# Patient Record
Sex: FEMALE | Race: BLACK OR AFRICAN AMERICAN | Employment: UNEMPLOYED | URBAN - METROPOLITAN AREA
[De-identification: names, ages, dates, MRNs, and addresses within clinical notes are randomized per-mention and may not be internally consistent; named-entity substitution may affect disease eponyms.]

---

## 2016-12-30 RX ORDER — FLUOROURACIL 50 MG/ML
824 INJECTION, SOLUTION INTRAVENOUS ONCE
Status: COMPLETED | OUTPATIENT
Start: 2017-01-03 | End: 2017-01-03

## 2017-01-03 ENCOUNTER — HOSPITAL ENCOUNTER (OUTPATIENT)
Dept: INFUSION THERAPY | Age: 62
Discharge: HOME | End: 2017-01-03
Payer: MEDICARE

## 2017-01-03 VITALS
HEIGHT: 60 IN | RESPIRATION RATE: 18 BRPM | BODY MASS INDEX: 40.93 KG/M2 | WEIGHT: 208.5 LBS | HEART RATE: 60 BPM | DIASTOLIC BLOOD PRESSURE: 80 MMHG | OXYGEN SATURATION: 100 % | TEMPERATURE: 97.6 F | SYSTOLIC BLOOD PRESSURE: 144 MMHG

## 2017-01-03 LAB
ALBUMIN SERPL BCP-MCNC: 3.1 G/DL (ref 3.4–5)
ALBUMIN/GLOB SERPL: 0.8 {RATIO} (ref 0.8–1.7)
ALP SERPL-CCNC: 94 U/L (ref 45–117)
ALT SERPL-CCNC: 25 U/L (ref 13–56)
ANION GAP BLD CALC-SCNC: 10 MMOL/L (ref 3–18)
APPEARANCE UR: ABNORMAL
AST SERPL W P-5'-P-CCNC: 17 U/L (ref 15–37)
BACTERIA URNS QL MICRO: ABNORMAL /HPF
BASO+EOS+MONOS # BLD AUTO: 0.9 K/UL (ref 0–2.3)
BASO+EOS+MONOS # BLD AUTO: 28 % (ref 0.1–17)
BILIRUB SERPL-MCNC: 0.2 MG/DL (ref 0.2–1)
BILIRUB UR QL: NEGATIVE
BUN SERPL-MCNC: 31 MG/DL (ref 7–18)
BUN/CREAT SERPL: 19 (ref 12–20)
CALCIUM SERPL-MCNC: 9.3 MG/DL (ref 8.5–10.1)
CHLORIDE SERPL-SCNC: 104 MMOL/L (ref 100–108)
CO2 SERPL-SCNC: 23 MMOL/L (ref 21–32)
COLOR UR: YELLOW
CREAT SERPL-MCNC: 1.65 MG/DL (ref 0.6–1.3)
DIFFERENTIAL METHOD BLD: ABNORMAL
EPITH CASTS URNS QL MICRO: ABNORMAL /LPF (ref 0–5)
ERYTHROCYTE [DISTWIDTH] IN BLOOD BY AUTOMATED COUNT: 20.1 % (ref 11.5–14.5)
GLOBULIN SER CALC-MCNC: 3.7 G/DL (ref 2–4)
GLUCOSE SERPL-MCNC: 88 MG/DL (ref 74–99)
GLUCOSE UR STRIP.AUTO-MCNC: NEGATIVE MG/DL
HCT VFR BLD AUTO: 31.2 % (ref 36–48)
HGB BLD-MCNC: 9.4 G/DL (ref 12–16)
HGB UR QL STRIP: ABNORMAL
KETONES UR QL STRIP.AUTO: NEGATIVE MG/DL
LEUKOCYTE ESTERASE UR QL STRIP.AUTO: ABNORMAL
LYMPHOCYTES # BLD AUTO: 34 % (ref 14–44)
LYMPHOCYTES # BLD: 1.1 K/UL (ref 1.1–5.9)
MCH RBC QN AUTO: 25.4 PG (ref 25–35)
MCHC RBC AUTO-ENTMCNC: 30.1 G/DL (ref 31–37)
MCV RBC AUTO: 84.3 FL (ref 78–102)
NEUTS SEG # BLD: 1.3 K/UL (ref 1.8–9.5)
NEUTS SEG NFR BLD AUTO: 38 % (ref 40–70)
NITRITE UR QL STRIP.AUTO: NEGATIVE
PH UR STRIP: 5.5 [PH] (ref 5–8)
PLATELET # BLD AUTO: 455 K/UL (ref 140–440)
POTASSIUM SERPL-SCNC: 4.2 MMOL/L (ref 3.5–5.5)
PROT SERPL-MCNC: 6.8 G/DL (ref 6.4–8.2)
PROT UR STRIP-MCNC: 300 MG/DL
RBC # BLD AUTO: 3.7 M/UL (ref 4.1–5.1)
RBC #/AREA URNS HPF: ABNORMAL /HPF (ref 0–5)
SODIUM SERPL-SCNC: 137 MMOL/L (ref 136–145)
SP GR UR REFRACTOMETRY: 1.02 (ref 1–1.03)
TRICHOMONAS UR QL MICRO: ABNORMAL
UROBILINOGEN UR QL STRIP.AUTO: 0.2 EU/DL (ref 0.2–1)
WBC # BLD AUTO: 3.3 K/UL (ref 4.5–13)
WBC URNS QL MICRO: ABNORMAL /HPF (ref 0–4)

## 2017-01-03 PROCEDURE — 96416 CHEMO PROLONG INFUSE W/PUMP: CPT

## 2017-01-03 PROCEDURE — 96368 THER/DIAG CONCURRENT INF: CPT

## 2017-01-03 PROCEDURE — 96375 TX/PRO/DX INJ NEW DRUG ADDON: CPT

## 2017-01-03 PROCEDURE — 96413 CHEMO IV INFUSION 1 HR: CPT

## 2017-01-03 PROCEDURE — 74011000258 HC RX REV CODE- 258: Performed by: INTERNAL MEDICINE

## 2017-01-03 PROCEDURE — 96411 CHEMO IV PUSH ADDL DRUG: CPT

## 2017-01-03 PROCEDURE — 80053 COMPREHEN METABOLIC PANEL: CPT | Performed by: INTERNAL MEDICINE

## 2017-01-03 PROCEDURE — 81001 URINALYSIS AUTO W/SCOPE: CPT | Performed by: INTERNAL MEDICINE

## 2017-01-03 PROCEDURE — 85025 COMPLETE CBC W/AUTO DIFF WBC: CPT | Performed by: INTERNAL MEDICINE

## 2017-01-03 PROCEDURE — 74011000258 HC RX REV CODE- 258

## 2017-01-03 PROCEDURE — 77030012965 HC NDL HUBR BBMI -A

## 2017-01-03 PROCEDURE — 74011250636 HC RX REV CODE- 250/636: Performed by: INTERNAL MEDICINE

## 2017-01-03 PROCEDURE — 96417 CHEMO IV INFUS EACH ADDL SEQ: CPT

## 2017-01-03 PROCEDURE — 74011250636 HC RX REV CODE- 250/636

## 2017-01-03 RX ORDER — PALONOSETRON 0.05 MG/ML
0.25 INJECTION, SOLUTION INTRAVENOUS ONCE
Status: COMPLETED | OUTPATIENT
Start: 2017-01-03 | End: 2017-01-03

## 2017-01-03 RX ORDER — SODIUM CHLORIDE 0.9 % (FLUSH) 0.9 %
10-40 SYRINGE (ML) INJECTION AS NEEDED
Status: DISCONTINUED | OUTPATIENT
Start: 2017-01-03 | End: 2017-01-07 | Stop reason: HOSPADM

## 2017-01-03 RX ORDER — METRONIDAZOLE 500 MG/1
2000 TABLET ORAL ONCE
Qty: 4 TAB | Refills: 0 | OUTPATIENT
Start: 2017-01-03 | End: 2017-01-03

## 2017-01-03 RX ORDER — METRONIDAZOLE 500 MG/1
500 TABLET ORAL 2 TIMES DAILY
Qty: 14 TAB | Refills: 0 | Status: CANCELLED | OUTPATIENT
Start: 2017-01-03 | End: 2017-01-10

## 2017-01-03 RX ORDER — DEXTROSE MONOHYDRATE 50 MG/ML
50 INJECTION, SOLUTION INTRAVENOUS AS NEEDED
Status: DISPENSED | OUTPATIENT
Start: 2017-01-03 | End: 2017-01-04

## 2017-01-03 RX ORDER — SODIUM CHLORIDE 9 MG/ML
50 INJECTION, SOLUTION INTRAVENOUS CONTINUOUS
Status: DISPENSED | OUTPATIENT
Start: 2017-01-03 | End: 2017-01-04

## 2017-01-03 RX ADMIN — DEXTROSE MONOHYDRATE 50 ML/HR: 5 INJECTION, SOLUTION INTRAVENOUS at 09:03

## 2017-01-03 RX ADMIN — Medication 30 ML: at 08:45

## 2017-01-03 RX ADMIN — Medication 10 ML: at 12:58

## 2017-01-03 RX ADMIN — FLUOROURACIL 4944 MG: 50 INJECTION, SOLUTION INTRAVENOUS at 13:04

## 2017-01-03 RX ADMIN — PALONOSETRON HYDROCHLORIDE 0.25 MG: 0.25 INJECTION INTRAVENOUS at 09:15

## 2017-01-03 RX ADMIN — FLUOROURACIL 824 MG: 50 INJECTION, SOLUTION INTRAVENOUS at 12:58

## 2017-01-03 RX ADMIN — DEXAMETHASONE SODIUM PHOSPHATE 12 MG: 4 INJECTION, SOLUTION INTRA-ARTICULAR; INTRALESIONAL; INTRAMUSCULAR; INTRAVENOUS; SOFT TISSUE at 09:30

## 2017-01-03 RX ADMIN — LEUCOVORIN CALCIUM 824 MG: 200 INJECTION, POWDER, LYOPHILIZED, FOR SOLUTION INTRAMUSCULAR; INTRAVENOUS at 10:13

## 2017-01-03 NOTE — PROGRESS NOTES
[facility] Progress Note    Date: January 3, 2017    Name: Luna Santos    MRN: [id number]         : 1955      Patient assessed and education was provided. Here for cycle 3 folfox*6+avastin     Patient vitals were reviewed. Visit Vitals    /80 (BP 1 Location: Left arm, BP Patient Position: At rest)    Pulse 60    Temp 97.6 °F (36.4 °C)    Resp 18    Ht 5' (1.524 m)    Wt 94.6 kg (208 lb 8 oz)    SpO2 100%    BMI 40.72 kg/m2       Right chest Medi-port was accessed. Brisk flush/blood returns noted. Blood sample obtained for cbc,cmp and hepatic function      Urine sample obtained and sent for urinalysis with microscopic      Recent Results (from the past 12 hour(s))   CBC WITH 3 PART DIFF    Collection Time: 17  8:30 AM   Result Value Ref Range    WBC 3.3 (L) 4.5 - 13.0 K/uL    RBC 3.70 (L) 4.10 - 5.10 M/uL    HGB 9.4 (L) 12.0 - 16 g/dL    HCT 31.2 (L) 36 - 48 %    MCV 84.3 78 - 102 FL    MCH 25.4 25.0 - 35.0 PG    MCHC 30.1 (L) 31 - 37 g/dL    RDW 20.1 (H) 11.5 - 14.5 %    PLATELET 706 (H) 511 - 440 K/uL    NEUTROPHILS 38 (L) 40 - 70 %    MIXED CELLS 28 (H) 0.1 - 17 %    LYMPHOCYTES 34 14 - 44 %    ABS. NEUTROPHILS 1.3 (L) 1.8 - 9.5 K/UL    ABS. MIXED CELLS 0.9 0.0 - 2.3 K/uL    ABS. LYMPHOCYTES 1.1 1.1 - 5.9 K/UL    DF AUTOMATED     METABOLIC PANEL, COMPREHENSIVE    Collection Time: 17  8:45 AM   Result Value Ref Range    Sodium 137 136 - 145 mmol/L    Potassium 4.2 3.5 - 5.5 mmol/L    Chloride 104 100 - 108 mmol/L    CO2 23 21 - 32 mmol/L    Anion gap 10 3.0 - 18 mmol/L    Glucose 88 74 - 99 mg/dL    BUN 31 (H) 7.0 - 18 MG/DL    Creatinine 1.65 (H) 0.6 - 1.3 MG/DL    BUN/Creatinine ratio 19 12 - 20      GFR est AA 38 (L) >60 ml/min/1.73m2    GFR est non-AA 31 (L) >60 ml/min/1.73m2    Calcium 9.3 8.5 - 10.1 MG/DL    Bilirubin, total 0.2 0.2 - 1.0 MG/DL    ALT 25 13 - 56 U/L    AST 17 15 - 37 U/L    Alk.  phosphatase 94 45 - 117 U/L    Protein, total 6.8 6.4 - 8.2 g/dL    Albumin 3.1 (L) 3.4 - 5.0 g/dL    Globulin 3.7 2.0 - 4.0 g/dL    A-G Ratio 0.8 0.8 - 1.7     URINALYSIS W/MICROSCOPIC    Collection Time: 01/03/17 10:00 AM   Result Value Ref Range    Color YELLOW      Appearance CLOUDY      Specific gravity 1.020 1.005 - 1.030      pH (UA) 5.5 5.0 - 8.0      Protein 300 (A) NEG mg/dL    Glucose NEGATIVE  NEG mg/dL    Ketone NEGATIVE  NEG mg/dL    Bilirubin NEGATIVE  NEG      Blood TRACE (A) NEG      Urobilinogen 0.2 0.2 - 1.0 EU/dL    Nitrites NEGATIVE  NEG      Leukocyte Esterase LARGE (A) NEG      WBC 15 to 20 0 - 4 /hpf    RBC 2 to 4 0 - 5 /hpf    Epithelial cells 1+ 0 - 5 /lpf    Bacteria 1+ (A) NEG /hpf    Trichomonas 1+ (A) NEG       Hayden Hagan NP made aware of abnormal urinalysis. Okay to proceed with FOLFOX*6 TODAY. Hold avastin per Ele. Hardeep Le instructed patient to follow up with pcp asap for urinary tract infection. Hardeep Le will discuss with Dr Iris Garay regarding abx treatment for patient that is compatable with chemo. She will call patient at home regarding plan of care      Pre-medications of aloxi and decadron IV were administered as ordered and chemotherapy cycle 3 was initiated. Leucovorin 824 mg and oxaliplatin 175 mg was infused concurrently over 2 hrs. Tolerated well. Brisk flush/blood returns noted from port. 5 fu 824 mg IVP given over 2-4 minutes, followed by 5 fu continous cadd pump of 4944 mg to run over 46 hrs    Patient Vitals for the past 12 hrs:   Temp Pulse Resp BP SpO2   01/03/17 1247 - 60 18 144/80 -   01/03/17 1135 97.6 °F (36.4 °C) 62 16 126/69 -   01/03/17 0830 98.5 °F (36.9 °C) 63 18 131/68 100 %       Hadley needle intact to port site and drsg d/i. No signs of infiltration or infection noted. drsg and all hook ups secured      Discharge instructions given.  Patient verbalized understanding       Patient was discharged from [place] [address] in stable condition at 1315 She is to return on 1/5/17 at 2 pm for her next appointment at [facility] to sam her pump and de-access her port    Mike Gao, PABLO  January 3, 2017  5:05 PM

## 2017-01-05 ENCOUNTER — HOSPITAL ENCOUNTER (OUTPATIENT)
Dept: INFUSION THERAPY | Age: 62
Discharge: HOME | End: 2017-01-05
Payer: MEDICARE

## 2017-01-05 VITALS
TEMPERATURE: 97.4 F | HEART RATE: 64 BPM | OXYGEN SATURATION: 97 % | RESPIRATION RATE: 18 BRPM | DIASTOLIC BLOOD PRESSURE: 96 MMHG | SYSTOLIC BLOOD PRESSURE: 162 MMHG

## 2017-01-05 PROCEDURE — 74011250636 HC RX REV CODE- 250/636: Performed by: INTERNAL MEDICINE

## 2017-01-05 RX ORDER — SODIUM CHLORIDE 0.9 % (FLUSH) 0.9 %
10-40 SYRINGE (ML) INJECTION AS NEEDED
Status: DISPENSED | OUTPATIENT
Start: 2017-01-05 | End: 2017-01-06

## 2017-01-05 RX ORDER — HEPARIN SODIUM (PORCINE) LOCK FLUSH IV SOLN 100 UNIT/ML 100 UNIT/ML
500 SOLUTION INTRAVENOUS AS NEEDED
Status: DISPENSED | OUTPATIENT
Start: 2017-01-05 | End: 2017-01-06

## 2017-01-05 RX ADMIN — Medication 20 ML: at 14:36

## 2017-01-05 RX ADMIN — HEPARIN SODIUM (PORCINE) LOCK FLUSH IV SOLN 100 UNIT/ML 500 UNITS: 100 SOLUTION at 14:36

## 2017-01-05 NOTE — PROGRESS NOTES
[facility] Progress Note    Date: 2017    Name: Marco Waddell    MRN: [id number]         : 1955     Port flush removal of CADD pump    Ms. Polk was assessed and education was provided. Pt arrived ambulatory for scheduled appt, pt states \"the steriods is making her BP high\" Denies any nausea, vomiting, fever, chills,  Or pain. Ms. Polk's vitals were reviewed and patient was observed for 5 minutes prior to procedure. Visit Vitals    BP (!) 162/96 (BP 1 Location: Left arm, BP Patient Position: Sitting)    Pulse 64    Temp 97.4 °F (36.3 °C)    Resp 18    SpO2 97%           Cadd pump noted with 30 ml remaining to be infused. Rate set in CADD pump noted to be 4.5ml/hr instead of ordered rate of  5.4ml/hr. Patient informed she will need to wait for an hour to hour and half for CADD pump to be completed prior to de-accessing. Patient states she has a ride waiting on her outside and cannot wait that long and would like the CADD pump off. CADD pump appeared to have no solution in container. CADD pump taken off and port was flushed with 20 ml of saline after blood return was verified and then followed by heparin 500 units. Hadley needle(s) removed. Band-Aid applied. Ms. Robe Vega tolerated the procedure, and had no complaints. Ms. Robe Vega was discharged from [place] [address] in stable condition at [phone]. She is to return on 17 at 1000 for her next appointment for yoselin Plunkett  2017  3:26 PM

## 2017-01-13 RX ORDER — FLUOROURACIL 50 MG/ML
824 INJECTION, SOLUTION INTRAVENOUS ONCE
Status: COMPLETED | OUTPATIENT
Start: 2017-01-17 | End: 2017-01-17

## 2017-01-16 RX ORDER — SODIUM CHLORIDE 9 MG/ML
50 INJECTION, SOLUTION INTRAVENOUS CONTINUOUS
Status: DISPENSED | OUTPATIENT
Start: 2017-01-17 | End: 2017-01-17

## 2017-01-16 RX ORDER — SODIUM CHLORIDE 0.9 % (FLUSH) 0.9 %
10-40 SYRINGE (ML) INJECTION AS NEEDED
Status: DISCONTINUED | OUTPATIENT
Start: 2017-01-17 | End: 2017-01-21 | Stop reason: HOSPADM

## 2017-01-16 RX ORDER — DEXTROSE MONOHYDRATE 50 MG/ML
50 INJECTION, SOLUTION INTRAVENOUS AS NEEDED
Status: DISPENSED | OUTPATIENT
Start: 2017-01-17 | End: 2017-01-17

## 2017-01-17 ENCOUNTER — DOCUMENTATION ONLY (OUTPATIENT)
Dept: INFUSION THERAPY | Age: 62
End: 2017-01-17

## 2017-01-17 ENCOUNTER — HOSPITAL ENCOUNTER (OUTPATIENT)
Dept: INFUSION THERAPY | Age: 62
Discharge: HOME | End: 2017-01-17
Payer: MEDICARE

## 2017-01-17 VITALS
SYSTOLIC BLOOD PRESSURE: 145 MMHG | OXYGEN SATURATION: 98 % | HEIGHT: 60 IN | BODY MASS INDEX: 42.27 KG/M2 | TEMPERATURE: 98 F | WEIGHT: 215.3 LBS | RESPIRATION RATE: 18 BRPM | HEART RATE: 72 BPM | DIASTOLIC BLOOD PRESSURE: 81 MMHG

## 2017-01-17 LAB
APPEARANCE UR: ABNORMAL
BACTERIA URNS QL MICRO: ABNORMAL /HPF
BASOPHILS # BLD AUTO: 0 K/UL (ref 0–0.06)
BASOPHILS # BLD: 1 % (ref 0–3)
BILIRUB UR QL: NEGATIVE
COLOR UR: YELLOW
DIFFERENTIAL METHOD BLD: ABNORMAL
EOSINOPHIL # BLD: 0.1 K/UL (ref 0–0.4)
EOSINOPHIL NFR BLD: 4 % (ref 0–5)
EPITH CASTS URNS QL MICRO: ABNORMAL /LPF (ref 0–5)
ERYTHROCYTE [DISTWIDTH] IN BLOOD BY AUTOMATED COUNT: 21.3 % (ref 11.6–14.5)
GLUCOSE UR STRIP.AUTO-MCNC: NEGATIVE MG/DL
HCT VFR BLD AUTO: 29.9 % (ref 35–45)
HGB BLD-MCNC: 9.1 G/DL (ref 12–16)
HGB UR QL STRIP: ABNORMAL
KETONES UR QL STRIP.AUTO: NEGATIVE MG/DL
LEUKOCYTE ESTERASE UR QL STRIP.AUTO: ABNORMAL
LYMPHOCYTES # BLD AUTO: 36 % (ref 20–51)
LYMPHOCYTES # BLD: 1.1 K/UL (ref 0.8–3.5)
MCH RBC QN AUTO: 25.9 PG (ref 24–34)
MCHC RBC AUTO-ENTMCNC: 30.4 G/DL (ref 31–37)
MCV RBC AUTO: 85.2 FL (ref 74–97)
MONOCYTES # BLD: 0.6 K/UL (ref 0–1)
MONOCYTES NFR BLD AUTO: 19 % (ref 2–9)
NEUTS SEG # BLD: 1.3 K/UL (ref 1.8–8)
NEUTS SEG NFR BLD AUTO: 40 % (ref 42–75)
NITRITE UR QL STRIP.AUTO: NEGATIVE
PH UR STRIP: 6 [PH] (ref 5–8)
PLATELET # BLD AUTO: 383 K/UL (ref 135–420)
PLATELET COMMENTS,PCOM: ABNORMAL
PMV BLD AUTO: 9.4 FL (ref 9.2–11.8)
PROT UR STRIP-MCNC: 300 MG/DL
RBC # BLD AUTO: 3.51 M/UL (ref 4.2–5.3)
RBC #/AREA URNS HPF: ABNORMAL /HPF (ref 0–5)
RBC MORPH BLD: ABNORMAL
RBC MORPH BLD: ABNORMAL
SP GR UR REFRACTOMETRY: 1.02 (ref 1–1.03)
TRICHOMONAS UR QL MICRO: ABNORMAL
UROBILINOGEN UR QL STRIP.AUTO: 1 EU/DL (ref 0.2–1)
WBC # BLD AUTO: 3.1 K/UL (ref 4.6–13.2)
WBC URNS QL MICRO: ABNORMAL /HPF (ref 0–4)

## 2017-01-17 PROCEDURE — 96416 CHEMO PROLONG INFUSE W/PUMP: CPT

## 2017-01-17 PROCEDURE — 96411 CHEMO IV PUSH ADDL DRUG: CPT

## 2017-01-17 PROCEDURE — 74011000258 HC RX REV CODE- 258: Performed by: INTERNAL MEDICINE

## 2017-01-17 PROCEDURE — 77030012965 HC NDL HUBR BBMI -A

## 2017-01-17 PROCEDURE — 81001 URINALYSIS AUTO W/SCOPE: CPT | Performed by: INTERNAL MEDICINE

## 2017-01-17 PROCEDURE — 96375 TX/PRO/DX INJ NEW DRUG ADDON: CPT

## 2017-01-17 PROCEDURE — 74011250636 HC RX REV CODE- 250/636

## 2017-01-17 PROCEDURE — 74011000258 HC RX REV CODE- 258

## 2017-01-17 PROCEDURE — 96409 CHEMO IV PUSH SNGL DRUG: CPT

## 2017-01-17 PROCEDURE — 85025 COMPLETE CBC W/AUTO DIFF WBC: CPT | Performed by: INTERNAL MEDICINE

## 2017-01-17 PROCEDURE — 96417 CHEMO IV INFUS EACH ADDL SEQ: CPT

## 2017-01-17 PROCEDURE — 96413 CHEMO IV INFUSION 1 HR: CPT

## 2017-01-17 PROCEDURE — 74011250636 HC RX REV CODE- 250/636: Performed by: INTERNAL MEDICINE

## 2017-01-17 RX ORDER — LORAZEPAM 0.5 MG/1
0.5 TABLET ORAL
Qty: 90 TAB | Refills: 0 | OUTPATIENT
Start: 2017-01-17 | End: 2017-10-11

## 2017-01-17 RX ORDER — PALONOSETRON 0.05 MG/ML
0.25 INJECTION, SOLUTION INTRAVENOUS ONCE
Status: COMPLETED | OUTPATIENT
Start: 2017-01-17 | End: 2017-01-17

## 2017-01-17 RX ADMIN — FLUOROURACIL 824 MG: 50 INJECTION, SOLUTION INTRAVENOUS at 15:21

## 2017-01-17 RX ADMIN — PALONOSETRON HYDROCHLORIDE 0.25 MG: 0.25 INJECTION INTRAVENOUS at 11:32

## 2017-01-17 RX ADMIN — DEXTROSE MONOHYDRATE 50 ML/HR: 5 INJECTION, SOLUTION INTRAVENOUS at 11:30

## 2017-01-17 RX ADMIN — Medication 30 ML: at 10:10

## 2017-01-17 RX ADMIN — LEUCOVORIN CALCIUM 824 MG: 200 INJECTION, POWDER, LYOPHILIZED, FOR SOLUTION INTRAMUSCULAR; INTRAVENOUS at 12:32

## 2017-01-17 RX ADMIN — FLUOROURACIL 4944 MG: 50 INJECTION, SOLUTION INTRAVENOUS at 15:25

## 2017-01-17 RX ADMIN — DEXAMETHASONE SODIUM PHOSPHATE 12 MG: 4 INJECTION, SOLUTION INTRAMUSCULAR; INTRAVENOUS at 11:35

## 2017-01-17 NOTE — PROGRESS NOTES
[facility] Progress Note    Date: 2017    Name: Nicole Marsh    MRN: [id number]         : 1955      Patient assessed and education was provided. Here for cycle 4 folfox*6+avastin. Patient states she saw her pcp regarding urinary tract infection and blood sugar elevated on chemo days. Stated that meds were readjusted for elevated blood glucose and urine culture was done, but results not back yet. Patient anxious and states she is overwhelmed regarding chemotherapy and other health issues. Dena Monroy NP made aware and saw patient in [facility]. I also rendered nurtured loving kindness, reassurances and reinforcement of treatment to be given    Patient vitals were reviewed. Visit Vitals    /81 (BP 1 Location: Left arm, BP Patient Position: At rest)    Pulse 72    Temp 98 °F (36.7 °C)    Resp 18    Ht 5' (1.524 m)    Wt 97.7 kg (215 lb 4.8 oz)    SpO2 98%    BMI 42.05 kg/m2       Right chest Medi-port was accessed. Brisk flush/blood returns noted. Blood sample obtained for cbc. Urine sample obtained and sent for urinalysis with microscopic after 3 attempts to void. Patient states that she has renal disease and does not void a lot      Recent Results (from the past 12 hour(s))   CBC WITH AUTOMATED DIFF    Collection Time: 17 10:33 AM   Result Value Ref Range    WBC 3.1 (L) 4.6 - 13.2 K/uL    RBC 3.51 (L) 4.20 - 5.30 M/uL    HGB 9.1 (L) 12.0 - 16.0 g/dL    HCT 29.9 (L) 35.0 - 45.0 %    MCV 85.2 74.0 - 97.0 FL    MCH 25.9 24.0 - 34.0 PG    MCHC 30.4 (L) 31.0 - 37.0 g/dL    RDW 21.3 (H) 11.6 - 14.5 %    PLATELET 224 518 - 055 K/uL    MPV 9.4 9.2 - 11.8 FL    NEUTROPHILS PENDING %    LYMPHOCYTES PENDING %    MONOCYTES PENDING %    EOSINOPHILS PENDING %    BASOPHILS PENDING %    ABS. NEUTROPHILS PENDING K/UL    ABS. LYMPHOCYTES PENDING K/UL    ABS. MONOCYTES PENDING K/UL    ABS. EOSINOPHILS PENDING K/UL    ABS.  BASOPHILS PENDING K/UL    DF PENDING    URINALYSIS W/MICROSCOPIC Collection Time: 01/17/17 12:25 PM   Result Value Ref Range    Color YELLOW      Appearance CLOUDY      Specific gravity 1.021 1.005 - 1.030      pH (UA) 6.0 5.0 - 8.0      Protein 300 (A) NEG mg/dL    Glucose NEGATIVE  NEG mg/dL    Ketone NEGATIVE  NEG mg/dL    Bilirubin NEGATIVE  NEG      Blood SMALL (A) NEG      Urobilinogen 1.0 0.2 - 1.0 EU/dL    Nitrites NEGATIVE  NEG      Leukocyte Esterase MODERATE (A) NEG      WBC 15 to 25 0 - 4 /hpf    RBC 4 to 10 0 - 5 /hpf    Epithelial cells 2+ 0 - 5 /lpf    Bacteria 1+ (A) NEG /hpf    Trichomonas 1+ (A) NEG       Nat Rogers NP made aware of abnormal urinalysis. Okay to proceed with FOLFOX*6 TODAY. Hold avastin per Ele. Fabiola Ellsworth instructed patient to follow up with pcp asap for urinary tract infection. Fabiola Ellsworth will call patient's pcp regarding plan of care for UTI. Patient is to see Dr Lester Booth before her cycle 4 chemo for evaluation and plan of care per Fabiola Ellsworth. Patient made aware. She has an appt with Dr Lester Booth on 1/25/17. Patient was given a prescription for ativan from Fabiola Ellsworth. Pre-medications of aloxi and decadron IV were administered as ordered and chemotherapy cycle 4 was initiated. Leucovorin 824 mg and oxaliplatin 175 mg was infused concurrently over 2 hrs. Tolerated well. Brisk flush/blood returns noted from port. 5 fu 824 mg IVP given over 2-4 minutes, followed by 5 fu continous cadd pump of 4944 mg to run over 46 hrs    Patient Vitals for the past 12 hrs:   Temp Pulse Resp BP SpO2   01/17/17 1532 98 °F (36.7 °C) 72 18 145/81 98 %   01/17/17 1449 98.2 °F (36.8 °C) 71 18 (!) 156/91 -   01/17/17 1005 98.7 °F (37.1 °C) 70 18 145/86 100 %       Hadley needle intact to port site and drsg d/i. No signs of infiltration or infection noted. drsg and all hook ups secured      Discharge instructions given.  Patient verbalized understanding       Patient was discharged from [place] [address] in stable condition at 1535 She is to return on 1/19/17 at 1330 pm for her  appointment at [facility] to sam her pump and de-access her port    Mike Part, PABLO  January 17, 2017  5:05 PM

## 2017-01-17 NOTE — PROGRESS NOTES
Pt expressed feelings of anxiety and worry as she experiences her skin color changes during her chemotherapy treatments. She expressed that on the days of her chemo, she notices that her blood pressure is elevated higher than most days. In addition, she states that she often over thinks about her outcome whether she will live or die. She remains hopeful but does agree to take Ativan 0.5mg prn TID for now in hope of decreasing her anxiety. She denies suicidal ideation. Will reassess at her next [facility] visit.

## 2017-01-18 RX ORDER — PREDNISONE 20 MG/1
40 TABLET ORAL DAILY
Qty: 4 TAB | Refills: 2 | Status: SHIPPED | OUTPATIENT
Start: 2017-01-18 | End: 2017-02-24 | Stop reason: SDUPTHER

## 2017-01-18 NOTE — TELEPHONE ENCOUNTER
Pt Rx for Prednisone has . She needs a new one called in to her pharmacy. She is requesting an extended amount of refills or an increased number of pills in her Rx as she has treatment every two weeks and states that her right now her Rx is not enough. You may call her at [phone].

## 2017-01-19 ENCOUNTER — HOSPITAL ENCOUNTER (OUTPATIENT)
Dept: INFUSION THERAPY | Age: 62
Discharge: HOME | End: 2017-01-19
Payer: MEDICARE

## 2017-01-19 ENCOUNTER — DOCUMENTATION ONLY (OUTPATIENT)
Dept: INFUSION THERAPY | Age: 62
End: 2017-01-19

## 2017-01-19 VITALS
OXYGEN SATURATION: 97 % | RESPIRATION RATE: 18 BRPM | TEMPERATURE: 97.9 F | SYSTOLIC BLOOD PRESSURE: 169 MMHG | HEART RATE: 71 BPM | DIASTOLIC BLOOD PRESSURE: 80 MMHG

## 2017-01-19 PROCEDURE — 74011250636 HC RX REV CODE- 250/636: Performed by: INTERNAL MEDICINE

## 2017-01-19 RX ORDER — SODIUM CHLORIDE 0.9 % (FLUSH) 0.9 %
10-40 SYRINGE (ML) INJECTION AS NEEDED
Status: CANCELLED | OUTPATIENT
Start: 2017-01-19

## 2017-01-19 RX ORDER — SODIUM CHLORIDE 0.9 % (FLUSH) 0.9 %
10-40 SYRINGE (ML) INJECTION AS NEEDED
Status: DISCONTINUED | OUTPATIENT
Start: 2017-01-19 | End: 2017-01-23 | Stop reason: HOSPADM

## 2017-01-19 RX ORDER — HEPARIN SODIUM (PORCINE) LOCK FLUSH IV SOLN 100 UNIT/ML 100 UNIT/ML
SOLUTION INTRAVENOUS
Status: DISPENSED
Start: 2017-01-19 | End: 2017-01-20

## 2017-01-19 RX ORDER — HEPARIN SODIUM (PORCINE) LOCK FLUSH IV SOLN 100 UNIT/ML 100 UNIT/ML
500 SOLUTION INTRAVENOUS AS NEEDED
Status: ACTIVE | OUTPATIENT
Start: 2017-01-19 | End: 2017-01-20

## 2017-01-19 RX ORDER — HEPARIN SODIUM (PORCINE) LOCK FLUSH IV SOLN 100 UNIT/ML 100 UNIT/ML
500 SOLUTION INTRAVENOUS AS NEEDED
Status: CANCELLED | OUTPATIENT
Start: 2017-01-19 | End: 2017-01-20

## 2017-01-19 RX ADMIN — Medication 20 ML: at 13:45

## 2017-01-19 RX ADMIN — HEPARIN SODIUM (PORCINE) LOCK FLUSH IV SOLN 100 UNIT/ML 500 UNITS: 100 SOLUTION at 13:45

## 2017-01-19 NOTE — PROGRESS NOTES
[facility] Progress Note    Date: 2017    Name: Cassandra Pratt    MRN: [id number]         : 1955    D/C pump    Ms. Polk arrived ambulatory to [facility] at 1335. She  was assessed and education was provided. Ms. Polk's vitals were reviewed and patient was observed for 5 minutes prior to procedure. Visit Vitals    /80 (BP 1 Location: Right arm, BP Patient Position: Sitting)    Pulse 71    Temp 97.9 °F (36.6 °C)    Resp 18    SpO2 97%     CADD pump completed infusion and removed from patient. Port flushed with 20 mL and 500 units of heparin after blood return was verified. Band-aid applied. Ms. Lita Arteaga tolerated the procedure, and had no complaints. Ms. Lita Arteaga was discharged from [place] [address] in stable condition at 1350. She is to return on 17 at 1000 for her next appointment for chemo.     Armond Benítez RN  2017

## 2017-01-19 NOTE — PROGRESS NOTES
Spoke with pts PCP today regarding her proteinuria. Pt has DM2. Nephrology referral has been submitted today per PCP. In addition, her most recent urine cx sent through her PCP was negative for bacteria.

## 2017-01-25 ENCOUNTER — HOSPITAL ENCOUNTER (OUTPATIENT)
Dept: LAB | Age: 62
Discharge: HOME | End: 2017-01-25
Payer: MEDICARE

## 2017-01-25 ENCOUNTER — HOSPITAL ENCOUNTER (OUTPATIENT)
Dept: ONCOLOGY | Age: 62
Discharge: HOME | End: 2017-01-25

## 2017-01-25 ENCOUNTER — OFFICE VISIT (OUTPATIENT)
Dept: ONCOLOGY | Age: 62
End: 2017-01-25

## 2017-01-25 VITALS
HEART RATE: 76 BPM | BODY MASS INDEX: 41.82 KG/M2 | WEIGHT: 213 LBS | SYSTOLIC BLOOD PRESSURE: 170 MMHG | HEIGHT: 60 IN | DIASTOLIC BLOOD PRESSURE: 90 MMHG | TEMPERATURE: 98.6 F

## 2017-01-25 DIAGNOSIS — C19 COLORECTAL CARCINOMA (HCC): ICD-10-CM

## 2017-01-25 DIAGNOSIS — T45.1X5A CHEMOTHERAPY INDUCED NEUTROPENIA (HCC): ICD-10-CM

## 2017-01-25 DIAGNOSIS — D63.1 ANEMIA IN CHRONIC KIDNEY DISEASE (CKD): ICD-10-CM

## 2017-01-25 DIAGNOSIS — T45.1X5A ANTINEOPLASTIC CHEMOTHERAPY INDUCED ANEMIA: ICD-10-CM

## 2017-01-25 DIAGNOSIS — N18.9 ANEMIA IN CHRONIC KIDNEY DISEASE (CKD): ICD-10-CM

## 2017-01-25 DIAGNOSIS — D64.81 ANTINEOPLASTIC CHEMOTHERAPY INDUCED ANEMIA: ICD-10-CM

## 2017-01-25 DIAGNOSIS — D70.1 CHEMOTHERAPY INDUCED NEUTROPENIA (HCC): ICD-10-CM

## 2017-01-25 DIAGNOSIS — C19 COLORECTAL CARCINOMA (HCC): Primary | ICD-10-CM

## 2017-01-25 LAB
ALBUMIN SERPL BCP-MCNC: 2.9 G/DL (ref 3.4–5)
ALBUMIN/GLOB SERPL: 0.8 {RATIO} (ref 0.8–1.7)
ALP SERPL-CCNC: 80 U/L (ref 45–117)
ALT SERPL-CCNC: 19 U/L (ref 13–56)
ANION GAP BLD CALC-SCNC: 9 MMOL/L (ref 3–18)
AST SERPL W P-5'-P-CCNC: 15 U/L (ref 15–37)
BASO+EOS+MONOS # BLD AUTO: 0.7 K/UL (ref 0–2.3)
BASO+EOS+MONOS # BLD AUTO: 19 % (ref 0.1–17)
BILIRUB SERPL-MCNC: 0.2 MG/DL (ref 0.2–1)
BUN SERPL-MCNC: 41 MG/DL (ref 7–18)
BUN/CREAT SERPL: 21 (ref 12–20)
CALCIUM SERPL-MCNC: 8.2 MG/DL (ref 8.5–10.1)
CHLORIDE SERPL-SCNC: 107 MMOL/L (ref 100–108)
CO2 SERPL-SCNC: 24 MMOL/L (ref 21–32)
CREAT SERPL-MCNC: 1.97 MG/DL (ref 0.6–1.3)
DIFFERENTIAL METHOD BLD: ABNORMAL
ERYTHROCYTE [DISTWIDTH] IN BLOOD BY AUTOMATED COUNT: 19.9 % (ref 11.5–14.5)
FERRITIN SERPL-MCNC: 182 NG/ML (ref 8–388)
GLOBULIN SER CALC-MCNC: 3.6 G/DL (ref 2–4)
GLUCOSE SERPL-MCNC: 80 MG/DL (ref 74–99)
HCT VFR BLD AUTO: 29.6 % (ref 36–48)
HGB BLD-MCNC: 9.4 G/DL (ref 12–16)
IRON SATN MFR SERPL: 9 %
IRON SERPL-MCNC: 27 UG/DL (ref 50–175)
LYMPHOCYTES # BLD AUTO: 35 % (ref 14–44)
LYMPHOCYTES # BLD: 1.4 K/UL (ref 1.1–5.9)
MCH RBC QN AUTO: 26.9 PG (ref 25–35)
MCHC RBC AUTO-ENTMCNC: 31.8 G/DL (ref 31–37)
MCV RBC AUTO: 84.6 FL (ref 78–102)
NEUTS SEG # BLD: 1.8 K/UL (ref 1.8–9.5)
NEUTS SEG NFR BLD AUTO: 46 % (ref 40–70)
PLATELET # BLD AUTO: 353 K/UL (ref 140–440)
POTASSIUM SERPL-SCNC: 4.6 MMOL/L (ref 3.5–5.5)
PROT SERPL-MCNC: 6.5 G/DL (ref 6.4–8.2)
RBC # BLD AUTO: 3.5 M/UL (ref 4.1–5.1)
SODIUM SERPL-SCNC: 140 MMOL/L (ref 136–145)
TIBC SERPL-MCNC: 296 UG/DL (ref 250–450)
WBC # BLD AUTO: 3.9 K/UL (ref 4.5–13)

## 2017-01-25 PROCEDURE — 83540 ASSAY OF IRON: CPT | Performed by: INTERNAL MEDICINE

## 2017-01-25 PROCEDURE — 36415 COLL VENOUS BLD VENIPUNCTURE: CPT | Performed by: INTERNAL MEDICINE

## 2017-01-25 PROCEDURE — 82378 CARCINOEMBRYONIC ANTIGEN: CPT | Performed by: INTERNAL MEDICINE

## 2017-01-25 PROCEDURE — 82728 ASSAY OF FERRITIN: CPT | Performed by: INTERNAL MEDICINE

## 2017-01-25 PROCEDURE — 80053 COMPREHEN METABOLIC PANEL: CPT | Performed by: INTERNAL MEDICINE

## 2017-01-25 NOTE — MR AVS SNAPSHOT
Visit Information Date & Time Provider Department Dept. Phone Encounter #  
 1/25/2017 10:30 AM Killian Mayorga MD [facility] [phone] [id number] Your Appointments 3/8/2017 11:00 AM  
Office Visit with Killian Mayorga MD  
[facility] [facility]  Oncology [facility]) Appt Note: 6 wk fu  
 [address], [address] [place] [address] Upcoming Health Maintenance Date Due Hepatitis C Screening 1955 DTaP/Tdap/Td series (1 - Tdap) 8/26/1974 PAP AKA CERVICAL CYTOLOGY 8/26/1974 BREAST CANCER SCRN MAMMOGRAM 8/26/2003 FOBT Q 1 YEAR AGE 50-75 8/26/2003 ZOSTER VACCINE AGE 60> 8/26/2013 INFLUENZA AGE 9 TO ADULT 8/1/2016 Pneumococcal 19-64 Highest Risk (2 of 3 - PCV13) 3/1/2017 Allergies as of 1/25/2017  Review Complete On: 1/17/2017 By: Jose A Bahena, PABLO Severity Noted Reaction Type Reactions Latex  08/04/2016    Other (comments) Asa-acetaminophen-caff-buffers  08/04/2016    Other (comments) Codeine  08/04/2016    Other (comments) Pcn [Penicillins]  08/04/2016    Other (comments) Sulfa (Sulfonamide Antibiotics)  08/04/2016    Other (comments) Current Immunizations  Reviewed on 1/17/2017 Name Date Influenza Vaccine 3/1/2016 Pneumococcal Vaccine (Unspecified Type) 3/1/2016 Not reviewed this visit You Were Diagnosed With   
  
 Codes Comments [facility]    -  Primary ICD-10-CM: C19 
ICD-9-CM: 154.0 Vitals BP Pulse Temp Height(growth percentile) Weight(growth percentile) BMI  
 170/90 76 98.6 °F (37 °C) 5' (1.524 m) 213 lb (96.6 kg) 41.6 kg/m2 Smoking Status Never Smoker BMI and BSA Data Body Mass Index Body Surface Area  
 41.6 kg/m 2 2.02 m 2 Preferred Pharmacy Pharmacy Name Phone [facility] [address] [facility] [address] [facility] [place] Your Updated Medication List  
  
   
This list is accurate as of: 1/25/17 11:14 AM.  Always use your most recent med list.  
  
  
  
  
 acetaminophen 650 mg CR tablet Commonly known as:  TYLENOL  
1,300 mg.  
  
 albuterol 90 mcg/actuation inhaler Commonly known as:  PROVENTIL HFA, VENTOLIN HFA, PROAIR HFA  
2 Puffs. allopurinol 300 mg tablet Commonly known as:  ZYLOPRIM  
300 mg.  
  
 bisoprolol-hydroCHLOROthiazide 10-6.25 mg per tablet Commonly known as:  [facility] Take 1 Tab by Mouth Once a Day. cloNIDine HCl 0.1 mg tablet Commonly known as:  CATAPRES  
0.1 mg.  
  
 colchicine 0.6 mg tablet  
0.6 mg.  
  
 desonide 0.05 % cream  
Commonly known as:  Morgan Ofelia Use 1 Application to affected area Twice Daily. diphenhydrAMINE 25 mg capsule Commonly known as:  BENADRYL Take 1 with compazine every 6 hours for nausea for 3 days then, as needed. ergocalciferol 50,000 unit capsule Commonly known as:  ERGOCALCIFEROL  
50,000 Units. fluconazole 150 mg tablet Commonly known as:  DIFLUCAN  
TAKE 1 TABLET BY MOUTH ONCE DAILY TODAY, MAY REPEAT AFTER 3 DAYS AS NEEDED  
  
 furosemide 20 mg tablet Commonly known as:  LASIX Take 1/2-1 tablet daily if needed for swelling.  
  
 gabapentin 300 mg capsule Commonly known as:  NEURONTIN  
300 mg.  
  
 glimepiride 4 mg tablet Commonly known as:  AMARYL Take one in the am.  New dose. glipiZIDE 10 mg tablet Commonly known as:  Rianna Isles 10 mg.  
  
 lidocaine-prilocaine topical cream  
Commonly known as:  EMLA Place cream over mediport 1 hour prior to chemotherapy and then place saran wrap over area. Every chemo treatment. LORazepam 0.5 mg tablet Commonly known as:  ATIVAN Take 1 Tab by mouth every eight (8) hours as needed for Anxiety. Max Daily Amount: 1.5 mg. Indications: ANXIETY nitrofurantoin (macrocrystal-monohydrate) 100 mg capsule Commonly known as:  MACROBID Take 1 Cap by mouth two (2) times a day. ondansetron hcl 8 mg tablet Commonly known as:  ZOFRAN (AS HYDROCHLORIDE) Take one tablet by mouth every 8 hours for nausea beginning the night of chemo for 3 days. * predniSONE 20 mg tablet Commonly known as:  Luis Felling Take 2 Tabs by mouth daily (with breakfast). Take 40 mg po on day 2 and day 3 of treatment. * predniSONE 20 mg tablet Commonly known as:  Luis Felling Take 2 Tabs by mouth daily. Take 2 tabs on days 2 and 3 of each chemo cycle  
  
 prochlorperazine 10 mg tablet Commonly known as:  COMPAZINE Take 1 tablet every 6 hours with Benadryl 25mg for nausea for 3 days then, as needed. warfarin 1 mg tablet Commonly known as:  COUMADIN Take one 1 tablet by mouth everyday at 6pm or after. You will continue to take this medication. Everyday until mediport is removed. * Notice: This list has 2 medication(s) that are the same as other medications prescribed for you. Read the directions carefully, and ask your doctor or other care provider to review them with you. We Performed the Following COMPLETE CBC & AUTO DIFF WBC [54857 CPT(R)] To-Do List   
 01/25/2017 Lab:  CBC WITH 3 PART DIFF   
  
 01/31/2017 10:00 AM  
  Appointment with [facility] NURSE 2 at [facility]  
  
 02/02/2017 1:00 PM  
  Appointment with [facility] FA[facility] TRACK NURSE at [facility] Introducing [facility]! Essie Saldivar introduces [employer] patient portal. Now you can access parts of your medical record, email your doctor's office, and request medication refills online. 1. In your internet browser, go to [email or web address] 2. Click on the First Time User? Click Here link in the Sign In box. You will see the New Member Sign Up page. 3. Enter your [employer] Access Code exactly as it appears below.  You will not need to use this code after youve completed the sign-up process. If you do not sign up before the expiration date, you must request a new code. · [employer] Access Code: [email or web address] Expires: 1/31/2017  2:06 PM 
 
4. Enter the last four digits of your Social Security Number (xxxx) and Date of Birth (mm/dd/yyyy) as indicated and click Submit. You will be taken to the next sign-up page. 5. Create a [employer] ID. This will be your [employer] login ID and cannot be changed, so think of one that is secure and easy to remember. 6. Create a [employer] password. You can change your password at any time. 7. Enter your Password Reset Question and Answer. This can be used at a later time if you forget your password. 8. Enter your e-mail address. You will receive e-mail notification when new information is available in [address] 9. Click Sign Up. You can now view and download portions of your medical record. 10. Click the Download Summary menu link to download a portable copy of your medical information. If you have questions, please visit the Frequently Asked Questions section of the [employer] website. Remember, [employer] is NOT to be used for urgent needs. For medical emergencies, dial 911. Now available from your iPhone and Android! Please provide this summary of care documentation to your next provider. Your primary care clinician is listed as [address] [place] [address]. If you have any questions after today's visit, please call [phone].

## 2017-01-25 NOTE — PROGRESS NOTES
Hematology/Oncology  Progress Note    Name: Kofi Dutta  Date: 2017  : 1955    PCP: Rajendra Nolasco NP     Ms. Francisco J North is a 59 y.o. -American woman with metastatic colorectal carcinoma/carcinomatosis  Current therapy: FOLFOX chemotherapy regimen      Subjective:     Ms. 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 She is currently receiving outpatient systemic chemotherapy with the FOLFOX chemotherapy regimen. We had attempted to use the a Avastin in combination with the FOLFOX but due to her chronic kidney disease we have not been able to give this medication. Overall, she is tolerating the treatment reasonably well. She is complaining of some fatigue. Her appetite remains good. She denies having any unexplained bruising or bleeding. Nausea is well controlled with the antinausea medications such as Zofran and Compazine. Past medical history, family history, and social history: these were reviewed and remains unchanged. Past Medical History   Diagnosis Date    Diabetes ([address]     Gout     Heart disease     Hypertension     Neuropathic pain of foot      Past Surgical History   Procedure Laterality Date    Hx back surgery      Hx hysterectomy       Social History     Social History    Marital status:      Spouse name: N/A    Number of children: N/A    Years of education: N/A     Occupational History    Not on file. Social History Main Topics    Smoking status: Never Smoker    Smokeless tobacco: Never Used    Alcohol use No    Drug use: No    Sexual activity: Not on file     Other Topics Concern    Not on file     Social History Narrative     Family History   Problem Relation Age of Onset    Family history unknown: Yes     Current Outpatient Prescriptions   Medication Sig Dispense Refill    predniSONE (DELTASONE) 20 mg tablet Take 2 Tabs by mouth daily.  Take 2 tabs on days 2 and 3 of each chemo cycle 4 Tab 2    LORazepam (ATIVAN) 0.5 mg tablet Take 1 Tab by mouth every eight (8) hours as needed for Anxiety. Max Daily Amount: 1.5 mg. Indications: ANXIETY 90 Tab 0    nitrofurantoin, macrocrystal-monohydrate, (MACROBID) 100 mg capsule Take 1 Cap by mouth two (2) times a day. 10 Cap 0    predniSONE (DELTASONE) 20 mg tablet Take 2 Tabs by mouth daily (with breakfast). Take 40 mg po on day 2 and day 3 of treatment. 4 Tab 0    diphenhydrAMINE (BENADRYL) 25 mg capsule Take 1 with compazine every 6 hours for nausea for 3 days then, as needed. 60 Cap 3    prochlorperazine (COMPAZINE) 10 mg tablet Take 1 tablet every 6 hours with Benadryl 25mg for nausea for 3 days then, as needed. 60 Tab 3    warfarin (COUMADIN) 1 mg tablet Take one 1 tablet by mouth everyday at 6pm or after. You will continue to take this medication. Everyday until mediport is removed. 30 Tab 3    ondansetron hcl (ZOFRAN, AS HYDROCHLORIDE,) 8 mg tablet Take one tablet by mouth every 8 hours for nausea beginning the night of chemo for 3 days. 9 Tab 3    lidocaine-prilocaine (EMLA) topical cream Place cream over mediport 1 hour prior to chemotherapy and then place saran wrap over area. Every chemo treatment. 30 g 0    acetaminophen (TYLENOL) 650 mg CR tablet 1,300 mg.      albuterol (PROVENTIL HFA, VENTOLIN HFA, PROAIR HFA) 90 mcg/actuation inhaler 2 Puffs.  colchicine 0.6 mg tablet 0.6 mg.      ergocalciferol (ERGOCALCIFEROL) 50,000 unit capsule 50,000 Units.  furosemide (LASIX) 20 mg tablet Take 1/2-1 tablet daily if needed for swelling.  allopurinol (ZYLOPRIM) 300 mg tablet 300 mg.      desonide (TRIDESILON) 0.05 % cream Use 1 Application to affected area Twice Daily.       fluconazole (DIFLUCAN) 150 mg tablet TAKE 1 TABLET BY MOUTH ONCE DAILY TODAY, MAY REPEAT AFTER 3 DAYS AS NEEDED      cloNIDine HCl (CATAPRES) 0.1 mg tablet 0.1 mg.      bisoprolol-hydrochlorothiazide (ZIAC) 10-6.25 mg per tablet Take 1 Tab by Mouth Once a Day.  glipiZIDE (GLUCOTROL) 10 mg tablet 10 mg.      glimepiride (AMARYL) 4 mg tablet Take one in the am.  New dose.  gabapentin (NEURONTIN) 300 mg capsule 300 mg. Review of Systems  Constitutional: The patient has complaints of mild to moderate fatigue due to chemotherapy treatment and malignancy. HEENT: The patient denies recent head trauma, eye pain, blurred vision,  hearing deficit, oropharyngeal mucosal pain or lesions, and the patient denies throat pain or discomfort. Lymphatics: The patient denies palpable peripheral lymphadenopathy. Hematologic: The patient denies having bruising, bleeding, or progressive fatigue. Respiratory: Patient denies having shortness of breath, cough, sputum production, fever, or dyspnea on exertion. Cardiovascular: The patient denies having leg pain, leg swelling, heart palpitations, chest permit, chest pain, or lightheadedness. The patient denies having dyspnea on exertion. Gastrointestinal: The patient reports occasional nausea from chemotherapy which is well controlled with antinausea medication. She denies having any emesis or diarrhea. Genitourinary: Patient denies having urinary urgency, frequency, or dysuria. The patient denies having blood in the urine. Psychological: The patient denies having symptoms of nervousness, anxiety, depression, or thoughts of harming self. Skin: Patient denies having skin rashes, skin, ulcerations, or unexplained itching or pruritus. Musculoskeletal: The patient denies having pain in the joints or bones. Objective:     Visit Vitals    /90    Pulse 76    Temp 98.6 °F (37 °C)    Ht 5' (1.524 m)    Wt 96.6 kg (213 lb)    BMI 41.6 kg/m2     ECOG PS=1; Pain score=0/10    Physical Exam:   Gen. Appearance: The patient is in no acute distress. Skin: There is no bruise or rash. HEENT: The exam is unremarkable. Neck: Supple without lymphadenopathy or thyromegaly.   Lungs: Clear to auscultation and percussion; there are no wheezes or rhonchi. Heart: Regular rate and rhythm; there are no murmurs, gallops, or rubs. Abdomen: Bowel sounds are present and normal.  There is no guarding, tenderness, or hepatosplenomegaly. Extremities: There is no clubbing, cyanosis, or edema. Neurologic: There are no focal neurologic deficits. Lymphatics: There is no palpable peripheral lymphadenopathy. Musculoskeletal: The patient has full range of motion at all joints. There is no evidence of joint deformity or effusions. There is no focal joint tenderness. Psychological/psychiatric: There is no clinical evidence of anxiety, depression, or melancholy. Lab data:      Results for orders placed or performed during the hospital encounter of 01/25/17   CBC WITH 3 PART DIFF     Status: Abnormal   Result Value Ref Range Status    WBC 3.9 (L) 4.5 - 13.0 K/uL Final    RBC 3.50 (L) 4.10 - 5.10 M/uL Final    HGB 9.4 (L) 12.0 - 16.0 g/dL Final    HCT 29.6 (L) 36 - 48 % Final    MCV 84.6 78 - 102 FL Final    MCH 26.9 25.0 - 35.0 PG Final    MCHC 31.8 31 - 37 g/dL Final    RDW 19.9 (H) 11.5 - 14.5 % Final    PLATELET 259 658 - 694 K/uL Final    NEUTROPHILS 46 40 - 70 % Final    MIXED CELLS 19 (H) 0.1 - 17 % Final    LYMPHOCYTES 35 14 - 44 % Final    ABS. NEUTROPHILS 1.8 1.8 - 9.5 K/UL Final    ABS. MIXED CELLS 0.7 0.0 - 2.3 K/uL Final    ABS. LYMPHOCYTES 1.4 1.1 - 5.9 K/UL Final     Comment: Test performed at [place] [address] Location. Results Reviewed by Medical Director. DF AUTOMATED   Final           Assessment:     1. Colorectal carcinoma ([address]    2. Anemia in chronic kidney disease (CKD)    3. Antineoplastic chemotherapy induced anemia    4. Chemotherapy induced neutropenia (HCC)      Plan:   Colorectal carcinoma with abdominal carcinomatosis: We will continue with the FOLFOX chemotherapy regimen every 14 days. Due to underlying kidney disease we will D/C the order for a fasting.   At this time I will check the CEA level to see if her numbers are beginning to decline since she started chemotherapy. Anemia associated with chronic kidney disease and chemotherapy-induced anemia (new problem): I have explained to the patient that a CBC today shows a hemoglobin of 9.4 g/dL with hematocrit of 29.6%. Procrit at a dose of 60,000 units will be provided whenever her hemoglobin is below 10 g/dL hematocrit is below 30%. The iron profile and ferritin levels will be ordered at this time. Chemotherapy-induced leukopenia/neutropenia (new problem): The CBC today shows a WBC count of 3.9, the absolute neutrophil count is 1.8 and the absolute lymphocyte count is 1.4. I have explained to the patient that we will begin Neulasta at a dose of 6 mg every 14 days if the WBC count declines below 2. These will continue to be monitored at 2 week intervals. I will have the patient return to clinic for complete reassessment again in 6 weeks. Orders Placed This Encounter    COMPLETE CBC & AUTO DIFF WBC    InHouse CBC ([place])     Standing Status:   Future     Number of Occurrences:   1     Standing Expiration Date:   2/1/2017    CEA     Standing Status:   Future     Standing Expiration Date:   1/26/2018    FERRITIN     Standing Status:   Future     Standing Expiration Date:   5/45/1804    METABOLIC PANEL, COMPREHENSIVE     Standing Status:   Future     Standing Expiration Date:   1/26/2018    IRON PROFILE     Standing Status:   Future     Standing Expiration Date:   1/26/2018       Adriana Hayden MD  1/25/2017      Please note: This document has been produced using voice recognition software. Unrecognized errors in transcription may be present.

## 2017-01-26 LAB — CEA SERPL-MCNC: 398.4 NG/ML

## 2017-01-30 RX ORDER — FLUOROURACIL 50 MG/ML
800 INJECTION, SOLUTION INTRAVENOUS ONCE
Status: COMPLETED | OUTPATIENT
Start: 2017-01-31 | End: 2017-01-31

## 2017-01-31 ENCOUNTER — HOSPITAL ENCOUNTER (OUTPATIENT)
Dept: INFUSION THERAPY | Age: 62
Discharge: HOME | End: 2017-01-31
Payer: MEDICARE

## 2017-01-31 VITALS
BODY MASS INDEX: 42.7 KG/M2 | RESPIRATION RATE: 18 BRPM | HEIGHT: 60 IN | WEIGHT: 217.5 LBS | DIASTOLIC BLOOD PRESSURE: 80 MMHG | TEMPERATURE: 98.9 F | OXYGEN SATURATION: 100 % | HEART RATE: 87 BPM | SYSTOLIC BLOOD PRESSURE: 167 MMHG

## 2017-01-31 LAB
ALBUMIN SERPL BCP-MCNC: 2.5 G/DL (ref 3.4–5)
ALBUMIN/GLOB SERPL: 0.8 {RATIO} (ref 0.8–1.7)
ALP SERPL-CCNC: 74 U/L (ref 45–117)
ALT SERPL-CCNC: 16 U/L (ref 13–56)
ANION GAP BLD CALC-SCNC: 11 MMOL/L (ref 3–18)
APPEARANCE UR: CLEAR
AST SERPL W P-5'-P-CCNC: 15 U/L (ref 15–37)
BACTERIA URNS QL MICRO: ABNORMAL /HPF
BASO+EOS+MONOS # BLD AUTO: 0.7 K/UL (ref 0–2.3)
BASO+EOS+MONOS # BLD AUTO: 25 % (ref 0.1–17)
BILIRUB SERPL-MCNC: 0.1 MG/DL (ref 0.2–1)
BILIRUB UR QL: NEGATIVE
BUN SERPL-MCNC: 28 MG/DL (ref 7–18)
BUN/CREAT SERPL: 16 (ref 12–20)
CALCIUM SERPL-MCNC: 8.4 MG/DL (ref 8.5–10.1)
CHLORIDE SERPL-SCNC: 105 MMOL/L (ref 100–108)
CO2 SERPL-SCNC: 24 MMOL/L (ref 21–32)
COLOR UR: YELLOW
CREAT SERPL-MCNC: 1.73 MG/DL (ref 0.6–1.3)
DIFFERENTIAL METHOD BLD: ABNORMAL
EPITH CASTS URNS QL MICRO: ABNORMAL /LPF (ref 0–5)
ERYTHROCYTE [DISTWIDTH] IN BLOOD BY AUTOMATED COUNT: 20.9 % (ref 11.5–14.5)
GLOBULIN SER CALC-MCNC: 3.3 G/DL (ref 2–4)
GLUCOSE SERPL-MCNC: 101 MG/DL (ref 74–99)
GLUCOSE UR STRIP.AUTO-MCNC: NEGATIVE MG/DL
HCT VFR BLD AUTO: 28.5 % (ref 36–48)
HGB BLD-MCNC: 8.7 G/DL (ref 12–16)
HGB UR QL STRIP: NEGATIVE
KETONES UR QL STRIP.AUTO: NEGATIVE MG/DL
LEUKOCYTE ESTERASE UR QL STRIP.AUTO: ABNORMAL
LYMPHOCYTES # BLD AUTO: 37 % (ref 14–44)
LYMPHOCYTES # BLD: 1.1 K/UL (ref 1.1–5.9)
MCH RBC QN AUTO: 26.4 PG (ref 25–35)
MCHC RBC AUTO-ENTMCNC: 30.5 G/DL (ref 31–37)
MCV RBC AUTO: 86.4 FL (ref 78–102)
NEUTS SEG # BLD: 1.1 K/UL (ref 1.8–9.5)
NEUTS SEG NFR BLD AUTO: 39 % (ref 40–70)
NITRITE UR QL STRIP.AUTO: NEGATIVE
PH UR STRIP: 6 [PH] (ref 5–8)
PLATELET # BLD AUTO: 383 K/UL (ref 140–440)
POTASSIUM SERPL-SCNC: 4.2 MMOL/L (ref 3.5–5.5)
PROT SERPL-MCNC: 5.8 G/DL (ref 6.4–8.2)
PROT UR STRIP-MCNC: 300 MG/DL
RBC # BLD AUTO: 3.3 M/UL (ref 4.1–5.1)
SODIUM SERPL-SCNC: 140 MMOL/L (ref 136–145)
SP GR UR REFRACTOMETRY: 1.02 (ref 1–1.03)
UROBILINOGEN UR QL STRIP.AUTO: 0.2 EU/DL (ref 0.2–1)
WBC # BLD AUTO: 2.9 K/UL (ref 4.5–13)
WBC URNS QL MICRO: ABNORMAL /HPF (ref 0–4)

## 2017-01-31 PROCEDURE — 74011000258 HC RX REV CODE- 258: Performed by: INTERNAL MEDICINE

## 2017-01-31 PROCEDURE — 74011250636 HC RX REV CODE- 250/636: Performed by: INTERNAL MEDICINE

## 2017-01-31 PROCEDURE — 96375 TX/PRO/DX INJ NEW DRUG ADDON: CPT

## 2017-01-31 PROCEDURE — 96413 CHEMO IV INFUSION 1 HR: CPT

## 2017-01-31 PROCEDURE — 96368 THER/DIAG CONCURRENT INF: CPT

## 2017-01-31 PROCEDURE — 96415 CHEMO IV INFUSION ADDL HR: CPT

## 2017-01-31 PROCEDURE — 81001 URINALYSIS AUTO W/SCOPE: CPT | Performed by: INTERNAL MEDICINE

## 2017-01-31 PROCEDURE — 36591 DRAW BLOOD OFF VENOUS DEVICE: CPT

## 2017-01-31 PROCEDURE — C8957 PROLONGED IV INF, REQ PUMP: HCPCS

## 2017-01-31 PROCEDURE — 80053 COMPREHEN METABOLIC PANEL: CPT | Performed by: INTERNAL MEDICINE

## 2017-01-31 PROCEDURE — 74011250636 HC RX REV CODE- 250/636

## 2017-01-31 PROCEDURE — 36415 COLL VENOUS BLD VENIPUNCTURE: CPT | Performed by: INTERNAL MEDICINE

## 2017-01-31 PROCEDURE — 77030012965 HC NDL HUBR BBMI -A

## 2017-01-31 PROCEDURE — 96411 CHEMO IV PUSH ADDL DRUG: CPT

## 2017-01-31 PROCEDURE — 85025 COMPLETE CBC W/AUTO DIFF WBC: CPT | Performed by: INTERNAL MEDICINE

## 2017-01-31 RX ORDER — PALONOSETRON 0.05 MG/ML
0.25 INJECTION, SOLUTION INTRAVENOUS ONCE
Status: COMPLETED | OUTPATIENT
Start: 2017-01-31 | End: 2017-01-31

## 2017-01-31 RX ORDER — DEXTROSE MONOHYDRATE 50 MG/ML
250 INJECTION, SOLUTION INTRAVENOUS ONCE
Status: COMPLETED | OUTPATIENT
Start: 2017-01-31 | End: 2017-01-31

## 2017-01-31 RX ORDER — SODIUM CHLORIDE 9 MG/ML
250 INJECTION, SOLUTION INTRAVENOUS ONCE
Status: COMPLETED | OUTPATIENT
Start: 2017-01-31 | End: 2017-01-31

## 2017-01-31 RX ORDER — SODIUM CHLORIDE 0.9 % (FLUSH) 0.9 %
10-40 SYRINGE (ML) INJECTION AS NEEDED
Status: DISCONTINUED | OUTPATIENT
Start: 2017-01-31 | End: 2017-02-04 | Stop reason: HOSPADM

## 2017-01-31 RX ORDER — HEPARIN 100 UNIT/ML
500 SYRINGE INTRAVENOUS ONCE
Status: DISCONTINUED | OUTPATIENT
Start: 2017-01-31 | End: 2017-01-31

## 2017-01-31 RX ADMIN — PALONOSETRON HYDROCHLORIDE 0.25 MG: 0.25 INJECTION INTRAVENOUS at 11:09

## 2017-01-31 RX ADMIN — DEXAMETHASONE SODIUM PHOSPHATE 12 MG: 4 INJECTION, SOLUTION INTRAMUSCULAR; INTRAVENOUS at 11:09

## 2017-01-31 RX ADMIN — LEUCOVORIN CALCIUM 800 MG: 350 INJECTION, POWDER, LYOPHILIZED, FOR SOLUTION INTRAMUSCULAR; INTRAVENOUS at 11:54

## 2017-01-31 RX ADMIN — OXALIPLATIN 130 MG: 100 INJECTION, SOLUTION, CONCENTRATE INTRAVENOUS at 11:54

## 2017-01-31 RX ADMIN — DEXTROSE MONOHYDRATE 250 ML/HR: 5 INJECTION, SOLUTION INTRAVENOUS at 11:54

## 2017-01-31 RX ADMIN — FLUOROURACIL 4900 MG: 50 INJECTION, SOLUTION INTRAVENOUS at 14:30

## 2017-01-31 RX ADMIN — Medication 10 ML: at 14:28

## 2017-01-31 RX ADMIN — SODIUM CHLORIDE 250 ML/HR: 900 INJECTION, SOLUTION INTRAVENOUS at 10:34

## 2017-01-31 RX ADMIN — Medication 10 ML: at 10:34

## 2017-01-31 RX ADMIN — FLUOROURACIL 800 MG: 50 INJECTION, SOLUTION INTRAVENOUS at 14:30

## 2017-01-31 NOTE — PROGRESS NOTES
[facility] Progress Note    Date: 2017    Name: Martina Bain    MRN: [id number]         : 1955      Patient assessed and education was provided. Here for cycle 5 folfox*6+avastin     Patient vitals were reviewed. Visit Vitals    /81 (BP 1 Location: Left arm, BP Patient Position: Sitting)    Pulse 83    Temp 97.8 °F (36.6 °C)    Resp 18    Ht 5' (1.524 m)    Wt 98.7 kg (217 lb 8 oz)    SpO2 98%    BMI 42.48 kg/m2       Right chest Medi-port was accessed. Brisk flush/blood returns noted. Blood sample obtained for cbc and cmp. Urine sample obtained and sent for urinalysis with microscopic. Recent Results (from the past 12 hour(s))   CBC WITH 3 PART DIFF    Collection Time: 17 10:20 AM   Result Value Ref Range    WBC 2.9 (L) 4.5 - 13.0 K/uL    RBC 3.30 (L) 4.10 - 5.10 M/uL    HGB 8.7 (L) 12.0 - 16 g/dL    HCT 28.5 (L) 36 - 48 %    MCV 86.4 78 - 102 FL    MCH 26.4 25.0 - 35.0 PG    MCHC 30.5 (L) 31 - 37 g/dL    RDW 20.9 (H) 11.5 - 14.5 %    PLATELET 890 415 - 367 K/uL    NEUTROPHILS 39 (L) 40 - 70 %    MIXED CELLS 25 (H) 0.1 - 17 %    LYMPHOCYTES 37 14 - 44 %    ABS. NEUTROPHILS 1.1 (L) 1.8 - 9.5 K/UL    ABS. MIXED CELLS 0.7 0.0 - 2.3 K/uL    ABS. LYMPHOCYTES 1.1 1.1 - 5.9 K/UL    DF AUTOMATED         Edgardo Porter NP made aware of abnormal urinalysis and ANC of 1.1. Okay to proceed with FOLFOX*6 TODAY. Avastin discontinued until further cycle per  Ele WITT. Pre-medications of aloxi and decadron IV were administered as ordered and chemotherapy cycle 5 was initiated. Leucovorin 800 mg and oxaliplatin 130 mg was infused concurrently over 2 hrs. Tolerated well. Brisk flush/blood returns noted from port. 5 fu 800 mg IVP given over 2-4 minutes, followed by 5 fu continous cadd pump of 4900 mg to run over 46 hrs. Hadley needle intact to port site and drsg d/i. No signs of infiltration or infection noted.  drsg and all hook ups secured      Discharge instructions given.  Patient verbalized understanding       Patient was discharged from [place] [address] in stable condition at 1445 She is to return on 2/2/17 at 1300 for her next appointment at [place] [address] to sam her pump and de-access her port    Bernice Davenport  January 31, 2017

## 2017-02-02 ENCOUNTER — HOSPITAL ENCOUNTER (OUTPATIENT)
Dept: INFUSION THERAPY | Age: 62
Discharge: HOME | End: 2017-02-02
Payer: MEDICARE

## 2017-02-02 VITALS
DIASTOLIC BLOOD PRESSURE: 90 MMHG | HEART RATE: 68 BPM | TEMPERATURE: 98.2 F | SYSTOLIC BLOOD PRESSURE: 156 MMHG | OXYGEN SATURATION: 99 % | RESPIRATION RATE: 18 BRPM

## 2017-02-02 PROCEDURE — 74011250636 HC RX REV CODE- 250/636

## 2017-02-02 RX ORDER — HEPARIN SODIUM (PORCINE) LOCK FLUSH IV SOLN 100 UNIT/ML 100 UNIT/ML
SOLUTION INTRAVENOUS
Status: COMPLETED
Start: 2017-02-02 | End: 2017-02-02

## 2017-02-02 RX ORDER — SODIUM CHLORIDE 0.9 % (FLUSH) 0.9 %
10-40 SYRINGE (ML) INJECTION AS NEEDED
Status: DISCONTINUED | OUTPATIENT
Start: 2017-02-02 | End: 2017-02-06 | Stop reason: HOSPADM

## 2017-02-02 RX ORDER — HEPARIN SODIUM (PORCINE) LOCK FLUSH IV SOLN 100 UNIT/ML 100 UNIT/ML
500 SOLUTION INTRAVENOUS AS NEEDED
Status: DISCONTINUED | OUTPATIENT
Start: 2017-02-02 | End: 2017-02-06 | Stop reason: HOSPADM

## 2017-02-02 RX ADMIN — HEPARIN SODIUM (PORCINE) LOCK FLUSH IV SOLN 100 UNIT/ML 500 UNITS: 100 SOLUTION at 13:17

## 2017-02-02 RX ADMIN — Medication 30 ML: at 13:14

## 2017-02-02 NOTE — PROGRESS NOTES
[facility] Progress Note    Date: 2017    Name: Emi Sellers    MRN: [id number]         : 1955      Patient assessed and education was provided. Patient states on days of chemo for past 2 chemo treatments, she experienced slight shakes/tremors. Will monitor. Patient instructed to inform md of same. Patient was educated on side effects of chemo and symptom management. No complaints of pain. No acute distress noted    Patient vitals were reviewed. Visit Vitals    /90 (BP 1 Location: Left arm, BP Patient Position: Post activity)    Pulse 68    Temp 98.2 °F (36.8 °C)    Resp 18    SpO2 99%       cadd pump removed on completion of 5 fu. Port with brisk flush/blood returns. Port was flushed with 30 ml NS      Port heparinized per protocol, then de-accessed. Site s signs of infection or infiltration. bandaid applied to site         Patient was discharged from [place] [address] in stable condition at 1320 She is to return on 17 at 1000 for her next chemo appointment.     Gayathri Starkey RN  2017  1:33 PM

## 2017-02-10 RX ORDER — FLUOROURACIL 50 MG/ML
800 INJECTION, SOLUTION INTRAVENOUS ONCE
Status: COMPLETED | OUTPATIENT
Start: 2017-02-14 | End: 2017-02-14

## 2017-02-14 ENCOUNTER — HOSPITAL ENCOUNTER (OUTPATIENT)
Dept: INFUSION THERAPY | Age: 62
Discharge: HOME | End: 2017-02-14
Payer: MEDICARE

## 2017-02-14 VITALS
SYSTOLIC BLOOD PRESSURE: 166 MMHG | OXYGEN SATURATION: 97 % | BODY MASS INDEX: 42.41 KG/M2 | HEART RATE: 66 BPM | DIASTOLIC BLOOD PRESSURE: 85 MMHG | WEIGHT: 216 LBS | HEIGHT: 60 IN | RESPIRATION RATE: 18 BRPM | TEMPERATURE: 98.2 F

## 2017-02-14 LAB
ALBUMIN SERPL BCP-MCNC: 2.7 G/DL (ref 3.4–5)
ALBUMIN/GLOB SERPL: 0.8 {RATIO} (ref 0.8–1.7)
ALP SERPL-CCNC: 84 U/L (ref 45–117)
ALT SERPL-CCNC: 19 U/L (ref 13–56)
ANION GAP BLD CALC-SCNC: 12 MMOL/L (ref 3–18)
AST SERPL W P-5'-P-CCNC: 11 U/L (ref 15–37)
BASO+EOS+MONOS # BLD AUTO: 0.8 K/UL (ref 0–2.3)
BASO+EOS+MONOS # BLD AUTO: 28 % (ref 0.1–17)
BILIRUB SERPL-MCNC: 0.2 MG/DL (ref 0.2–1)
BUN SERPL-MCNC: 21 MG/DL (ref 7–18)
BUN/CREAT SERPL: 12 (ref 12–20)
CALCIUM SERPL-MCNC: 9 MG/DL (ref 8.5–10.1)
CHLORIDE SERPL-SCNC: 107 MMOL/L (ref 100–108)
CO2 SERPL-SCNC: 22 MMOL/L (ref 21–32)
CREAT SERPL-MCNC: 1.69 MG/DL (ref 0.6–1.3)
DIFFERENTIAL METHOD BLD: ABNORMAL
ERYTHROCYTE [DISTWIDTH] IN BLOOD BY AUTOMATED COUNT: 20.8 % (ref 11.5–14.5)
GLOBULIN SER CALC-MCNC: 3.6 G/DL (ref 2–4)
GLUCOSE SERPL-MCNC: 159 MG/DL (ref 74–99)
HCT VFR BLD AUTO: 28.2 % (ref 36–48)
HGB BLD-MCNC: 8.9 G/DL (ref 12–16)
LYMPHOCYTES # BLD AUTO: 40 % (ref 14–44)
LYMPHOCYTES # BLD: 1.2 K/UL (ref 1.1–5.9)
MCH RBC QN AUTO: 27.7 PG (ref 25–35)
MCHC RBC AUTO-ENTMCNC: 31.6 G/DL (ref 31–37)
MCV RBC AUTO: 87.9 FL (ref 78–102)
NEUTS SEG # BLD: 1 K/UL (ref 1.8–9.5)
NEUTS SEG NFR BLD AUTO: 32 % (ref 40–70)
PLATELET # BLD AUTO: 393 K/UL (ref 140–440)
POTASSIUM SERPL-SCNC: 3.9 MMOL/L (ref 3.5–5.5)
PROT SERPL-MCNC: 6.3 G/DL (ref 6.4–8.2)
RBC # BLD AUTO: 3.21 M/UL (ref 4.1–5.1)
SODIUM SERPL-SCNC: 141 MMOL/L (ref 136–145)
WBC # BLD AUTO: 3 K/UL (ref 4.5–13)

## 2017-02-14 PROCEDURE — 80053 COMPREHEN METABOLIC PANEL: CPT | Performed by: INTERNAL MEDICINE

## 2017-02-14 PROCEDURE — 96411 CHEMO IV PUSH ADDL DRUG: CPT

## 2017-02-14 PROCEDURE — 74011000258 HC RX REV CODE- 258: Performed by: INTERNAL MEDICINE

## 2017-02-14 PROCEDURE — 77030012965 HC NDL HUBR BBMI -A

## 2017-02-14 PROCEDURE — 96413 CHEMO IV INFUSION 1 HR: CPT

## 2017-02-14 PROCEDURE — 36591 DRAW BLOOD OFF VENOUS DEVICE: CPT

## 2017-02-14 PROCEDURE — 85025 COMPLETE CBC W/AUTO DIFF WBC: CPT | Performed by: INTERNAL MEDICINE

## 2017-02-14 PROCEDURE — 74011250636 HC RX REV CODE- 250/636

## 2017-02-14 PROCEDURE — 96416 CHEMO PROLONG INFUSE W/PUMP: CPT

## 2017-02-14 PROCEDURE — 74011250636 HC RX REV CODE- 250/636: Performed by: INTERNAL MEDICINE

## 2017-02-14 PROCEDURE — 96375 TX/PRO/DX INJ NEW DRUG ADDON: CPT

## 2017-02-14 PROCEDURE — 96415 CHEMO IV INFUSION ADDL HR: CPT

## 2017-02-14 RX ORDER — HEPARIN SODIUM (PORCINE) LOCK FLUSH IV SOLN 100 UNIT/ML 100 UNIT/ML
500 SOLUTION INTRAVENOUS AS NEEDED
Status: DISCONTINUED | OUTPATIENT
Start: 2017-02-14 | End: 2017-02-14

## 2017-02-14 RX ORDER — DEXTROSE MONOHYDRATE 50 MG/ML
250 INJECTION, SOLUTION INTRAVENOUS ONCE
Status: COMPLETED | OUTPATIENT
Start: 2017-02-14 | End: 2017-02-14

## 2017-02-14 RX ORDER — PALONOSETRON 0.05 MG/ML
0.25 INJECTION, SOLUTION INTRAVENOUS ONCE
Status: COMPLETED | OUTPATIENT
Start: 2017-02-14 | End: 2017-02-14

## 2017-02-14 RX ORDER — SODIUM CHLORIDE 0.9 % (FLUSH) 0.9 %
10-40 SYRINGE (ML) INJECTION AS NEEDED
Status: DISCONTINUED | OUTPATIENT
Start: 2017-02-14 | End: 2017-02-18 | Stop reason: HOSPADM

## 2017-02-14 RX ORDER — SODIUM CHLORIDE 9 MG/ML
250 INJECTION, SOLUTION INTRAVENOUS ONCE
Status: DISCONTINUED | OUTPATIENT
Start: 2017-02-14 | End: 2017-02-14

## 2017-02-14 RX ADMIN — OXALIPLATIN 130 MG: 100 INJECTION, SOLUTION, CONCENTRATE INTRAVENOUS at 12:09

## 2017-02-14 RX ADMIN — DEXTROSE MONOHYDRATE 250 ML/HR: 5 INJECTION, SOLUTION INTRAVENOUS at 10:28

## 2017-02-14 RX ADMIN — DEXAMETHASONE SODIUM PHOSPHATE 12 MG: 4 INJECTION, SOLUTION INTRA-ARTICULAR; INTRALESIONAL; INTRAMUSCULAR; INTRAVENOUS; SOFT TISSUE at 11:16

## 2017-02-14 RX ADMIN — FLUOROURACIL 4900 MG: 50 INJECTION, SOLUTION INTRAVENOUS at 14:39

## 2017-02-14 RX ADMIN — FLUOROURACIL 800 MG: 50 INJECTION, SOLUTION INTRAVENOUS at 14:38

## 2017-02-14 RX ADMIN — Medication 20 ML: at 10:27

## 2017-02-14 RX ADMIN — PALONOSETRON HYDROCHLORIDE 0.25 MG: 0.25 INJECTION INTRAVENOUS at 11:13

## 2017-02-14 RX ADMIN — Medication 10 ML: at 10:25

## 2017-02-14 RX ADMIN — LEUCOVORIN CALCIUM 800 MG: 350 INJECTION, POWDER, LYOPHILIZED, FOR SOLUTION INTRAMUSCULAR; INTRAVENOUS at 12:09

## 2017-02-14 NOTE — PROGRESS NOTES
[facility] Progress Note    Date: 2017    Name: Cassandra Pratt    MRN: [id number]         : 1955      Patient assessed and education was provided. Here for cycle 6 folfox*6+avastin     Patient vitals were reviewed. Visit Vitals    /75 (BP 1 Location: Right arm, BP Patient Position: Sitting)    Pulse 65    Temp 97.4 °F (36.3 °C)    Resp 18    Ht 5' (1.524 m)    Wt 98 kg (216 lb)    SpO2 98%    BMI 42.18 kg/m2       Right chest Medi-port was accessed. Brisk flush/blood returns noted. Blood sample obtained for cbc, cmp, and hepatic function panel. Recent Results (from the past 12 hour(s))   CBC WITH 3 PART DIFF    Collection Time: 17 10:00 AM   Result Value Ref Range    WBC 3.0 (L) 4.5 - 13.0 K/uL    RBC 3.21 (L) 4.10 - 5.10 M/uL    HGB 8.9 (L) 12.0 - 16 g/dL    HCT 28.2 (L) 36 - 48 %    MCV 87.9 78 - 102 FL    MCH 27.7 25.0 - 35.0 PG    MCHC 31.6 31 - 37 g/dL    RDW 20.8 (H) 11.5 - 14.5 %    PLATELET 053 636 - 134 K/uL    NEUTROPHILS 32 (L) 40 - 70 %    MIXED CELLS 28 (H) 0.1 - 17 %    LYMPHOCYTES 40 14 - 44 %    ABS. NEUTROPHILS 1.0 (L) 1.8 - 9.5 K/UL    ABS. MIXED CELLS 0.8 0.0 - 2.3 K/uL    ABS. LYMPHOCYTES 1.2 1.1 - 5.9 K/UL    DF AUTOMATED         Gladys Angry NP made aware of ANC of 1.0. Okay to proceed with FOLFOX*6 TODAY. Avastin discontinued. Pre-medications of aloxi and decadron IV were administered as ordered and chemotherapy cycle 6 was initiated. Leucovorin 800 mg and oxaliplatin 130 mg was infused concurrently over 2 hrs. Tolerated well. Brisk flush/blood returns noted from port. 5 fu 800 mg IVP given over 2-4 minutes, followed by 5 fu continous cadd pump of 4900 mg to run over 46 hrs. Hadley needle intact to port site and drsg d/i. No signs of infiltration or infection noted. drsg and all hook ups secured. Discharge instructions given.  Patient verbalized understanding       Patient was discharged from [place] [address] in stable condition at 1500.  She is to return on 2/16/17  for her next appointment at [facility] to akbarc her pump and de-access her port    [facility]  February 14, 2017

## 2017-02-16 ENCOUNTER — HOSPITAL ENCOUNTER (OUTPATIENT)
Dept: INFUSION THERAPY | Age: 62
Discharge: HOME | End: 2017-02-16
Payer: MEDICARE

## 2017-02-16 VITALS
SYSTOLIC BLOOD PRESSURE: 141 MMHG | DIASTOLIC BLOOD PRESSURE: 83 MMHG | OXYGEN SATURATION: 100 % | HEART RATE: 71 BPM | RESPIRATION RATE: 18 BRPM | TEMPERATURE: 98.3 F

## 2017-02-16 PROCEDURE — 74011250636 HC RX REV CODE- 250/636

## 2017-02-16 RX ORDER — HEPARIN SODIUM (PORCINE) LOCK FLUSH IV SOLN 100 UNIT/ML 100 UNIT/ML
500 SOLUTION INTRAVENOUS AS NEEDED
Status: ACTIVE | OUTPATIENT
Start: 2017-02-16 | End: 2017-02-17

## 2017-02-16 RX ORDER — SODIUM CHLORIDE 0.9 % (FLUSH) 0.9 %
10-40 SYRINGE (ML) INJECTION AS NEEDED
Status: DISCONTINUED | OUTPATIENT
Start: 2017-02-16 | End: 2017-02-20 | Stop reason: HOSPADM

## 2017-02-16 RX ORDER — HEPARIN SODIUM (PORCINE) LOCK FLUSH IV SOLN 100 UNIT/ML 100 UNIT/ML
SOLUTION INTRAVENOUS
Status: COMPLETED
Start: 2017-02-16 | End: 2017-02-16

## 2017-02-16 RX ADMIN — Medication 20 ML: at 13:21

## 2017-02-16 RX ADMIN — HEPARIN SODIUM (PORCINE) LOCK FLUSH IV SOLN 100 UNIT/ML 500 UNITS: 100 SOLUTION at 13:21

## 2017-02-16 NOTE — PROGRESS NOTES
[facility] [facility] Progress Note    Date: 2017    Name: Mindy Dickerson    MRN: [id number]         : 1955      Ms. Polk arrived to [address] [place] [address] at 1315. Ms. Laura Rodrigez was assessed and education was provided. Pt c/o being dizzy. Pt states she always gets dizzy after getting her chemo tx's and it lasts a few days. States she is drinking plenty of water. Educated pt to go from sitting to standing position slowly. Pt verbalized understanding. Pt states she thinks it's the steroids that make her feel dizzy because she used to take steroids for her asthma and they would always make her dizzy. Ms. Polk's vitals were reviewed. Visit Vitals    /83 (BP 1 Location: Left arm, BP Patient Position: Sitting)    Pulse 71    Temp 98.3 °F (36.8 °C)    Resp 18    SpO2 100%       Pt was observed for 5 minutes after obtaining vital signs prior to initiating treatment. Provided pt w/ water to drink at this time. Patient's right upper chest port with 46-hour 5FU cadd pump infusion complete. Flushed with normal saline/ blood return verified. Flushed with heparin per order and de-accessed. Band-aid applied to site. Ms. Laura Rodrigez tolerated well without complaints. Ms. Laura Rodrigez was discharged from [place] [address] in stable condition at 1330. She is to return on 17 at 1000 for her next appointment.     Tye Johnston RN  2017

## 2017-02-24 RX ORDER — PREDNISONE 20 MG/1
40 TABLET ORAL DAILY
Qty: 4 TAB | Refills: 2 | Status: SHIPPED | OUTPATIENT
Start: 2017-02-24 | End: 2018-01-09 | Stop reason: SDUPTHER

## 2017-02-24 RX ORDER — ONDANSETRON HYDROCHLORIDE 8 MG/1
TABLET, FILM COATED ORAL
Qty: 9 TAB | Refills: 3 | Status: SHIPPED | OUTPATIENT
Start: 2017-02-24 | End: 2017-08-30 | Stop reason: SDUPTHER

## 2017-03-02 ENCOUNTER — HOSPITAL ENCOUNTER (OUTPATIENT)
Dept: INFUSION THERAPY | Age: 62
End: 2017-03-02
Payer: MEDICARE

## 2017-03-08 ENCOUNTER — HOSPITAL ENCOUNTER (OUTPATIENT)
Dept: ONCOLOGY | Age: 62
Discharge: HOME | End: 2017-03-08

## 2017-03-08 ENCOUNTER — OFFICE VISIT (OUTPATIENT)
Dept: ONCOLOGY | Age: 62
End: 2017-03-08

## 2017-03-08 ENCOUNTER — HOSPITAL ENCOUNTER (OUTPATIENT)
Dept: LAB | Age: 62
Discharge: HOME | End: 2017-03-08
Payer: MEDICARE

## 2017-03-08 VITALS
HEART RATE: 68 BPM | TEMPERATURE: 98.6 F | DIASTOLIC BLOOD PRESSURE: 88 MMHG | BODY MASS INDEX: 41.43 KG/M2 | HEIGHT: 60 IN | WEIGHT: 211 LBS | SYSTOLIC BLOOD PRESSURE: 177 MMHG

## 2017-03-08 DIAGNOSIS — D64.81 ANTINEOPLASTIC CHEMOTHERAPY INDUCED ANEMIA: ICD-10-CM

## 2017-03-08 DIAGNOSIS — D70.1 CHEMOTHERAPY INDUCED NEUTROPENIA (HCC): ICD-10-CM

## 2017-03-08 DIAGNOSIS — T45.1X5A CHEMOTHERAPY INDUCED NEUTROPENIA (HCC): ICD-10-CM

## 2017-03-08 DIAGNOSIS — C19 COLORECTAL CARCINOMA (HCC): ICD-10-CM

## 2017-03-08 DIAGNOSIS — T45.1X5A ANTINEOPLASTIC CHEMOTHERAPY INDUCED ANEMIA: ICD-10-CM

## 2017-03-08 DIAGNOSIS — N18.9 ANEMIA IN CHRONIC KIDNEY DISEASE (CKD): ICD-10-CM

## 2017-03-08 DIAGNOSIS — D63.1 ANEMIA IN CHRONIC KIDNEY DISEASE (CKD): ICD-10-CM

## 2017-03-08 DIAGNOSIS — C19 COLORECTAL CARCINOMA (HCC): Primary | ICD-10-CM

## 2017-03-08 LAB
ALBUMIN SERPL BCP-MCNC: 2.7 G/DL (ref 3.4–5)
ALBUMIN/GLOB SERPL: 0.8 {RATIO} (ref 0.8–1.7)
ALP SERPL-CCNC: 91 U/L (ref 45–117)
ALT SERPL-CCNC: 27 U/L (ref 13–56)
ANION GAP BLD CALC-SCNC: 9 MMOL/L (ref 3–18)
AST SERPL W P-5'-P-CCNC: 16 U/L (ref 15–37)
BASO+EOS+MONOS # BLD AUTO: 1.3 K/UL (ref 0–2.3)
BASO+EOS+MONOS # BLD AUTO: 14 % (ref 0.1–17)
BILIRUB SERPL-MCNC: 0.3 MG/DL (ref 0.2–1)
BUN SERPL-MCNC: 36 MG/DL (ref 7–18)
BUN/CREAT SERPL: 21 (ref 12–20)
CALCIUM SERPL-MCNC: 8.9 MG/DL (ref 8.5–10.1)
CHLORIDE SERPL-SCNC: 107 MMOL/L (ref 100–108)
CO2 SERPL-SCNC: 23 MMOL/L (ref 21–32)
CREAT SERPL-MCNC: 1.73 MG/DL (ref 0.6–1.3)
DIFFERENTIAL METHOD BLD: ABNORMAL
ERYTHROCYTE [DISTWIDTH] IN BLOOD BY AUTOMATED COUNT: 22.8 % (ref 11.5–14.5)
FERRITIN SERPL-MCNC: 120 NG/ML (ref 8–388)
GLOBULIN SER CALC-MCNC: 3.6 G/DL (ref 2–4)
GLUCOSE SERPL-MCNC: 105 MG/DL (ref 74–99)
HCT VFR BLD AUTO: 26.1 % (ref 36–48)
HGB BLD-MCNC: 8.1 G/DL (ref 12–16)
IRON SATN MFR SERPL: 15 %
IRON SERPL-MCNC: 41 UG/DL (ref 50–175)
LYMPHOCYTES # BLD AUTO: 18 % (ref 14–44)
LYMPHOCYTES # BLD: 1.7 K/UL (ref 1.1–5.9)
MCH RBC QN AUTO: 28.2 PG (ref 25–35)
MCHC RBC AUTO-ENTMCNC: 31 G/DL (ref 31–37)
MCV RBC AUTO: 90.9 FL (ref 78–102)
NEUTS SEG # BLD: 6.3 K/UL (ref 1.8–9.5)
NEUTS SEG NFR BLD AUTO: 68 % (ref 40–70)
PLATELET # BLD AUTO: 445 K/UL (ref 140–440)
POTASSIUM SERPL-SCNC: 4.3 MMOL/L (ref 3.5–5.5)
PROT SERPL-MCNC: 6.3 G/DL (ref 6.4–8.2)
RBC # BLD AUTO: 2.87 M/UL (ref 4.1–5.1)
SODIUM SERPL-SCNC: 139 MMOL/L (ref 136–145)
TIBC SERPL-MCNC: 267 UG/DL (ref 250–450)
WBC # BLD AUTO: 9.3 K/UL (ref 4.5–13)

## 2017-03-08 PROCEDURE — 83540 ASSAY OF IRON: CPT | Performed by: NURSE PRACTITIONER

## 2017-03-08 PROCEDURE — 82728 ASSAY OF FERRITIN: CPT | Performed by: NURSE PRACTITIONER

## 2017-03-08 PROCEDURE — 80053 COMPREHEN METABOLIC PANEL: CPT | Performed by: NURSE PRACTITIONER

## 2017-03-08 PROCEDURE — 82378 CARCINOEMBRYONIC ANTIGEN: CPT | Performed by: NURSE PRACTITIONER

## 2017-03-08 PROCEDURE — 36415 COLL VENOUS BLD VENIPUNCTURE: CPT | Performed by: NURSE PRACTITIONER

## 2017-03-08 NOTE — MR AVS SNAPSHOT
Visit Information Date & Time Provider Department Dept. Phone Encounter #  
 3/8/2017 11:00 AM Cedric Mac MD [facility] Medical Oncology [phone] [id number] Your Appointments 3/29/2017 10:00 AM  
Office Visit with Cedric Mac MD  
[facility]) Appt Note: 3 month follow up appointment [facility], [address] [place] [address] Upcoming Health Maintenance Date Due Hepatitis C Screening 1955 DTaP/Tdap/Td series (1 - Tdap) 8/26/1974 PAP AKA CERVICAL CYTOLOGY 8/26/1974 BREAST CANCER SCRN MAMMOGRAM 8/26/2003 FOBT Q 1 YEAR AGE 50-75 8/26/2003 ZOSTER VACCINE AGE 60> 8/26/2013 INFLUENZA AGE 9 TO ADULT 8/1/2016 Pneumococcal 19-64 Highest Risk (2 of 3 - PCV13) 3/1/2017 Allergies as of 3/8/2017  Review Complete On: 2/16/2017 By: Giovany Najera RN Severity Noted Reaction Type Reactions Latex  08/04/2016    Other (comments) Asa-acetaminophen-caff-buffers  08/04/2016    Other (comments) Codeine  08/04/2016    Other (comments) Pcn [Penicillins]  08/04/2016    Other (comments) Sulfa (Sulfonamide Antibiotics)  08/04/2016    Other (comments) Current Immunizations  Reviewed on 2/2/2017 Name Date Influenza Vaccine 3/1/2016 Pneumococcal Vaccine (Unspecified Type) 3/1/2016 Not reviewed this visit You Were Diagnosed With   
  
 Codes Comments Colorectal carcinoma ([facility] [address]    -  Primary ICD-10-CM: C19 
ICD-9-CM: 154.0 Anemia in chronic kidney disease (CKD)     ICD-10-CM: N18.9, D63.1 ICD-9-CM: 285.21, 585.9 Antineoplastic chemotherapy induced anemia     ICD-10-CM: D64.81, T45.1X5A 
ICD-9-CM: 285.3, E933.1 Chemotherapy induced neutropenia (HCC)     ICD-10-CM: D70.1, T45.1X5A 
ICD-9-CM: 288.03, E933.1 Vitals BP Pulse Temp Height(growth percentile) Weight(growth percentile) BMI  
 177/88 68 98.6 °F (37 °C) 5' (1.524 m) 211 lb (95.7 kg) 41.21 kg/m2 Smoking Status Never Smoker BMI and BSA Data Body Mass Index Body Surface Area  
 41.21 kg/m 2 2.01 m 2 Preferred Pharmacy Pharmacy Name Phone [facility] [address], [address] [facility] Your Updated Medication List  
  
   
This list is accurate as of: 3/8/17  1:20 PM.  Always use your most recent med list.  
  
  
  
  
 acetaminophen 650 mg CR tablet Commonly known as:  TYLENOL  
1,300 mg.  
  
 albuterol 90 mcg/actuation inhaler Commonly known as:  PROVENTIL HFA, VENTOLIN HFA, PROAIR HFA  
2 Puffs. allopurinol 300 mg tablet Commonly known as:  ZYLOPRIM  
300 mg.  
  
 bisoprolol-hydroCHLOROthiazide 10-6.25 mg per tablet Commonly known as:  [facility] Take 1 Tab by Mouth Once a Day. cloNIDine HCl 0.1 mg tablet Commonly known as:  CATAPRES  
0.1 mg.  
  
 colchicine 0.6 mg tablet  
0.6 mg.  
  
 desonide 0.05 % cream  
Commonly known as:  Leonarda Gang Use 1 Application to affected area Twice Daily. diphenhydrAMINE 25 mg capsule Commonly known as:  BENADRYL Take 1 with compazine every 6 hours for nausea for 3 days then, as needed. ergocalciferol 50,000 unit capsule Commonly known as:  ERGOCALCIFEROL  
50,000 Units. fluconazole 150 mg tablet Commonly known as:  DIFLUCAN  
TAKE 1 TABLET BY MOUTH ONCE DAILY TODAY, MAY REPEAT AFTER 3 DAYS AS NEEDED  
  
 furosemide 20 mg tablet Commonly known as:  LASIX Take 1/2-1 tablet daily if needed for swelling.  
  
 gabapentin 300 mg capsule Commonly known as:  NEURONTIN  
300 mg.  
  
 glimepiride 4 mg tablet Commonly known as:  AMARYL Take one in the am.  New dose. glipiZIDE 10 mg tablet Commonly known as:  [place] 10 mg.  
  
 lidocaine-prilocaine topical cream  
Commonly known as:  EMLA Place cream over mediport 1 hour prior to chemotherapy and then place saran wrap over area. Every chemo treatment. LORazepam 0.5 mg tablet Commonly known as:  ATIVAN Take 1 Tab by mouth every eight (8) hours as needed for Anxiety. Max Daily Amount: 1.5 mg. Indications: ANXIETY  
  
 nitrofurantoin (macrocrystal-monohydrate) 100 mg capsule Commonly known as:  MACROBID Take 1 Cap by mouth two (2) times a day. ondansetron hcl 8 mg tablet Commonly known as:  ZOFRAN (AS HYDROCHLORIDE) Take one tablet by mouth every 8 hours for nausea beginning the night of chemo for 3 days. * predniSONE 20 mg tablet Commonly known as:  [place] Take 2 Tabs by mouth daily (with breakfast). Take 40 mg po on day 2 and day 3 of treatment. * predniSONE 20 mg tablet Commonly known as:  [place] Take 2 Tabs by mouth daily. Take 2 tabs on days 2 and 3 of each chemo cycle  
  
 prochlorperazine 10 mg tablet Commonly known as:  COMPAZINE Take 1 tablet every 6 hours with Benadryl 25mg for nausea for 3 days then, as needed. warfarin 1 mg tablet Commonly known as:  COUMADIN Take one 1 tablet by mouth everyday at 6pm or after. You will continue to take this medication. Everyday until mediport is removed. * Notice: This list has 2 medication(s) that are the same as other medications prescribed for you. Read the directions carefully, and ask your doctor or other care provider to review them with you. We Performed the Following COMPLETE CBC & AUTO DIFF WBC [[address] CPT(R)] To-Do List   
 03/08/2017   Lab:  CBC WITH 3 PART DIFF   
  
 03/14/2017 10:00 AM  
  Appointment with [facility] INFUSION NURSE 3 at [facility] ([phone])  
  
 03/16/2017 1:00 PM  
  Appointment with [facility] FAST TRACK NURSE at [facility] ([phone])  
  
 03/28/2017 10:00 AM  
  Appointment with [facility] INFUSION NURSE 1 at [facility] ([phone])  
  
 03/30/2017 1:00 PM  
 Appointment with [facility] FAST TRACK NURSE at [facility] ([phone]) Introducing [facility]! [employer] introduces [employer] patient portal. Now you can access parts of your medical record, email your doctor's office, and request medication refills online. 1. In your internet browser, go to [email or web address] 2. Click on the First Time User? Click Here link in the Sign In box. You will see the New Member Sign Up page. 3. Enter your [employer] Access Code exactly as it appears below. You will not need to use this code after youve completed the sign-up process. If you do not sign up before the expiration date, you must request a new code. · [employer] Access Code: [email or web address] Expires: 5/2/2017  3:15 PM 
 
4. Enter the last four digits of your Social Security Number (xxxx) and Date of Birth (mm/dd/yyyy) as indicated and click Submit. You will be taken to the next sign-up page. 5. Create a [employer] ID. This will be your [employer] login ID and cannot be changed, so think of one that is secure and easy to remember. 6. Create a [employer] password. You can change your password at any time. 7. Enter your Password Reset Question and Answer. This can be used at a later time if you forget your password. 8. Enter your e-mail address. You will receive e-mail notification when new information is available in [address] 9. Click Sign Up. You can now view and download portions of your medical record. 10. Click the Download Summary menu link to download a portable copy of your medical information. If you have questions, please visit the Frequently Asked Questions section of the [employer] website. Remember, [employer] is NOT to be used for urgent needs. For medical emergencies, dial 911. Now available from your iPhone and Android! Please provide this summary of care documentation to your next provider. Your primary care clinician is listed as [address] [place] [address]. If you have any questions after today's visit, please call [phone].

## 2017-03-08 NOTE — PROGRESS NOTES
Hematology/Oncology  Progress Note    Name: Herlinda Campos  Date: 3/8/2017  : 1955    PCP: Ludmila Mancilla NP     Ms. Ly Iraheta is a 59 y.o. -American woman with metastatic colorectal carcinoma/carcinomatosis  Current therapy: FOLFOX chemotherapy regimen      Subjective:     Ms. 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 She is currently receiving outpatient systemic chemotherapy with the FOLFOX chemotherapy regimen. We had attempted to use the  Avastin in combination with the FOLFOX but due to her chronic kidney disease we have not been able to give this medication. Overall, she is tolerating the treatment reasonably well  Her appetite remains good. She denies having any unexplained bruising or bleeding. She was recently admitted to [facility] for dizziness, elevated glucose, and hypertension. She reports she feels much better, she has no concerns to report. She is ready to resume her chemo treatment. Past medical history, family history, and social history: these were reviewed and remains unchanged. Past Medical History:   Diagnosis Date    Diabetes ([address]     Gout     Heart disease     Hypertension     Neuropathic pain of foot      Past Surgical History:   Procedure Laterality Date    HX BACK SURGERY      HX HYSTERECTOMY       Social History     Social History    Marital status:      Spouse name: N/A    Number of children: N/A    Years of education: N/A     Occupational History    Not on file.      Social History Main Topics    Smoking status: Never Smoker    Smokeless tobacco: Never Used    Alcohol use No    Drug use: No    Sexual activity: Not on file     Other Topics Concern    Not on file     Social History Narrative     Family History   Problem Relation Age of Onset    Family history unknown: Yes     Current Outpatient Prescriptions   Medication Sig Dispense Refill    ondansetron hcl (ZOFRAN, AS HYDROCHLORIDE,) 8 mg tablet Take one tablet by mouth every 8 hours for nausea beginning the night of chemo for 3 days. 9 Tab 3    predniSONE (DELTASONE) 20 mg tablet Take 2 Tabs by mouth daily. Take 2 tabs on days 2 and 3 of each chemo cycle 4 Tab 2    LORazepam (ATIVAN) 0.5 mg tablet Take 1 Tab by mouth every eight (8) hours as needed for Anxiety. Max Daily Amount: 1.5 mg. Indications: ANXIETY 90 Tab 0    nitrofurantoin, macrocrystal-monohydrate, (MACROBID) 100 mg capsule Take 1 Cap by mouth two (2) times a day. 10 Cap 0    predniSONE (DELTASONE) 20 mg tablet Take 2 Tabs by mouth daily (with breakfast). Take 40 mg po on day 2 and day 3 of treatment. 4 Tab 0    diphenhydrAMINE (BENADRYL) 25 mg capsule Take 1 with compazine every 6 hours for nausea for 3 days then, as needed. 60 Cap 3    prochlorperazine (COMPAZINE) 10 mg tablet Take 1 tablet every 6 hours with Benadryl 25mg for nausea for 3 days then, as needed. 60 Tab 3    warfarin (COUMADIN) 1 mg tablet Take one 1 tablet by mouth everyday at 6pm or after. You will continue to take this medication. Everyday until mediport is removed. 30 Tab 3    lidocaine-prilocaine (EMLA) topical cream Place cream over mediport 1 hour prior to chemotherapy and then place saran wrap over area. Every chemo treatment. 30 g 0    acetaminophen (TYLENOL) 650 mg CR tablet 1,300 mg.      albuterol (PROVENTIL HFA, VENTOLIN HFA, PROAIR HFA) 90 mcg/actuation inhaler 2 Puffs.  colchicine 0.6 mg tablet 0.6 mg.      ergocalciferol (ERGOCALCIFEROL) 50,000 unit capsule 50,000 Units.  furosemide (LASIX) 20 mg tablet Take 1/2-1 tablet daily if needed for swelling.  allopurinol (ZYLOPRIM) 300 mg tablet 300 mg.      desonide (TRIDESILON) 0.05 % cream Use 1 Application to affected area Twice Daily.       fluconazole (DIFLUCAN) 150 mg tablet TAKE 1 TABLET BY MOUTH ONCE DAILY TODAY, MAY REPEAT AFTER 3 DAYS AS NEEDED      cloNIDine HCl (CATAPRES) 0.1 mg tablet 0.1 mg.      bisoprolol-hydrochlorothiazide [facility]) 10-6.25 mg per tablet Take 1 Tab by Mouth Once a Day.  glipiZIDE (GLUCOTROL) 10 mg tablet 10 mg.      glimepiride (AMARYL) 4 mg tablet Take one in the am.  New dose.  gabapentin (NEURONTIN) 300 mg capsule 300 mg. Review of Systems  Constitutional: The patient has complaints of mild to moderate fatigue due to chemotherapy treatment and malignancy. HEENT: The patient denies recent head trauma, eye pain, blurred vision,  hearing deficit, oropharyngeal mucosal pain or lesions, and the patient denies throat pain or discomfort. Lymphatics: The patient denies palpable peripheral lymphadenopathy. Hematologic: The patient denies having bruising, bleeding, or progressive fatigue. Respiratory: Patient denies having shortness of breath, cough, sputum production, fever, or dyspnea on exertion. Cardiovascular: The patient denies having leg pain, leg swelling, heart palpitations, chest permit, chest pain, or lightheadedness. The patient denies having dyspnea on exertion. Gastrointestinal: The patient reports occasional nausea from chemotherapy which is well controlled with antinausea medication. She denies having any emesis or diarrhea. Genitourinary: Patient denies having urinary urgency, frequency, or dysuria. The patient denies having blood in the urine. Psychological: The patient denies having symptoms of nervousness, anxiety, depression, or thoughts of harming self. Skin: Patient denies having skin rashes, skin, ulcerations, or unexplained itching or pruritus. Musculoskeletal: The patient denies having pain in the joints or bones. Objective:     Visit Vitals    /88    Pulse 68    Temp 98.6 °F (37 °C)    Ht 5' (1.524 m)    Wt 95.7 kg (211 lb)    BMI 41.21 kg/m2     ECOG PS=1; Pain score=0/10    Physical Exam:   Gen. Appearance: The patient is in no acute distress. Skin: There is no bruise or rash. HEENT: The exam is unremarkable. Neck: Supple without lymphadenopathy or thyromegaly. Lungs: Clear to auscultation and percussion; there are no wheezes or rhonchi. Heart: Regular rate and rhythm; there are no murmurs, gallops, or rubs. Abdomen: Bowel sounds are present and normal.  There is no guarding, tenderness, or hepatosplenomegaly. Extremities: There is no clubbing, cyanosis, or edema. Neurologic: There are no focal neurologic deficits. Lymphatics: There is no palpable peripheral lymphadenopathy. Musculoskeletal: The patient has full range of motion at all joints. There is no evidence of joint deformity or effusions. There is no focal joint tenderness. Psychological/psychiatric: There is no clinical evidence of anxiety, depression, or melancholy. Lab data:      Results for orders placed or performed during the hospital encounter of 03/08/17   CBC WITH 3 PART DIFF     Status: Abnormal   Result Value Ref Range Status    WBC 9.3 4.5 - 13.0 K/uL Final    RBC 2.87 (L) 4.10 - 5.10 M/uL Final    HGB 8.1 (L) 12.0 - 16 g/dL Final    HCT 26.1 (L) 36 - 48 % Final    MCV 90.9 78 - 102 FL Final    MCH 28.2 25.0 - 35.0 PG Final    MCHC 31.0 31 - 37 g/dL Final    RDW 22.8 (H) 11.5 - 14.5 % Final    PLATELET 247 (H) 258 - 440 K/uL Final    NEUTROPHILS 68 40 - 70 % Final    MIXED CELLS 14 0.1 - 17 % Final    LYMPHOCYTES 18 14 - 44 % Final    ABS. NEUTROPHILS 6.3 1.8 - 9.5 K/UL Final    ABS. MIXED CELLS 1.3 0.0 - 2.3 K/uL Final    ABS. LYMPHOCYTES 1.7 1.1 - 5.9 K/UL Final     Comment: Test performed at [place] [address] Location. Results Reviewed by Medical Director. DF AUTOMATED   Final           Assessment:     1. Colorectal carcinoma ([facility] [address]    2. Anemia in chronic kidney disease (CKD)    3. Antineoplastic chemotherapy induced anemia    4. Chemotherapy induced neutropenia (HCC)      Plan:   Colorectal carcinoma with abdominal carcinomatosis: We will continue with the FOLFOX chemotherapy regimen every 14 days.  Her Avastin has been d/c'ed. At this time I will check the CEA level to see if her numbers are beginning to decline since she started chemotherapy. 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 Procrit at a dose of 60,000 units will be provided whenever her hemoglobin is below 10 g/dL hematocrit is below 30% every 2 weeks. The iron profile and ferritin levels will be ordered at this time. Chemotherapy-induced leukopenia/neutropenia (improvedproblem): The CBC today shows a normal WBC count of 9.3, the absolute neutrophil count is 6.3 and the absolute lymphocyte count is 1.7. I have explained to the patient that we continue Neulasta at a dose of 6 mg every 14 days if the WBC count declines below 2. These will continue to be monitored at 2 week intervals. I will have the patient return to clinic for complete reassessment again in 3 weeks. Orders Placed This Encounter    COMPLETE CBC & AUTO DIFF WBC    InHouse CBC ([facility])     Standing Status:   Future     Number of Occurrences:   1     Standing Expiration Date:   0/34/9336    METABOLIC PANEL, COMPREHENSIVE     Standing Status:   Future     Standing Expiration Date:   3/9/2018    IRON PROFILE     Standing Status:   Future     Standing Expiration Date:   3/9/2018    CEA     Standing Status:   Future     Standing Expiration Date:   3/9/2018    FERRITIN     Standing Status:   Future     Standing Expiration Date:   3/9/2018       Hiram Adhikari NP  3/8/2017     I have assessed the patient independently and  agree with the full assessment as outlined. Kamran Buckner MD, FACP      Please note: This document has been produced using voice recognition software. Unrecognized errors in transcription may be present.

## 2017-03-09 LAB — CEA SERPL-MCNC: 956.6 NG/ML

## 2017-03-10 RX ORDER — FLUOROURACIL 50 MG/ML
800 INJECTION, SOLUTION INTRAVENOUS ONCE
Status: COMPLETED | OUTPATIENT
Start: 2017-03-14 | End: 2017-03-14

## 2017-03-14 ENCOUNTER — HOSPITAL ENCOUNTER (OUTPATIENT)
Dept: INFUSION THERAPY | Age: 62
Discharge: HOME | End: 2017-03-14
Payer: MEDICARE

## 2017-03-14 VITALS
SYSTOLIC BLOOD PRESSURE: 124 MMHG | DIASTOLIC BLOOD PRESSURE: 72 MMHG | WEIGHT: 211.6 LBS | BODY MASS INDEX: 41.54 KG/M2 | RESPIRATION RATE: 18 BRPM | HEART RATE: 77 BPM | OXYGEN SATURATION: 100 % | TEMPERATURE: 98.6 F | HEIGHT: 60 IN

## 2017-03-14 LAB
ANION GAP BLD CALC-SCNC: 17 MMOL/L (ref 10–20)
BASO+EOS+MONOS # BLD AUTO: 1 K/UL (ref 0–2.3)
BASO+EOS+MONOS # BLD AUTO: 14 % (ref 0.1–17)
BUN BLD-MCNC: 25 MG/DL (ref 7–18)
CA-I BLD-MCNC: 1.14 MMOL/L (ref 1.12–1.32)
CHLORIDE BLD-SCNC: 108 MMOL/L (ref 100–108)
CO2 BLD-SCNC: 19 MMOL/L (ref 19–24)
CREAT UR-MCNC: 1.6 MG/DL (ref 0.6–1.3)
DIFFERENTIAL METHOD BLD: ABNORMAL
ERYTHROCYTE [DISTWIDTH] IN BLOOD BY AUTOMATED COUNT: 22.4 % (ref 11.5–14.5)
GLUCOSE BLD STRIP.AUTO-MCNC: 128 MG/DL (ref 74–106)
HCT VFR BLD AUTO: 23.7 % (ref 36–48)
HCT VFR BLD CALC: 23 % (ref 36–49)
HGB BLD-MCNC: 7.2 G/DL (ref 12–16)
HGB BLD-MCNC: 7.8 G/DL (ref 12–16)
LYMPHOCYTES # BLD AUTO: 16 % (ref 14–44)
LYMPHOCYTES # BLD: 1.3 K/UL (ref 1.1–5.9)
MCH RBC QN AUTO: 28.2 PG (ref 25–35)
MCHC RBC AUTO-ENTMCNC: 30.4 G/DL (ref 31–37)
MCV RBC AUTO: 92.9 FL (ref 78–102)
NEUTS SEG # BLD: 5.4 K/UL (ref 1.8–9.5)
NEUTS SEG NFR BLD AUTO: 70 % (ref 40–70)
PLATELET # BLD AUTO: 378 K/UL (ref 140–440)
POTASSIUM BLD-SCNC: 3.7 MMOL/L (ref 3.5–5.5)
RBC # BLD AUTO: 2.55 M/UL (ref 4.1–5.1)
SODIUM BLD-SCNC: 140 MMOL/L (ref 136–145)
WBC # BLD AUTO: 7.7 K/UL (ref 4.5–13)

## 2017-03-14 PROCEDURE — 96416 CHEMO PROLONG INFUSE W/PUMP: CPT

## 2017-03-14 PROCEDURE — 74011250636 HC RX REV CODE- 250/636: Performed by: INTERNAL MEDICINE

## 2017-03-14 PROCEDURE — 74011250636 HC RX REV CODE- 250/636

## 2017-03-14 PROCEDURE — 80047 BASIC METABLC PNL IONIZED CA: CPT

## 2017-03-14 PROCEDURE — 96411 CHEMO IV PUSH ADDL DRUG: CPT

## 2017-03-14 PROCEDURE — 74011000258 HC RX REV CODE- 258: Performed by: INTERNAL MEDICINE

## 2017-03-14 PROCEDURE — 85025 COMPLETE CBC W/AUTO DIFF WBC: CPT | Performed by: INTERNAL MEDICINE

## 2017-03-14 PROCEDURE — 96413 CHEMO IV INFUSION 1 HR: CPT

## 2017-03-14 PROCEDURE — 96415 CHEMO IV INFUSION ADDL HR: CPT

## 2017-03-14 PROCEDURE — 74011250636 HC RX REV CODE- 250/636: Performed by: NURSE PRACTITIONER

## 2017-03-14 PROCEDURE — 77030012965 HC NDL HUBR BBMI -A

## 2017-03-14 PROCEDURE — 96417 CHEMO IV INFUS EACH ADDL SEQ: CPT

## 2017-03-14 PROCEDURE — 96375 TX/PRO/DX INJ NEW DRUG ADDON: CPT

## 2017-03-14 RX ORDER — PALONOSETRON 0.05 MG/ML
0.25 INJECTION, SOLUTION INTRAVENOUS ONCE
Status: COMPLETED | OUTPATIENT
Start: 2017-03-14 | End: 2017-03-14

## 2017-03-14 RX ORDER — SODIUM CHLORIDE 0.9 % (FLUSH) 0.9 %
10-40 SYRINGE (ML) INJECTION AS NEEDED
Status: DISPENSED | OUTPATIENT
Start: 2017-03-14 | End: 2017-03-14

## 2017-03-14 RX ORDER — AMLODIPINE BESYLATE 5 MG/1
5 TABLET ORAL DAILY
COMMUNITY
End: 2018-01-01

## 2017-03-14 RX ORDER — DEXTROSE MONOHYDRATE 50 MG/ML
25 INJECTION, SOLUTION INTRAVENOUS ONCE
Status: COMPLETED | OUTPATIENT
Start: 2017-03-14 | End: 2017-03-14

## 2017-03-14 RX ORDER — HEPARIN SODIUM (PORCINE) LOCK FLUSH IV SOLN 100 UNIT/ML 100 UNIT/ML
500 SOLUTION INTRAVENOUS AS NEEDED
Status: ACTIVE | OUTPATIENT
Start: 2017-03-14 | End: 2017-03-15

## 2017-03-14 RX ADMIN — FLUOROURACIL 800 MG: 50 INJECTION, SOLUTION INTRAVENOUS at 14:35

## 2017-03-14 RX ADMIN — DEXAMETHASONE SODIUM PHOSPHATE 12 MG: 4 INJECTION, SOLUTION INTRA-ARTICULAR; INTRALESIONAL; INTRAMUSCULAR; INTRAVENOUS; SOFT TISSUE at 11:08

## 2017-03-14 RX ADMIN — DEXTROSE MONOHYDRATE 25 ML/HR: 5 INJECTION, SOLUTION INTRAVENOUS at 11:08

## 2017-03-14 RX ADMIN — LEUCOVORIN CALCIUM 800 MG: 350 INJECTION, POWDER, LYOPHILIZED, FOR SOLUTION INTRAMUSCULAR; INTRAVENOUS at 12:05

## 2017-03-14 RX ADMIN — ERYTHROPOIETIN 20000 UNITS: 20000 INJECTION, SOLUTION INTRAVENOUS; SUBCUTANEOUS at 13:53

## 2017-03-14 RX ADMIN — PALONOSETRON HYDROCHLORIDE 0.25 MG: 0.25 INJECTION INTRAVENOUS at 11:08

## 2017-03-14 RX ADMIN — OXALIPLATIN 130 MG: 100 INJECTION, SOLUTION, CONCENTRATE INTRAVENOUS at 12:03

## 2017-03-14 RX ADMIN — ERYTHROPOIETIN 40000 UNITS: 40000 INJECTION, SOLUTION INTRAVENOUS; SUBCUTANEOUS at 13:53

## 2017-03-14 RX ADMIN — FLUOROURACIL 4900 MG: 50 INJECTION, SOLUTION INTRAVENOUS at 14:40

## 2017-03-14 NOTE — PROGRESS NOTES
[facility] Progress Note    Date: 2017    Name: Yelena Javier    MRN: [id number]         : 1955      Patient assessed and education was provided. Here for cycle 7 Folfox. Patient vitals were reviewed. Visit Vitals    /72 (BP 1 Location: Left arm, BP Patient Position: Sitting)    Pulse 77    Temp 98.6 °F (37 °C)    Resp 18    Ht 5' (1.524 m)    Wt 96 kg (211 lb 9.6 oz)    SpO2 100%    Breastfeeding No    BMI 41.33 kg/m2       Right chest Medi-port was accessed. Brisk flush/blood returns noted. Blood sample obtained for cbc, and bmp. Recent Results (from the past 12 hour(s))   CBC WITH 3 PART DIFF    Collection Time: 17 10:30 AM   Result Value Ref Range    WBC 7.7 4.5 - 13.0 K/uL    RBC 2.55 (L) 4.10 - 5.10 M/uL    HGB 7.2 (L) 12.0 - 16 g/dL    HCT 23.7 (L) 36 - 48 %    MCV 92.9 78 - 102 FL    MCH 28.2 25.0 - 35.0 PG    MCHC 30.4 (L) 31 - 37 g/dL    RDW 22.4 (H) 11.5 - 14.5 %    PLATELET 832 910 - 395 K/uL    NEUTROPHILS 70 40 - 70 %    MIXED CELLS 14 0.1 - 17 %    LYMPHOCYTES 16 14 - 44 %    ABS. NEUTROPHILS 5.4 1.8 - 9.5 K/UL    ABS. MIXED CELLS 1.0 0.0 - 2.3 K/uL    ABS. LYMPHOCYTES 1.3 1.1 - 5.9 K/UL    DF AUTOMATED     POC CHEM8    Collection Time: 17 10:55 AM   Result Value Ref Range    CO2 (POC) 19 19 - 24 MMOL/L    Glucose (POC) 128 (H) 74 - 106 MG/DL    BUN (POC) 25 (H) 7 - 18 MG/DL    Creatinine (POC) 1.6 (H) 0.6 - 1.3 MG/DL    GFR-AA (POC) 39 (L) >60 ml/min/1.73m2    GFR, non-AA (POC) 33 (L) >60 ml/min/1.73m2    Sodium (POC) 140 136 - 145 MMOL/L    Potassium (POC) 3.7 3.5 - 5.5 MMOL/L    Calcium, ionized (POC) 1.14 1.12 - 1.32 MMOL/L    Chloride (POC) 108 100 - 108 MMOL/L    Anion gap (POC) 17 10 - 20      Hematocrit (POC) 23 (L) 36 - 49 %    Hemoglobin (POC) 7.8 (L) 12 - 16 G/DL         Procrit added today to patients regimen q 2 weeks. Procrit 60,000 units given left upper arm. Procrit education and Care Notes provided and signed by patient. Pre-medications of aloxi and decadron IV were administered as ordered and chemotherapy cycle 7 was initiated. Leucovorin 800 mg and oxaliplatin 130 mg was infused concurrently over 2 hrs. Tolerated well. Brisk flush/blood returns noted from port. 5 fu 800 mg IVP given over 2-4 minutes, followed by 5 fu continous cadd pump of 4900 mg to run over 46 hrs. Hadley needle intact to port site and drsg d/i. No signs of infiltration or infection noted. drsg and all hook ups secured. Discharge instructions given. Patient verbalized understanding      Patient was discharged from [place] [address] in stable condition at 1505. She is to return on 3/16/17   At 1300 for her next appointment at [facility] to d.c her pump and de-access her port.     Paulino Ortez RN  March 14, 2017

## 2017-03-16 ENCOUNTER — HOSPITAL ENCOUNTER (OUTPATIENT)
Dept: INFUSION THERAPY | Age: 62
Discharge: HOME | End: 2017-03-16
Payer: MEDICARE

## 2017-03-16 VITALS
HEART RATE: 71 BPM | DIASTOLIC BLOOD PRESSURE: 85 MMHG | TEMPERATURE: 98.8 F | OXYGEN SATURATION: 100 % | RESPIRATION RATE: 18 BRPM | SYSTOLIC BLOOD PRESSURE: 138 MMHG

## 2017-03-16 PROCEDURE — 74011250636 HC RX REV CODE- 250/636

## 2017-03-16 RX ORDER — HEPARIN SODIUM (PORCINE) LOCK FLUSH IV SOLN 100 UNIT/ML 100 UNIT/ML
SOLUTION INTRAVENOUS
Status: COMPLETED
Start: 2017-03-16 | End: 2017-03-16

## 2017-03-16 RX ORDER — SODIUM CHLORIDE 0.9 % (FLUSH) 0.9 %
10-40 SYRINGE (ML) INJECTION AS NEEDED
Status: DISCONTINUED | OUTPATIENT
Start: 2017-03-16 | End: 2017-03-20 | Stop reason: HOSPADM

## 2017-03-16 RX ORDER — HEPARIN SODIUM (PORCINE) LOCK FLUSH IV SOLN 100 UNIT/ML 100 UNIT/ML
500 SOLUTION INTRAVENOUS AS NEEDED
Status: ACTIVE | OUTPATIENT
Start: 2017-03-16 | End: 2017-03-17

## 2017-03-16 RX ADMIN — Medication 10 ML: at 13:17

## 2017-03-16 RX ADMIN — HEPARIN SODIUM (PORCINE) LOCK FLUSH IV SOLN 100 UNIT/ML 500 UNITS: 100 SOLUTION at 13:17

## 2017-03-16 NOTE — PROGRESS NOTES
[facility] Progress Note    Date: 2017    Name: Jd Sim    MRN: [id number]         : 1955      Ms. Polk arrived to [facility] at 1300. Ms. Shira Lunsford was assessed and education was provided. Ms. Polk's vitals were reviewed. Visit Vitals    /85 (BP 1 Location: Left arm, BP Patient Position: At rest)    Pulse 71    Temp 98.8 °F (37.1 °C)    Resp 18    SpO2 100%       Pt was observed for 5 minutes after obtaining vital signs prior to initiating treatment. Patient's right upper chest port with 46-hour 5FU cadd pump infusion complete. Flushed with normal saline/ blood return verified. Flushed with heparin per order and de-accessed. Band-aid applied to site. Ms. Shira Lunsford tolerated well without complaints. Ms. Shira Lunsford was discharged from [place] [address] in stable condition [id number]. She is to return on 3/28/17 at 1000 for her next appointment.     Lucinda Frank RN  2017

## 2017-03-22 RX ORDER — PREDNISONE 20 MG/1
TABLET ORAL
Qty: 4 TAB | Refills: 0 | Status: SHIPPED | OUTPATIENT
Start: 2017-03-22 | End: 2017-08-30 | Stop reason: SDUPTHER

## 2017-03-27 RX ORDER — FLUOROURACIL 50 MG/ML
800 INJECTION, SOLUTION INTRAVENOUS ONCE
Status: COMPLETED | OUTPATIENT
Start: 2017-03-28 | End: 2017-03-28

## 2017-03-28 ENCOUNTER — HOSPITAL ENCOUNTER (OUTPATIENT)
Dept: INFUSION THERAPY | Age: 62
Discharge: HOME | End: 2017-03-28
Payer: MEDICARE

## 2017-03-28 VITALS
BODY MASS INDEX: 41.05 KG/M2 | DIASTOLIC BLOOD PRESSURE: 76 MMHG | WEIGHT: 209.1 LBS | TEMPERATURE: 98.2 F | HEIGHT: 60 IN | SYSTOLIC BLOOD PRESSURE: 129 MMHG | HEART RATE: 85 BPM | OXYGEN SATURATION: 98 % | RESPIRATION RATE: 18 BRPM

## 2017-03-28 LAB
ALBUMIN SERPL BCP-MCNC: 2.3 G/DL (ref 3.4–5)
ALBUMIN/GLOB SERPL: 0.6 {RATIO} (ref 0.8–1.7)
ALP SERPL-CCNC: 116 U/L (ref 45–117)
ALT SERPL-CCNC: 28 U/L (ref 13–56)
ANION GAP BLD CALC-SCNC: 17 MMOL/L (ref 10–20)
AST SERPL W P-5'-P-CCNC: 16 U/L (ref 15–37)
BASOPHILS # BLD AUTO: 0 K/UL (ref 0–0.06)
BASOPHILS # BLD: 1 % (ref 0–3)
BILIRUB DIRECT SERPL-MCNC: <0.1 MG/DL (ref 0–0.2)
BILIRUB SERPL-MCNC: 0.2 MG/DL (ref 0.2–1)
BUN BLD-MCNC: 20 MG/DL (ref 7–18)
CA-I BLD-MCNC: 1.23 MMOL/L (ref 1.12–1.32)
CHLORIDE BLD-SCNC: 103 MMOL/L (ref 100–108)
CO2 BLD-SCNC: 20 MMOL/L (ref 19–24)
CREAT UR-MCNC: 1.7 MG/DL (ref 0.6–1.3)
DIFFERENTIAL METHOD BLD: ABNORMAL
EOSINOPHIL # BLD: 0 K/UL (ref 0–0.4)
EOSINOPHIL NFR BLD: 1 % (ref 0–5)
ERYTHROCYTE [DISTWIDTH] IN BLOOD BY AUTOMATED COUNT: 20.9 % (ref 11.6–14.5)
GLOBULIN SER CALC-MCNC: 3.8 G/DL (ref 2–4)
GLUCOSE BLD STRIP.AUTO-MCNC: 162 MG/DL (ref 74–106)
HCT VFR BLD AUTO: 24.4 % (ref 35–45)
HCT VFR BLD CALC: 22 % (ref 36–49)
HGB BLD-MCNC: 7.3 G/DL (ref 12–16)
HGB BLD-MCNC: 7.5 G/DL (ref 12–16)
LYMPHOCYTES # BLD AUTO: 34 % (ref 20–51)
LYMPHOCYTES # BLD: 1.4 K/UL (ref 0.8–3.5)
MCH RBC QN AUTO: 27.8 PG (ref 24–34)
MCHC RBC AUTO-ENTMCNC: 29.9 G/DL (ref 31–37)
MCV RBC AUTO: 92.8 FL (ref 74–97)
MONOCYTES # BLD: 1 K/UL (ref 0–1)
MONOCYTES NFR BLD AUTO: 26 % (ref 2–9)
NEUTS BAND NFR BLD MANUAL: 4 % (ref 0–5)
NEUTS SEG # BLD: 1.6 K/UL (ref 1.8–8)
NEUTS SEG NFR BLD AUTO: 34 % (ref 42–75)
PLATELET # BLD AUTO: 524 K/UL (ref 135–420)
PLATELET COMMENTS,PCOM: ABNORMAL
PMV BLD AUTO: 8.8 FL (ref 9.2–11.8)
POTASSIUM BLD-SCNC: 3.6 MMOL/L (ref 3.5–5.5)
PROT SERPL-MCNC: 6.1 G/DL (ref 6.4–8.2)
RBC # BLD AUTO: 2.63 M/UL (ref 4.2–5.3)
RBC MORPH BLD: ABNORMAL
RBC MORPH BLD: ABNORMAL
SODIUM BLD-SCNC: 135 MMOL/L (ref 136–145)
WBC # BLD AUTO: 4 K/UL (ref 4.6–13.2)
WBC MORPH BLD: ABNORMAL

## 2017-03-28 PROCEDURE — 96375 TX/PRO/DX INJ NEW DRUG ADDON: CPT

## 2017-03-28 PROCEDURE — 80076 HEPATIC FUNCTION PANEL: CPT | Performed by: INTERNAL MEDICINE

## 2017-03-28 PROCEDURE — 74011250636 HC RX REV CODE- 250/636: Performed by: NURSE PRACTITIONER

## 2017-03-28 PROCEDURE — 74011250636 HC RX REV CODE- 250/636: Performed by: INTERNAL MEDICINE

## 2017-03-28 PROCEDURE — 96411 CHEMO IV PUSH ADDL DRUG: CPT

## 2017-03-28 PROCEDURE — 80047 BASIC METABLC PNL IONIZED CA: CPT

## 2017-03-28 PROCEDURE — 96413 CHEMO IV INFUSION 1 HR: CPT

## 2017-03-28 PROCEDURE — 96416 CHEMO PROLONG INFUSE W/PUMP: CPT

## 2017-03-28 PROCEDURE — 77030012965 HC NDL HUBR BBMI -A

## 2017-03-28 PROCEDURE — 85025 COMPLETE CBC W/AUTO DIFF WBC: CPT | Performed by: INTERNAL MEDICINE

## 2017-03-28 PROCEDURE — 96415 CHEMO IV INFUSION ADDL HR: CPT

## 2017-03-28 PROCEDURE — 74011000258 HC RX REV CODE- 258: Performed by: INTERNAL MEDICINE

## 2017-03-28 PROCEDURE — 96417 CHEMO IV INFUS EACH ADDL SEQ: CPT

## 2017-03-28 PROCEDURE — 74011250636 HC RX REV CODE- 250/636

## 2017-03-28 RX ORDER — SODIUM CHLORIDE 9 MG/ML
250 INJECTION, SOLUTION INTRAVENOUS ONCE
Status: COMPLETED | OUTPATIENT
Start: 2017-03-28 | End: 2017-03-28

## 2017-03-28 RX ORDER — HEPARIN SODIUM (PORCINE) LOCK FLUSH IV SOLN 100 UNIT/ML 100 UNIT/ML
500 SOLUTION INTRAVENOUS AS NEEDED
Status: ACTIVE | OUTPATIENT
Start: 2017-03-28 | End: 2017-03-29

## 2017-03-28 RX ORDER — PALONOSETRON 0.05 MG/ML
0.25 INJECTION, SOLUTION INTRAVENOUS ONCE
Status: COMPLETED | OUTPATIENT
Start: 2017-03-28 | End: 2017-03-28

## 2017-03-28 RX ORDER — DEXTROSE MONOHYDRATE 50 MG/ML
25 INJECTION, SOLUTION INTRAVENOUS ONCE
Status: COMPLETED | OUTPATIENT
Start: 2017-03-28 | End: 2017-03-28

## 2017-03-28 RX ORDER — SODIUM CHLORIDE 0.9 % (FLUSH) 0.9 %
10-40 SYRINGE (ML) INJECTION AS NEEDED
Status: DISCONTINUED | OUTPATIENT
Start: 2017-03-28 | End: 2017-04-01 | Stop reason: HOSPADM

## 2017-03-28 RX ADMIN — ERYTHROPOIETIN 20000 UNITS: 20000 INJECTION, SOLUTION INTRAVENOUS; SUBCUTANEOUS at 14:01

## 2017-03-28 RX ADMIN — FLUOROURACIL 4800 MG: 50 INJECTION, SOLUTION INTRAVENOUS at 14:08

## 2017-03-28 RX ADMIN — PALONOSETRON HYDROCHLORIDE 0.25 MG: 0.25 INJECTION INTRAVENOUS at 11:04

## 2017-03-28 RX ADMIN — OXALIPLATIN 130 MG: 100 INJECTION, SOLUTION, CONCENTRATE INTRAVENOUS at 11:52

## 2017-03-28 RX ADMIN — ERYTHROPOIETIN 40000 UNITS: 40000 INJECTION, SOLUTION INTRAVENOUS; SUBCUTANEOUS at 14:01

## 2017-03-28 RX ADMIN — DEXAMETHASONE SODIUM PHOSPHATE 12 MG: 4 INJECTION, SOLUTION INTRA-ARTICULAR; INTRALESIONAL; INTRAMUSCULAR; INTRAVENOUS; SOFT TISSUE at 11:09

## 2017-03-28 RX ADMIN — FLUOROURACIL 800 MG: 50 INJECTION, SOLUTION INTRAVENOUS at 14:02

## 2017-03-28 RX ADMIN — SODIUM CHLORIDE 250 ML: 900 INJECTION, SOLUTION INTRAVENOUS at 11:03

## 2017-03-28 RX ADMIN — LEUCOVORIN CALCIUM 800 MG: 350 INJECTION, POWDER, LYOPHILIZED, FOR SOLUTION INTRAMUSCULAR; INTRAVENOUS at 11:53

## 2017-03-28 RX ADMIN — DEXTROSE MONOHYDRATE 25 ML/HR: 5 INJECTION, SOLUTION INTRAVENOUS at 11:50

## 2017-03-28 NOTE — PROGRESS NOTES
[facility] Progress Note    Date: 2017    Name: Cassandra Pratt    MRN: [id number]         : 1955    Ms. Polk arrived arrived at [facility] at 36. She is here for Cycle 8 Day 1 Folfox. Patient assessed and education was provided. Patient vitals were reviewed. Visit Vitals    /76 (BP 1 Location: Right arm, BP Patient Position: Sitting)    Pulse 85    Temp 98.2 °F (36.8 °C)    Resp 18    Ht 5' (1.524 m)    Wt 94.8 kg (209 lb 1.6 oz)    SpO2 98%    BMI 40.84 kg/m2       Right chest Medi-port was accessed. Brisk flush/blood returns noted. Blood sample obtained for cbc, hepatic function panel and bmp. Lab results obtained and reviewed. Recent Results (from the past 12 hour(s))   POC CHEM8    Collection Time: 17 10:50 AM   Result Value Ref Range    CO2 (POC) 20 19 - 24 MMOL/L    Glucose (POC) 162 (H) 74 - 106 MG/DL    BUN (POC) 20 (H) 7 - 18 MG/DL    Creatinine (POC) 1.7 (H) 0.6 - 1.3 MG/DL    GFR-AA (POC) 37 (L) >60 ml/min/1.73m2    GFR, non-AA (POC) 30 (L) >60 ml/min/1.73m2    Sodium (POC) 135 (L) 136 - 145 MMOL/L    Potassium (POC) 3.6 3.5 - 5.5 MMOL/L    Calcium, ionized (POC) 1.23 1.12 - 1.32 MMOL/L    Chloride (POC) 103 100 - 108 MMOL/L    Anion gap (POC) 17 10 - 20      Hematocrit (POC) 22 (L) 36 - 49 %    Hemoglobin (POC) 7.5 (L) 12 - 16 G/DL     ANC= 1.6 and PLT= 599. HCT= 24.1 and HGB= 7.4. Jagdeep Don NP notified of lab values. Pt due to receive procrit today and is ok to receive chemo today. Procrit 60,000 units given right upper arm. Pre-medications of aloxi 0.25 mg and decadron 12 mg IV were administered as ordered and chemotherapy cycle 8 was initiated. Leucovorin 800 mg and oxaliplatin 130 mg was infused concurrently over 2 hrs. Tolerated well. Brisk flush/blood returns noted from port. Fluorouracil 800 mg IVP given over 2-4 minutes, followed by fluorouracil continous cadd pump of 4800 mg to run over 46 hrs.     Chemotherapy administered and monitored by MICK Avila RN. Hadley needle intact to port site and drsg clean, dry and intact. No signs of infiltration or infection noted. drsg and all hook ups secured. Discharge instructions given. Patient verbalized understanding      Patient was discharged from [place] [address] in stable condition at 1415. She is to return on 03/30/2017 At 1300 for her next appointment at [facility] to d.c her pump and de-access her port.     Darwin Bender RN  March 28, 2017

## 2017-03-30 ENCOUNTER — HOSPITAL ENCOUNTER (OUTPATIENT)
Dept: INFUSION THERAPY | Age: 62
Discharge: HOME | End: 2017-03-30
Payer: MEDICARE

## 2017-03-30 VITALS
HEART RATE: 80 BPM | SYSTOLIC BLOOD PRESSURE: 155 MMHG | DIASTOLIC BLOOD PRESSURE: 85 MMHG | OXYGEN SATURATION: 98 % | RESPIRATION RATE: 18 BRPM | TEMPERATURE: 98 F

## 2017-03-30 PROCEDURE — 74011250636 HC RX REV CODE- 250/636: Performed by: INTERNAL MEDICINE

## 2017-03-30 PROCEDURE — 96523 IRRIG DRUG DELIVERY DEVICE: CPT

## 2017-03-30 RX ORDER — HEPARIN SODIUM (PORCINE) LOCK FLUSH IV SOLN 100 UNIT/ML 100 UNIT/ML
500 SOLUTION INTRAVENOUS AS NEEDED
Status: CANCELLED | OUTPATIENT
Start: 2017-03-30 | End: 2017-03-31

## 2017-03-30 RX ORDER — SODIUM CHLORIDE 0.9 % (FLUSH) 0.9 %
10-40 SYRINGE (ML) INJECTION AS NEEDED
Status: DISCONTINUED | OUTPATIENT
Start: 2017-03-30 | End: 2017-04-03 | Stop reason: HOSPADM

## 2017-03-30 RX ORDER — HEPARIN SODIUM (PORCINE) LOCK FLUSH IV SOLN 100 UNIT/ML 100 UNIT/ML
500 SOLUTION INTRAVENOUS AS NEEDED
Status: ACTIVE | OUTPATIENT
Start: 2017-03-30 | End: 2017-03-31

## 2017-03-30 RX ORDER — SODIUM CHLORIDE 0.9 % (FLUSH) 0.9 %
10-40 SYRINGE (ML) INJECTION AS NEEDED
Status: CANCELLED | OUTPATIENT
Start: 2017-03-30

## 2017-03-30 RX ADMIN — HEPARIN SODIUM (PORCINE) LOCK FLUSH IV SOLN 100 UNIT/ML 500 UNITS: 100 SOLUTION at 13:14

## 2017-03-30 RX ADMIN — Medication 20 ML: at 13:13

## 2017-04-05 ENCOUNTER — HOSPITAL ENCOUNTER (OUTPATIENT)
Dept: LAB | Age: 62
Discharge: HOME | End: 2017-04-05
Payer: MEDICARE

## 2017-04-05 ENCOUNTER — HOSPITAL ENCOUNTER (OUTPATIENT)
Dept: ONCOLOGY | Age: 62
Discharge: HOME | End: 2017-04-05

## 2017-04-05 ENCOUNTER — OFFICE VISIT (OUTPATIENT)
Dept: ONCOLOGY | Age: 62
End: 2017-04-05

## 2017-04-05 ENCOUNTER — HOSPITAL ENCOUNTER (OUTPATIENT)
Dept: INFUSION THERAPY | Age: 62
Discharge: HOME | End: 2017-04-05
Payer: MEDICARE

## 2017-04-05 VITALS — HEART RATE: 82 BPM | DIASTOLIC BLOOD PRESSURE: 76 MMHG | SYSTOLIC BLOOD PRESSURE: 142 MMHG | TEMPERATURE: 99.7 F

## 2017-04-05 DIAGNOSIS — D63.1 ANEMIA IN CHRONIC KIDNEY DISEASE (CKD): ICD-10-CM

## 2017-04-05 DIAGNOSIS — C19 COLORECTAL CARCINOMA (HCC): ICD-10-CM

## 2017-04-05 DIAGNOSIS — N18.9 ANEMIA IN CHRONIC KIDNEY DISEASE (CKD): ICD-10-CM

## 2017-04-05 DIAGNOSIS — D70.1 CHEMOTHERAPY INDUCED NEUTROPENIA (HCC): ICD-10-CM

## 2017-04-05 DIAGNOSIS — D50.0 IRON DEFICIENCY ANEMIA DUE TO CHRONIC BLOOD LOSS: ICD-10-CM

## 2017-04-05 DIAGNOSIS — T45.1X5A CHEMOTHERAPY INDUCED NEUTROPENIA (HCC): ICD-10-CM

## 2017-04-05 DIAGNOSIS — C19 COLORECTAL CARCINOMA (HCC): Primary | ICD-10-CM

## 2017-04-05 LAB
ALBUMIN SERPL BCP-MCNC: 2.5 G/DL (ref 3.4–5)
ALBUMIN/GLOB SERPL: 0.6 {RATIO} (ref 0.8–1.7)
ALP SERPL-CCNC: 95 U/L (ref 45–117)
ALT SERPL-CCNC: 19 U/L (ref 13–56)
ANION GAP BLD CALC-SCNC: 14 MMOL/L (ref 3–18)
AST SERPL W P-5'-P-CCNC: 15 U/L (ref 15–37)
BASO+EOS+MONOS # BLD AUTO: 0.3 K/UL (ref 0–2.3)
BASO+EOS+MONOS # BLD AUTO: 7 % (ref 0.1–17)
BILIRUB SERPL-MCNC: 0.3 MG/DL (ref 0.2–1)
BUN SERPL-MCNC: 25 MG/DL (ref 7–18)
BUN/CREAT SERPL: 14 (ref 12–20)
CALCIUM SERPL-MCNC: 8.5 MG/DL (ref 8.5–10.1)
CHLORIDE SERPL-SCNC: 106 MMOL/L (ref 100–108)
CO2 SERPL-SCNC: 21 MMOL/L (ref 21–32)
CREAT SERPL-MCNC: 1.83 MG/DL (ref 0.6–1.3)
DIFFERENTIAL METHOD BLD: ABNORMAL
ERYTHROCYTE [DISTWIDTH] IN BLOOD BY AUTOMATED COUNT: 19.2 % (ref 11.5–14.5)
FERRITIN SERPL-MCNC: 356 NG/ML (ref 8–388)
GLOBULIN SER CALC-MCNC: 4 G/DL (ref 2–4)
GLUCOSE SERPL-MCNC: 209 MG/DL (ref 74–99)
HCT VFR BLD AUTO: 24.3 % (ref 36–48)
HGB BLD-MCNC: 7.4 G/DL (ref 12–16)
IRON SATN MFR SERPL: 9 %
IRON SERPL-MCNC: 23 UG/DL (ref 50–175)
LYMPHOCYTES # BLD AUTO: 35 % (ref 14–44)
LYMPHOCYTES # BLD: 1.4 K/UL (ref 1.1–5.9)
MCH RBC QN AUTO: 28 PG (ref 25–35)
MCHC RBC AUTO-ENTMCNC: 30.5 G/DL (ref 31–37)
MCV RBC AUTO: 92 FL (ref 78–102)
NEUTS SEG # BLD: 2.2 K/UL (ref 1.8–9.5)
NEUTS SEG NFR BLD AUTO: 58 % (ref 40–70)
PLATELET # BLD AUTO: 492 K/UL (ref 140–440)
POTASSIUM SERPL-SCNC: 4.3 MMOL/L (ref 3.5–5.5)
PROT SERPL-MCNC: 6.5 G/DL (ref 6.4–8.2)
RBC # BLD AUTO: 2.64 M/UL (ref 4.1–5.1)
SODIUM SERPL-SCNC: 141 MMOL/L (ref 136–145)
TIBC SERPL-MCNC: 260 UG/DL (ref 250–450)
WBC # BLD AUTO: 3.9 K/UL (ref 4.5–13)

## 2017-04-05 PROCEDURE — 86920 COMPATIBILITY TEST SPIN: CPT | Performed by: INTERNAL MEDICINE

## 2017-04-05 PROCEDURE — 82728 ASSAY OF FERRITIN: CPT | Performed by: INTERNAL MEDICINE

## 2017-04-05 PROCEDURE — 36415 COLL VENOUS BLD VENIPUNCTURE: CPT | Performed by: INTERNAL MEDICINE

## 2017-04-05 PROCEDURE — 80053 COMPREHEN METABOLIC PANEL: CPT | Performed by: INTERNAL MEDICINE

## 2017-04-05 PROCEDURE — 86850 RBC ANTIBODY SCREEN: CPT | Performed by: INTERNAL MEDICINE

## 2017-04-05 PROCEDURE — 82378 CARCINOEMBRYONIC ANTIGEN: CPT | Performed by: INTERNAL MEDICINE

## 2017-04-05 PROCEDURE — 83540 ASSAY OF IRON: CPT | Performed by: INTERNAL MEDICINE

## 2017-04-05 PROCEDURE — 36415 COLL VENOUS BLD VENIPUNCTURE: CPT

## 2017-04-05 RX ORDER — SODIUM CHLORIDE 9 MG/ML
250 INJECTION, SOLUTION INTRAVENOUS AS NEEDED
Status: DISCONTINUED | OUTPATIENT
Start: 2017-04-05 | End: 2017-04-09 | Stop reason: HOSPADM

## 2017-04-05 NOTE — PROGRESS NOTES
Hematology/Oncology  Progress Note    Name: Candie Griffiths  Date: 2017  : 1955    PCP: Lourdes Davis NP     Ms. Dimitrios Ortega is a 59 y.o. -American woman with metastatic colorectal carcinoma/carcinomatosis  Current therapy: FOLFOX chemotherapy regimen      Subjective:     Ms. 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 She is currently receiving outpatient systemic chemotherapy with the FOLFOX chemotherapy regimen. We had attempted to use the  Avastin in combination with the FOLFOX but due to her chronic kidney disease we have not been able to give this medication. Overall, she is tolerating the treatment reasonably well  Her appetite remains good. She denies having any unexplained bruising or bleeding. She was recently admitted to [facility] for dizziness, elevated glucose, and hypertension. She reports she feels much better, she has no concerns to report. She is ready to resume her chemo treatment. Past medical history, family history, and social history: these were reviewed and remains unchanged. Past Medical History:   Diagnosis Date    Diabetes ([address]     Gout     Heart disease     Hypertension     Neuropathic pain of foot      Past Surgical History:   Procedure Laterality Date    HX BACK SURGERY      HX HYSTERECTOMY       Social History     Social History    Marital status:      Spouse name: N/A    Number of children: N/A    Years of education: N/A     Occupational History    Not on file.      Social History Main Topics    Smoking status: Never Smoker    Smokeless tobacco: Never Used    Alcohol use No    Drug use: No    Sexual activity: Not on file     Other Topics Concern    Not on file     Social History Narrative     Family History   Problem Relation Age of Onset    Family history unknown: Yes     Current Outpatient Prescriptions   Medication Sig Dispense Refill    predniSONE (DELTASONE) 20 mg tablet TAKE 2 TABLETS BY MOUTH WITH BREAKFAST - TAKE 40 MG ON DAY 2 AND DAY 3 OF TREATMENT 4 Tab 0    amLODIPine (NORVASC) 5 mg tablet Take 5 mg by mouth daily.  ondansetron hcl (ZOFRAN, AS HYDROCHLORIDE,) 8 mg tablet Take one tablet by mouth every 8 hours for nausea beginning the night of chemo for 3 days. 9 Tab 3    predniSONE (DELTASONE) 20 mg tablet Take 2 Tabs by mouth daily. Take 2 tabs on days 2 and 3 of each chemo cycle 4 Tab 2    LORazepam (ATIVAN) 0.5 mg tablet Take 1 Tab by mouth every eight (8) hours as needed for Anxiety. Max Daily Amount: 1.5 mg. Indications: ANXIETY 90 Tab 0    nitrofurantoin, macrocrystal-monohydrate, (MACROBID) 100 mg capsule Take 1 Cap by mouth two (2) times a day. 10 Cap 0    diphenhydrAMINE (BENADRYL) 25 mg capsule Take 1 with compazine every 6 hours for nausea for 3 days then, as needed. 60 Cap 3    prochlorperazine (COMPAZINE) 10 mg tablet Take 1 tablet every 6 hours with Benadryl 25mg for nausea for 3 days then, as needed. 60 Tab 3    warfarin (COUMADIN) 1 mg tablet Take one 1 tablet by mouth everyday at 6pm or after. You will continue to take this medication. Everyday until mediport is removed. 30 Tab 3    lidocaine-prilocaine (EMLA) topical cream Place cream over mediport 1 hour prior to chemotherapy and then place saran wrap over area. Every chemo treatment. 30 g 0    acetaminophen (TYLENOL) 650 mg CR tablet 1,300 mg.      albuterol (PROVENTIL HFA, VENTOLIN HFA, PROAIR HFA) 90 mcg/actuation inhaler 2 Puffs.  colchicine 0.6 mg tablet 0.6 mg.      ergocalciferol (ERGOCALCIFEROL) 50,000 unit capsule 50,000 Units.  furosemide (LASIX) 20 mg tablet Take 1/2-1 tablet daily if needed for swelling.  allopurinol (ZYLOPRIM) 300 mg tablet 300 mg.      desonide (TRIDESILON) 0.05 % cream Use 1 Application to affected area Twice Daily.       fluconazole (DIFLUCAN) 150 mg tablet TAKE 1 TABLET BY MOUTH ONCE DAILY TODAY, MAY REPEAT AFTER 3 DAYS AS NEEDED      cloNIDine HCl (CATAPRES) 0.1 mg tablet 0.1 mg.      bisoprolol-hydrochlorothiazide (ZIAC) 10-6.25 mg per tablet Take 1 Tab by Mouth Once a Day.  glipiZIDE (GLUCOTROL) 10 mg tablet 10 mg.      glimepiride (AMARYL) 4 mg tablet Take one in the am.  New dose.  gabapentin (NEURONTIN) 300 mg capsule 300 mg. Review of Systems  Constitutional: The patient has complaints of mild to moderate fatigue due to chemotherapy treatment and malignancy. HEENT: The patient denies recent head trauma, eye pain, blurred vision,  hearing deficit, oropharyngeal mucosal pain or lesions, and the patient denies throat pain or discomfort. Lymphatics: The patient denies palpable peripheral lymphadenopathy. Hematologic: The patient denies having bruising, bleeding, or progressive fatigue. Respiratory: Patient denies having shortness of breath, cough, sputum production, fever, or dyspnea on exertion. Cardiovascular: The patient denies having leg pain, leg swelling, heart palpitations, chest permit, chest pain, or lightheadedness. The patient denies having dyspnea on exertion. Gastrointestinal: The patient reports occasional nausea from chemotherapy which is well controlled with antinausea medication. She denies having any emesis or diarrhea. Genitourinary: Patient denies having urinary urgency, frequency, or dysuria. The patient denies having blood in the urine. Psychological: The patient denies having symptoms of nervousness, anxiety, depression, or thoughts of harming self. Skin: Patient denies having skin rashes, skin, ulcerations, or unexplained itching or pruritus. Musculoskeletal: The patient denies having pain in the joints or bones. Objective:     Visit Vitals    /76    Pulse 82    Temp 99.7 °F (37.6 °C)     ECOG PS=1; Pain score=0/10    Physical Exam:   Gen. Appearance: The patient is in no acute distress. Skin: There is no bruise or rash.   HEENT: The exam is unremarkable. Neck: Supple without lymphadenopathy or thyromegaly. Lungs: Clear to auscultation and percussion; there are no wheezes or rhonchi. Heart: Regular rate and rhythm; there are no murmurs, gallops, or rubs. Abdomen: Bowel sounds are present and normal.  There is no guarding, tenderness, or hepatosplenomegaly. Extremities: There is no clubbing, cyanosis, or edema. Neurologic: There are no focal neurologic deficits. Lymphatics: There is no palpable peripheral lymphadenopathy. Musculoskeletal: The patient has full range of motion at all joints. There is no evidence of joint deformity or effusions. There is no focal joint tenderness. Psychological/psychiatric: There is no clinical evidence of anxiety, depression, or melancholy. Lab data:      Results for orders placed or performed during the hospital encounter of 04/05/17   CBC WITH 3 PART DIFF     Status: Abnormal   Result Value Ref Range Status    WBC 3.9 (L) 4.5 - 13.0 K/uL Final    RBC 2.64 (L) 4.10 - 5.10 M/uL Final    HGB 7.4 (L) 12.0 - 16 g/dL Final    HCT 24.3 (L) 36 - 48 % Final    MCV 92.0 78 - 102 FL Final    MCH 28.0 25.0 - 35.0 PG Final    MCHC 30.5 (L) 31 - 37 g/dL Final    RDW 19.2 (H) 11.5 - 14.5 % Final    PLATELET 399 (H) 075 - 440 K/uL Final    NEUTROPHILS 58 40 - 70 % Final    MIXED CELLS 7 0.1 - 17 % Final    LYMPHOCYTES 35 14 - 44 % Final    ABS. NEUTROPHILS 2.2 1.8 - 9.5 K/UL Final    ABS. MIXED CELLS 0.3 0.0 - 2.3 K/uL Final    ABS. LYMPHOCYTES 1.4 1.1 - 5.9 K/UL Final     Comment: Test performed at [place] [address] Location. Results Reviewed by Medical Director. DF AUTOMATED   Final           Assessment:     1. Colorectal carcinoma ([address]    2. Chemotherapy induced neutropenia (HCC)    3. Anemia in chronic kidney disease (CKD)    4. Iron deficiency anemia due to chronic blood loss      Plan:   Colorectal carcinoma with abdominal carcinomatosis:  We will continue with the FOLFOX chemotherapy regimen every 14 days. Her Avastin has been d/c'ed. At this time I will check the CEA level to see if her numbers are beginning to decline since she started chemotherapy. 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 Procrit at a dose of 60,000 units will be provided whenever her hemoglobin is below 10 g/dL hematocrit is below 30% every 2 weeks. The iron profile and ferritin levels will be ordered at this time. Chemotherapy-induced leukopenia/neutropenia (improvedproblem): The CBC today shows a normal WBC count of 9.3, the absolute neutrophil count is 6.3 and the absolute lymphocyte count is 1.7. I have explained to the patient that we continue Neulasta at a dose of 6 mg every 14 days if the WBC count declines below 2. These will continue to be monitored at 2 week intervals. I will have the patient return to clinic for complete reassessment again in 3 weeks. Orders Placed This Encounter    COMPLETE CBC & AUTO DIFF WBC    InHouse CBC ([employer])     Standing Status:   Future     Number of Occurrences:   1     Standing Expiration Date:   7/24/6707    METABOLIC PANEL, COMPREHENSIVE     Standing Status:   Future     Standing Expiration Date:   4/6/2018    IRON PROFILE     Standing Status:   Future     Standing Expiration Date:   4/6/2018    FERRITIN     Standing Status:   Future     Standing Expiration Date:   4/6/2018    CEA     Standing Status:   Future     Standing Expiration Date:   4/6/2018       Juanito Harris MD  4/5/2017         Please note: This document has been produced using voice recognition software. Unrecognized errors in transcription may be present.

## 2017-04-05 NOTE — PATIENT INSTRUCTIONS
Colon Cancer: Care Instructions  Your Care Instructions    Colon cancer occurs when abnormal cells grow out of control in the lower part of your intestine (your colon). If the tumor was small and had not spread, your doctor may have removed it during the colonoscopy. But you may need surgery to remove the cancer if the tumor was too big or had spread too far to be removed during a colonoscopy. If cancer has spread to another part of your body, such as the liver, you may need more far-reaching surgery. Treatment for colon cancer may also include radiation therapy and medicines that destroy cancer cells (chemotherapy). Being treated for cancer can weaken your body, and you may feel very tired. Get the rest your body needs so that you can feel better. When you find out that you have cancer, you may feel many emotions and may need some help coping. Seek out family, friends, and counselors for support. You also can do things at home to make yourself feel better while you go through treatment. Call the [employer] ([phone]) or visit its website at [address] for more information. Follow-up care is a key part of your treatment and safety. Be sure to make and go to all appointments, and call your doctor if you are having problems. It's also a good idea to know your test results and keep a list of the medicines you take. How can you care for yourself at home? · Take your medicines exactly as prescribed. Call your doctor if you think you are having a problem with your medicine. You may get medicine for nausea and vomiting if you have these side effects. · Follow your doctor's instructions to relieve pain. Pain from cancer and surgery can almost always be controlled. Use pain medicine when you first notice pain, before it becomes severe. · Eat healthy food. If you do not feel like eating, try to eat food that has protein and extra calories to keep up your strength and prevent weight loss. Drink liquid meal replacements for extra calories and protein. Try to eat your main meal early. · Get some physical activity every day, but do not get too tired. Keep doing the hobbies you enjoy as your energy allows. · Take steps to control your stress and workload. Learn relaxation techniques. ¨ Share your feelings. Stress and tension affect our emotions. By expressing your feelings to others, you may be able to understand and cope with them. ¨ Consider joining a support group. Talking about a problem with your spouse, a good friend, or other people with similar problems is a good way to reduce tension and stress. ¨ Express yourself through art. Try writing, dance, art, or crafts to relieve stress. Some dance, writing, or art groups may be available just for people who have cancer. ¨ Be kind to your body and mind. Getting enough sleep, eating a healthy diet, and taking time to do things you enjoy can contribute to an overall feeling of balance in your life and help reduce stress. ¨ Get help if you need it. Discuss your concerns with your doctor or counselor. · If you are vomiting or have diarrhea:  ¨ Drink plenty of fluids (enough so that your urine is light yellow or clear like water) to prevent dehydration. Choose water and other caffeine-free clear liquids. If you have kidney, heart, or liver disease and have to limit fluids, talk with your doctor before you increase the amount of fluids you drink. ¨ When you are able to eat, try clear soups, mild foods, and liquids until all symptoms are gone for 12 to 48 hours. Other good choices include dry toast, crackers, cooked cereal, and gelatin dessert, such as Jell-O.  · If you have not already done so, prepare a list of advance directives. Advance directives are instructions to your doctor and family members about what kind of care you want if you become unable to speak or express yourself. When should you call for help?   Call 911 anytime you think you may need emergency care. For example, call if:  · You pass maroon or very bloody stools. · You vomit blood or what looks like coffee grounds. · You have sudden chest pain and shortness of breath, or you cough up blood. · You have severe belly pain. Call your doctor now or seek immediate medical care if:  · You have signs of a blood clot, such as:  ¨ Pain in your calf, back of the knee, thigh, or groin. ¨ Redness and swelling in your leg or groin. · You have a fever. · You have nausea or vomiting. · You are very constipated or have diarrhea for several days. · Your stools are black and tarlike or have streaks of blood. Watch closely for changes in your health, and be sure to contact your doctor if:  · Your pain is not controlled by medicine. · Your symptoms get worse or are not improving as expected. Where can you learn more? Go to http://ledyMetagenomixtoño.info/. Enter O000 in the search box to learn more about \"Colon Cancer: Care Instructions. \"  Current as of: July 26, 2016  Content Version: 11.2  © 7632-4327 [employer]. Care instructions adapted under license by [employer] (which disclaims liability or warranty for this information). If you have questions about a medical condition or this instruction, always ask your healthcare professional. [address] any warranty or liability for your use of this information. Iron Deficiency Anemia: Care Instructions  Your Care Instructions    Anemia means that you do not have enough red blood cells. Red blood cells carry oxygen around your body. When you have anemia, it can make you pale, weak, and tired. Many things can cause anemia. The most common cause is loss of blood. This can happen if you have heavy menstrual periods. It can also happen if you have bleeding in your stomach or bowel.   You can also get anemia if you don't have enough iron in your diet or if it's hard for your body to absorb iron. In some cases, pregnancy causes anemia. That's because a pregnant woman needs more iron. Your doctor may do more tests to find the cause of your anemia. If a disease or other health problem is causing it, your doctor will treat that problem. It's important to follow up with your doctor to make sure that your iron level returns to normal.  Follow-up care is a key part of your treatment and safety. Be sure to make and go to all appointments, and call your doctor if you are having problems. It's also a good idea to know your test results and keep a list of the medicines you take. How can you care for yourself at home? · If your doctor recommended iron pills, take them as directed. ¨ Try to take the pills on an empty stomach. You can do this about 1 hour before or 2 hours after meals. But you may need to take iron with food to avoid an upset stomach. ¨ Do not take antacids or drink milk or anything with caffeine within 2 hours of when you take your iron. They can keep your body from absorbing the iron well. ¨ Vitamin C helps your body absorb iron. You may want to take iron pills with a glass of orange juice or some other food high in vitamin C.  ¨ Iron pills may cause stomach problems. These include heartburn, nausea, diarrhea, constipation, and cramps. It can help to drink plenty of fluids and include fruits, vegetables, and fiber in your diet. ¨ It's normal for iron pills to make your stool a greenish or grayish black. But internal bleeding can also cause dark stool. So it's important to tell your doctor about any color changes. ¨ Call your doctor if you think you are having a problem with your iron pills. Even after you start to feel better, it will take several months for your body to build up its supply of iron. ¨ If you miss a pill, don't take a double dose. ¨ Keep iron pills out of the reach of small children. Too much iron can be very dangerous. · Eat foods with a lot of iron.  These include red meat, shellfish, poultry, and eggs. They also include beans, raisins, whole-grain bread, and leafy green vegetables. · Steam your vegetables. This is the best way to prepare them if you want to get as much iron as possible. · Be safe with medicines. Do not take nonsteroidal anti-inflammatory pain relievers unless your doctor tells you to. These include aspirin, naproxen (Aleve), and ibuprofen (Advil, Motrin). · Liquid iron can stain your teeth. But you can mix it with water or juice and drink it with a straw. Then it won't get on your teeth. When should you call for help? Call 911 anytime you think you may need emergency care. For example, call if:  · You passed out (lost consciousness). · You vomit blood or what looks like coffee grounds. · You pass maroon or very bloody stools. Call your doctor now or seek immediate medical care if:  · Your stools are black and look like tar, or they have streaks of blood. · You are dizzy or lightheaded, or you feel like you may faint. Watch closely for changes in your health, and be sure to contact your doctor if:  · Your fatigue and weakness continue or get worse. · You have side effects from taking iron pills, such as nausea, vomiting, constipation, diarrhea, or heartburn. · You do not get better as expected. Where can you learn more? Go to http://ledy-toño.info/. Enter X518 in the search box to learn more about \"Iron Deficiency Anemia: Care Instructions. \"  Current as of: October 13, 2016  Content Version: 11.2  © 4052-8207 [employer]. Care instructions adapted under license by [employer] (which disclaims liability or warranty for this information). If you have questions about a medical condition or this instruction, always ask your healthcare professional. [address] any warranty or liability for your use of this information.        Learning About Blood Transfusions  What is a blood transfusion? Blood transfusion is a medical treatment to replace the blood or parts of blood that your body has lost. The blood goes through a tube from a bag to an intravenous (IV) catheter and into your vein. You may need a blood transfusion after losing blood from an injury, a major surgery, an illness that causes bleeding, or an illness that destroys blood cells. Transfusions are also used to give you the parts of blood--such as platelets, plasma, or substances that cause clotting--that your body needs to fight an illness or stop bleeding. How is a blood transfusion done? Before you receive a blood transfusion, your blood is tested to find out what your blood type is. Blood or blood parts that are a match with your blood type are ordered by your doctor. Blood is typed as A, B, AB, or O. It is also typed as Rh-positive or Rh-negative. The blood you are getting is checked and rechecked to make sure that it's the right type for you. A sample of your blood is mixed with a sample of the blood you will receive to check for problems. Before actually giving you the transfusion, a doctor and nurses will look at the label on the package of blood and compare it to your hospital ID bracelet and medical records. The transfusion begins only when all agree that this is the correct blood and that you are the correct person to receive it. To receive the transfusion, you will have an intravenous (IV) catheter inserted into a vein. A tube connects the catheter to the bag containing the blood, which is placed higher than your body. The blood then flows slowly into your vein. A doctor or nurse will check you several times during the transfusion to watch for a reaction or other problems. What are the possible risks? Blood transfusions have many benefits and are often life-saving. But they also have a few risks. Possible risks include:  · Your body's reaction to receiving new blood. This may include:  ¨ Fever.   ¨ Allergic reactions. ¨ Breathing problems. · An infection from the blood. This risk is small because of the strict rules placed on handling and storing blood. Getting a viral infection, such as HIV or hepatitis B or C, through blood transfusions has become very rare. The U.S. Food and Drug Administration (FDA) enforces strict guidelines on the collection, testing, storage, and use of blood. · Getting the wrong blood type by accident. Severe reactions, which can be life-threatening, are very rare. What can you expect after a blood transfusion? Here are some things you can do at home to help prevent infection at the transfusion site:  · Wash the area daily with warm, soapy water, and pat it dry. Don't use hydrogen peroxide or alcohol, which can slow healing. You may cover the area with a gauze bandage if it weeps or rubs against clothing. Change the bandage every day. · Keep the area clean and dry. Follow-up care is a key part of your treatment and safety. Be sure to make and go to all appointments, and call your doctor if you are having problems. It's also a good idea to know your test results and keep a list of the medicines you take. When should you call for help? Call 911 anytime you think you may need emergency care. For example, call if:  · You have severe trouble breathing. Call your doctor now or seek immediate medical care if:  · You have a fever. · You feel weaker or more tired than usual.  · You have a yellow tint to your skin or the whites of your eyes. Watch closely for changes in your health, and be sure to contact your doctor if you have any problems. Where can you learn more? Go to http://ledy-toño.info/. Enter V588 in the search box to learn more about \"Learning About Blood Transfusions. \"  Current as of: October 13, 2016  Content Version: 11.2  © 0004-3544 [employer].  Care instructions adapted under license by [employer] (which disclaims liability or warranty for this information). If you have questions about a medical condition or this instruction, always ask your healthcare professional. [place] [address] any warranty or liability for your use of this information.

## 2017-04-05 NOTE — MR AVS SNAPSHOT
Visit Information Date & Time Provider Department Dept. Phone Encounter #  
 4/5/2017 11:30 AM Dipak Hartman MD [facility] [phone] [id number] Follow-up Instructions Return in about 6 weeks (around 5/17/2017). Your Appointments 5/17/2017 11:30 AM  
Office Visit with Dipak Hartman MD  
[facility] [facility] [address] [facility]) Appt Note: 6 week follow up appointment [place] [address], [address] [place] [address], [address] [place] [address] Upcoming Health Maintenance Date Due Hepatitis C Screening 1955 DTaP/Tdap/Td series (1 - Tdap) 8/26/1974 PAP AKA CERVICAL CYTOLOGY 8/26/1974 BREAST CANCER SCRN MAMMOGRAM 8/26/2003 FOBT Q 1 YEAR AGE 50-75 8/26/2003 ZOSTER VACCINE AGE 60> 8/26/2013 INFLUENZA AGE 9 TO ADULT 8/1/2016 Pneumococcal 19-64 Highest Risk (2 of 3 - PCV13) 3/1/2017 Allergies as of 4/5/2017  Review Complete On: 3/30/2017 By: Michael Mcmillan RN Severity Noted Reaction Type Reactions Latex  08/04/2016    Other (comments) Asa-acetaminophen-caff-buffers  08/04/2016    Other (comments) Codeine  08/04/2016    Other (comments) Pcn [Penicillins]  08/04/2016    Other (comments) Sulfa (Sulfonamide Antibiotics)  08/04/2016    Other (comments) Current Immunizations  Reviewed on 3/30/2017 Name Date Influenza Vaccine 3/1/2016 Pneumococcal Vaccine (Unspecified Type) 3/1/2016 Not reviewed this visit You Were Diagnosed With   
  
 Codes Comments Colorectal carcinoma ([facility] [address]    -  Primary ICD-10-CM: C19 
ICD-9-CM: 154.0 Chemotherapy induced neutropenia (HCC)     ICD-10-CM: D70.1, T45.1X5A 
ICD-9-CM: 288.03, E933.1 Anemia in chronic kidney disease (CKD)     ICD-10-CM: N18.9, D63.1 ICD-9-CM: 285.21   
 Iron deficiency anemia due to chronic blood loss     ICD-10-CM: D50.0 ICD-9-CM: 280.0 Vitals BP Pulse Temp Smoking Status 142/76 82 99.7 °F (37.6 °C) Never Smoker Preferred Pharmacy Pharmacy Name Phone [facility] [address], [address] [facility] Your Updated Medication List  
  
   
This list is accurate as of: 4/5/17 12:49 PM.  Always use your most recent med list.  
  
  
  
  
 acetaminophen 650 mg CR tablet Commonly known as:  TYLENOL  
1,300 mg.  
  
 albuterol 90 mcg/actuation inhaler Commonly known as:  PROVENTIL HFA, VENTOLIN HFA, PROAIR HFA  
2 Puffs. allopurinol 300 mg tablet Commonly known as:  ZYLOPRIM  
300 mg.  
  
 bisoprolol-hydroCHLOROthiazide 10-6.25 mg per tablet Commonly known as:  [facility] Take 1 Tab by Mouth Once a Day. cloNIDine HCl 0.1 mg tablet Commonly known as:  CATAPRES  
0.1 mg.  
  
 colchicine 0.6 mg tablet  
0.6 mg.  
  
 desonide 0.05 % cream  
Commonly known as:  Alexandra Armor Use 1 Application to affected area Twice Daily. diphenhydrAMINE 25 mg capsule Commonly known as:  BENADRYL Take 1 with compazine every 6 hours for nausea for 3 days then, as needed. ergocalciferol 50,000 unit capsule Commonly known as:  ERGOCALCIFEROL  
50,000 Units. fluconazole 150 mg tablet Commonly known as:  DIFLUCAN  
TAKE 1 TABLET BY MOUTH ONCE DAILY TODAY, MAY REPEAT AFTER 3 DAYS AS NEEDED  
  
 furosemide 20 mg tablet Commonly known as:  LASIX Take 1/2-1 tablet daily if needed for swelling.  
  
 gabapentin 300 mg capsule Commonly known as:  NEURONTIN  
300 mg.  
  
 glimepiride 4 mg tablet Commonly known as:  AMARYL Take one in the am.  New dose. glipiZIDE 10 mg tablet Commonly known as:  Elveria Seen 10 mg.  
  
 lidocaine-prilocaine topical cream  
Commonly known as:  EMLA Place cream over mediport 1 hour prior to chemotherapy and then place noemyan wrap over area. Every chemo treatment. LORazepam 0.5 mg tablet Commonly known as:  ATIVAN Take 1 Tab by mouth every eight (8) hours as needed for Anxiety. Max Daily Amount: 1.5 mg. Indications: ANXIETY  
  
 nitrofurantoin (macrocrystal-monohydrate) 100 mg capsule Commonly known as:  MACROBID Take 1 Cap by mouth two (2) times a day. NORVASC 5 mg tablet Generic drug:  amLODIPine Take 5 mg by mouth daily. ondansetron hcl 8 mg tablet Commonly known as:  ZOFRAN (AS HYDROCHLORIDE) Take one tablet by mouth every 8 hours for nausea beginning the night of chemo for 3 days. * predniSONE 20 mg tablet Commonly known as:  Sherra Bernabe Take 2 Tabs by mouth daily. Take 2 tabs on days 2 and 3 of each chemo cycle * predniSONE 20 mg tablet Commonly known as:  DELTASONE  
TAKE 2 TABLETS BY MOUTH WITH BREAKFAST - TAKE 40 MG ON DAY 2 AND DAY 3 OF TREATMENT  
  
 prochlorperazine 10 mg tablet Commonly known as:  COMPAZINE Take 1 tablet every 6 hours with Benadryl 25mg for nausea for 3 days then, as needed. warfarin 1 mg tablet Commonly known as:  COUMADIN Take one 1 tablet by mouth everyday at 6pm or after. You will continue to take this medication. Everyday until mediport is removed. * Notice: This list has 2 medication(s) that are the same as other medications prescribed for you. Read the directions carefully, and ask your doctor or other care provider to review them with you. We Performed the Following COMPLETE CBC & AUTO DIFF WBC [97228 CPT(R)] Follow-up Instructions Return in about 6 weeks (around 5/17/2017). To-Do List   
 04/05/2017 Lab:  CBC WITH 3 PART DIFF   
  
 04/05/2017 1:00 PM  
  Appointment with [facility] PHLEBOTOMIST at [facility] ([phone])  04/07/2017 8:00 AM  
  Appointment with [facility] INF[facility]ION NURSE 3 at [facility] ([phone])  
  
 04/11/2017 10:00 AM  
 Appointment with [facility] INF[facility]ION NURSE 3 at [facility] ([phone])  
  
 04/13/2017 1:00 PM  
  Appointment with [facility] FAST TRACK NURSE at [facility] ([phone]) Patient Instructions Colon Cancer: Care Instructions Your Care Instructions Colon cancer occurs when abnormal cells grow out of control in the lower part of your intestine (your colon). If the tumor was small and had not spread, your doctor may have removed it during the colonoscopy. But you may need surgery to remove the cancer if the tumor was too big or had spread too far to be removed during a colonoscopy. If cancer has spread to another part of your body, such as the liver, you may need more far-reaching surgery. Treatment for colon cancer may also include radiation therapy and medicines that destroy cancer cells (chemotherapy). Being treated for cancer can weaken your body, and you may feel very tired. Get the rest your body needs so that you can feel better. When you find out that you have cancer, you may feel many emotions and may need some help coping. Seek out family, friends, and counselors for support. You also can do things at home to make yourself feel better while you go through treatment. Call the [employer] (4-980.754.2533) or visit its website at [employer] for more information. Follow-up care is a key part of your treatment and safety. Be sure to make and go to all appointments, and call your doctor if you are having problems. It's also a good idea to know your test results and keep a list of the medicines you take. How can you care for yourself at home? · Take your medicines exactly as prescribed. Call your doctor if you think you are having a problem with your medicine. You may get medicine for nausea and vomiting if you have these side effects. · Follow your doctor's instructions to relieve pain.  Pain from cancer and surgery can almost always be controlled. Use pain medicine when you first notice pain, before it becomes severe. · Eat healthy food. If you do not feel like eating, try to eat food that has protein and extra calories to keep up your strength and prevent weight loss. Drink liquid meal replacements for extra calories and protein. Try to eat your main meal early. · Get some physical activity every day, but do not get too tired. Keep doing the hobbies you enjoy as your energy allows. · Take steps to control your stress and workload. Learn relaxation techniques. ¨ Share your feelings. Stress and tension affect our emotions. By expressing your feelings to others, you may be able to understand and cope with them. ¨ Consider joining a support group. Talking about a problem with your spouse, a good friend, or other people with similar problems is a good way to reduce tension and stress. ¨ Express yourself through art. Try writing, dance, art, or crafts to relieve stress. Some dance, writing, or art groups may be available just for people who have cancer. ¨ Be kind to your body and mind. Getting enough sleep, eating a healthy diet, and taking time to do things you enjoy can contribute to an overall feeling of balance in your life and help reduce stress. ¨ Get help if you need it. Discuss your concerns with your doctor or counselor. · If you are vomiting or have diarrhea: ¨ Drink plenty of fluids (enough so that your urine is light yellow or clear like water) to prevent dehydration. Choose water and other caffeine-free clear liquids. If you have kidney, heart, or liver disease and have to limit fluids, talk with your doctor before you increase the amount of fluids you drink. ¨ When you are able to eat, try clear soups, mild foods, and liquids until all symptoms are gone for 12 to 48 hours. Other good choices include dry toast, crackers, cooked cereal, and gelatin dessert, such as Jell-O. · If you have not already done so, prepare a list of advance directives. Advance directives are instructions to your doctor and family members about what kind of care you want if you become unable to speak or express yourself. When should you call for help? Call 911 anytime you think you may need emergency care. For example, call if: 
· You pass maroon or very bloody stools. · You vomit blood or what looks like coffee grounds. · You have sudden chest pain and shortness of breath, or you cough up blood. · You have severe belly pain. Call your doctor now or seek immediate medical care if: 
· You have signs of a blood clot, such as: 
¨ Pain in your calf, back of the knee, thigh, or groin. ¨ Redness and swelling in your leg or groin. · You have a fever. · You have nausea or vomiting. · You are very constipated or have diarrhea for several days. · Your stools are black and tarlike or have streaks of blood. Watch closely for changes in your health, and be sure to contact your doctor if: 
· Your pain is not controlled by medicine. · Your symptoms get worse or are not improving as expected. Where can you learn more? Go to http://ledy-toño.info/. Enter D241 in the search box to learn more about \"Colon Cancer: Care Instructions. \" Current as of: July 26, 2016 Content Version: 11.2 © 8450-8605 [employer] Care instructions adapted under license by [employer] (which disclaims liability or warranty for this information). If you have questions about a medical condition or this instruction, always ask your healthcare professional. [place] [address] any warranty or liability for your use of this information. Iron Deficiency Anemia: Care Instructions Your Care Instructions Anemia means that you do not have enough red blood cells. Red blood cells carry oxygen around your body. When you have anemia, it can make you pale, weak, and tired. Many things can cause anemia. The most common cause is loss of blood. This can happen if you have heavy menstrual periods. It can also happen if you have bleeding in your stomach or bowel. You can also get anemia if you don't have enough iron in your diet or if it's hard for your body to absorb iron. In some cases, pregnancy causes anemia. That's because a pregnant woman needs more iron. Your doctor may do more tests to find the cause of your anemia. If a disease or other health problem is causing it, your doctor will treat that problem. It's important to follow up with your doctor to make sure that your iron level returns to normal. 
Follow-up care is a key part of your treatment and safety. Be sure to make and go to all appointments, and call your doctor if you are having problems. It's also a good idea to know your test results and keep a list of the medicines you take. How can you care for yourself at home? · If your doctor recommended iron pills, take them as directed. ¨ Try to take the pills on an empty stomach. You can do this about 1 hour before or 2 hours after meals. But you may need to take iron with food to avoid an upset stomach. ¨ Do not take antacids or drink milk or anything with caffeine within 2 hours of when you take your iron. They can keep your body from absorbing the iron well. ¨ Vitamin C helps your body absorb iron. You may want to take iron pills with a glass of orange juice or some other food high in vitamin C. 
¨ Iron pills may cause stomach problems. These include heartburn, nausea, diarrhea, constipation, and cramps. It can help to drink plenty of fluids and include fruits, vegetables, and fiber in your diet. ¨ It's normal for iron pills to make your stool a greenish or grayish black. But internal bleeding can also cause dark stool. So it's important to tell your doctor about any color changes.  
¨ Call your doctor if you think you are having a problem with your iron pills. Even after you start to feel better, it will take several months for your body to build up its supply of iron. ¨ If you miss a pill, don't take a double dose. ¨ Keep iron pills out of the reach of small children. Too much iron can be very dangerous. · Eat foods with a lot of iron. These include red meat, shellfish, poultry, and eggs. They also include beans, raisins, whole-grain bread, and leafy green vegetables. · Steam your vegetables. This is the best way to prepare them if you want to get as much iron as possible. · Be safe with medicines. Do not take nonsteroidal anti-inflammatory pain relievers unless your doctor tells you to. These include aspirin, naproxen (Aleve), and ibuprofen (Advil, Motrin). · Liquid iron can stain your teeth. But you can mix it with water or juice and drink it with a straw. Then it won't get on your teeth. When should you call for help? Call 911 anytime you think you may need emergency care. For example, call if: 
· You passed out (lost consciousness). · You vomit blood or what looks like coffee grounds. · You pass maroon or very bloody stools. Call your doctor now or seek immediate medical care if: 
· Your stools are black and look like tar, or they have streaks of blood. · You are dizzy or lightheaded, or you feel like you may faint. Watch closely for changes in your health, and be sure to contact your doctor if: 
· Your fatigue and weakness continue or get worse. · You have side effects from taking iron pills, such as nausea, vomiting, constipation, diarrhea, or heartburn. · You do not get better as expected. Where can you learn more? Go to http://ledy-toño.info/. Enter Z737 in the search box to learn more about \"Iron Deficiency Anemia: Care Instructions. \" Current as of: October 13, 2016 Content Version: 11.2 © 2931-8607 [employer].  Care instructions adapted under license by [facility] [address] (which disclaims liability or warranty for this information). If you have questions about a medical condition or this instruction, always ask your healthcare professional. [address] any warranty or liability for your use of this information. Learning About Blood Transfusions What is a blood transfusion? Blood transfusion is a medical treatment to replace the blood or parts of blood that your body has lost. The blood goes through a tube from a bag to an intravenous (IV) catheter and into your vein. You may need a blood transfusion after losing blood from an injury, a major surgery, an illness that causes bleeding, or an illness that destroys blood cells. Transfusions are also used to give you the parts of bloodsuch as platelets, plasma, or substances that cause clottingthat your body needs to fight an illness or stop bleeding. How is a blood transfusion done? Before you receive a blood transfusion, your blood is tested to find out what your blood type is. Blood or blood parts that are a match with your blood type are ordered by your doctor. Blood is typed as A, B, AB, or O. It is also typed as Rh-positive or Rh-negative. The blood you are getting is checked and rechecked to make sure that it's the right type for you. A sample of your blood is mixed with a sample of the blood you will receive to check for problems. Before actually giving you the transfusion, a doctor and nurses will look at the label on the package of blood and compare it to your hospital ID bracelet and medical records. The transfusion begins only when all agree that this is the correct blood and that you are the correct person to receive it. To receive the transfusion, you will have an intravenous (IV) catheter inserted into a vein. A tube connects the catheter to the bag containing the blood, which is placed higher than your body.  The blood then flows slowly into your vein. A doctor or nurse will check you several times during the transfusion to watch for a reaction or other problems. What are the possible risks? Blood transfusions have many benefits and are often life-saving. But they also have a few risks. Possible risks include: 
· Your body's reaction to receiving new blood. This may include: ¨ Fever. ¨ Allergic reactions. ¨ Breathing problems. · An infection from the blood. This risk is small because of the strict rules placed on handling and storing blood. Getting a viral infection, such as HIV or hepatitis B or C, through blood transfusions has become very rare. The U.S. Food and Drug Administration (FDA) enforces strict guidelines on the collection, testing, storage, and use of blood. · Getting the wrong blood type by accident. Severe reactions, which can be life-threatening, are very rare. What can you expect after a blood transfusion? Here are some things you can do at home to help prevent infection at the transfusion site: 
· Wash the area daily with warm, soapy water, and pat it dry. Don't use hydrogen peroxide or alcohol, which can slow healing. You may cover the area with a gauze bandage if it weeps or rubs against clothing. Change the bandage every day. · Keep the area clean and dry. Follow-up care is a key part of your treatment and safety. Be sure to make and go to all appointments, and call your doctor if you are having problems. It's also a good idea to know your test results and keep a list of the medicines you take. When should you call for help? Call 911 anytime you think you may need emergency care. For example, call if: 
· You have severe trouble breathing. Call your doctor now or seek immediate medical care if: 
· You have a fever. · You feel weaker or more tired than usual. 
· You have a yellow tint to your skin or the whites of your eyes.  
Watch closely for changes in your health, and be sure to contact your doctor if you have any problems. Where can you learn more? Go to http://ledy-toño.info/. Enter V588 in the search box to learn more about \"Learning About Blood Transfusions. \" Current as of: October 13, 2016 Content Version: 11.2 © 9240-3308 [employer]. Care instructions adapted under license by [employer] (which disclaims liability or warranty for this information). If you have questions about a medical condition or this instruction, always ask your healthcare professional. [address] any warranty or liability for your use of this information. Introducing [facility]! Danielle Russell introduces [employer] patient portal. Now you can access parts of your medical record, email your doctor's office, and request medication refills online. 1. In your internet browser, go to [email or web address] 2. Click on the First Time User? Click Here link in the Sign In box. You will see the New Member Sign Up page. 3. Enter your [employer] Access Code exactly as it appears below. You will not need to use this code after youve completed the sign-up process. If you do not sign up before the expiration date, you must request a new code. · [employer] Access Code: [email or web address] Expires: 5/2/2017  4:15 PM 
 
4. Enter the last four digits of your Social Security Number (xxxx) and Date of Birth (mm/dd/yyyy) as indicated and click Submit. You will be taken to the next sign-up page. 5. Create a [employer] ID. This will be your [employer] login ID and cannot be changed, so think of one that is secure and easy to remember. 6. Create a [employer] password. You can change your password at any time. 7. Enter your Password Reset Question and Answer. This can be used at a later time if you forget your password. 8. Enter your e-mail address. You will receive e-mail notification when new information is available in [address] 9. Click Sign Up. You can now view and download portions of your medical record. 10. Click the Download Summary menu link to download a portable copy of your medical information. If you have questions, please visit the Frequently Asked Questions section of the [employer] website. Remember, [employer] is NOT to be used for urgent needs. For medical emergencies, dial 911. Now available from your iPhone and Android! Please provide this summary of care documentation to your next provider. Your primary care clinician is listed as [address] [place] [address]. If you have any questions after today's visit, please call [phone].

## 2017-04-06 LAB — CEA SERPL-MCNC: 486.3 NG/ML

## 2017-04-07 ENCOUNTER — HOSPITAL ENCOUNTER (OUTPATIENT)
Dept: INFUSION THERAPY | Age: 62
Discharge: HOME | End: 2017-04-07
Payer: MEDICARE

## 2017-04-07 VITALS
HEART RATE: 67 BPM | RESPIRATION RATE: 18 BRPM | TEMPERATURE: 98.6 F | SYSTOLIC BLOOD PRESSURE: 133 MMHG | DIASTOLIC BLOOD PRESSURE: 84 MMHG | OXYGEN SATURATION: 97 %

## 2017-04-07 PROCEDURE — 36430 TRANSFUSION BLD/BLD COMPNT: CPT

## 2017-04-07 PROCEDURE — P9016 RBC LEUKOCYTES REDUCED: HCPCS | Performed by: INTERNAL MEDICINE

## 2017-04-07 PROCEDURE — 74011250637 HC RX REV CODE- 250/637: Performed by: INTERNAL MEDICINE

## 2017-04-07 PROCEDURE — 77030013169 SET IV BLD ICUM -A

## 2017-04-07 PROCEDURE — 74011250636 HC RX REV CODE- 250/636: Performed by: INTERNAL MEDICINE

## 2017-04-07 RX ORDER — FLUOROURACIL 50 MG/ML
800 INJECTION, SOLUTION INTRAVENOUS ONCE
Status: COMPLETED | OUTPATIENT
Start: 2017-04-11 | End: 2017-04-11

## 2017-04-07 RX ORDER — SODIUM CHLORIDE 9 MG/ML
250 INJECTION, SOLUTION INTRAVENOUS AS NEEDED
Status: DISCONTINUED | OUTPATIENT
Start: 2017-04-07 | End: 2017-04-10 | Stop reason: HOSPADM

## 2017-04-07 RX ORDER — HEPARIN SODIUM (PORCINE) LOCK FLUSH IV SOLN 100 UNIT/ML 100 UNIT/ML
500 SOLUTION INTRAVENOUS AS NEEDED
Status: ACTIVE | OUTPATIENT
Start: 2017-04-07 | End: 2017-04-08

## 2017-04-07 RX ORDER — DIPHENHYDRAMINE HCL 25 MG
25 CAPSULE ORAL ONCE
Status: COMPLETED | OUTPATIENT
Start: 2017-04-07 | End: 2017-04-07

## 2017-04-07 RX ORDER — ACETAMINOPHEN 325 MG/1
650 TABLET ORAL ONCE
Status: COMPLETED | OUTPATIENT
Start: 2017-04-07 | End: 2017-04-07

## 2017-04-07 RX ORDER — DIPHENHYDRAMINE HYDROCHLORIDE 50 MG/ML
25 INJECTION, SOLUTION INTRAMUSCULAR; INTRAVENOUS
Status: ACTIVE | OUTPATIENT
Start: 2017-04-07 | End: 2017-04-07

## 2017-04-07 RX ORDER — FAMOTIDINE 10 MG/ML
20 INJECTION INTRAVENOUS
Status: ACTIVE | OUTPATIENT
Start: 2017-04-07 | End: 2017-04-07

## 2017-04-07 RX ORDER — SODIUM CHLORIDE 0.9 % (FLUSH) 0.9 %
10-40 SYRINGE (ML) INJECTION AS NEEDED
Status: DISCONTINUED | OUTPATIENT
Start: 2017-04-07 | End: 2017-04-10 | Stop reason: HOSPADM

## 2017-04-07 RX ADMIN — SODIUM CHLORIDE 250 ML: 900 INJECTION, SOLUTION INTRAVENOUS at 08:38

## 2017-04-07 RX ADMIN — Medication 10 ML: at 08:36

## 2017-04-07 RX ADMIN — Medication 10 ML: at 13:56

## 2017-04-07 RX ADMIN — DIPHENHYDRAMINE HYDROCHLORIDE 25 MG: 25 CAPSULE ORAL at 08:28

## 2017-04-07 RX ADMIN — ACETAMINOPHEN 650 MG: 325 TABLET ORAL at 08:28

## 2017-04-07 RX ADMIN — Medication 10 ML: at 11:00

## 2017-04-07 NOTE — PROGRESS NOTES
[facility] Progress Note    Date: 2017    Name: Wilton Shrestha    MRN: [id number]         : 1955      Ms. Polk arrived to [facility] at 0810. Ms. Sandra Dennison was assessed and education was provided. Discussed risks and benefits of blood transfusion with patient, including risk of transfusion reaction and disease transmission. Patient verbalized understanding and signed consent placed on chart. Ms. Polk's vitals were reviewed. Visit Vitals    /73 (BP 1 Location: Right arm, BP Patient Position: Sitting)    Pulse 62    Temp 98.7 °F (37.1 °C)    Resp 18    SpO2 98%    Breastfeeding No       22g IV inserted in patient's left antecubital x1 attempt. Positive for blood return and flushes without difficulty. Normal saline initiated at [place]. Pre-medications (Tylenol 650 mg and Benadryl 25 mg) were administered as ordered. First unit of two ordered units of PRBCs initiated @ 75 ml/hr at 0857. Fifteen minutes into infusion, VS stable and pt denied c/o SOB, itching/hives, lip/tongue/facial swelling, CP or other complaints. Infusion rate increased to 155 ml/hr for the remainder of the transfusion. Unit finished @ 448 8003. VS stable and no transfusion reaction suspected. Second unit of two ordered units of PRBCs initiated @ 75 ml/hr at 1132. Fifteen minutes into infusion, VS stable and pt denied c/o SOB, itching/hives, lip/tongue/facial swelling, CP or other complaints. Infusion rate increased to 155 ml/hr for the remainder of the transfusion. Unit finished @ 454 5656. VS stable and no transfusion reaction suspected. Ms. Sandra Dennison tolerated infusion without complaints. Pt remained for an one hour observation period. No distress or reaction noted. IV removed. No irritation, bleeding, or hematoma noted at site. Gauze and tape applied to site. Discharge instructions reviewed with pt.  Pt instructed to report SOB, CP, elevated temp, back pain, or other symptoms of transfusion reaction to MD or ED. Pt verbalized understanding. Patient Vitals for the past 12 hrs:   Temp Pulse Resp BP SpO2   04/07/17 1443 98.6 °F (37 °C) 67 18 133/84 97 %   04/07/17 1345 98.4 °F (36.9 °C) 78 18 127/73 100 %   04/07/17 1236 98.5 °F (36.9 °C) 62 18 147/76 99 %   04/07/17 1210 98.5 °F (36.9 °C) 62 18 159/79 98 %   04/07/17 1151 98.8 °F (37.1 °C) 60 18 153/83 96 %   04/07/17 1132 98.6 °F (37 °C) 63 18 148/84 100 %   04/07/17 1103 97.9 °F (36.6 °C) 61 18 126/78 99 %   04/07/17 0956 98.5 °F (36.9 °C) 61 18 122/80 99 %   04/07/17 0930 98.7 °F (37.1 °C) 62 18 120/73 98 %   04/07/17 0916 98.7 °F (37.1 °C) 64 18 124/77 99 %   04/07/17 0857 98.8 °F (37.1 °C) 61 18 136/74 96 %   04/07/17 0807 99.3 °F (37.4 °C) 78 18 122/77 100 %       Ms. Polk was discharged from [place] [address] in stable condition at 1450. She is to return for her next appointment at [facility] on 4/11/2017 at 1000.     Ricardo Balderas RN  April 7, 2017

## 2017-04-08 LAB
ABO + RH BLD: NORMAL
BLD PROD TYP BPU: NORMAL
BLD PROD TYP BPU: NORMAL
BLOOD GROUP ANTIBODIES SERPL: NORMAL
BPU ID: NORMAL
BPU ID: NORMAL
CROSSMATCH RESULT,%XM: NORMAL
CROSSMATCH RESULT,%XM: NORMAL
SPECIMEN EXP DATE BLD: NORMAL
STATUS OF UNIT,%ST: NORMAL
STATUS OF UNIT,%ST: NORMAL
UNIT DIVISION, %UDIV: 0
UNIT DIVISION, %UDIV: 0

## 2017-04-11 ENCOUNTER — HOSPITAL ENCOUNTER (OUTPATIENT)
Dept: INFUSION THERAPY | Age: 62
Discharge: HOME | End: 2017-04-11
Payer: MEDICARE

## 2017-04-11 VITALS
DIASTOLIC BLOOD PRESSURE: 93 MMHG | HEIGHT: 60 IN | HEART RATE: 75 BPM | OXYGEN SATURATION: 98 % | BODY MASS INDEX: 40.46 KG/M2 | TEMPERATURE: 98.7 F | RESPIRATION RATE: 18 BRPM | SYSTOLIC BLOOD PRESSURE: 141 MMHG | WEIGHT: 206.1 LBS

## 2017-04-11 LAB
ANION GAP BLD CALC-SCNC: 15 MMOL/L (ref 10–20)
BASOPHILS # BLD AUTO: 0 K/UL (ref 0–0.06)
BASOPHILS # BLD: 0 % (ref 0–3)
BUN BLD-MCNC: 21 MG/DL (ref 7–18)
CA-I BLD-MCNC: 1.19 MMOL/L (ref 1.12–1.32)
CHLORIDE BLD-SCNC: 105 MMOL/L (ref 100–108)
CO2 BLD-SCNC: 22 MMOL/L (ref 19–24)
CREAT UR-MCNC: 1.4 MG/DL (ref 0.6–1.3)
DIFFERENTIAL METHOD BLD: ABNORMAL
EOSINOPHIL # BLD: 0.2 K/UL (ref 0–0.4)
EOSINOPHIL NFR BLD: 4 % (ref 0–5)
ERYTHROCYTE [DISTWIDTH] IN BLOOD BY AUTOMATED COUNT: 18.5 % (ref 11.6–14.5)
GLUCOSE BLD STRIP.AUTO-MCNC: 125 MG/DL (ref 74–106)
HCT VFR BLD AUTO: 31 % (ref 35–45)
HCT VFR BLD CALC: 30 % (ref 36–49)
HGB BLD-MCNC: 10.2 G/DL (ref 12–16)
HGB BLD-MCNC: 9.6 G/DL (ref 12–16)
LYMPHOCYTES # BLD AUTO: 36 % (ref 20–51)
LYMPHOCYTES # BLD: 1.4 K/UL (ref 0.8–3.5)
MCH RBC QN AUTO: 28.1 PG (ref 24–34)
MCHC RBC AUTO-ENTMCNC: 31 G/DL (ref 31–37)
MCV RBC AUTO: 90.6 FL (ref 74–97)
METAMYELOCYTES NFR BLD MANUAL: 4 %
MONOCYTES # BLD: 1.1 K/UL (ref 0–1)
MONOCYTES NFR BLD AUTO: 27 % (ref 2–9)
MYELOCYTES NFR BLD MANUAL: 1 %
NEUTS BAND NFR BLD MANUAL: 6 % (ref 0–5)
NEUTS SEG # BLD: 1.1 K/UL (ref 1.8–8)
NEUTS SEG NFR BLD AUTO: 22 % (ref 42–75)
PLATELET # BLD AUTO: 359 K/UL (ref 135–420)
PLATELET COMMENTS,PCOM: ABNORMAL
PMV BLD AUTO: 9.5 FL (ref 9.2–11.8)
POTASSIUM BLD-SCNC: 3.6 MMOL/L (ref 3.5–5.5)
RBC # BLD AUTO: 3.42 M/UL (ref 4.2–5.3)
RBC MORPH BLD: ABNORMAL
SODIUM BLD-SCNC: 138 MMOL/L (ref 136–145)
WBC # BLD AUTO: 3.9 K/UL (ref 4.6–13.2)

## 2017-04-11 PROCEDURE — 96413 CHEMO IV INFUSION 1 HR: CPT

## 2017-04-11 PROCEDURE — 96375 TX/PRO/DX INJ NEW DRUG ADDON: CPT

## 2017-04-11 PROCEDURE — 80047 BASIC METABLC PNL IONIZED CA: CPT

## 2017-04-11 PROCEDURE — 96411 CHEMO IV PUSH ADDL DRUG: CPT

## 2017-04-11 PROCEDURE — 96415 CHEMO IV INFUSION ADDL HR: CPT

## 2017-04-11 PROCEDURE — 77030012965 HC NDL HUBR BBMI -A

## 2017-04-11 PROCEDURE — 74011250636 HC RX REV CODE- 250/636: Performed by: INTERNAL MEDICINE

## 2017-04-11 PROCEDURE — 74011000258 HC RX REV CODE- 258: Performed by: INTERNAL MEDICINE

## 2017-04-11 PROCEDURE — 74011250636 HC RX REV CODE- 250/636

## 2017-04-11 PROCEDURE — 85025 COMPLETE CBC W/AUTO DIFF WBC: CPT | Performed by: INTERNAL MEDICINE

## 2017-04-11 PROCEDURE — 96416 CHEMO PROLONG INFUSE W/PUMP: CPT

## 2017-04-11 PROCEDURE — 96417 CHEMO IV INFUS EACH ADDL SEQ: CPT

## 2017-04-11 RX ORDER — SODIUM CHLORIDE 9 MG/ML
25 INJECTION, SOLUTION INTRAVENOUS CONTINUOUS
Status: DISPENSED | OUTPATIENT
Start: 2017-04-11 | End: 2017-04-12

## 2017-04-11 RX ORDER — DEXTROSE MONOHYDRATE 50 MG/ML
25 INJECTION, SOLUTION INTRAVENOUS ONCE
Status: DISPENSED | OUTPATIENT
Start: 2017-04-11 | End: 2017-04-11

## 2017-04-11 RX ORDER — PALONOSETRON 0.05 MG/ML
0.25 INJECTION, SOLUTION INTRAVENOUS ONCE
Status: COMPLETED | OUTPATIENT
Start: 2017-04-11 | End: 2017-04-11

## 2017-04-11 RX ORDER — SODIUM CHLORIDE 0.9 % (FLUSH) 0.9 %
10-40 SYRINGE (ML) INJECTION AS NEEDED
Status: DISCONTINUED | OUTPATIENT
Start: 2017-04-11 | End: 2017-04-15 | Stop reason: HOSPADM

## 2017-04-11 RX ORDER — DEXTROSE MONOHYDRATE 50 MG/ML
25 INJECTION, SOLUTION INTRAVENOUS ONCE
Status: COMPLETED | OUTPATIENT
Start: 2017-04-11 | End: 2017-04-11

## 2017-04-11 RX ADMIN — FLUOROURACIL 800 MG: 50 INJECTION, SOLUTION INTRAVENOUS at 13:36

## 2017-04-11 RX ADMIN — PALONOSETRON HYDROCHLORIDE 0.25 MG: 0.25 INJECTION INTRAVENOUS at 10:39

## 2017-04-11 RX ADMIN — LEUCOVORIN CALCIUM 800 MG: 100 INJECTION, POWDER, LYOPHILIZED, FOR SOLUTION INTRAMUSCULAR; INTRAVENOUS at 11:28

## 2017-04-11 RX ADMIN — FLUOROURACIL 4800 MG: 50 INJECTION, SOLUTION INTRAVENOUS at 13:45

## 2017-04-11 RX ADMIN — DEXAMETHASONE SODIUM PHOSPHATE 12 MG: 4 INJECTION, SOLUTION INTRA-ARTICULAR; INTRALESIONAL; INTRAMUSCULAR; INTRAVENOUS; SOFT TISSUE at 10:39

## 2017-04-11 RX ADMIN — Medication 30 ML: at 10:18

## 2017-04-11 RX ADMIN — OXALIPLATIN 130 MG: 100 INJECTION, SOLUTION, CONCENTRATE INTRAVENOUS at 11:28

## 2017-04-11 RX ADMIN — SODIUM CHLORIDE 25 ML/HR: 900 INJECTION, SOLUTION INTRAVENOUS at 10:39

## 2017-04-11 RX ADMIN — DEXTROSE MONOHYDRATE 25 ML/HR: 5 INJECTION, SOLUTION INTRAVENOUS at 11:24

## 2017-04-11 NOTE — PROGRESS NOTES
[facility] Progress Note    Date: 2017    Name: Elizabeth Silva              MRN: [id number]              : 1955    Chemotherapy Cycle:C9D1       Pt to [facility], ambulatory, at 1000. Ms. Bre Cruz was assessed and education was provided. Ms. Polk's vitals were reviewed. Visit Vitals    BP (!) 141/93 (BP 1 Location: Left arm, BP Patient Position: Post activity)    Pulse 75    Temp 98.7 °F (37.1 °C)    Resp 18    Ht 5' (1.524 m)    Wt 93.5 kg (206 lb 1.6 oz)    SpO2 98%    BMI 40.25 kg/m2       Right chest mediport accessed with 20 g 1 inch orozco needle. Port flushed easily and had brisk blood return. Blood drawn off and sent for CBC and BMP run on the Istat per written orders after 10 ml waste. NS initiated @ 25mL/hr. Lab results were obtained and reviewed. Recent Results (from the past 12 hour(s))   POC CHEM8    Collection Time: 17 10:20 AM   Result Value Ref Range    CO2 (POC) 22 19 - 24 MMOL/L    Glucose (POC) 125 (H) 74 - 106 MG/DL    BUN (POC) 21 (H) 7 - 18 MG/DL    Creatinine (POC) 1.4 (H) 0.6 - 1.3 MG/DL    GFR-AA (POC) 46 (L) >60 ml/min/1.73m2    GFR, non-AA (POC) 38 (L) >60 ml/min/1.73m2    Sodium (POC) 138 136 - 145 MMOL/L    Potassium (POC) 3.6 3.5 - 5.5 MMOL/L    Calcium, ionized (POC) 1.19 1.12 - 1.32 MMOL/L    Chloride (POC) 105 100 - 108 MMOL/L    Anion gap (POC) 15 10 - 20      Hematocrit (POC) 30 (L) 36 - 49 %    Hemoglobin (POC) 10.2 (L) 12 - 16 G/DL       Lab results within ordered parameters to give chemo today. ANC = 1.2, PLT = 396. Chemo dosages verified with today's BSA and found to be within 10% of ordered dosages. (Chemo order states notify provider is [address] is less then 1.5. Mary Estes notified and orders received to proceed with chemo today.)    Pre-medications (Decadron 12mg and Aloxi 0.25mg) were administered as ordered and chemotherapy was initiated after blood return from port re-verified.      NS was stopped and line was flushed with D5. Oxaliplatin 130 mg  was infused over 2 hours concurrently with Leucovorin 800 mg. VS stable at end of infusion and pt denied complaints. Line flushed with D5 and blood return from port re-verified. NS was resumed. Fluorouracil 800 mg was given slow IVP over 4 minutes. Line flushed with 10 mL NS and blood return was re-verified. Fluorouracil 4800 mg CADD pump was connected to patient. Connections were secured with tape and the volume was verified to be infusing. Ms. Dewayne Owens tolerated infusion, and had no complaints at this time. Patient armband removed and shredded. Ms. Dewayne Owens was discharged from [place] [address] in stable condition at [phone]. She is to return on 4/13/2017 at 1300 for her next appointment.     Tremayne Orellana RN  April 11, 2017

## 2017-04-13 ENCOUNTER — HOSPITAL ENCOUNTER (OUTPATIENT)
Dept: INFUSION THERAPY | Age: 62
Discharge: HOME | End: 2017-04-13
Payer: MEDICARE

## 2017-04-13 VITALS
TEMPERATURE: 98.5 F | RESPIRATION RATE: 18 BRPM | DIASTOLIC BLOOD PRESSURE: 90 MMHG | HEART RATE: 67 BPM | OXYGEN SATURATION: 98 % | SYSTOLIC BLOOD PRESSURE: 156 MMHG

## 2017-04-13 PROCEDURE — 74011250636 HC RX REV CODE- 250/636: Performed by: INTERNAL MEDICINE

## 2017-04-13 RX ORDER — HEPARIN SODIUM (PORCINE) LOCK FLUSH IV SOLN 100 UNIT/ML 100 UNIT/ML
SOLUTION INTRAVENOUS
Status: DISPENSED
Start: 2017-04-13 | End: 2017-04-14

## 2017-04-13 RX ORDER — SODIUM CHLORIDE 0.9 % (FLUSH) 0.9 %
10-40 SYRINGE (ML) INJECTION AS NEEDED
Status: DISPENSED | OUTPATIENT
Start: 2017-04-13 | End: 2017-04-14

## 2017-04-13 RX ORDER — HEPARIN SODIUM (PORCINE) LOCK FLUSH IV SOLN 100 UNIT/ML 100 UNIT/ML
500 SOLUTION INTRAVENOUS AS NEEDED
Status: ACTIVE | OUTPATIENT
Start: 2017-04-13 | End: 2017-04-14

## 2017-04-13 RX ADMIN — Medication 20 ML: at 13:15

## 2017-04-13 RX ADMIN — HEPARIN SODIUM (PORCINE) LOCK FLUSH IV SOLN 100 UNIT/ML 500 UNITS: 100 SOLUTION at 13:14

## 2017-04-13 NOTE — PROGRESS NOTES
[facility] Short Consult Note                    Date: 2017    Name: Emi Sellers    MRN: [id number]         : 1955    Treatment: DC pump      Ms. Polk was assessed and education was provided. Pt here for scheduled pump removal. Denies fever, chills,nausea,vomiting, no issues with appetite or po intake. Ms. Polk's vitals were reviewed and patient was observed for 5 minutes prior to treatment. Visit Vitals    /90 (BP 1 Location: Left arm, BP Patient Position: Sitting)    Pulse 67    Temp 98.5 °F (36.9 °C)    Resp 18    SpO2 98%     Pump finished     Mediport flushed and de accessed. Avoidance of cold substances reinforced with patient for next 5 days. Ms. Nish Degroot tolerated the infusion, and had no complaints. Ms. Nish Degroot was discharged from [place] [address] in stable condition at 1320 . She is to return on 2017 at 1000 for her next appointment.     Britney Valdes RN  2017  1:27 PM

## 2017-04-21 RX ORDER — FLUOROURACIL 50 MG/ML
800 INJECTION, SOLUTION INTRAVENOUS ONCE
Status: COMPLETED | OUTPATIENT
Start: 2017-04-25 | End: 2017-04-25

## 2017-04-25 ENCOUNTER — HOSPITAL ENCOUNTER (OUTPATIENT)
Dept: INFUSION THERAPY | Age: 62
Discharge: HOME | End: 2017-04-25
Payer: MEDICARE

## 2017-04-25 VITALS
RESPIRATION RATE: 18 BRPM | OXYGEN SATURATION: 100 % | TEMPERATURE: 98.5 F | DIASTOLIC BLOOD PRESSURE: 95 MMHG | SYSTOLIC BLOOD PRESSURE: 173 MMHG | BODY MASS INDEX: 39.7 KG/M2 | WEIGHT: 202.2 LBS | HEIGHT: 60 IN | HEART RATE: 66 BPM

## 2017-04-25 LAB
ALBUMIN SERPL BCP-MCNC: 2.3 G/DL (ref 3.4–5)
ALBUMIN/GLOB SERPL: 0.6 {RATIO} (ref 0.8–1.7)
ALP SERPL-CCNC: 97 U/L (ref 45–117)
ALT SERPL-CCNC: 13 U/L (ref 13–56)
ANION GAP BLD CALC-SCNC: 16 MMOL/L (ref 10–20)
AST SERPL W P-5'-P-CCNC: 13 U/L (ref 15–37)
BASOPHILS # BLD AUTO: 0 K/UL (ref 0–0.06)
BASOPHILS # BLD: 0 % (ref 0–3)
BILIRUB DIRECT SERPL-MCNC: <0.1 MG/DL (ref 0–0.2)
BILIRUB SERPL-MCNC: 0.3 MG/DL (ref 0.2–1)
BUN BLD-MCNC: 17 MG/DL (ref 7–18)
CA-I BLD-MCNC: 1.17 MMOL/L (ref 1.12–1.32)
CHLORIDE BLD-SCNC: 105 MMOL/L (ref 100–108)
CO2 BLD-SCNC: 20 MMOL/L (ref 19–24)
CREAT UR-MCNC: 1.4 MG/DL (ref 0.6–1.3)
DIFFERENTIAL METHOD BLD: ABNORMAL
EOSINOPHIL # BLD: 0.1 K/UL (ref 0–0.4)
EOSINOPHIL NFR BLD: 2 % (ref 0–5)
ERYTHROCYTE [DISTWIDTH] IN BLOOD BY AUTOMATED COUNT: 18.2 % (ref 11.6–14.5)
GLOBULIN SER CALC-MCNC: 3.8 G/DL (ref 2–4)
GLUCOSE BLD STRIP.AUTO-MCNC: 156 MG/DL (ref 74–106)
HCT VFR BLD AUTO: 28.4 % (ref 35–45)
HCT VFR BLD CALC: 30 % (ref 36–49)
HGB BLD-MCNC: 10.2 G/DL (ref 12–16)
HGB BLD-MCNC: 8.9 G/DL (ref 12–16)
LYMPHOCYTES # BLD AUTO: 24 % (ref 20–51)
LYMPHOCYTES # BLD: 1.5 K/UL (ref 0.8–3.5)
MCH RBC QN AUTO: 27.6 PG (ref 24–34)
MCHC RBC AUTO-ENTMCNC: 31.3 G/DL (ref 31–37)
MCV RBC AUTO: 88.2 FL (ref 74–97)
MONOCYTES # BLD: 1.6 K/UL (ref 0–1)
MONOCYTES NFR BLD AUTO: 26 % (ref 2–9)
NEUTS BAND NFR BLD MANUAL: 6 % (ref 0–5)
NEUTS SEG # BLD: 3.1 K/UL (ref 1.8–8)
NEUTS SEG NFR BLD AUTO: 42 % (ref 42–75)
PLATELET # BLD AUTO: 472 K/UL (ref 135–420)
PLATELET COMMENTS,PCOM: ABNORMAL
PMV BLD AUTO: 9.3 FL (ref 9.2–11.8)
POTASSIUM BLD-SCNC: 3.5 MMOL/L (ref 3.5–5.5)
PROT SERPL-MCNC: 6.1 G/DL (ref 6.4–8.2)
RBC # BLD AUTO: 3.22 M/UL (ref 4.2–5.3)
RBC MORPH BLD: ABNORMAL
SODIUM BLD-SCNC: 137 MMOL/L (ref 136–145)
WBC # BLD AUTO: 6.3 K/UL (ref 4.6–13.2)
WBC MORPH BLD: ABNORMAL

## 2017-04-25 PROCEDURE — 74011250636 HC RX REV CODE- 250/636: Performed by: NURSE PRACTITIONER

## 2017-04-25 PROCEDURE — 96417 CHEMO IV INFUS EACH ADDL SEQ: CPT

## 2017-04-25 PROCEDURE — 74011250636 HC RX REV CODE- 250/636

## 2017-04-25 PROCEDURE — 96413 CHEMO IV INFUSION 1 HR: CPT

## 2017-04-25 PROCEDURE — 74011250636 HC RX REV CODE- 250/636: Performed by: INTERNAL MEDICINE

## 2017-04-25 PROCEDURE — 74011000258 HC RX REV CODE- 258: Performed by: INTERNAL MEDICINE

## 2017-04-25 PROCEDURE — 96415 CHEMO IV INFUSION ADDL HR: CPT

## 2017-04-25 PROCEDURE — 80076 HEPATIC FUNCTION PANEL: CPT | Performed by: INTERNAL MEDICINE

## 2017-04-25 PROCEDURE — 96411 CHEMO IV PUSH ADDL DRUG: CPT

## 2017-04-25 PROCEDURE — 96375 TX/PRO/DX INJ NEW DRUG ADDON: CPT

## 2017-04-25 PROCEDURE — 96416 CHEMO PROLONG INFUSE W/PUMP: CPT

## 2017-04-25 PROCEDURE — 80047 BASIC METABLC PNL IONIZED CA: CPT

## 2017-04-25 PROCEDURE — 85025 COMPLETE CBC W/AUTO DIFF WBC: CPT | Performed by: INTERNAL MEDICINE

## 2017-04-25 PROCEDURE — 77030012965 HC NDL HUBR BBMI -A

## 2017-04-25 RX ORDER — SODIUM CHLORIDE 9 MG/ML
250 INJECTION, SOLUTION INTRAVENOUS ONCE
Status: COMPLETED | OUTPATIENT
Start: 2017-04-25 | End: 2017-04-25

## 2017-04-25 RX ORDER — PALONOSETRON 0.05 MG/ML
0.25 INJECTION, SOLUTION INTRAVENOUS ONCE
Status: COMPLETED | OUTPATIENT
Start: 2017-04-25 | End: 2017-04-25

## 2017-04-25 RX ORDER — DEXTROSE MONOHYDRATE 50 MG/ML
250 INJECTION, SOLUTION INTRAVENOUS ONCE
Status: COMPLETED | OUTPATIENT
Start: 2017-04-25 | End: 2017-04-25

## 2017-04-25 RX ORDER — SODIUM CHLORIDE 0.9 % (FLUSH) 0.9 %
10-40 SYRINGE (ML) INJECTION AS NEEDED
Status: DISPENSED | OUTPATIENT
Start: 2017-04-25 | End: 2017-04-25

## 2017-04-25 RX ORDER — HEPARIN SODIUM (PORCINE) LOCK FLUSH IV SOLN 100 UNIT/ML 100 UNIT/ML
500 SOLUTION INTRAVENOUS AS NEEDED
Status: DISPENSED | OUTPATIENT
Start: 2017-04-25 | End: 2017-04-26

## 2017-04-25 RX ADMIN — SODIUM CHLORIDE 250 ML: 900 INJECTION, SOLUTION INTRAVENOUS at 10:49

## 2017-04-25 RX ADMIN — DEXTROSE MONOHYDRATE 250 ML: 5 INJECTION, SOLUTION INTRAVENOUS at 11:50

## 2017-04-25 RX ADMIN — LEUCOVORIN CALCIUM 800 MG: 350 INJECTION, POWDER, LYOPHILIZED, FOR SOLUTION INTRAMUSCULAR; INTRAVENOUS at 12:00

## 2017-04-25 RX ADMIN — DEXAMETHASONE SODIUM PHOSPHATE 12 MG: 4 INJECTION, SOLUTION INTRA-ARTICULAR; INTRALESIONAL; INTRAMUSCULAR; INTRAVENOUS; SOFT TISSUE at 10:57

## 2017-04-25 RX ADMIN — ERYTHROPOIETIN 40000 UNITS: 40000 INJECTION, SOLUTION INTRAVENOUS; SUBCUTANEOUS at 14:31

## 2017-04-25 RX ADMIN — Medication 10 ML: at 14:23

## 2017-04-25 RX ADMIN — PALONOSETRON HYDROCHLORIDE 0.25 MG: 0.25 INJECTION INTRAVENOUS at 10:53

## 2017-04-25 RX ADMIN — FLUOROURACIL 800 MG: 50 INJECTION, SOLUTION INTRAVENOUS at 14:41

## 2017-04-25 RX ADMIN — ERYTHROPOIETIN 20000 UNITS: 20000 INJECTION, SOLUTION INTRAVENOUS; SUBCUTANEOUS at 14:31

## 2017-04-25 RX ADMIN — Medication 10 ML: at 10:48

## 2017-04-25 RX ADMIN — FLUOROURACIL 4800 MG: 50 INJECTION, SOLUTION INTRAVENOUS at 14:50

## 2017-04-25 RX ADMIN — OXALIPLATIN 130 MG: 100 INJECTION, SOLUTION, CONCENTRATE INTRAVENOUS at 11:59

## 2017-04-25 NOTE — PROGRESS NOTES
[facility] Progress Note    Date: 2017    Name: Yelena Javier              MRN: [id number]              : 1955    Chemotherapy Cycle:C10D1       Pt to [facility], ambulatory, at 1000. Ms. Miguel Marcum was assessed and education was provided. Ms. Polk's vitals were reviewed. Visit Vitals    BP (!) 158/96 (BP 1 Location: Left arm, BP Patient Position: Sitting)    Pulse 69    Temp 99.5 °F (37.5 °C)    Resp 18    Ht 5' (1.524 m)    Wt 91.7 kg (202 lb 3.2 oz)    SpO2 99%    BMI 39.49 kg/m2       Right chest mediport accessed with 20 g 1 inch orozco needle. Port flushed easily and had brisk blood return. Blood drawn off and sent for CBC, BMP run on the Istat and hepatic function panel per written orders after 10 ml waste. NS initiated @ 25mL/hr. Lab results were obtained and reviewed. Recent Results (from the past 12 hour(s))   POC CHEM8    Collection Time: 17 10:30 AM   Result Value Ref Range    CO2 (POC) 20 19 - 24 MMOL/L    Glucose (POC) 156 (H) 74 - 106 MG/DL    BUN (POC) 17 7 - 18 MG/DL    Creatinine (POC) 1.4 (H) 0.6 - 1.3 MG/DL    GFR-AA (POC) 46 (L) >60 ml/min/1.73m2    GFR, non-AA (POC) 38 (L) >60 ml/min/1.73m2    Sodium (POC) 137 136 - 145 MMOL/L    Potassium (POC) 3.5 3.5 - 5.5 MMOL/L    Calcium, ionized (POC) 1.17 1.12 - 1.32 MMOL/L    Chloride (POC) 105 100 - 108 MMOL/L    Anion gap (POC) 16 10 - 20      Hematocrit (POC) 30 (L) 36 - 49 %    Hemoglobin (POC) 10.2 (L) 12 - 16 G/DL       Lab results within ordered parameters to give chemo today. ANC = 2.7, PLT = 492. Chemo dosages verified with today's BSA and found to be within 10% of ordered dosages. Pre-medications (Decadron 12mg IVPB and Aloxi 0.25mg IVP) were administered as ordered and chemotherapy was initiated after blood return from port re-verified. NS was stopped and line was flushed with D5.      Oxaliplatin 130 mg  was infused over 2 hours concurrently with Leucovorin 800 mg. VS stable at end of infusion and pt denied complaints. Line flushed with D5 and blood return from port re-verified. After infusion completed, pt stated that she felt \"a little pressure\" in her chest and pt also states that she did not take her BP medications this AM. VS taken and BP noted to be elevated at 204/91. BP rechecked on opposite arm with larger cuff and was 172/99. Farzana Land NP notified of the clinical changes in the patients condition. NP states that it is ok to continue with the treatment today but to encourage pt to take her BP medications when she gets home today. NS was resumed. Fluorouracil 800 mg was given slow IVP over 4 minutes. Line flushed with 10 mL NS and blood return was re-verified. Fluorouracil 4800 mg CADD pump was connected to patient. Connections were secured with tape and the volume was verified to be infusing. HGB= 8.9 and HCT= 29.0. Lab values within range to receive her Procrit injection today. 60,000 units of Erythropoietin (Procrit) given SQ in the back of the left arm. No redness or bleeding noted. Band-aid applied at site. Ms. Mounika Chavez tolerated infusion/injection, and had no complaints at this time. Patient Vitals for the past 12 hrs:   Temp Pulse Resp BP SpO2   04/25/17 1459 98.5 °F (36.9 °C) 66 18 (!) 173/95 -   04/25/17 1422 - - - (!) 172/99 -   04/25/17 1417 98.8 °F (37.1 °C) (!) 58 18 (!) 204/91 100 %   04/25/17 1007 99.5 °F (37.5 °C) 69 18 (!) 158/96 99 %     Patient armband removed and shredded. Ms. Mounika Chavez was discharged from [place] [address] in stable condition at 1500. She is to return on 4/27/2017 at 1300 for her next appointment.     May [place] RN  April 25, 2017

## 2017-04-27 ENCOUNTER — HOSPITAL ENCOUNTER (OUTPATIENT)
Dept: INFUSION THERAPY | Age: 62
Discharge: HOME | End: 2017-04-27
Payer: MEDICARE

## 2017-04-27 VITALS
TEMPERATURE: 98.6 F | DIASTOLIC BLOOD PRESSURE: 85 MMHG | HEART RATE: 80 BPM | SYSTOLIC BLOOD PRESSURE: 156 MMHG | RESPIRATION RATE: 18 BRPM

## 2017-04-27 PROCEDURE — 96523 IRRIG DRUG DELIVERY DEVICE: CPT

## 2017-04-27 PROCEDURE — 74011250636 HC RX REV CODE- 250/636: Performed by: INTERNAL MEDICINE

## 2017-04-27 RX ORDER — HEPARIN SODIUM (PORCINE) LOCK FLUSH IV SOLN 100 UNIT/ML 100 UNIT/ML
500 SOLUTION INTRAVENOUS AS NEEDED
Status: CANCELLED | OUTPATIENT
Start: 2017-04-27 | End: 2017-04-28

## 2017-04-27 RX ORDER — SODIUM CHLORIDE 0.9 % (FLUSH) 0.9 %
10-40 SYRINGE (ML) INJECTION AS NEEDED
Status: CANCELLED | OUTPATIENT
Start: 2017-04-27

## 2017-04-27 RX ORDER — SODIUM CHLORIDE 0.9 % (FLUSH) 0.9 %
10-40 SYRINGE (ML) INJECTION AS NEEDED
Status: DISCONTINUED | OUTPATIENT
Start: 2017-04-27 | End: 2017-05-01 | Stop reason: HOSPADM

## 2017-04-27 RX ORDER — HEPARIN SODIUM (PORCINE) LOCK FLUSH IV SOLN 100 UNIT/ML 100 UNIT/ML
500 SOLUTION INTRAVENOUS AS NEEDED
Status: ACTIVE | OUTPATIENT
Start: 2017-04-27 | End: 2017-04-28

## 2017-04-27 RX ADMIN — Medication 20 ML: at 13:16

## 2017-04-27 RX ADMIN — HEPARIN SODIUM (PORCINE) LOCK FLUSH IV SOLN 100 UNIT/ML 500 UNITS: 100 SOLUTION at 13:17

## 2017-05-05 RX ORDER — FLUOROURACIL 50 MG/ML
800 INJECTION, SOLUTION INTRAVENOUS ONCE
Status: DISPENSED | OUTPATIENT
Start: 2017-05-09 | End: 2017-05-09

## 2017-05-05 RX ORDER — PALONOSETRON 0.05 MG/ML
0.25 INJECTION, SOLUTION INTRAVENOUS ONCE
Status: CANCELLED | OUTPATIENT
Start: 2017-05-09 | End: 2017-05-09

## 2017-05-09 ENCOUNTER — HOSPITAL ENCOUNTER (OUTPATIENT)
Dept: INFUSION THERAPY | Age: 62
Discharge: HOME | End: 2017-05-09

## 2017-05-11 ENCOUNTER — APPOINTMENT (OUTPATIENT)
Dept: INFUSION THERAPY | Age: 62
End: 2017-05-11

## 2017-05-17 ENCOUNTER — HOSPITAL ENCOUNTER (OUTPATIENT)
Dept: LAB | Age: 62
Discharge: HOME | End: 2017-05-17
Payer: MEDICARE

## 2017-05-17 ENCOUNTER — OFFICE VISIT (OUTPATIENT)
Dept: ONCOLOGY | Age: 62
End: 2017-05-17

## 2017-05-17 ENCOUNTER — HOSPITAL ENCOUNTER (OUTPATIENT)
Dept: ONCOLOGY | Age: 62
Discharge: HOME | End: 2017-05-17

## 2017-05-17 VITALS
HEIGHT: 60 IN | TEMPERATURE: 98.1 F | SYSTOLIC BLOOD PRESSURE: 117 MMHG | HEART RATE: 61 BPM | DIASTOLIC BLOOD PRESSURE: 74 MMHG

## 2017-05-17 DIAGNOSIS — C19 COLORECTAL CARCINOMA (HCC): ICD-10-CM

## 2017-05-17 DIAGNOSIS — T45.1X5A CHEMOTHERAPY INDUCED NEUTROPENIA (HCC): ICD-10-CM

## 2017-05-17 DIAGNOSIS — D70.1 CHEMOTHERAPY INDUCED NEUTROPENIA (HCC): ICD-10-CM

## 2017-05-17 DIAGNOSIS — N18.9 ANEMIA IN CHRONIC KIDNEY DISEASE (CKD): ICD-10-CM

## 2017-05-17 DIAGNOSIS — D63.1 ANEMIA IN CHRONIC KIDNEY DISEASE (CKD): ICD-10-CM

## 2017-05-17 DIAGNOSIS — T45.1X5A ANTINEOPLASTIC CHEMOTHERAPY INDUCED ANEMIA: ICD-10-CM

## 2017-05-17 DIAGNOSIS — D50.0 IRON DEFICIENCY ANEMIA DUE TO CHRONIC BLOOD LOSS: ICD-10-CM

## 2017-05-17 DIAGNOSIS — C19 COLORECTAL CARCINOMA (HCC): Primary | ICD-10-CM

## 2017-05-17 DIAGNOSIS — D64.81 ANTINEOPLASTIC CHEMOTHERAPY INDUCED ANEMIA: ICD-10-CM

## 2017-05-17 LAB
ALBUMIN SERPL BCP-MCNC: 2.3 G/DL (ref 3.4–5)
ALBUMIN/GLOB SERPL: 0.7 {RATIO} (ref 0.8–1.7)
ALP SERPL-CCNC: 86 U/L (ref 45–117)
ALT SERPL-CCNC: 11 U/L (ref 13–56)
ANION GAP BLD CALC-SCNC: 11 MMOL/L (ref 3–18)
AST SERPL W P-5'-P-CCNC: 17 U/L (ref 15–37)
BASO+EOS+MONOS # BLD AUTO: 1.1 K/UL (ref 0–2.3)
BASO+EOS+MONOS # BLD AUTO: 14 % (ref 0.1–17)
BILIRUB SERPL-MCNC: 0.2 MG/DL (ref 0.2–1)
BUN SERPL-MCNC: 14 MG/DL (ref 7–18)
BUN/CREAT SERPL: 10 (ref 12–20)
CALCIUM SERPL-MCNC: 8.8 MG/DL (ref 8.5–10.1)
CHLORIDE SERPL-SCNC: 104 MMOL/L (ref 100–108)
CO2 SERPL-SCNC: 26 MMOL/L (ref 21–32)
CREAT SERPL-MCNC: 1.43 MG/DL (ref 0.6–1.3)
DIFFERENTIAL METHOD BLD: ABNORMAL
ERYTHROCYTE [DISTWIDTH] IN BLOOD BY AUTOMATED COUNT: 19.7 % (ref 11.5–14.5)
FERRITIN SERPL-MCNC: 470 NG/ML (ref 8–388)
GLOBULIN SER CALC-MCNC: 3.5 G/DL (ref 2–4)
GLUCOSE SERPL-MCNC: 152 MG/DL (ref 74–99)
HCT VFR BLD AUTO: 28.8 % (ref 36–48)
HGB BLD-MCNC: 8.8 G/DL (ref 12–16)
IRON SATN MFR SERPL: 29 %
IRON SERPL-MCNC: 54 UG/DL (ref 50–175)
LYMPHOCYTES # BLD AUTO: 16 % (ref 14–44)
LYMPHOCYTES # BLD: 1.2 K/UL (ref 1.1–5.9)
MCH RBC QN AUTO: 27.2 PG (ref 25–35)
MCHC RBC AUTO-ENTMCNC: 30.6 G/DL (ref 31–37)
MCV RBC AUTO: 89.2 FL (ref 78–102)
NEUTS SEG # BLD: 5.5 K/UL (ref 1.8–9.5)
NEUTS SEG NFR BLD AUTO: 70 % (ref 40–70)
PLATELET # BLD AUTO: 642 K/UL (ref 140–440)
POTASSIUM SERPL-SCNC: 3.5 MMOL/L (ref 3.5–5.5)
PROT SERPL-MCNC: 5.8 G/DL (ref 6.4–8.2)
RBC # BLD AUTO: 3.23 M/UL (ref 4.1–5.1)
SODIUM SERPL-SCNC: 141 MMOL/L (ref 136–145)
TIBC SERPL-MCNC: 189 UG/DL (ref 250–450)
WBC # BLD AUTO: 7.8 K/UL (ref 4.5–13)

## 2017-05-17 PROCEDURE — 82378 CARCINOEMBRYONIC ANTIGEN: CPT | Performed by: INTERNAL MEDICINE

## 2017-05-17 PROCEDURE — 36415 COLL VENOUS BLD VENIPUNCTURE: CPT | Performed by: INTERNAL MEDICINE

## 2017-05-17 PROCEDURE — 83540 ASSAY OF IRON: CPT | Performed by: INTERNAL MEDICINE

## 2017-05-17 PROCEDURE — 80053 COMPREHEN METABOLIC PANEL: CPT | Performed by: INTERNAL MEDICINE

## 2017-05-17 PROCEDURE — 82728 ASSAY OF FERRITIN: CPT | Performed by: INTERNAL MEDICINE

## 2017-05-17 NOTE — MR AVS SNAPSHOT
Visit Information Date & Time Provider Department Dept. Phone Encounter #  
 5/17/2017 11:30 AM Alissa Prakash MD [facility] [phone] [id number] Follow-up Instructions Return in about 6 weeks (around 6/28/2017). Your Appointments 6/28/2017 11:30 AM  
Office Visit with Alissa Prakash MD  
[facility]) Appt Note: 6 week follow up appointment Familia Mcintosh, [address], [address] [place] [address] Upcoming Health Maintenance Date Due Hepatitis C Screening 1955 DTaP/Tdap/Td series (1 - Tdap) 8/26/1974 PAP AKA CERVICAL CYTOLOGY 8/26/1974 BREAST CANCER SCRN MAMMOGRAM 8/26/2003 FOBT Q 1 YEAR AGE 50-75 8/26/2003 ZOSTER VACCINE AGE 60> 8/26/2013 Pneumococcal 19-64 Highest Risk (2 of 3 - PCV13) 3/1/2017 INFLUENZA AGE 9 TO ADULT 8/1/2017 Allergies as of 5/17/2017  Review Complete On: 5/17/2017 By: Alissa Prakash MD  
  
 Severity Noted Reaction Type Reactions Latex  08/04/2016    Other (comments) Lisinopril High 04/25/2017    Hives Asa-acetaminophen-caff-buffers  08/04/2016    Other (comments) Codeine  08/04/2016    Other (comments) Pcn [Penicillins]  08/04/2016    Other (comments) Sulfa (Sulfonamide Antibiotics)  08/04/2016    Other (comments) Current Immunizations  Reviewed on 4/27/2017 Name Date Influenza Vaccine 3/1/2016 Pneumococcal Vaccine (Unspecified Type) 3/1/2016 Not reviewed this visit You Were Diagnosed With   
  
 Codes Comments Colorectal carcinoma ([facility] [address]    -  Primary ICD-10-CM: C19 
ICD-9-CM: 154.0 Anemia in chronic kidney disease (CKD)     ICD-10-CM: N18.9, D63.1 ICD-9-CM: 285.21 Iron deficiency anemia due to chronic blood loss     ICD-10-CM: D50.0 ICD-9-CM: 280.0 Antineoplastic chemotherapy induced anemia     ICD-10-CM: D64.81, T45.1X5A 
ICD-9-CM: 285.3, E933.1 Chemotherapy induced neutropenia (HCC)     ICD-10-CM: D70.1, T45.1X5A 
ICD-9-CM: 288.03, E933.1 Vitals BP Pulse Temp Height(growth percentile) Smoking Status 117/74 61 98.1 °F (36.7 °C) 5' (1.524 m) Never Smoker Preferred Pharmacy Pharmacy Name Phone [facility] [address], [address] [facility] Your Updated Medication List  
  
   
This list is accurate as of: 5/17/17 12:34 PM.  Always use your most recent med list.  
  
  
  
  
 acetaminophen 650 mg CR tablet Commonly known as:  TYLENOL  
1,300 mg.  
  
 albuterol 90 mcg/actuation inhaler Commonly known as:  PROVENTIL HFA, VENTOLIN HFA, PROAIR HFA  
2 Puffs. allopurinol 300 mg tablet Commonly known as:  ZYLOPRIM  
300 mg.  
  
 bisoprolol-hydroCHLOROthiazide 10-6.25 mg per tablet Commonly known as:  [facility] Take 1 Tab by Mouth Once a Day. cloNIDine HCl 0.1 mg tablet Commonly known as:  CATAPRES  
0.1 mg.  
  
 colchicine 0.6 mg tablet  
0.6 mg.  
  
 desonide 0.05 % cream  
Commonly known as:  Cristina Helena Use 1 Application to affected area Twice Daily. diphenhydrAMINE 25 mg capsule Commonly known as:  BENADRYL Take 1 with compazine every 6 hours for nausea for 3 days then, as needed. ergocalciferol 50,000 unit capsule Commonly known as:  ERGOCALCIFEROL  
50,000 Units. fluconazole 150 mg tablet Commonly known as:  DIFLUCAN  
TAKE 1 TABLET BY MOUTH ONCE DAILY TODAY, MAY REPEAT AFTER 3 DAYS AS NEEDED  
  
 furosemide 20 mg tablet Commonly known as:  LASIX Take 1/2-1 tablet daily if needed for swelling.  
  
 gabapentin 300 mg capsule Commonly known as:  NEURONTIN  
300 mg.  
  
 glimepiride 4 mg tablet Commonly known as:  AMARYL Take one in the am.  New dose. glipiZIDE 10 mg tablet Commonly known as:  Hamilton Carolyn 10 mg. lidocaine-prilocaine topical cream  
Commonly known as:  EMLA Place cream over mediport 1 hour prior to chemotherapy and then place saran wrap over area. Every chemo treatment. LORazepam 0.5 mg tablet Commonly known as:  ATIVAN Take 1 Tab by mouth every eight (8) hours as needed for Anxiety. Max Daily Amount: 1.5 mg. Indications: ANXIETY  
  
 nitrofurantoin (macrocrystal-monohydrate) 100 mg capsule Commonly known as:  MACROBID Take 1 Cap by mouth two (2) times a day. NORVASC 5 mg tablet Generic drug:  amLODIPine Take 5 mg by mouth daily. ondansetron hcl 8 mg tablet Commonly known as:  ZOFRAN (AS HYDROCHLORIDE) Take one tablet by mouth every 8 hours for nausea beginning the night of chemo for 3 days. * predniSONE 20 mg tablet Commonly known as:  Yessica Ringer Take 2 Tabs by mouth daily. Take 2 tabs on days 2 and 3 of each chemo cycle * predniSONE 20 mg tablet Commonly known as:  DELTASONE  
TAKE 2 TABLETS BY MOUTH WITH BREAKFAST - TAKE 40 MG ON DAY 2 AND DAY 3 OF TREATMENT  
  
 prochlorperazine 10 mg tablet Commonly known as:  COMPAZINE Take 1 tablet every 6 hours with Benadryl 25mg for nausea for 3 days then, as needed. warfarin 1 mg tablet Commonly known as:  COUMADIN Take one 1 tablet by mouth everyday at 6pm or after. You will continue to take this medication. Everyday until mediport is removed. * Notice: This list has 2 medication(s) that are the same as other medications prescribed for you. Read the directions carefully, and ask your doctor or other care provider to review them with you. We Performed the Following COMPLETE CBC & AUTO DIFF WBC [29407 CPT(R)] Follow-up Instructions Return in about 6 weeks (around 6/28/2017). To-Do List   
 05/17/2017   Lab:  CBC WITH 3 PART DIFF   
  
 05/23/2017 10:00 AM  
  Appointment with HBV INFUSION NURSE 3 at [facility] OP INFUSION ([phone])  
  
 05/25/2017 1:00 PM  
 Appointment with HBV FAST TRACK NURSE at [facility] OP INFUSION ([phone]) Patient Instructions Colon Cancer: Care Instructions Your Care Instructions Colon cancer occurs when abnormal cells grow out of control in the lower part of your intestine (your colon). If the tumor was small and had not spread, your doctor may have removed it during the colonoscopy. But you may need surgery to remove the cancer if the tumor was too big or had spread too far to be removed during a colonoscopy. If cancer has spread to another part of your body, such as the liver, you may need more far-reaching surgery. Treatment for colon cancer may also include radiation therapy and medicines that destroy cancer cells (chemotherapy). Being treated for cancer can weaken your body, and you may feel very tired. Get the rest your body needs so that you can feel better. When you find out that you have cancer, you may feel many emotions and may need some help coping. Seek out family, friends, and counselors for support. You also can do things at home to make yourself feel better while you go through treatment. Call the [employer] ([phone]) or visit its website at [address] for more information. Follow-up care is a key part of your treatment and safety. Be sure to make and go to all appointments, and call your doctor if you are having problems. It's also a good idea to know your test results and keep a list of the medicines you take. How can you care for yourself at home? · Take your medicines exactly as prescribed. Call your doctor if you think you are having a problem with your medicine. You may get medicine for nausea and vomiting if you have these side effects. · Follow your doctor's instructions to relieve pain. Pain from cancer and surgery can almost always be controlled. Use pain medicine when you first notice pain, before it becomes severe. · Eat healthy food. If you do not feel like eating, try to eat food that has protein and extra calories to keep up your strength and prevent weight loss. Drink liquid meal replacements for extra calories and protein. Try to eat your main meal early. · Get some physical activity every day, but do not get too tired. Keep doing the hobbies you enjoy as your energy allows. · Take steps to control your stress and workload. Learn relaxation techniques. ¨ Share your feelings. Stress and tension affect our emotions. By expressing your feelings to others, you may be able to understand and cope with them. ¨ Consider joining a support group. Talking about a problem with your spouse, a good friend, or other people with similar problems is a good way to reduce tension and stress. ¨ Express yourself through art. Try writing, dance, art, or crafts to relieve stress. Some dance, writing, or art groups may be available just for people who have cancer. ¨ Be kind to your body and mind. Getting enough sleep, eating a healthy diet, and taking time to do things you enjoy can contribute to an overall feeling of balance in your life and help reduce stress. ¨ Get help if you need it. Discuss your concerns with your doctor or counselor. · If you are vomiting or have diarrhea: ¨ Drink plenty of fluids (enough so that your urine is light yellow or clear like water) to prevent dehydration. Choose water and other caffeine-free clear liquids. If you have kidney, heart, or liver disease and have to limit fluids, talk with your doctor before you increase the amount of fluids you drink. ¨ When you are able to eat, try clear soups, mild foods, and liquids until all symptoms are gone for 12 to 48 hours. Other good choices include dry toast, crackers, cooked cereal, and gelatin dessert, such as Jell-O. · If you have not already done so, prepare a list of advance directives. Advance directives are instructions to your doctor and family members about what kind of care you want if you become unable to speak or express yourself. When should you call for help? Call 911 anytime you think you may need emergency care. For example, call if: 
· You pass maroon or very bloody stools. · You vomit blood or what looks like coffee grounds. · You have sudden chest pain and shortness of breath, or you cough up blood. · You have severe belly pain. Call your doctor now or seek immediate medical care if: 
· You have signs of a blood clot, such as: 
¨ Pain in your calf, back of the knee, thigh, or groin. ¨ Redness and swelling in your leg or groin. · You have a fever. · You have nausea or vomiting. · You are very constipated or have diarrhea for several days. · Your stools are black and tarlike or have streaks of blood. Watch closely for changes in your health, and be sure to contact your doctor if: 
· Your pain is not controlled by medicine. · Your symptoms get worse or are not improving as expected. Where can you learn more? Go to http://ledy-toño.info/. Enter O280 in the search box to learn more about \"Colon Cancer: Care Instructions. \" Current as of: July 26, 2016 Content Version: 11.2 © 8907-1989 [employer]. Care instructions adapted under license by [employer] (which disclaims liability or warranty for this information). If you have questions about a medical condition or this instruction, always ask your healthcare professional. [place] [address] any warranty or liability for your use of this information. Iron Deficiency Anemia: Care Instructions Your Care Instructions Anemia means that you do not have enough red blood cells. Red blood cells carry oxygen around your body. When you have anemia, it can make you pale, weak, and tired. Many things can cause anemia. The most common cause is loss of blood.  This can happen if you have heavy menstrual periods. It can also happen if you have bleeding in your stomach or bowel. You can also get anemia if you don't have enough iron in your diet or if it's hard for your body to absorb iron. In some cases, pregnancy causes anemia. That's because a pregnant woman needs more iron. Your doctor may do more tests to find the cause of your anemia. If a disease or other health problem is causing it, your doctor will treat that problem. It's important to follow up with your doctor to make sure that your iron level returns to normal. 
Follow-up care is a key part of your treatment and safety. Be sure to make and go to all appointments, and call your doctor if you are having problems. It's also a good idea to know your test results and keep a list of the medicines you take. How can you care for yourself at home? · If your doctor recommended iron pills, take them as directed. ¨ Try to take the pills on an empty stomach. You can do this about 1 hour before or 2 hours after meals. But you may need to take iron with food to avoid an upset stomach. ¨ Do not take antacids or drink milk or anything with caffeine within 2 hours of when you take your iron. They can keep your body from absorbing the iron well. ¨ Vitamin C helps your body absorb iron. You may want to take iron pills with a glass of orange juice or some other food high in vitamin C. 
¨ Iron pills may cause stomach problems. These include heartburn, nausea, diarrhea, constipation, and cramps. It can help to drink plenty of fluids and include fruits, vegetables, and fiber in your diet. ¨ It's normal for iron pills to make your stool a greenish or grayish black. But internal bleeding can also cause dark stool. So it's important to tell your doctor about any color changes. ¨ Call your doctor if you think you are having a problem with your iron pills.  Even after you start to feel better, it will take several months for your body to build up its supply of iron. ¨ If you miss a pill, don't take a double dose. ¨ Keep iron pills out of the reach of small children. Too much iron can be very dangerous. · Eat foods with a lot of iron. These include red meat, shellfish, poultry, and eggs. They also include beans, raisins, whole-grain bread, and leafy green vegetables. · Steam your vegetables. This is the best way to prepare them if you want to get as much iron as possible. · Be safe with medicines. Do not take nonsteroidal anti-inflammatory pain relievers unless your doctor tells you to. These include aspirin, naproxen (Aleve), and ibuprofen (Advil, Motrin). · Liquid iron can stain your teeth. But you can mix it with water or juice and drink it with a straw. Then it won't get on your teeth. When should you call for help? Call 911 anytime you think you may need emergency care. For example, call if: 
· You passed out (lost consciousness). · You vomit blood or what looks like coffee grounds. · You pass maroon or very bloody stools. Call your doctor now or seek immediate medical care if: 
· Your stools are black and look like tar, or they have streaks of blood. · You are dizzy or lightheaded, or you feel like you may faint. Watch closely for changes in your health, and be sure to contact your doctor if: 
· Your fatigue and weakness continue or get worse. · You have side effects from taking iron pills, such as nausea, vomiting, constipation, diarrhea, or heartburn. · You do not get better as expected. Where can you learn more? Go to http://ledy-toño.info/. Enter G978 in the search box to learn more about \"Iron Deficiency Anemia: Care Instructions. \" Current as of: October 13, 2016 Content Version: 11.2 © 3946-2907 [employer].  Care instructions adapted under license by [employer] (which disclaims liability or warranty for this information). If you have questions about a medical condition or this instruction, always ask your healthcare professional. [address] any warranty or liability for your use of this information. Introducing [facility] SERVICES! Pérez Ferrara introduces [employer] patient portal. Now you can access parts of your medical record, email your doctor's office, and request medication refills online. 1. In your internet browser, go to [email or web address] 2. Click on the First Time User? Click Here link in the Sign In box. You will see the New Member Sign Up page. 3. Enter your [employer] Access Code exactly as it appears below. You will not need to use this code after youve completed the sign-up process. If you do not sign up before the expiration date, you must request a new code. · [employer] Access Code: [id number] [facility] Expires: 8/6/2017  7:40 AM 
 
4. Enter the last four digits of your Social Security Number (xxxx) and Date of Birth (mm/dd/yyyy) as indicated and click Submit. You will be taken to the next sign-up page. 5. Create a [employer] ID. This will be your [employer] login ID and cannot be changed, so think of one that is secure and easy to remember. 6. Create a [employer] password. You can change your password at any time. 7. Enter your Password Reset Question and Answer. This can be used at a later time if you forget your password. 8. Enter your e-mail address. You will receive e-mail notification when new information is available in [address]. 9. Click Sign Up. You can now view and download portions of your medical record. 10. Click the Download Summary menu link to download a portable copy of your medical information. If you have questions, please visit the Frequently Asked Questions section of the [employer] website. Remember, [employer] is NOT to be used for urgent needs. For medical emergencies, dial 911. Now available from your iPhone and Android! Please provide this summary of care documentation to your next provider. Your primary care clinician is listed as [address] [place] [address]. If you have any questions after today's visit, please call [phone].

## 2017-05-17 NOTE — PROGRESS NOTES
Hematology/Oncology  Progress Note    Name: Danae Bennett  Date: 2017  : 1955    PCP: Tan Barreto NP     Ms. Yovanny Ruiz is a 59 y.o. -American woman with metastatic colorectal carcinoma/carcinomatosis  Current therapy: FOLFOX chemotherapy regimen      Subjective:     Ms. Yovanny Ruiz is a 49-year-old -American woman with abdominal carcinomatosis from metastatic colorectal carcinoma. The patient was recently hospitalized with a small perforation in her large bowel. She was seen by a surgeon who called me to inform me of this and suggested that we may want to discontinue her chemotherapy and have her follow with her initial surgeon to see if she is a candidate for any type of repetitive surgical intervention. The concern is that further systemic chemotherapy may result in a larger problem with rupture of her intestine and possible peritonitis. Therefore, the patient is here in clinic today to discuss her options at this point. I explained to the patient that early on she was found to have abdominal carcinomatosis and was not considered a surgical candidate based on that fact. Past medical history, family history, and social history: these were reviewed and remains unchanged. Past Medical History:   Diagnosis Date    Diabetes ([address]     Gout     Heart disease     Hypertension     Neuropathic pain of foot      Past Surgical History:   Procedure Laterality Date    HX BACK SURGERY      HX HYSTERECTOMY       Social History     Social History    Marital status:      Spouse name: N/A    Number of children: N/A    Years of education: N/A     Occupational History    Not on file.      Social History Main Topics    Smoking status: Never Smoker    Smokeless tobacco: Never Used    Alcohol use No    Drug use: No    Sexual activity: Not on file     Other Topics Concern    Not on file     Social History Narrative     Family History   Problem Relation Age of Onset    Family history unknown: Yes     Current Outpatient Prescriptions   Medication Sig Dispense Refill    predniSONE (DELTASONE) 20 mg tablet TAKE 2 TABLETS BY MOUTH WITH BREAKFAST - TAKE 40 MG ON DAY 2 AND DAY 3 OF TREATMENT 4 Tab 0    amLODIPine (NORVASC) 5 mg tablet Take 5 mg by mouth daily.  ondansetron hcl (ZOFRAN, AS HYDROCHLORIDE,) 8 mg tablet Take one tablet by mouth every 8 hours for nausea beginning the night of chemo for 3 days. 9 Tab 3    predniSONE (DELTASONE) 20 mg tablet Take 2 Tabs by mouth daily. Take 2 tabs on days 2 and 3 of each chemo cycle 4 Tab 2    LORazepam (ATIVAN) 0.5 mg tablet Take 1 Tab by mouth every eight (8) hours as needed for Anxiety. Max Daily Amount: 1.5 mg. Indications: ANXIETY 90 Tab 0    nitrofurantoin, macrocrystal-monohydrate, (MACROBID) 100 mg capsule Take 1 Cap by mouth two (2) times a day. 10 Cap 0    diphenhydrAMINE (BENADRYL) 25 mg capsule Take 1 with compazine every 6 hours for nausea for 3 days then, as needed. 60 Cap 3    prochlorperazine (COMPAZINE) 10 mg tablet Take 1 tablet every 6 hours with Benadryl 25mg for nausea for 3 days then, as needed. 60 Tab 3    warfarin (COUMADIN) 1 mg tablet Take one 1 tablet by mouth everyday at 6pm or after. You will continue to take this medication. Everyday until mediport is removed. 30 Tab 3    lidocaine-prilocaine (EMLA) topical cream Place cream over mediport 1 hour prior to chemotherapy and then place saran wrap over area. Every chemo treatment. 30 g 0    acetaminophen (TYLENOL) 650 mg CR tablet 1,300 mg.      albuterol (PROVENTIL HFA, VENTOLIN HFA, PROAIR HFA) 90 mcg/actuation inhaler 2 Puffs.  colchicine 0.6 mg tablet 0.6 mg.      ergocalciferol (ERGOCALCIFEROL) 50,000 unit capsule 50,000 Units.  furosemide (LASIX) 20 mg tablet Take 1/2-1 tablet daily if needed for swelling.       allopurinol (ZYLOPRIM) 300 mg tablet 300 mg.      desonide (TRIDESILON) 0.05 % cream Use 1 Application to affected area Twice Daily.      fluconazole (DIFLUCAN) 150 mg tablet TAKE 1 TABLET BY MOUTH ONCE DAILY TODAY, MAY REPEAT AFTER 3 DAYS AS NEEDED      cloNIDine HCl (CATAPRES) 0.1 mg tablet 0.1 mg.      bisoprolol-hydrochlorothiazide (ZIAC) 10-6.25 mg per tablet Take 1 Tab by Mouth Once a Day.  glipiZIDE (GLUCOTROL) 10 mg tablet 10 mg.      glimepiride (AMARYL) 4 mg tablet Take one in the am.  New dose.  gabapentin (NEURONTIN) 300 mg capsule 300 mg. Review of Systems  Constitutional: The patient has complaints of mild to moderate fatigue due to chemotherapy treatment and malignancy. HEENT: The patient denies recent head trauma, eye pain, blurred vision,  hearing deficit, oropharyngeal mucosal pain or lesions, and the patient denies throat pain or discomfort. Lymphatics: The patient denies palpable peripheral lymphadenopathy. Hematologic: The patient denies having bruising, bleeding, or progressive fatigue. Respiratory: Patient denies having shortness of breath, cough, sputum production, fever, or dyspnea on exertion. Cardiovascular: The patient denies having leg pain, leg swelling, heart palpitations, chest permit, chest pain, or lightheadedness. The patient denies having dyspnea on exertion. Gastrointestinal: The patient reports occasional nausea from chemotherapy which is well controlled with antinausea medication. She denies having any emesis or diarrhea. Genitourinary: Patient denies having urinary urgency, frequency, or dysuria. The patient denies having blood in the urine. Psychological: The patient denies having symptoms of nervousness, anxiety, depression, or thoughts of harming self. Skin: Patient denies having skin rashes, skin, ulcerations, or unexplained itching or pruritus. Musculoskeletal: The patient denies having pain in the joints or bones.       Objective:     Visit Vitals    /74    Pulse 61    Temp 98.1 °F (36.7 °C)    Ht 5' (1.524 m)     ECOG PS=1; Pain score=0/10    Physical Exam:   Gen. Appearance: The patient is in no acute distress. Skin: There is no bruise or rash. HEENT: The exam is unremarkable. Neck: Supple without lymphadenopathy or thyromegaly. Lungs: Clear to auscultation and percussion; there are no wheezes or rhonchi. Heart: Regular rate and rhythm; there are no murmurs, gallops, or rubs. Abdomen: Bowel sounds are present and normal.  There is no guarding, tenderness, or hepatosplenomegaly. Extremities: There is no clubbing, cyanosis, or edema. Neurologic: There are no focal neurologic deficits. Lymphatics: There is no palpable peripheral lymphadenopathy. Musculoskeletal: The patient has full range of motion at all joints. There is no evidence of joint deformity or effusions. There is no focal joint tenderness. Psychological/psychiatric: There is no clinical evidence of anxiety, depression, or melancholy. Lab data:      Results for orders placed or performed during the hospital encounter of 05/17/17   CBC WITH 3 PART DIFF     Status: Abnormal   Result Value Ref Range Status    WBC 7.8 4.5 - 13.0 K/uL Final    RBC 3.23 (L) 4.10 - 5.10 M/uL Final    HGB 8.8 (L) 12.0 - 16 g/dL Final    HCT 28.8 (L) 36 - 48 % Final    MCV 89.2 78 - 102 FL Final    MCH 27.2 25.0 - 35.0 PG Final    MCHC 30.6 (L) 31 - 37 g/dL Final    RDW 19.7 (H) 11.5 - 14.5 % Final    PLATELET 858 (H) 807 - 440 K/uL Final    NEUTROPHILS 70 40 - 70 % Final    MIXED CELLS 14 0.1 - 17 % Final    LYMPHOCYTES 16 14 - 44 % Final    ABS. NEUTROPHILS 5.5 1.8 - 9.5 K/UL Final    ABS. MIXED CELLS 1.1 0.0 - 2.3 K/uL Final    ABS. LYMPHOCYTES 1.2 1.1 - 5.9 K/UL Final     Comment: Test performed at [address] Results Reviewed by Medical Director. DF AUTOMATED   Final           Assessment:     1. Colorectal carcinoma ([facility] [address]    2. Anemia in chronic kidney disease (CKD)    3. Iron deficiency anemia due to chronic blood loss    4.  Antineoplastic chemotherapy induced anemia    5. Chemotherapy induced neutropenia (HCC)      Plan:   Colorectal carcinoma with abdominal carcinomatosis: This time I will discontinue her FOLFOX chemotherapy which has been given for its palliative benefit. She was recently hospitalized and found to have a small perforation in her large bowel and an infection. I was called by the surgeon who evaluated her and asked to consider holding the final cycle of chemotherapy. Therefore she will be referred back to her colorectal surgeon for further assessment to see if there is any role for palliative surgery in this setting. Anemia associated with chronic kidney disease and chemotherapy-induced anemia (persistent problem): I have explained to the patient that a CBC today shows a hemoglobin of 8. Percent. Procrit at a dose of 60,000 units will be provided whenever her hemoglobin is below 10 g/dL hematocrit is below 30% every 2 weeks. The iron profile and ferritin levels will be ordered at this time. Chemotherapy-induced leukopenia/neutropenia (improvedproblem): The CBC today shows a normal WBC count of 7.8, hemoglobin is 8.8 g/dL, hematocrit 28.8%, and a platelet count of 807,268. Absolute neutrophil count is now 5.5. I have explained to the patient that since we are discontinuing chemotherapy we will not need to continue Neulasta at this point. Therefore Neulasta will be discontinued as well. I will have the patient return to clinic for complete reassessment again in 6 weeks.   Orders Placed This Encounter    COMPLETE CBC & AUTO DIFF WBC    InHouse CBC ([place])     Standing Status:   Future     Number of Occurrences:   1     Standing Expiration Date:   2/15/7502    METABOLIC PANEL, COMPREHENSIVE     Standing Status:   Future     Standing Expiration Date:   5/18/2018    IRON PROFILE     Standing Status:   Future     Standing Expiration Date:   5/18/2018    FERRITIN     Standing Status:   Future     Standing Expiration Date: 5/18/2018    CEA     Standing Status:   Future     Standing Expiration Date:   5/18/2018       Leo Aragon MD  5/17/2017         Please note: This document has been produced using voice recognition software. Unrecognized errors in transcription may be present.

## 2017-05-17 NOTE — PROGRESS NOTES
Hematology/Oncology  Progress Note    Name: Clifford Dickerson  Date: 2017  : 1955    PCP: Sharon Brown NP     Ms. Laura Mireles is a 59 y.o. -American woman with metastatic colorectal carcinoma/carcinomatosis  Current therapy: FOLFOX chemotherapy regimen      Subjective:     Ms. Laura Mireles is a 56-year-old -American woman with abdominal carcinomatosis from metastatic colorectal carcinoma. She is currently receiving outpatient systemic chemotherapy with the FOLFOX chemotherapy regimen. We had attempted to use the  Avastin in combination with the FOLFOX but due to her chronic kidney disease we have not been able to give this medication. Overall, she is tolerating the treatment reasonably well  Her appetite remains good. She denies having any unexplained bruising or bleeding. She was recently admitted to [facility] for dizziness, elevated glucose, and hypertension. She reports she feels much better, she has no concerns to report. She is ready to resume her chemo treatment. Past medical history, family history, and social history: these were reviewed and remains unchanged. Past Medical History:   Diagnosis Date    Diabetes ([address]     Gout     Heart disease     Hypertension     Neuropathic pain of foot      Past Surgical History:   Procedure Laterality Date    HX BACK SURGERY      HX HYSTERECTOMY       Social History     Social History    Marital status:      Spouse name: N/A    Number of children: N/A    Years of education: N/A     Occupational History    Not on file.      Social History Main Topics    Smoking status: Never Smoker    Smokeless tobacco: Never Used    Alcohol use No    Drug use: No    Sexual activity: Not on file     Other Topics Concern    Not on file     Social History Narrative     Family History   Problem Relation Age of Onset    Family history unknown: Yes     Current Outpatient Prescriptions   Medication Sig Dispense Refill    predniSONE (DELTASONE) 20 mg tablet TAKE 2 TABLETS BY MOUTH WITH BREAKFAST - TAKE 40 MG ON DAY 2 AND DAY 3 OF TREATMENT 4 Tab 0    amLODIPine (NORVASC) 5 mg tablet Take 5 mg by mouth daily.  ondansetron hcl (ZOFRAN, AS HYDROCHLORIDE,) 8 mg tablet Take one tablet by mouth every 8 hours for nausea beginning the night of chemo for 3 days. 9 Tab 3    predniSONE (DELTASONE) 20 mg tablet Take 2 Tabs by mouth daily. Take 2 tabs on days 2 and 3 of each chemo cycle 4 Tab 2    LORazepam (ATIVAN) 0.5 mg tablet Take 1 Tab by mouth every eight (8) hours as needed for Anxiety. Max Daily Amount: 1.5 mg. Indications: ANXIETY 90 Tab 0    nitrofurantoin, macrocrystal-monohydrate, (MACROBID) 100 mg capsule Take 1 Cap by mouth two (2) times a day. 10 Cap 0    diphenhydrAMINE (BENADRYL) 25 mg capsule Take 1 with compazine every 6 hours for nausea for 3 days then, as needed. 60 Cap 3    prochlorperazine (COMPAZINE) 10 mg tablet Take 1 tablet every 6 hours with Benadryl 25mg for nausea for 3 days then, as needed. 60 Tab 3    warfarin (COUMADIN) 1 mg tablet Take one 1 tablet by mouth everyday at 6pm or after. You will continue to take this medication. Everyday until mediport is removed. 30 Tab 3    lidocaine-prilocaine (EMLA) topical cream Place cream over mediport 1 hour prior to chemotherapy and then place saran wrap over area. Every chemo treatment. 30 g 0    acetaminophen (TYLENOL) 650 mg CR tablet 1,300 mg.      albuterol (PROVENTIL HFA, VENTOLIN HFA, PROAIR HFA) 90 mcg/actuation inhaler 2 Puffs.  colchicine 0.6 mg tablet 0.6 mg.      ergocalciferol (ERGOCALCIFEROL) 50,000 unit capsule 50,000 Units.  furosemide (LASIX) 20 mg tablet Take 1/2-1 tablet daily if needed for swelling.  allopurinol (ZYLOPRIM) 300 mg tablet 300 mg.      desonide (TRIDESILON) 0.05 % cream Use 1 Application to affected area Twice Daily.       fluconazole (DIFLUCAN) 150 mg tablet TAKE 1 TABLET BY MOUTH ONCE DAILY TODAY, MAY REPEAT AFTER 3 DAYS AS NEEDED      cloNIDine HCl (CATAPRES) 0.1 mg tablet 0.1 mg.      bisoprolol-hydrochlorothiazide (ZIAC) 10-6.25 mg per tablet Take 1 Tab by Mouth Once a Day.  glipiZIDE (GLUCOTROL) 10 mg tablet 10 mg.      glimepiride (AMARYL) 4 mg tablet Take one in the am.  New dose.  gabapentin (NEURONTIN) 300 mg capsule 300 mg. Review of Systems  Constitutional: The patient has complaints of mild to moderate fatigue due to chemotherapy treatment and malignancy. HEENT: The patient denies recent head trauma, eye pain, blurred vision,  hearing deficit, oropharyngeal mucosal pain or lesions, and the patient denies throat pain or discomfort. Lymphatics: The patient denies palpable peripheral lymphadenopathy. Hematologic: The patient denies having bruising, bleeding, or progressive fatigue. Respiratory: Patient denies having shortness of breath, cough, sputum production, fever, or dyspnea on exertion. Cardiovascular: The patient denies having leg pain, leg swelling, heart palpitations, chest permit, chest pain, or lightheadedness. The patient denies having dyspnea on exertion. Gastrointestinal: The patient reports occasional nausea from chemotherapy which is well controlled with antinausea medication. She denies having any emesis or diarrhea. Genitourinary: Patient denies having urinary urgency, frequency, or dysuria. The patient denies having blood in the urine. Psychological: The patient denies having symptoms of nervousness, anxiety, depression, or thoughts of harming self. Skin: Patient denies having skin rashes, skin, ulcerations, or unexplained itching or pruritus. Musculoskeletal: The patient denies having pain in the joints or bones. Objective: There were no vitals taken for this visit. ECOG PS=1; Pain score=0/10    Physical Exam:   Gen. Appearance: The patient is in no acute distress. Skin: There is no bruise or rash. HEENT: The exam is unremarkable.   Neck: Supple without lymphadenopathy or thyromegaly. Lungs: Clear to auscultation and percussion; there are no wheezes or rhonchi. Heart: Regular rate and rhythm; there are no murmurs, gallops, or rubs. Abdomen: Bowel sounds are present and normal.  There is no guarding, tenderness, or hepatosplenomegaly. Extremities: There is no clubbing, cyanosis, or edema. Neurologic: There are no focal neurologic deficits. Lymphatics: There is no palpable peripheral lymphadenopathy. Musculoskeletal: The patient has full range of motion at all joints. There is no evidence of joint deformity or effusions. There is no focal joint tenderness. Psychological/psychiatric: There is no clinical evidence of anxiety, depression, or melancholy. Lab data:      Results for orders placed or performed during the hospital encounter of 04/05/17   CBC WITH 3 PART DIFF     Status: Abnormal   Result Value Ref Range Status    WBC 3.9 (L) 4.5 - 13.0 K/uL Final    RBC 2.64 (L) 4.10 - 5.10 M/uL Final    HGB 7.4 (L) 12.0 - 16 g/dL Final    HCT 24.3 (L) 36 - 48 % Final    MCV 92.0 78 - 102 FL Final    MCH 28.0 25.0 - 35.0 PG Final    MCHC 30.5 (L) 31 - 37 g/dL Final    RDW 19.2 (H) 11.5 - 14.5 % Final    PLATELET 768 (H) 758 - 440 K/uL Final    NEUTROPHILS 58 40 - 70 % Final    MIXED CELLS 7 0.1 - 17 % Final    LYMPHOCYTES 35 14 - 44 % Final    ABS. NEUTROPHILS 2.2 1.8 - 9.5 K/UL Final    ABS. MIXED CELLS 0.3 0.0 - 2.3 K/uL Final    ABS. LYMPHOCYTES 1.4 1.1 - 5.9 K/UL Final     Comment: Test performed at [place] [id number] Location. Results Reviewed by Medical Director. DF AUTOMATED   Final           Assessment:     No diagnosis found. Plan:   Colorectal carcinoma with abdominal carcinomatosis: We will continue with the FOLFOX chemotherapy regimen every 14 days. Her Avastin has been d/c'ed. At this time I will check the CEA level to see if her numbers are beginning to decline since she started chemotherapy.     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 Procrit at a dose of 60,000 units will be provided whenever her hemoglobin is below 10 g/dL hematocrit is below 30% every 2 weeks. The iron profile and ferritin levels will be ordered at this time. Chemotherapy-induced leukopenia/neutropenia (improvedproblem): The CBC today shows a normal WBC count of 9.3, the absolute neutrophil count is 6.3 and the absolute lymphocyte count is 1.7. I have explained to the patient that we continue Neulasta at a dose of 6 mg every 14 days if the WBC count declines below 2. These will continue to be monitored at 2 week intervals. I will have the patient return to clinic for complete reassessment again in 3 weeks. No orders of the defined types were placed in this encounter. Akhil Roblero MD  2017         Please note: This document has been produced using voice recognition software. Unrecognized errors in transcription may be present. Hematology/Oncology  Progress Note    Name: Sara Almanzar  Date: 2017  : 1955    PCP: Usha Lentz NP     Ms. Stan Goodman is a 59 y.o. -American woman with metastatic colorectal carcinoma/carcinomatosis  Current therapy: FOLFOX chemotherapy regimen      Subjective:     Ms. Stan Goodman is a 57-year-old -American woman with abdominal carcinomatosis from metastatic colorectal carcinoma. She is currently receiving outpatient systemic chemotherapy with the FOLFOX chemotherapy regimen. We had attempted to use the  Avastin in combination with the FOLFOX but due to her chronic kidney disease we have not been able to give this medication. Overall, she is tolerating the treatment reasonably well  Her appetite remains good.   She denies having any unexplained bruising or bleeding. She was recently admitted to [facility] for dizziness, elevated glucose, and hypertension. She reports she feels much better, she has no concerns to report. She is ready to resume her chemo treatment. Past medical history, family history, and social history: these were reviewed and remains unchanged. Past Medical History:   Diagnosis Date    Diabetes ([address]     Gout     Heart disease     Hypertension     Neuropathic pain of foot      Past Surgical History:   Procedure Laterality Date    HX BACK SURGERY      HX HYSTERECTOMY       Social History     Social History    Marital status:      Spouse name: N/A    Number of children: N/A    Years of education: N/A     Occupational History    Not on file. Social History Main Topics    Smoking status: Never Smoker    Smokeless tobacco: Never Used    Alcohol use No    Drug use: No    Sexual activity: Not on file     Other Topics Concern    Not on file     Social History Narrative     Family History   Problem Relation Age of Onset    Family history unknown: Yes     Current Outpatient Prescriptions   Medication Sig Dispense Refill    predniSONE (DELTASONE) 20 mg tablet TAKE 2 TABLETS BY MOUTH WITH BREAKFAST - TAKE 40 MG ON DAY 2 AND DAY 3 OF TREATMENT 4 Tab 0    amLODIPine (NORVASC) 5 mg tablet Take 5 mg by mouth daily.  ondansetron hcl (ZOFRAN, AS HYDROCHLORIDE,) 8 mg tablet Take one tablet by mouth every 8 hours for nausea beginning the night of chemo for 3 days. 9 Tab 3    predniSONE (DELTASONE) 20 mg tablet Take 2 Tabs by mouth daily. Take 2 tabs on days 2 and 3 of each chemo cycle 4 Tab 2    LORazepam (ATIVAN) 0.5 mg tablet Take 1 Tab by mouth every eight (8) hours as needed for Anxiety. Max Daily Amount: 1.5 mg. Indications: ANXIETY 90 Tab 0    nitrofurantoin, macrocrystal-monohydrate, (MACROBID) 100 mg capsule Take 1 Cap by mouth two (2) times a day.  10 Cap 0    diphenhydrAMINE (BENADRYL) 25 mg capsule Take 1 with compazine every 6 hours for nausea for 3 days then, as needed. 60 Cap 3    prochlorperazine (COMPAZINE) 10 mg tablet Take 1 tablet every 6 hours with Benadryl 25mg for nausea for 3 days then, as needed. 60 Tab 3    warfarin (COUMADIN) 1 mg tablet Take one 1 tablet by mouth everyday at 6pm or after. You will continue to take this medication. Everyday until mediport is removed. 30 Tab 3    lidocaine-prilocaine (EMLA) topical cream Place cream over mediport 1 hour prior to chemotherapy and then place saran wrap over area. Every chemo treatment. 30 g 0    acetaminophen (TYLENOL) 650 mg CR tablet 1,300 mg.      albuterol (PROVENTIL HFA, VENTOLIN HFA, PROAIR HFA) 90 mcg/actuation inhaler 2 Puffs.  colchicine 0.6 mg tablet 0.6 mg.      ergocalciferol (ERGOCALCIFEROL) 50,000 unit capsule 50,000 Units.  furosemide (LASIX) 20 mg tablet Take 1/2-1 tablet daily if needed for swelling.  allopurinol (ZYLOPRIM) 300 mg tablet 300 mg.      desonide (TRIDESILON) 0.05 % cream Use 1 Application to affected area Twice Daily.  fluconazole (DIFLUCAN) 150 mg tablet TAKE 1 TABLET BY MOUTH ONCE DAILY TODAY, MAY REPEAT AFTER 3 DAYS AS NEEDED      cloNIDine HCl (CATAPRES) 0.1 mg tablet 0.1 mg.      bisoprolol-hydrochlorothiazide (ZIAC) 10-6.25 mg per tablet Take 1 Tab by Mouth Once a Day.  glipiZIDE (GLUCOTROL) 10 mg tablet 10 mg.      glimepiride (AMARYL) 4 mg tablet Take one in the am.  New dose.  gabapentin (NEURONTIN) 300 mg capsule 300 mg. Review of Systems  Constitutional: The patient has complaints of mild to moderate fatigue due to chemotherapy treatment and malignancy. HEENT: The patient denies recent head trauma, eye pain, blurred vision,  hearing deficit, oropharyngeal mucosal pain or lesions, and the patient denies throat pain or discomfort. Lymphatics: The patient denies palpable peripheral lymphadenopathy.   Hematologic: The patient denies having bruising, bleeding, or progressive fatigue. Respiratory: Patient denies having shortness of breath, cough, sputum production, fever, or dyspnea on exertion. Cardiovascular: The patient denies having leg pain, leg swelling, heart palpitations, chest permit, chest pain, or lightheadedness. The patient denies having dyspnea on exertion. Gastrointestinal: The patient reports occasional nausea from chemotherapy which is well controlled with antinausea medication. She denies having any emesis or diarrhea. Genitourinary: Patient denies having urinary urgency, frequency, or dysuria. The patient denies having blood in the urine. Psychological: The patient denies having symptoms of nervousness, anxiety, depression, or thoughts of harming self. Skin: Patient denies having skin rashes, skin, ulcerations, or unexplained itching or pruritus. Musculoskeletal: The patient denies having pain in the joints or bones. Objective: There were no vitals taken for this visit. ECOG PS=1; Pain score=0/10    Physical Exam:   Gen. Appearance: The patient is in no acute distress. Skin: There is no bruise or rash. HEENT: The exam is unremarkable. Neck: Supple without lymphadenopathy or thyromegaly. Lungs: Clear to auscultation and percussion; there are no wheezes or rhonchi. Heart: Regular rate and rhythm; there are no murmurs, gallops, or rubs. Abdomen: Bowel sounds are present and normal.  There is no guarding, tenderness, or hepatosplenomegaly. Extremities: There is no clubbing, cyanosis, or edema. Neurologic: There are no focal neurologic deficits. Lymphatics: There is no palpable peripheral lymphadenopathy. Musculoskeletal: The patient has full range of motion at all joints. There is no evidence of joint deformity or effusions. There is no focal joint tenderness. Psychological/psychiatric: There is no clinical evidence of anxiety, depression, or melancholy.     Lab data:      Results for orders placed or performed during the hospital encounter of 04/05/17   CBC WITH 3 PART DIFF     Status: Abnormal   Result Value Ref Range Status    WBC 3.9 (L) 4.5 - 13.0 K/uL Final    RBC 2.64 (L) 4.10 - 5.10 M/uL Final    HGB 7.4 (L) 12.0 - 16 g/dL Final    HCT 24.3 (L) 36 - 48 % Final    MCV 92.0 78 - 102 FL Final    MCH 28.0 25.0 - 35.0 PG Final    MCHC 30.5 (L) 31 - 37 g/dL Final    RDW 19.2 (H) 11.5 - 14.5 % Final    PLATELET 909 (H) 808 - 440 K/uL Final    NEUTROPHILS 58 40 - 70 % Final    MIXED CELLS 7 0.1 - 17 % Final    LYMPHOCYTES 35 14 - 44 % Final    ABS. NEUTROPHILS 2.2 1.8 - 9.5 K/UL Final    ABS. MIXED CELLS 0.3 0.0 - 2.3 K/uL Final    ABS. LYMPHOCYTES 1.4 1.1 - 5.9 K/UL Final     Comment: Test performed at [address]. Results Reviewed by Medical Director. DF AUTOMATED   Final           Assessment:     No diagnosis found. Plan:   Colorectal carcinoma with abdominal carcinomatosis: We will continue with the FOLFOX chemotherapy regimen every 14 days. Her Avastin has been d/c'ed. At this time I will check the CEA level to see if her numbers are beginning to decline since she started chemotherapy. 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 Procrit at a dose of 60,000 units will be provided whenever her hemoglobin is below 10 g/dL hematocrit is below 30% every 2 weeks. The iron profile and ferritin levels will be ordered at this time. Chemotherapy-induced leukopenia/neutropenia (improvedproblem): The CBC today shows a normal WBC count of 9.3, the absolute neutrophil count is 6.3 and the absolute lymphocyte count is 1.7. I have explained to the patient that we continue Neulasta at a dose of 6 mg every 14 days if the WBC count declines below 2. These will continue to be monitored at 2 week intervals.     I will have the patient return to clinic for complete reassessment again in 3 weeks. No orders of the defined types were placed in this encounter. Ying Rader MD  5/17/2017         Please note: This document has been produced using voice recognition software. Unrecognized errors in transcription may be present.

## 2017-05-17 NOTE — PATIENT INSTRUCTIONS
Colon Cancer: Care Instructions  Your Care Instructions    Colon cancer occurs when abnormal cells grow out of control in the lower part of your intestine (your colon). If the tumor was small and had not spread, your doctor may have removed it during the colonoscopy. But you may need surgery to remove the cancer if the tumor was too big or had spread too far to be removed during a colonoscopy. If cancer has spread to another part of your body, such as the liver, you may need more far-reaching surgery. Treatment for colon cancer may also include radiation therapy and medicines that destroy cancer cells (chemotherapy). Being treated for cancer can weaken your body, and you may feel very tired. Get the rest your body needs so that you can feel better. When you find out that you have cancer, you may feel many emotions and may need some help coping. Seek out family, friends, and counselors for support. You also can do things at home to make yourself feel better while you go through treatment. Call the [employer] (2-416.599.5984) or visit its website at [address] for more information. Follow-up care is a key part of your treatment and safety. Be sure to make and go to all appointments, and call your doctor if you are having problems. It's also a good idea to know your test results and keep a list of the medicines you take. How can you care for yourself at home? · Take your medicines exactly as prescribed. Call your doctor if you think you are having a problem with your medicine. You may get medicine for nausea and vomiting if you have these side effects. · Follow your doctor's instructions to relieve pain. Pain from cancer and surgery can almost always be controlled. Use pain medicine when you first notice pain, before it becomes severe. · Eat healthy food. If you do not feel like eating, try to eat food that has protein and extra calories to keep up your strength and prevent weight loss. Drink liquid meal replacements for extra calories and protein. Try to eat your main meal early. · Get some physical activity every day, but do not get too tired. Keep doing the hobbies you enjoy as your energy allows. · Take steps to control your stress and workload. Learn relaxation techniques. ¨ Share your feelings. Stress and tension affect our emotions. By expressing your feelings to others, you may be able to understand and cope with them. ¨ Consider joining a support group. Talking about a problem with your spouse, a good friend, or other people with similar problems is a good way to reduce tension and stress. ¨ Express yourself through art. Try writing, dance, art, or crafts to relieve stress. Some dance, writing, or art groups may be available just for people who have cancer. ¨ Be kind to your body and mind. Getting enough sleep, eating a healthy diet, and taking time to do things you enjoy can contribute to an overall feeling of balance in your life and help reduce stress. ¨ Get help if you need it. Discuss your concerns with your doctor or counselor. · If you are vomiting or have diarrhea:  ¨ Drink plenty of fluids (enough so that your urine is light yellow or clear like water) to prevent dehydration. Choose water and other caffeine-free clear liquids. If you have kidney, heart, or liver disease and have to limit fluids, talk with your doctor before you increase the amount of fluids you drink. ¨ When you are able to eat, try clear soups, mild foods, and liquids until all symptoms are gone for 12 to 48 hours. Other good choices include dry toast, crackers, cooked cereal, and gelatin dessert, such as Jell-O.  · If you have not already done so, prepare a list of advance directives. Advance directives are instructions to your doctor and family members about what kind of care you want if you become unable to speak or express yourself. When should you call for help?   Call 911 anytime you think you may need emergency care. For example, call if:  · You pass maroon or very bloody stools. · You vomit blood or what looks like coffee grounds. · You have sudden chest pain and shortness of breath, or you cough up blood. · You have severe belly pain. Call your doctor now or seek immediate medical care if:  · You have signs of a blood clot, such as:  ¨ Pain in your calf, back of the knee, thigh, or groin. ¨ Redness and swelling in your leg or groin. · You have a fever. · You have nausea or vomiting. · You are very constipated or have diarrhea for several days. · Your stools are black and tarlike or have streaks of blood. Watch closely for changes in your health, and be sure to contact your doctor if:  · Your pain is not controlled by medicine. · Your symptoms get worse or are not improving as expected. Where can you learn more? Go to http://ledySocialVolttoño.info/. Enter H592 in the search box to learn more about \"Colon Cancer: Care Instructions. \"  Current as of: July 26, 2016  Content Version: 11.2  © 8400-4914 [employer]. Care instructions adapted under license by [employer] (which disclaims liability or warranty for this information). If you have questions about a medical condition or this instruction, always ask your healthcare professional. [address] any warranty or liability for your use of this information. Iron Deficiency Anemia: Care Instructions  Your Care Instructions    Anemia means that you do not have enough red blood cells. Red blood cells carry oxygen around your body. When you have anemia, it can make you pale, weak, and tired. Many things can cause anemia. The most common cause is loss of blood. This can happen if you have heavy menstrual periods. It can also happen if you have bleeding in your stomach or bowel.   You can also get anemia if you don't have enough iron in your diet or if it's hard for your body to absorb iron. In some cases, pregnancy causes anemia. That's because a pregnant woman needs more iron. Your doctor may do more tests to find the cause of your anemia. If a disease or other health problem is causing it, your doctor will treat that problem. It's important to follow up with your doctor to make sure that your iron level returns to normal.  Follow-up care is a key part of your treatment and safety. Be sure to make and go to all appointments, and call your doctor if you are having problems. It's also a good idea to know your test results and keep a list of the medicines you take. How can you care for yourself at home? · If your doctor recommended iron pills, take them as directed. ¨ Try to take the pills on an empty stomach. You can do this about 1 hour before or 2 hours after meals. But you may need to take iron with food to avoid an upset stomach. ¨ Do not take antacids or drink milk or anything with caffeine within 2 hours of when you take your iron. They can keep your body from absorbing the iron well. ¨ Vitamin C helps your body absorb iron. You may want to take iron pills with a glass of orange juice or some other food high in vitamin C.  ¨ Iron pills may cause stomach problems. These include heartburn, nausea, diarrhea, constipation, and cramps. It can help to drink plenty of fluids and include fruits, vegetables, and fiber in your diet. ¨ It's normal for iron pills to make your stool a greenish or grayish black. But internal bleeding can also cause dark stool. So it's important to tell your doctor about any color changes. ¨ Call your doctor if you think you are having a problem with your iron pills. Even after you start to feel better, it will take several months for your body to build up its supply of iron. ¨ If you miss a pill, don't take a double dose. ¨ Keep iron pills out of the reach of small children. Too much iron can be very dangerous. · Eat foods with a lot of iron.  These include red meat, shellfish, poultry, and eggs. They also include beans, raisins, whole-grain bread, and leafy green vegetables. · Steam your vegetables. This is the best way to prepare them if you want to get as much iron as possible. · Be safe with medicines. Do not take nonsteroidal anti-inflammatory pain relievers unless your doctor tells you to. These include aspirin, naproxen (Aleve), and ibuprofen (Advil, Motrin). · Liquid iron can stain your teeth. But you can mix it with water or juice and drink it with a straw. Then it won't get on your teeth. When should you call for help? Call 911 anytime you think you may need emergency care. For example, call if:  · You passed out (lost consciousness). · You vomit blood or what looks like coffee grounds. · You pass maroon or very bloody stools. Call your doctor now or seek immediate medical care if:  · Your stools are black and look like tar, or they have streaks of blood. · You are dizzy or lightheaded, or you feel like you may faint. Watch closely for changes in your health, and be sure to contact your doctor if:  · Your fatigue and weakness continue or get worse. · You have side effects from taking iron pills, such as nausea, vomiting, constipation, diarrhea, or heartburn. · You do not get better as expected. Where can you learn more? Go to http://ledy-toño.info/. Enter G020 in the search box to learn more about \"Iron Deficiency Anemia: Care Instructions. \"  Current as of: October 13, 2016  Content Version: 11.2  © 9763-8318 [employer]. Care instructions adapted under license by [employer] (which disclaims liability or warranty for this information). If you have questions about a medical condition or this instruction, always ask your healthcare professional. [address] any warranty or liability for your use of this information.

## 2017-05-18 LAB — CEA SERPL-MCNC: 568 NG/ML

## 2017-05-25 ENCOUNTER — APPOINTMENT (OUTPATIENT)
Dept: INFUSION THERAPY | Age: 62
End: 2017-05-25

## 2017-07-05 ENCOUNTER — HOSPITAL ENCOUNTER (OUTPATIENT)
Dept: INFUSION THERAPY | Age: 62
Discharge: HOME | End: 2017-07-05
Payer: MEDICARE

## 2017-07-05 VITALS
OXYGEN SATURATION: 100 % | TEMPERATURE: 97.6 F | DIASTOLIC BLOOD PRESSURE: 85 MMHG | RESPIRATION RATE: 18 BRPM | SYSTOLIC BLOOD PRESSURE: 164 MMHG | HEART RATE: 79 BPM

## 2017-07-05 LAB
BASO+EOS+MONOS # BLD AUTO: 0.7 K/UL (ref 0–2.3)
BASO+EOS+MONOS # BLD AUTO: 13 % (ref 0.1–17)
DIFFERENTIAL METHOD BLD: ABNORMAL
ERYTHROCYTE [DISTWIDTH] IN BLOOD BY AUTOMATED COUNT: 19.5 % (ref 11.5–14.5)
HCT VFR BLD AUTO: 29 % (ref 36–48)
HGB BLD-MCNC: 9.2 G/DL (ref 12–16)
LYMPHOCYTES # BLD AUTO: 38 % (ref 14–44)
LYMPHOCYTES # BLD: 2.2 K/UL (ref 1.1–5.9)
MCH RBC QN AUTO: 28.9 PG (ref 25–35)
MCHC RBC AUTO-ENTMCNC: 31.7 G/DL (ref 31–37)
MCV RBC AUTO: 91.2 FL (ref 78–102)
NEUTS SEG # BLD: 2.8 K/UL (ref 1.8–9.5)
NEUTS SEG NFR BLD AUTO: 49 % (ref 40–70)
PLATELET # BLD AUTO: 272 K/UL (ref 140–440)
RBC # BLD AUTO: 3.18 M/UL (ref 4.1–5.1)
WBC # BLD AUTO: 5.7 K/UL (ref 4.5–13)

## 2017-07-05 PROCEDURE — 77030012965 HC NDL HUBR BBMI -A

## 2017-07-05 PROCEDURE — 74011250636 HC RX REV CODE- 250/636

## 2017-07-05 PROCEDURE — 85025 COMPLETE CBC W/AUTO DIFF WBC: CPT | Performed by: INTERNAL MEDICINE

## 2017-07-05 PROCEDURE — 36591 DRAW BLOOD OFF VENOUS DEVICE: CPT

## 2017-07-05 PROCEDURE — 74011250636 HC RX REV CODE- 250/636: Performed by: NURSE PRACTITIONER

## 2017-07-05 PROCEDURE — 96372 THER/PROPH/DIAG INJ SC/IM: CPT

## 2017-07-05 RX ORDER — HEPARIN SODIUM (PORCINE) LOCK FLUSH IV SOLN 100 UNIT/ML 100 UNIT/ML
500 SOLUTION INTRAVENOUS AS NEEDED
Status: DISCONTINUED | OUTPATIENT
Start: 2017-07-05 | End: 2017-07-09 | Stop reason: HOSPADM

## 2017-07-05 RX ORDER — SODIUM CHLORIDE 0.9 % (FLUSH) 0.9 %
10-40 SYRINGE (ML) INJECTION AS NEEDED
Status: DISCONTINUED | OUTPATIENT
Start: 2017-07-05 | End: 2017-07-09 | Stop reason: HOSPADM

## 2017-07-05 RX ORDER — HEPARIN SODIUM (PORCINE) LOCK FLUSH IV SOLN 100 UNIT/ML 100 UNIT/ML
SOLUTION INTRAVENOUS
Status: COMPLETED
Start: 2017-07-05 | End: 2017-07-05

## 2017-07-05 RX ADMIN — Medication 30 ML: at 10:45

## 2017-07-05 RX ADMIN — HEPARIN SODIUM (PORCINE) LOCK FLUSH IV SOLN 100 UNIT/ML 500 UNITS: 100 SOLUTION at 10:46

## 2017-07-05 RX ADMIN — ERYTHROPOIETIN 20000 UNITS: 20000 INJECTION, SOLUTION INTRAVENOUS; SUBCUTANEOUS at 11:17

## 2017-07-05 RX ADMIN — ERYTHROPOIETIN 40000 UNITS: 40000 INJECTION, SOLUTION INTRAVENOUS; SUBCUTANEOUS at 11:17

## 2017-07-05 NOTE — PROGRESS NOTES
[facility] Progress Note    Date: 2017    Name: Chrissy Leyva    MRN: [id number]         : 1955      Patient assessed and education was provided    . HERE FOR PORT FLUSH AND CBC/PROCRIT. Patient states she was recently hospitalized and had surgery. She is recuperating well and without complaints or distress    Patient vitals were reviewed. Visit Vitals    /85 (BP 1 Location: Left arm, BP Patient Position: Post activity)    Pulse 79    Temp 97.6 °F (36.4 °C)    Resp 18    SpO2 100%       Right chest medi-port accessed with # 20 1 inch orozco needle. Port with brisk flush/blood returns. Blood sample obtained for cbc. Port was flushed with 30 ml NS      Port heparinized per protocol, then de-accessed. Site s signs of infection or infiltration. bandaid applied to site        CBC WITH 3 PART DIFF    Collection Time: 17 10:54 AM   Result Value Ref Range    WBC 5.7 4.5 - 13.0 K/uL    RBC 3.18 (L) 4.10 - 5.10 M/uL    HGB 9.2 (L) 12.0 - 16 g/dL    HCT 29.0 (L) 36 - 48 %    MCV 91.2 78 - 102 FL    MCH 28.9 25.0 - 35.0 PG    MCHC 31.7 31 - 37 g/dL    RDW 19.5 (H) 11.5 - 14.5 %    PLATELET 047 570 - 821 K/uL    NEUTROPHILS 49 40 - 70 %    MIXED CELLS 13 0.1 - 17 %    LYMPHOCYTES 38 14 - 44 %    ABS. NEUTROPHILS 2.8 1.8 - 9.5 K/UL    ABS. MIXED CELLS 0.7 0.0 - 2.3 K/uL    ABS. LYMPHOCYTES 2.2 1.1 - 5.9 K/UL    DF AUTOMATED         Procrit 13490 units was given SQ to upper back of left arm. Tolerated well. bandaid applied to site       Patient was discharged from [place] [address] in stable condition at 1120 She is to return on 17 at 56 for her next chemo appointment.       Enriqueta Tompkins RN  2017  1:33 PM

## 2017-07-18 ENCOUNTER — HOSPITAL ENCOUNTER (OUTPATIENT)
Dept: INFUSION THERAPY | Age: 62
Discharge: HOME | End: 2017-07-18
Payer: MEDICARE

## 2017-07-18 VITALS
SYSTOLIC BLOOD PRESSURE: 160 MMHG | HEART RATE: 63 BPM | RESPIRATION RATE: 18 BRPM | DIASTOLIC BLOOD PRESSURE: 93 MMHG | TEMPERATURE: 98.5 F | OXYGEN SATURATION: 100 %

## 2017-07-18 LAB
BASO+EOS+MONOS # BLD AUTO: 0.5 K/UL (ref 0–2.3)
BASO+EOS+MONOS # BLD AUTO: 11 % (ref 0.1–17)
DIFFERENTIAL METHOD BLD: ABNORMAL
ERYTHROCYTE [DISTWIDTH] IN BLOOD BY AUTOMATED COUNT: 19.4 % (ref 11.5–14.5)
HCT VFR BLD AUTO: 33.9 % (ref 36–48)
HGB BLD-MCNC: 10.9 G/DL (ref 12–16)
LYMPHOCYTES # BLD AUTO: 36 % (ref 14–44)
LYMPHOCYTES # BLD: 1.6 K/UL (ref 1.1–5.9)
MCH RBC QN AUTO: 29.6 PG (ref 25–35)
MCHC RBC AUTO-ENTMCNC: 32.2 G/DL (ref 31–37)
MCV RBC AUTO: 92.1 FL (ref 78–102)
NEUTS SEG # BLD: 2.5 K/UL (ref 1.8–9.5)
NEUTS SEG NFR BLD AUTO: 54 % (ref 40–70)
PLATELET # BLD AUTO: 284 K/UL (ref 140–440)
RBC # BLD AUTO: 3.68 M/UL (ref 4.1–5.1)
WBC # BLD AUTO: 4.6 K/UL (ref 4.5–13)

## 2017-07-18 PROCEDURE — 85025 COMPLETE CBC W/AUTO DIFF WBC: CPT | Performed by: INTERNAL MEDICINE

## 2017-07-18 PROCEDURE — 36415 COLL VENOUS BLD VENIPUNCTURE: CPT

## 2017-07-18 RX ORDER — HYDROMORPHONE HYDROCHLORIDE 2 MG/1
2 TABLET ORAL
COMMUNITY
End: 2017-10-25 | Stop reason: CLARIF

## 2017-07-18 NOTE — PROGRESS NOTES
[facility] Progress Note    Date: 2017    Name: Ciarra Akers    MRN: [id number]         : 1955    Procrit Injection (every 2 weeks)      Ms. oPlk to [facility], ambulatory at 1040. Pt was assessed and education was provided. BP noted to be elevated today. Pt states that she has an appointment with her PCP tomorrow to address her BP issues. Ms. Polk's vitals were reviewed and patient was observed for 5 minutes prior to treatment. Visit Vitals    BP (!) 160/93 (BP 1 Location: Left arm, BP Patient Position: Sitting)    Pulse 63    Temp 98.5 °F (36.9 °C)    Resp 18    SpO2 100%       Blood obtained peripherally from left AC x 1 attempt with butterfly needle and sent to lab for CBC per written orders. No bleeding or hematoma noted at site. Guaze and tape applied. Lab results obtained and reviewed. Recent Results (from the past 12 hour(s))   CBC WITH 3 PART DIFF    Collection Time: 17 10:57 AM   Result Value Ref Range    WBC 4.6 4.5 - 13.0 K/uL    RBC 3.68 (L) 4.10 - 5.10 M/uL    HGB 10.9 (L) 12.0 - 16 g/dL    HCT 33.9 (L) 36 - 48 %    MCV 92.1 78 - 102 FL    MCH 29.6 25.0 - 35.0 PG    MCHC 32.2 31 - 37 g/dL    RDW 19.4 (H) 11.5 - 14.5 %    PLATELET 314 563 - 836 K/uL    NEUTROPHILS 54 40 - 70 %    MIXED CELLS 11 0.1 - 17 %    LYMPHOCYTES 36 14 - 44 %    ABS. NEUTROPHILS 2.5 1.8 - 9.5 K/UL    ABS. MIXED CELLS 0.5 0.0 - 2.3 K/uL    ABS. LYMPHOCYTES 1.6 1.1 - 5.9 K/UL    DF AUTOMATED       HGB= 10.9 and HCT= 33.9    Procrit injection HELD at this time per written order. Ms. Lissa Rea tolerated the procedure well, and had no complaints. Patient armband removed and shredded. Ms. Lissa Rea was discharged from [place] [address] in stable condition at 1010. She is to return on 2017 at 1100 for her next appointment.     Pretty Farnsworth RN  2017

## 2017-08-01 ENCOUNTER — HOSPITAL ENCOUNTER (OUTPATIENT)
Dept: INFUSION THERAPY | Age: 62
End: 2017-08-01
Payer: MEDICARE

## 2017-08-09 ENCOUNTER — HOSPITAL ENCOUNTER (OUTPATIENT)
Dept: ONCOLOGY | Age: 62
Discharge: HOME | End: 2017-08-09

## 2017-08-09 ENCOUNTER — OFFICE VISIT (OUTPATIENT)
Dept: ONCOLOGY | Age: 62
End: 2017-08-09

## 2017-08-09 VITALS
SYSTOLIC BLOOD PRESSURE: 172 MMHG | TEMPERATURE: 97.9 F | DIASTOLIC BLOOD PRESSURE: 77 MMHG | WEIGHT: 169.6 LBS | HEART RATE: 59 BPM | BODY MASS INDEX: 33.12 KG/M2

## 2017-08-09 DIAGNOSIS — D70.1 CHEMOTHERAPY INDUCED NEUTROPENIA (HCC): ICD-10-CM

## 2017-08-09 DIAGNOSIS — C19 COLORECTAL CARCINOMA (HCC): ICD-10-CM

## 2017-08-09 DIAGNOSIS — N18.9 ANEMIA IN CHRONIC KIDNEY DISEASE (CKD): ICD-10-CM

## 2017-08-09 DIAGNOSIS — T45.1X5A CHEMOTHERAPY INDUCED NEUTROPENIA (HCC): ICD-10-CM

## 2017-08-09 DIAGNOSIS — C19 COLORECTAL CARCINOMA (HCC): Primary | ICD-10-CM

## 2017-08-09 DIAGNOSIS — T45.1X5A ANTINEOPLASTIC CHEMOTHERAPY INDUCED ANEMIA: ICD-10-CM

## 2017-08-09 DIAGNOSIS — D50.0 IRON DEFICIENCY ANEMIA DUE TO CHRONIC BLOOD LOSS: ICD-10-CM

## 2017-08-09 DIAGNOSIS — D64.81 ANTINEOPLASTIC CHEMOTHERAPY INDUCED ANEMIA: ICD-10-CM

## 2017-08-09 DIAGNOSIS — D63.1 ANEMIA IN CHRONIC KIDNEY DISEASE (CKD): ICD-10-CM

## 2017-08-09 LAB
BASO+EOS+MONOS # BLD AUTO: 0.4 K/UL (ref 0–2.3)
BASO+EOS+MONOS # BLD AUTO: 8 % (ref 0.1–17)
DIFFERENTIAL METHOD BLD: ABNORMAL
ERYTHROCYTE [DISTWIDTH] IN BLOOD BY AUTOMATED COUNT: 16.2 % (ref 11.5–14.5)
HCT VFR BLD AUTO: 35 % (ref 36–48)
HGB BLD-MCNC: 11.3 G/DL (ref 12–16)
LYMPHOCYTES # BLD AUTO: 37 % (ref 14–44)
LYMPHOCYTES # BLD: 2 K/UL (ref 1.1–5.9)
MCH RBC QN AUTO: 30.1 PG (ref 25–35)
MCHC RBC AUTO-ENTMCNC: 32.3 G/DL (ref 31–37)
MCV RBC AUTO: 93.1 FL (ref 78–102)
NEUTS SEG # BLD: 2.9 K/UL (ref 1.8–9.5)
NEUTS SEG NFR BLD AUTO: 55 % (ref 40–70)
PLATELET # BLD AUTO: 255 K/UL (ref 140–440)
RBC # BLD AUTO: 3.76 M/UL (ref 4.1–5.1)
WBC # BLD AUTO: 5.3 K/UL (ref 4.5–13)

## 2017-08-09 NOTE — MR AVS SNAPSHOT
Visit Information Date & Time Provider Department Dept. Phone Encounter #  
 8/9/2017  9:00 AM Franco Causey MD [facility] Medical Oncology [phone] [id number] Follow-up Instructions Return in about 4 weeks (around 9/6/2017). Your Appointments 9/6/2017 10:45 AM  
Office Visit with Franco Causey MD  
[facility] [facility]  Oncology [facility] Appt Note: OV  
 [address] [facility], [address], [address] [place] [address] Upcoming Health Maintenance Date Due Hepatitis C Screening 1955 DTaP/Tdap/Td series (1 - Tdap) 8/26/1974 PAP AKA CERVICAL CYTOLOGY 8/26/1974 BREAST CANCER SCRN MAMMOGRAM 8/26/2003 FOBT Q 1 YEAR AGE 50-75 8/26/2003 ZOSTER VACCINE AGE 60> 6/26/2013 Pneumococcal 19-64 Highest Risk (2 of 3 - PCV13) 3/1/2017 INFLUENZA AGE 9 TO ADULT 8/1/2017 Allergies as of 8/9/2017  Review Complete On: 7/18/2017 By: Gracia Silvestre RN Severity Noted Reaction Type Reactions Latex  08/04/2016    Other (comments) Lisinopril High 04/25/2017    Hives Asa-acetaminophen-caff-buffers  08/04/2016    Other (comments) Codeine  08/04/2016    Other (comments) Pcn [Penicillins]  08/04/2016    Other (comments) Sulfa (Sulfonamide Antibiotics)  08/04/2016    Other (comments) Current Immunizations  Reviewed on 7/18/2017 Name Date Influenza Vaccine 3/1/2016 Pneumococcal Vaccine (Unspecified Type) 3/1/2016 Not reviewed this visit You Were Diagnosed With   
  
 Codes Comments Colorectal carcinoma ([facility] [address]    -  Primary ICD-10-CM: C19 
ICD-9-CM: 154.0 Antineoplastic chemotherapy induced anemia     ICD-10-CM: D64.81, T45.1X5A 
ICD-9-CM: 285.3, E933.1 Chemotherapy induced neutropenia (HCC)     ICD-10-CM: D70.1, T45.1X5A 
ICD-9-CM: 288.03, E933.1 Anemia in chronic kidney disease (CKD)     ICD-10-CM: N18.9, D63.1 ICD-9-CM: 285.21 Iron deficiency anemia due to chronic blood loss     ICD-10-CM: D50.0 ICD-9-CM: 280.0 Vitals Smoking Status Never Smoker Preferred Pharmacy Pharmacy Name Phone [facility] [address], [address] [facility] Your Updated Medication List  
  
   
This list is accurate as of: 8/9/17 11:17 AM.  Always use your most recent med list.  
  
  
  
  
 acetaminophen 650 mg CR tablet Commonly known as:  TYLENOL  
1,300 mg.  
  
 albuterol 90 mcg/actuation inhaler Commonly known as:  PROVENTIL HFA, VENTOLIN HFA, PROAIR HFA  
2 Puffs. allopurinol 300 mg tablet Commonly known as:  ZYLOPRIM  
300 mg.  
  
 bisoprolol-hydroCHLOROthiazide 10-6.25 mg per tablet Commonly known as:  [facility] Take 1 Tab by Mouth Once a Day. cloNIDine HCl 0.1 mg tablet Commonly known as:  CATAPRES  
0.1 mg.  
  
 colchicine 0.6 mg tablet  
0.6 mg.  
  
 desonide 0.05 % cream  
Commonly known as:  Nella Sara Use 1 Application to affected area Twice Daily. DILAUDID 2 mg tablet Generic drug:  HYDROmorphone Take 2 mg by mouth every four (4) hours as needed for Pain. diphenhydrAMINE 25 mg capsule Commonly known as:  BENADRYL Take 1 with compazine every 6 hours for nausea for 3 days then, as needed. ergocalciferol 50,000 unit capsule Commonly known as:  ERGOCALCIFEROL  
50,000 Units. fluconazole 150 mg tablet Commonly known as:  DIFLUCAN  
TAKE 1 TABLET BY MOUTH ONCE DAILY TODAY, MAY REPEAT AFTER 3 DAYS AS NEEDED  
  
 furosemide 20 mg tablet Commonly known as:  LASIX Take 1/2-1 tablet daily if needed for swelling.  
  
 gabapentin 300 mg capsule Commonly known as:  NEURONTIN  
300 mg.  
  
 glimepiride 4 mg tablet Commonly known as:  AMARYL Take one in the am.  New dose. glipiZIDE 10 mg tablet Commonly known as:  Clerance Sharps 10 mg.  
  
 lidocaine-prilocaine topical cream  
Commonly known as:  EMLA Place cream over mediport 1 hour prior to chemotherapy and then place saran wrap over area. Every chemo treatment. LORazepam 0.5 mg tablet Commonly known as:  ATIVAN Take 1 Tab by mouth every eight (8) hours as needed for Anxiety. Max Daily Amount: 1.5 mg. Indications: ANXIETY  
  
 nitrofurantoin (macrocrystal-monohydrate) 100 mg capsule Commonly known as:  MACROBID Take 1 Cap by mouth two (2) times a day. NORVASC 5 mg tablet Generic drug:  amLODIPine Take 5 mg by mouth daily. ondansetron hcl 8 mg tablet Commonly known as:  ZOFRAN (AS HYDROCHLORIDE) Take one tablet by mouth every 8 hours for nausea beginning the night of chemo for 3 days. * predniSONE 20 mg tablet Commonly known as:  [facility] Take 2 Tabs by mouth daily. Take 2 tabs on days 2 and 3 of each chemo cycle * predniSONE 20 mg tablet Commonly known as:  DELTASONE  
TAKE 2 TABLETS BY MOUTH WITH BREAKFAST - TAKE 40 MG ON DAY 2 AND DAY 3 OF TREATMENT  
  
 prochlorperazine 10 mg tablet Commonly known as:  COMPAZINE Take 1 tablet every 6 hours with Benadryl 25mg for nausea for 3 days then, as needed. warfarin 1 mg tablet Commonly known as:  COUMADIN Take one 1 tablet by mouth everyday at 6pm or after. You will continue to take this medication. Everyday until mediport is removed. * Notice: This list has 2 medication(s) that are the same as other medications prescribed for you. Read the directions carefully, and ask your doctor or other care provider to review them with you. We Performed the Following COMPLETE CBC & AUTO DIFF WBC [35389 CPT(R)] Follow-up Instructions Return in about 4 weeks (around 9/6/2017). To-Do List   
 08/09/2017   Lab:  CBC WITH 3 PART DIFF   
  
 08/10/2017  1:00 PM  
 Appointment with [facility] TRACK NURSE at [facility] ([phone])  
  
 08/24/2017 1:00 PM  
  Appointment with [facility] FAST TRACK NURSE at [facility] ([phone]) Patient Instructions Colon Cancer: Care Instructions Your Care Instructions Colon cancer occurs when abnormal cells grow out of control in the lower part of your intestine (your colon). If the tumor was small and had not spread, your doctor may have removed it during the colonoscopy. But you may need surgery to remove the cancer if the tumor was too big or had spread too far to be removed during a colonoscopy. If cancer has spread to another part of your body, such as the liver, you may need more far-reaching surgery. Treatment for colon cancer may also include radiation therapy and medicines that destroy cancer cells (chemotherapy). Being treated for cancer can weaken your body, and you may feel very tired. Get the rest your body needs so that you can feel better. When you find out that you have cancer, you may feel many emotions and may need some help coping. Seek out family, friends, and counselors for support. You also can do things at home to make yourself feel better while you go through treatment. Call the [employer] (2-594.442.7757) or visit its website at 8[address] [employer]GRAMS. [employer]ault for more information. Follow-up care is a key part of your treatment and safety. Be sure to make and go to all appointments, and call your doctor if you are having problems. It's also a good idea to know your test results and keep a list of the medicines you take. How can you care for yourself at home? · Take your medicines exactly as prescribed. Call your doctor if you think you are having a problem with your medicine. You may get medicine for nausea and vomiting if you have these side effects. · Follow your doctor's instructions to relieve pain.  Pain from cancer and surgery can almost always be controlled. Use pain medicine when you first notice pain, before it becomes severe. · Eat healthy food. If you do not feel like eating, try to eat food that has protein and extra calories to keep up your strength and prevent weight loss. Drink liquid meal replacements for extra calories and protein. Try to eat your main meal early. · Get some physical activity every day, but do not get too tired. Keep doing the hobbies you enjoy as your energy allows. · Take steps to control your stress and workload. Learn relaxation techniques. ¨ Share your feelings. Stress and tension affect our emotions. By expressing your feelings to others, you may be able to understand and cope with them. ¨ Consider joining a support group. Talking about a problem with your spouse, a good friend, or other people with similar problems is a good way to reduce tension and stress. ¨ Express yourself through art. Try writing, dance, art, or crafts to relieve stress. Some dance, writing, or art groups may be available just for people who have cancer. ¨ Be kind to your body and mind. Getting enough sleep, eating a healthy diet, and taking time to do things you enjoy can contribute to an overall feeling of balance in your life and help reduce stress. ¨ Get help if you need it. Discuss your concerns with your doctor or counselor. · If you are vomiting or have diarrhea: ¨ Drink plenty of fluids (enough so that your urine is light yellow or clear like water) to prevent dehydration. Choose water and other caffeine-free clear liquids. If you have kidney, heart, or liver disease and have to limit fluids, talk with your doctor before you increase the amount of fluids you drink. ¨ When you are able to eat, try clear soups, mild foods, and liquids until all symptoms are gone for 12 to 48 hours. Other good choices include dry toast, crackers, cooked cereal, and gelatin dessert, such as Jell-O. · If you have not already done so, prepare a list of advance directives. Advance directives are instructions to your doctor and family members about what kind of care you want if you become unable to speak or express yourself. When should you call for help? Call 911 anytime you think you may need emergency care. For example, call if: 
· You pass maroon or very bloody stools. · You vomit blood or what looks like coffee grounds. · You have sudden chest pain and shortness of breath, or you cough up blood. · You have severe belly pain. Call your doctor now or seek immediate medical care if: 
· You have signs of a blood clot, such as: 
¨ Pain in your calf, back of the knee, thigh, or groin. ¨ Redness and swelling in your leg or groin. · You have a fever. · You have nausea or vomiting. · You are very constipated or have diarrhea for several days. · Your stools are black and tarlike or have streaks of blood. Watch closely for changes in your health, and be sure to contact your doctor if: 
· Your pain is not controlled by medicine. · Your symptoms get worse or are not improving as expected. Where can you learn more? Go to http://ledy-toño.info/. Enter E397 in the search box to learn more about \"Colon Cancer: Care Instructions. \" Current as of: July 26, 2016 Content Version: 11.3 © 4193-9572 911 View. Care instructions adapted under license by [employer] (which disclaims liability or warranty for this information). If you have questions about a medical condition or this instruction, always ask your healthcare professional. [address] any warranty or liability for your use of this information. Introducing [facility]! Konstantin Blanc introduces [employer] patient portal. Now you can access parts of your medical record, email your doctor's office, and request medication refills online.    
 
1. In your internet browser, go to [email or web address] 2. Click on the First Time User? Click Here link in the Sign In box. You will see the New Member Sign Up page. 3. Enter your LabDoor Access Code exactly as it appears below. You will not need to use this code after youve completed the sign-up process. If you do not sign up before the expiration date, you must request a new code. · LabDoor Access Code: [email or web address] Expires: 11/7/2017  9:22 AM 
 
4. Enter the last four digits of your Social Security Number (xxxx) and Date of Birth (mm/dd/yyyy) as indicated and click Submit. You will be taken to the next sign-up page. 5. Create a LabDoor ID. This will be your LabDoor login ID and cannot be changed, so think of one that is secure and easy to remember. 6. Create a LabDoor password. You can change your password at any time. 7. Enter your Password Reset Question and Answer. This can be used at a later time if you forget your password. 8. Enter your e-mail address. You will receive e-mail notification when new information is available in [address] 9. Click Sign Up. You can now view and download portions of your medical record. 10. Click the Download Summary menu link to download a portable copy of your medical information. If you have questions, please visit the Frequently Asked Questions section of the [employer]Door website. Remember, LabDoor is NOT to be used for urgent needs. For medical emergencies, dial 911. Now available from your iPhone and Android! Please provide this summary of care documentation to your next provider. Your primary care clinician is listed as [address] [place] [address]. If you have any questions after today's visit, please call [phone].

## 2017-08-09 NOTE — PATIENT INSTRUCTIONS
Colon Cancer: Care Instructions  Your Care Instructions    Colon cancer occurs when abnormal cells grow out of control in the lower part of your intestine (your colon). If the tumor was small and had not spread, your doctor may have removed it during the colonoscopy. But you may need surgery to remove the cancer if the tumor was too big or had spread too far to be removed during a colonoscopy. If cancer has spread to another part of your body, such as the liver, you may need more far-reaching surgery. Treatment for colon cancer may also include radiation therapy and medicines that destroy cancer cells (chemotherapy). Being treated for cancer can weaken your body, and you may feel very tired. Get the rest your body needs so that you can feel better. When you find out that you have cancer, you may feel many emotions and may need some help coping. Seek out family, friends, and counselors for support. You also can do things at home to make yourself feel better while you go through treatment. Call the [employer] (1-395.791.8845) or visit its website at [address] for more information. Follow-up care is a key part of your treatment and safety. Be sure to make and go to all appointments, and call your doctor if you are having problems. It's also a good idea to know your test results and keep a list of the medicines you take. How can you care for yourself at home? · Take your medicines exactly as prescribed. Call your doctor if you think you are having a problem with your medicine. You may get medicine for nausea and vomiting if you have these side effects. · Follow your doctor's instructions to relieve pain. Pain from cancer and surgery can almost always be controlled. Use pain medicine when you first notice pain, before it becomes severe. · Eat healthy food. If you do not feel like eating, try to eat food that has protein and extra calories to keep up your strength and prevent weight loss. Drink liquid meal replacements for extra calories and protein. Try to eat your main meal early. · Get some physical activity every day, but do not get too tired. Keep doing the hobbies you enjoy as your energy allows. · Take steps to control your stress and workload. Learn relaxation techniques. ¨ Share your feelings. Stress and tension affect our emotions. By expressing your feelings to others, you may be able to understand and cope with them. ¨ Consider joining a support group. Talking about a problem with your spouse, a good friend, or other people with similar problems is a good way to reduce tension and stress. ¨ Express yourself through art. Try writing, dance, art, or crafts to relieve stress. Some dance, writing, or art groups may be available just for people who have cancer. ¨ Be kind to your body and mind. Getting enough sleep, eating a healthy diet, and taking time to do things you enjoy can contribute to an overall feeling of balance in your life and help reduce stress. ¨ Get help if you need it. Discuss your concerns with your doctor or counselor. · If you are vomiting or have diarrhea:  ¨ Drink plenty of fluids (enough so that your urine is light yellow or clear like water) to prevent dehydration. Choose water and other caffeine-free clear liquids. If you have kidney, heart, or liver disease and have to limit fluids, talk with your doctor before you increase the amount of fluids you drink. ¨ When you are able to eat, try clear soups, mild foods, and liquids until all symptoms are gone for 12 to 48 hours. Other good choices include dry toast, crackers, cooked cereal, and gelatin dessert, such as Jell-O.  · If you have not already done so, prepare a list of advance directives. Advance directives are instructions to your doctor and family members about what kind of care you want if you become unable to speak or express yourself. When should you call for help?   Call 911 anytime you think you may need emergency care. For example, call if:  · You pass maroon or very bloody stools. · You vomit blood or what looks like coffee grounds. · You have sudden chest pain and shortness of breath, or you cough up blood. · You have severe belly pain. Call your doctor now or seek immediate medical care if:  · You have signs of a blood clot, such as:  ¨ Pain in your calf, back of the knee, thigh, or groin. ¨ Redness and swelling in your leg or groin. · You have a fever. · You have nausea or vomiting. · You are very constipated or have diarrhea for several days. · Your stools are black and tarlike or have streaks of blood. Watch closely for changes in your health, and be sure to contact your doctor if:  · Your pain is not controlled by medicine. · Your symptoms get worse or are not improving as expected. Where can you learn more? Go to http://ledy-toño.info/. Enter X969 in the search box to learn more about \"Colon Cancer: Care Instructions. \"  Current as of: July 26, 2016  Content Version: 11.3  © 6281-3744 [employer]. Care instructions adapted under license by "2,10E+07" (which disclaims liability or warranty for this information). If you have questions about a medical condition or this instruction, always ask your healthcare professional. [address] any warranty or liability for your use of this information.

## 2017-08-10 ENCOUNTER — HOSPITAL ENCOUNTER (OUTPATIENT)
Dept: INFUSION THERAPY | Age: 62
End: 2017-08-10
Payer: MEDICARE

## 2017-08-10 LAB
ALBUMIN SERPL-MCNC: 3.7 G/DL (ref 3.6–4.8)
ALBUMIN/GLOB SERPL: 1.2 {RATIO} (ref 1.2–2.2)
ALP SERPL-CCNC: 89 IU/L (ref 39–117)
ALT SERPL-CCNC: 16 IU/L (ref 0–32)
AST SERPL-CCNC: 14 IU/L (ref 0–40)
BILIRUB SERPL-MCNC: 0.2 MG/DL (ref 0–1.2)
BUN SERPL-MCNC: 32 MG/DL (ref 8–27)
BUN/CREAT SERPL: 18 (ref 12–28)
CALCIUM SERPL-MCNC: 10.5 MG/DL (ref 8.7–10.3)
CEA SERPL-MCNC: 316.8 NG/ML (ref 0–4.7)
CHLORIDE SERPL-SCNC: 100 MMOL/L (ref 96–106)
CO2 SERPL-SCNC: 23 MMOL/L (ref 18–29)
CREAT SERPL-MCNC: 1.76 MG/DL (ref 0.57–1)
FERRITIN SERPL-MCNC: 485 NG/ML (ref 15–150)
GLOBULIN SER CALC-MCNC: 3.2 G/DL (ref 1.5–4.5)
GLUCOSE SERPL-MCNC: 150 MG/DL (ref 65–99)
IRON SATN MFR SERPL: 26 % (ref 15–55)
IRON SERPL-MCNC: 69 UG/DL (ref 27–139)
POTASSIUM SERPL-SCNC: 3.8 MMOL/L (ref 3.5–5.2)
PROT SERPL-MCNC: 6.9 G/DL (ref 6–8.5)
SODIUM SERPL-SCNC: 141 MMOL/L (ref 134–144)
TIBC SERPL-MCNC: 267 UG/DL (ref 250–450)
UIBC SERPL-MCNC: 198 UG/DL (ref 118–369)

## 2017-08-24 ENCOUNTER — HOSPITAL ENCOUNTER (OUTPATIENT)
Dept: INFUSION THERAPY | Age: 62
Discharge: HOME | End: 2017-08-24
Payer: MEDICARE

## 2017-08-24 VITALS
HEART RATE: 57 BPM | DIASTOLIC BLOOD PRESSURE: 86 MMHG | TEMPERATURE: 97.7 F | RESPIRATION RATE: 18 BRPM | SYSTOLIC BLOOD PRESSURE: 166 MMHG | OXYGEN SATURATION: 98 %

## 2017-08-24 LAB
BASO+EOS+MONOS # BLD AUTO: 0.6 K/UL (ref 0–2.3)
BASO+EOS+MONOS # BLD AUTO: 14 % (ref 0.1–17)
DIFFERENTIAL METHOD BLD: ABNORMAL
ERYTHROCYTE [DISTWIDTH] IN BLOOD BY AUTOMATED COUNT: 15.5 % (ref 11.5–14.5)
HCT VFR BLD AUTO: 35 % (ref 36–48)
HGB BLD-MCNC: 11.4 G/DL (ref 12–16)
LYMPHOCYTES # BLD: 1.8 K/UL (ref 1.1–5.9)
LYMPHOCYTES NFR BLD: 39 % (ref 14–44)
MCH RBC QN AUTO: 29.8 PG (ref 25–35)
MCHC RBC AUTO-ENTMCNC: 32.6 G/DL (ref 31–37)
MCV RBC AUTO: 91.6 FL (ref 78–102)
NEUTS SEG # BLD: 2.1 K/UL (ref 1.8–9.5)
NEUTS SEG NFR BLD: 47 % (ref 40–70)
PLATELET # BLD AUTO: 253 K/UL (ref 140–440)
RBC # BLD AUTO: 3.82 M/UL (ref 4.1–5.1)
WBC # BLD AUTO: 4.5 K/UL (ref 4.5–13)

## 2017-08-24 PROCEDURE — 85025 COMPLETE CBC W/AUTO DIFF WBC: CPT | Performed by: INTERNAL MEDICINE

## 2017-08-24 PROCEDURE — 36415 COLL VENOUS BLD VENIPUNCTURE: CPT

## 2017-08-24 NOTE — PROGRESS NOTES
[facility] Progress Note    Date: 2017    Name: Ranjeet Javier    MRN: [id number]         : 1955      Patient assessed and education was provided. No complaints or distress noted    . HERE FOR  CBC/PROCRIT. Patient vitals were reviewed. Visit Vitals    /86 (BP 1 Location: Left arm, BP Patient Position: Sitting)    Pulse (!) 57    Temp 97.7 °F (36.5 °C)    Resp 18    SpO2 98%           Blood sample obtained from right AC on the 1st attempt for cbc. CBC WITH 3 PART DIFF    Collection Time: 17  1:38 PM   Result Value Ref Range    WBC 4.5 4.5 - 13.0 K/uL    RBC 3.82 (L) 4.10 - 5.10 M/uL    HGB 11.4 (L) 12.0 - 16 g/dL    HCT 35.0 (L) 36 - 48 %    MCV 91.6 78 - 102 FL    MCH 29.8 25.0 - 35.0 PG    MCHC 32.6 31 - 37 g/dL    RDW 15.5 (H) 11.5 - 14.5 %    PLATELET 164 622 - 906 K/uL    NEUTROPHILS 47 40 - 70 %    MIXED CELLS 14 0.1 - 17 %    LYMPHOCYTES 39 14 - 44 %    ABS. NEUTROPHILS 2.1 1.8 - 9.5 K/UL    ABS. MIXED CELLS 0.6 0.0 - 2.3 K/uL    ABS. LYMPHOCYTES 1.8 1.1 - 5.9 K/UL    DF AUTOMATED         Procrit injection held per parameters. Patient was discharged from [place] [address] in stable condition at 1355. She is to return on 17 at 0900 for her next chemo appointment.       Kayode Brown RN  2017  1:33 PM

## 2017-08-28 RX ORDER — FLUOROURACIL 50 MG/ML
700 INJECTION, SOLUTION INTRAVENOUS ONCE
Status: COMPLETED | OUTPATIENT
Start: 2017-08-30 | End: 2017-08-30

## 2017-08-30 ENCOUNTER — HOSPITAL ENCOUNTER (OUTPATIENT)
Dept: INFUSION THERAPY | Age: 62
Discharge: HOME | End: 2017-08-30
Payer: MEDICARE

## 2017-08-30 VITALS
TEMPERATURE: 98.6 F | HEART RATE: 58 BPM | RESPIRATION RATE: 18 BRPM | OXYGEN SATURATION: 100 % | HEIGHT: 60 IN | DIASTOLIC BLOOD PRESSURE: 88 MMHG | WEIGHT: 169.1 LBS | SYSTOLIC BLOOD PRESSURE: 167 MMHG | BODY MASS INDEX: 33.2 KG/M2

## 2017-08-30 LAB
ALBUMIN SERPL-MCNC: 3.3 G/DL (ref 3.4–5)
ALBUMIN/GLOB SERPL: 0.9 {RATIO} (ref 0.8–1.7)
ALP SERPL-CCNC: 97 U/L (ref 45–117)
ALT SERPL-CCNC: 48 U/L (ref 13–56)
ANION GAP SERPL CALC-SCNC: 10 MMOL/L (ref 3–18)
AST SERPL-CCNC: 19 U/L (ref 15–37)
BASO+EOS+MONOS # BLD AUTO: 0.7 K/UL (ref 0–2.3)
BASO+EOS+MONOS # BLD AUTO: 14 % (ref 0.1–17)
BILIRUB SERPL-MCNC: 0.2 MG/DL (ref 0.2–1)
BUN SERPL-MCNC: 32 MG/DL (ref 7–18)
BUN/CREAT SERPL: 18 (ref 12–20)
CALCIUM SERPL-MCNC: 10.1 MG/DL (ref 8.5–10.1)
CHLORIDE SERPL-SCNC: 103 MMOL/L (ref 100–108)
CO2 SERPL-SCNC: 27 MMOL/L (ref 21–32)
CREAT SERPL-MCNC: 1.74 MG/DL (ref 0.6–1.3)
DIFFERENTIAL METHOD BLD: ABNORMAL
ERYTHROCYTE [DISTWIDTH] IN BLOOD BY AUTOMATED COUNT: 15.4 % (ref 11.5–14.5)
GLOBULIN SER CALC-MCNC: 3.7 G/DL (ref 2–4)
GLUCOSE SERPL-MCNC: 110 MG/DL (ref 74–99)
HCT VFR BLD AUTO: 33.6 % (ref 36–48)
HGB BLD-MCNC: 10.6 G/DL (ref 12–16)
LYMPHOCYTES # BLD: 1.9 K/UL (ref 1.1–5.9)
LYMPHOCYTES NFR BLD: 39 % (ref 14–44)
MCH RBC QN AUTO: 29.5 PG (ref 25–35)
MCHC RBC AUTO-ENTMCNC: 31.5 G/DL (ref 31–37)
MCV RBC AUTO: 93.6 FL (ref 78–102)
NEUTS SEG # BLD: 2.2 K/UL (ref 1.8–9.5)
NEUTS SEG NFR BLD: 47 % (ref 40–70)
PLATELET # BLD AUTO: 240 K/UL (ref 140–440)
POTASSIUM SERPL-SCNC: 3.5 MMOL/L (ref 3.5–5.5)
PROT SERPL-MCNC: 7 G/DL (ref 6.4–8.2)
RBC # BLD AUTO: 3.59 M/UL (ref 4.1–5.1)
SODIUM SERPL-SCNC: 140 MMOL/L (ref 136–145)
WBC # BLD AUTO: 4.8 K/UL (ref 4.5–13)

## 2017-08-30 PROCEDURE — 96411 CHEMO IV PUSH ADDL DRUG: CPT

## 2017-08-30 PROCEDURE — 96375 TX/PRO/DX INJ NEW DRUG ADDON: CPT

## 2017-08-30 PROCEDURE — 80053 COMPREHEN METABOLIC PANEL: CPT | Performed by: INTERNAL MEDICINE

## 2017-08-30 PROCEDURE — 74011250636 HC RX REV CODE- 250/636

## 2017-08-30 PROCEDURE — 96413 CHEMO IV INFUSION 1 HR: CPT

## 2017-08-30 PROCEDURE — 96416 CHEMO PROLONG INFUSE W/PUMP: CPT

## 2017-08-30 PROCEDURE — 96415 CHEMO IV INFUSION ADDL HR: CPT

## 2017-08-30 PROCEDURE — 85025 COMPLETE CBC W/AUTO DIFF WBC: CPT | Performed by: INTERNAL MEDICINE

## 2017-08-30 PROCEDURE — 96417 CHEMO IV INFUS EACH ADDL SEQ: CPT

## 2017-08-30 PROCEDURE — 74011000258 HC RX REV CODE- 258: Performed by: INTERNAL MEDICINE

## 2017-08-30 PROCEDURE — 74011250636 HC RX REV CODE- 250/636: Performed by: INTERNAL MEDICINE

## 2017-08-30 PROCEDURE — 36591 DRAW BLOOD OFF VENOUS DEVICE: CPT

## 2017-08-30 PROCEDURE — 77030012965 HC NDL HUBR BBMI -A

## 2017-08-30 RX ORDER — SODIUM CHLORIDE 0.9 % (FLUSH) 0.9 %
10-40 SYRINGE (ML) INJECTION AS NEEDED
Status: DISCONTINUED | OUTPATIENT
Start: 2017-08-30 | End: 2017-09-03 | Stop reason: HOSPADM

## 2017-08-30 RX ORDER — DEXTROSE MONOHYDRATE 50 MG/ML
250 INJECTION, SOLUTION INTRAVENOUS CONTINUOUS
Status: DISPENSED | OUTPATIENT
Start: 2017-08-30 | End: 2017-08-31

## 2017-08-30 RX ORDER — HEPARIN SODIUM (PORCINE) LOCK FLUSH IV SOLN 100 UNIT/ML 100 UNIT/ML
500 SOLUTION INTRAVENOUS AS NEEDED
Status: DISCONTINUED | OUTPATIENT
Start: 2017-08-30 | End: 2017-08-30

## 2017-08-30 RX ORDER — ONDANSETRON HYDROCHLORIDE 8 MG/1
TABLET, FILM COATED ORAL
Qty: 9 TAB | Refills: 3 | Status: SHIPPED | OUTPATIENT
Start: 2017-08-30

## 2017-08-30 RX ORDER — PALONOSETRON 0.05 MG/ML
0.25 INJECTION, SOLUTION INTRAVENOUS ONCE
Status: COMPLETED | OUTPATIENT
Start: 2017-08-30 | End: 2017-08-30

## 2017-08-30 RX ORDER — PREDNISONE 20 MG/1
TABLET ORAL
Qty: 4 TAB | Refills: 0 | Status: SHIPPED | OUTPATIENT
Start: 2017-08-30 | End: 2017-10-11

## 2017-08-30 RX ORDER — ATROPINE SULFATE 1 MG/ML
0.4 INJECTION, SOLUTION INTRAVENOUS
Status: ACTIVE | OUTPATIENT
Start: 2017-08-30 | End: 2017-08-30

## 2017-08-30 RX ADMIN — FLUOROURACIL 700 MG: 50 INJECTION, SOLUTION INTRAVENOUS at 13:17

## 2017-08-30 RX ADMIN — FLUOROURACIL 4300 MG: 50 INJECTION, SOLUTION INTRAVENOUS at 13:17

## 2017-08-30 RX ADMIN — DEXAMETHASONE SODIUM PHOSPHATE 12 MG: 4 INJECTION, SOLUTION INTRA-ARTICULAR; INTRALESIONAL; INTRAMUSCULAR; INTRAVENOUS; SOFT TISSUE at 10:10

## 2017-08-30 RX ADMIN — Medication 20 ML: at 09:42

## 2017-08-30 RX ADMIN — DEXTROSE MONOHYDRATE 320 MG: 5 INJECTION, SOLUTION INTRAVENOUS at 11:02

## 2017-08-30 RX ADMIN — DEXTROSE MONOHYDRATE 250 ML/HR: 5 INJECTION, SOLUTION INTRAVENOUS at 09:46

## 2017-08-30 RX ADMIN — DEXTROSE MONOHYDRATE 700 MG: 5 INJECTION, SOLUTION INTRAVENOUS at 11:02

## 2017-08-30 RX ADMIN — Medication 10 ML: at 09:40

## 2017-08-30 RX ADMIN — PALONOSETRON HYDROCHLORIDE 0.25 MG: 0.25 INJECTION INTRAVENOUS at 10:06

## 2017-08-30 NOTE — PROGRESS NOTES
[facility] Progress Note    Date: 2017    Name: iNcole Marsh              MRN: [id number]              : 1955    Folfiri: [id number]       [facility] to [facility], ambulatory, at 0930. Ms. Malcolm Nguyen was assessed and education was provided. Ms. Polk's vitals were reviewed. Visit Vitals    /88    Pulse (!) 58    Temp 98.6 °F (37 °C)    Resp 18    Ht 5' (1.524 m)    Wt 76.7 kg (169 lb 1.6 oz)    SpO2 100%    BMI 33.03 kg/m2       Right chest mediport accessed with 20 g 1 inch orozco needle. Port flushed easily and had brisk blood return. Blood drawn off and sent for CBC, BMP run on the Istat and hepatic function panel per written orders after 10 ml waste. NS initiated @ 25mL/hr. Lab results were obtained and reviewed. Recent Results (from the past 12 hour(s))   CBC WITH 3 PART DIFF    Collection Time: 17  9:40 AM   Result Value Ref Range    WBC 4.8 4.5 - 13.0 K/uL    RBC 3.59 (L) 4.10 - 5.10 M/uL    HGB 10.6 (L) 12.0 - 16 g/dL    HCT 33.6 (L) 36 - 48 %    MCV 93.6 78 - 102 FL    MCH 29.5 25.0 - 35.0 PG    MCHC 31.5 31 - 37 g/dL    RDW 15.4 (H) 11.5 - 14.5 %    PLATELET 649 023 - 451 K/uL    NEUTROPHILS 47 40 - 70 %    MIXED CELLS 14 0.1 - 17 %    LYMPHOCYTES 39 14 - 44 %    ABS. NEUTROPHILS 2.2 1.8 - 9.5 K/UL    ABS. MIXED CELLS 0.7 0.0 - 2.3 K/uL    ABS.  LYMPHOCYTES 1.9 1.1 - 5.9 K/UL    DF AUTOMATED     METABOLIC PANEL, COMPREHENSIVE    Collection Time: 17  9:40 AM   Result Value Ref Range    Sodium 140 136 - 145 mmol/L    Potassium 3.5 3.5 - 5.5 mmol/L    Chloride 103 100 - 108 mmol/L    CO2 27 21 - 32 mmol/L    Anion gap 10 3.0 - 18 mmol/L    Glucose 110 (H) 74 - 99 mg/dL    BUN 32 (H) 7.0 - 18 MG/DL    Creatinine 1.74 (H) 0.6 - 1.3 MG/DL    BUN/Creatinine ratio 18 12 - 20      GFR est AA 36 (L) >60 ml/min/1.73m2    GFR est non-AA 29 (L) >60 ml/min/1.73m2    Calcium 10.1 8.5 - 10.1 MG/DL    Bilirubin, total 0.2 0.2 - 1.0 MG/DL    ALT (SGPT) 48 13 - 56 U/L    AST (SGOT) 19 15 - 37 U/L    Alk. phosphatase 97 45 - 117 U/L    Protein, total 7.0 6.4 - 8.2 g/dL    Albumin 3.3 (L) 3.4 - 5.0 g/dL    Globulin 3.7 2.0 - 4.0 g/dL    A-G Ratio 0.9 0.8 - 1.7         Lab results within ordered parameters to give chemo today. ANC = 2.2, PLT = 240. Chemo dosages verified with today's BSA and found to be within 10% of ordered dosages. Pre-medications (Decadron 12mg IVPB and Aloxi 0.25mg IVP) were administered as ordered and chemotherapy was initiated after blood return from port re-verified. NS was stopped and line was flushed with D5. Irinotecan 180 mg  was infused over 90 minutes concurrently with Leucovorin 400 mg. VS stable at end of infusion and pt denied complaints. Line flushed with D5 and blood return from port re-verified. NS was resumed. Fluorouracil 400 mg was given slow IVP over 4 minutes. Line flushed with 10 mL NS and blood return was re-verified. Fluorouracil 4300 mg CADD pump was connected to patient. Connections were secured with tape and the volume was verified to be infusing. HGB= 10.6 and HCT= 33.6. Lab values not within range to receive her Procrit injection today. Ms. Erin Davies tolerated infusion/injection, and had no complaints at this time. Patient Vitals for the past 12 hrs:   Temp Pulse Resp BP SpO2   08/30/17 1327 - (!) 58 - 167/88 -   08/30/17 0935 98.6 °F (37 °C) 62 18 152/76 100 %     Patient armband removed and shredded. Ms. Erin Davies was discharged from [place] [address] in stable condition at 1335. She is to return on 9/01/2017 at 0800 for her next appointment for pump D/C.     Bernice Davenport  August 30, 2017

## 2017-09-01 ENCOUNTER — HOSPITAL ENCOUNTER (OUTPATIENT)
Dept: INFUSION THERAPY | Age: 62
Discharge: HOME | End: 2017-09-01
Payer: MEDICARE

## 2017-09-01 VITALS
OXYGEN SATURATION: 100 % | HEART RATE: 67 BPM | DIASTOLIC BLOOD PRESSURE: 98 MMHG | TEMPERATURE: 98.3 F | RESPIRATION RATE: 18 BRPM | SYSTOLIC BLOOD PRESSURE: 168 MMHG

## 2017-09-01 PROCEDURE — 74011250636 HC RX REV CODE- 250/636: Performed by: INTERNAL MEDICINE

## 2017-09-01 PROCEDURE — 74011250636 HC RX REV CODE- 250/636

## 2017-09-01 PROCEDURE — 96377 APPLICATON ON-BODY INJECTOR: CPT

## 2017-09-01 RX ORDER — SODIUM CHLORIDE 0.9 % (FLUSH) 0.9 %
10-40 SYRINGE (ML) INJECTION AS NEEDED
Status: DISCONTINUED | OUTPATIENT
Start: 2017-09-01 | End: 2017-09-05 | Stop reason: HOSPADM

## 2017-09-01 RX ORDER — HEPARIN SODIUM (PORCINE) LOCK FLUSH IV SOLN 100 UNIT/ML 100 UNIT/ML
500 SOLUTION INTRAVENOUS AS NEEDED
Status: ACTIVE | OUTPATIENT
Start: 2017-09-01 | End: 2017-09-02

## 2017-09-01 RX ORDER — HEPARIN SODIUM (PORCINE) LOCK FLUSH IV SOLN 100 UNIT/ML 100 UNIT/ML
SOLUTION INTRAVENOUS
Status: COMPLETED
Start: 2017-09-01 | End: 2017-09-01

## 2017-09-01 RX ADMIN — Medication 10 ML: at 12:15

## 2017-09-01 RX ADMIN — HEPARIN SODIUM (PORCINE) LOCK FLUSH IV SOLN 100 UNIT/ML 500 UNITS: 100 SOLUTION at 12:16

## 2017-09-01 RX ADMIN — PEGFILGRASTIM 6 MG: KIT SUBCUTANEOUS at 12:21

## 2017-09-01 NOTE — PROGRESS NOTES
[facility] Short Consult Note                    Date: 2017    Name: Doreen Ovalle    MRN: [id number]         : 1955    Treatment: DC pump      Ms. Polk was assessed and education was provided. Pt here for scheduled pump removal. Denies fever, chills,nausea,vomiting, no issues with appetite or po intake. Ms. Polk's vitals were reviewed and patient was observed for 5 minutes prior to treatment. Visit Vitals    BP (!) 168/98 (BP 1 Location: Left arm, BP Patient Position: Sitting)    Pulse 67    Temp 98.3 °F (36.8 °C)    Resp 18    SpO2 100%     Pump finished     Mediport flushed and de accessed. Neulasta OBI applied to left lower quadrant of abdomen. Patient given education on when to remove OBI. Full understanding noted by patient. OBI flashing green prior to discharge. Ms. Sharon Pino tolerated the infusion, and had no complaints. Ms. Sharon Pino was discharged from [place] [address] in stable condition at 1235 . She is to return on 2017 at 1430 for her next appointment procrit.     Tracie Plunkett  2017  1:27 PM

## 2017-09-06 ENCOUNTER — OFFICE VISIT (OUTPATIENT)
Dept: ONCOLOGY | Age: 62
End: 2017-09-06

## 2017-09-06 VITALS
WEIGHT: 169.2 LBS | DIASTOLIC BLOOD PRESSURE: 72 MMHG | HEART RATE: 57 BPM | BODY MASS INDEX: 33.04 KG/M2 | SYSTOLIC BLOOD PRESSURE: 167 MMHG | TEMPERATURE: 97.4 F

## 2017-09-06 DIAGNOSIS — T45.1X5A CHEMOTHERAPY INDUCED NEUTROPENIA (HCC): ICD-10-CM

## 2017-09-06 DIAGNOSIS — C19 COLORECTAL CARCINOMA (HCC): Primary | ICD-10-CM

## 2017-09-06 DIAGNOSIS — D64.81 ANTINEOPLASTIC CHEMOTHERAPY INDUCED ANEMIA: ICD-10-CM

## 2017-09-06 DIAGNOSIS — D50.0 IRON DEFICIENCY ANEMIA DUE TO CHRONIC BLOOD LOSS: ICD-10-CM

## 2017-09-06 DIAGNOSIS — D70.1 CHEMOTHERAPY INDUCED NEUTROPENIA (HCC): ICD-10-CM

## 2017-09-06 DIAGNOSIS — T45.1X5A ANTINEOPLASTIC CHEMOTHERAPY INDUCED ANEMIA: ICD-10-CM

## 2017-09-06 NOTE — PROGRESS NOTES
Hematology/Oncology  Progress Note    Name: Priscila Fierro  Date: 2017  : 1955    PCP: Leora Hayes NP     Ms. Mary Gómez is a 57 y.o. -American woman with metastatic colorectal carcinoma/carcinomatosis  Current therapy: FOLFIRI chemotherapy regimen      Subjective:     Ms. 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 The patient was recently hospitalized with a small perforation in her large bowel. She recently underwent extensive surgery for her advanced/metastatic disease because of recurrent small bowel perforations and obstructive symptoms. She was started on FOLFIRI regimen and she is here today for follow up. She states that she has no pain, her appetite has increased and she also mentioned that her energy level is up. Past medical history, family history, and social history: these were reviewed and remains unchanged. Past Medical History:   Diagnosis Date    Diabetes ([address]     Gout     Heart disease     Hypertension     Neuropathic pain of foot      Past Surgical History:   Procedure Laterality Date    HX BACK SURGERY      HX HYSTERECTOMY       Social History     Social History    Marital status:      Spouse name: N/A    Number of children: N/A    Years of education: N/A     Occupational History    Not on file.      Social History Main Topics    Smoking status: Never Smoker    Smokeless tobacco: Never Used    Alcohol use No    Drug use: No    Sexual activity: Not on file     Other Topics Concern    Not on file     Social History Narrative     Family History   Problem Relation Age of Onset    Family history unknown: Yes     Current Outpatient Prescriptions   Medication Sig Dispense Refill    predniSONE (DELTASONE) 20 mg tablet TAKE 2 TABLETS BY MOUTH WITH BREAKFAST - TAKE 40 MG ON DAY 2 AND DAY 3 OF TREATMENT 4 Tab 0    ondansetron hcl (ZOFRAN, AS HYDROCHLORIDE,) 8 mg tablet Take one tablet by mouth every 8 hours for nausea beginning the night of chemo for 3 days. 9 Tab 3    HYDROmorphone (DILAUDID) 2 mg tablet Take 2 mg by mouth every four (4) hours as needed for Pain.  amLODIPine (NORVASC) 5 mg tablet Take 5 mg by mouth daily.  predniSONE (DELTASONE) 20 mg tablet Take 2 Tabs by mouth daily. Take 2 tabs on days 2 and 3 of each chemo cycle 4 Tab 2    LORazepam (ATIVAN) 0.5 mg tablet Take 1 Tab by mouth every eight (8) hours as needed for Anxiety. Max Daily Amount: 1.5 mg. Indications: ANXIETY 90 Tab 0    nitrofurantoin, macrocrystal-monohydrate, (MACROBID) 100 mg capsule Take 1 Cap by mouth two (2) times a day. 10 Cap 0    diphenhydrAMINE (BENADRYL) 25 mg capsule Take 1 with compazine every 6 hours for nausea for 3 days then, as needed. 60 Cap 3    prochlorperazine (COMPAZINE) 10 mg tablet Take 1 tablet every 6 hours with Benadryl 25mg for nausea for 3 days then, as needed. 60 Tab 3    warfarin (COUMADIN) 1 mg tablet Take one 1 tablet by mouth everyday at 6pm or after. You will continue to take this medication. Everyday until mediport is removed. (Patient taking differently: 6 mg every Tuesday and Thursday. Take one 1 tablet by mouth everyday at 6pm or after. You will continue to take this medication. Everyday until mediport is removed.) 30 Tab 3    lidocaine-prilocaine (EMLA) topical cream Place cream over mediport 1 hour prior to chemotherapy and then place saran wrap over area. Every chemo treatment. 30 g 0    acetaminophen (TYLENOL) 650 mg CR tablet 1,300 mg.      albuterol (PROVENTIL HFA, VENTOLIN HFA, PROAIR HFA) 90 mcg/actuation inhaler 2 Puffs.  colchicine 0.6 mg tablet 0.6 mg.      ergocalciferol (ERGOCALCIFEROL) 50,000 unit capsule 50,000 Units.  furosemide (LASIX) 20 mg tablet Take 1/2-1 tablet daily if needed for swelling.       allopurinol (ZYLOPRIM) 300 mg tablet 300 mg.      desonide (TRIDESILON) 0.05 % cream Use 1 Application to affected area Twice Daily.  fluconazole (DIFLUCAN) 150 mg tablet TAKE 1 TABLET BY MOUTH ONCE DAILY TODAY, MAY REPEAT AFTER 3 DAYS AS NEEDED      cloNIDine HCl (CATAPRES) 0.1 mg tablet 0.1 mg.      bisoprolol-hydrochlorothiazide (ZIAC) 10-6.25 mg per tablet Take 1 Tab by Mouth Once a Day.  glipiZIDE (GLUCOTROL) 10 mg tablet 10 mg.      glimepiride (AMARYL) 4 mg tablet Take one in the am.  New dose.  gabapentin (NEURONTIN) 300 mg capsule 300 mg. Review of Systems  Constitutional: The patient has complaints of mild to moderate fatigue due to chemotherapy treatment and malignancy. HEENT: The patient denies recent head trauma, eye pain, blurred vision,  hearing deficit, oropharyngeal mucosal pain or lesions, and the patient denies throat pain or discomfort. Lymphatics: The patient denies palpable peripheral lymphadenopathy. Hematologic: The patient denies having bruising, bleeding, or progressive fatigue. Respiratory: Patient denies having shortness of breath, cough, sputum production, fever, or dyspnea on exertion. Cardiovascular: The patient denies having leg pain, leg swelling, heart palpitations, chest permit, chest pain, or lightheadedness. The patient denies having dyspnea on exertion. Gastrointestinal: The patient reports occasional nausea from chemotherapy which is well controlled with antinausea medication. She denies having any emesis or diarrhea. Genitourinary: Patient denies having urinary urgency, frequency, or dysuria. The patient denies having blood in the urine. Psychological: The patient denies having symptoms of nervousness, anxiety, depression, or thoughts of harming self. Skin: Patient denies having skin rashes, skin, ulcerations, or unexplained itching or pruritus. Musculoskeletal: The patient denies having pain in the joints or bones.       Objective:     Visit Vitals    /72 (BP 1 Location: Left arm, BP Patient Position: Sitting)    Pulse (!) 57    Temp 97.4 °F (36.3 °C) (Oral)    Wt 76.7 kg (169 lb 3.2 oz)    BMI 33.04 kg/m2     ECOG PS=1; Pain score=0/10    Physical Exam:   Gen. Appearance: The patient is in no acute distress. Skin: There is no bruise or rash. HEENT: The exam is unremarkable. Neck: Supple without lymphadenopathy or thyromegaly. Lungs: Clear to auscultation and percussion; there are no wheezes or rhonchi. Heart: Regular rate and rhythm; there are no murmurs, gallops, or rubs. Abdomen: Bowel sounds are present and normal.  There is no guarding, tenderness, or hepatosplenomegaly. The patient has a colostomy bag in place. Extremities: There is no clubbing, cyanosis, or edema. Neurologic: There are no focal neurologic deficits. Lymphatics: There is no palpable peripheral lymphadenopathy. Musculoskeletal: The patient has full range of motion at all joints. There is no evidence of joint deformity or effusions. There is no focal joint tenderness. Psychological/psychiatric: There is no clinical evidence of anxiety, depression, or melancholy. Lab data:      Results for orders placed or performed during the hospital encounter of 08/30/17   CBC WITH 3 PART DIFF     Status: Abnormal   Result Value Ref Range Status    WBC 4.8 4.5 - 13.0 K/uL Final    RBC 3.59 (L) 4.10 - 5.10 M/uL Final    HGB 10.6 (L) 12.0 - 16 g/dL Final    HCT 33.6 (L) 36 - 48 % Final    MCV 93.6 78 - 102 FL Final    MCH 29.5 25.0 - 35.0 PG Final    MCHC 31.5 31 - 37 g/dL Final    RDW 15.4 (H) 11.5 - 14.5 % Final    PLATELET 115 709 - 732 K/uL Final    NEUTROPHILS 47 40 - 70 % Final    MIXED CELLS 14 0.1 - 17 % Final    LYMPHOCYTES 39 14 - 44 % Final    ABS. NEUTROPHILS 2.2 1.8 - 9.5 K/UL Final    ABS. MIXED CELLS 0.7 0.0 - 2.3 K/uL Final    ABS. LYMPHOCYTES 1.9 1.1 - 5.9 K/UL Final     Comment: Test performed at [address]. Results Reviewed by Medical Director. DF AUTOMATED   Final           Assessment:     1. Colorectal carcinoma ([facility]    2. Antineoplastic chemotherapy induced anemia    3. Chemotherapy induced neutropenia (HCC)    4. Iron deficiency anemia due to chronic blood loss      Plan:   Colorectal carcinoma with abdominal carcinomatosis: On 5/24/2017 the patient underwent colectomy with omentectomy with the findings of adenocarcinoma involving at least 80% of the omentum. The primary tumor appeared to originate in the transverse colon and measured approximately 5 cm. There was microscopic perforation of the colon and metastatic involvement into the left ovary. The tumor was pathologically staged as a T4 a N1M1 stage IV malignancy. The patient was started on FOLFIRI chemotherapy regimen on 08/30/17. The regimen is a combination of Irinotecan at 180 mg/m² on day 1, 5 fluorouracil given at a dose of 400 mg/m² by IV bolus on day 1 followed by 2400 mg/m² continuous infusion for 46 hours on days 1 and 2, leucovorin 400 mg/m² IV on day 1 as a 2 hour infusion prior to 5-fluorouracil and with the addition of bevacizumab/avastin 5 mg/kg IV every 2 weeks to the regimen as well. A comprehensive metabolic panel, CEA level, ferritin level, iron profile, and CBC will be ordered at this time. Anemia associated with chronic kidney disease and chemotherapy-induced anemia (persistent problem): I have explained to the patient that her CBC on 08/30 showed a hemoglobin of 10.6 g/dL with hematocrit of 33.6%. Procrit at a dose of 60,000 units will be provided whenever her hemoglobin is below 10 g/dL hematocrit is below 30% every 2 weeks. The iron profile and ferritin levels will be ordered at this time. Chemotherapy-induced leukopenia/neutropenia (improvedproblem): The CBC on 08/30 showed a normal WBC count of 4.8 with an absolute neutrophil count of 2.2. This will be monitored on a regular basis. She will probably need to be placed on Neulasta with the reinstitution of chemotherapy.  The patient is at a significantly increased risk for the development of neutropenia and fever due to her advanced stage disease, low baseline hemoglobin level and renal dysfunction. She also has pre-existing neutropenia with a history of neutropenia with the use of the FOLFOX 6 regimen. Additionally the patient has a very low albumin level of 3.5 g/dL and she has experienced myelosuppression from prior chemotherapy. I have explained to the patient that she will most likely need to be placed on Neulasta due to her increased risk for the development of neutropenia and fever. I will have the patient return to clinic for complete reassessment again in 4 weeks.     Orders Placed This Encounter    METABOLIC PANEL, COMPREHENSIVE     Standing Status:   Future     Standing Expiration Date:   9/7/2018    IRON PROFILE     Standing Status:   Future     Standing Expiration Date:   9/7/2018    FERRITIN     Standing Status:   Future     Standing Expiration Date:   9/7/2018    CEA     Standing Status:   Future     Standing Expiration Date:   6/2/7960    METABOLIC PANEL, COMPREHENSIVE     Standing Status:   Future     Standing Expiration Date:   9/7/2018    CEA     Standing Status:   Future     Standing Expiration Date:   9/7/2018    FERRITIN     Standing Status:   Future     Standing Expiration Date:   9/7/2018    IRON PROFILE     Standing Status:   Future     Standing Expiration Date:   9/7/2018    CBC WITH AUTOMATED DIFF     Standing Status:   Future     Standing Expiration Date:   9/7/2018       Daniella Minor MD  9/6/2017

## 2017-09-06 NOTE — MR AVS SNAPSHOT
Visit Information Date & Time Provider Department Dept. Phone Encounter #  
 9/6/2017 10:45 AM Zechariah Branham MD [facility] [phone] [id number] Your Appointments 10/4/2017 10:30 AM  
Office Visit with Zechariah Branham MD  
[address]  Oncology [facility]) Appt Note: 4 weeks [address] Upcoming Health Maintenance Date Due Hepatitis C Screening 1955 DTaP/Tdap/Td series (1 - Tdap) 8/26/1974 PAP AKA CERVICAL CYTOLOGY 8/26/1974 BREAST CANCER SCRN MAMMOGRAM 8/26/2003 FOBT Q 1 YEAR AGE 50-75 8/26/2003 ZOSTER VACCINE AGE 60> 6/26/2013 Pneumococcal 19-64 Highest Risk (2 of 3 - PCV13) 3/1/2017 INFLUENZA AGE 9 TO ADULT 8/1/2017 Allergies as of 9/6/2017  Review Complete On: 9/1/2017 By: Konstantin Moody Severity Noted Reaction Type Reactions Latex  08/04/2016    Other (comments) Lisinopril High 04/25/2017    Hives Asa-acetaminophen-caff-buffers  08/04/2016    Other (comments) Codeine  08/04/2016    Other (comments) Pcn [Penicillins]  08/04/2016    Other (comments) Sulfa (Sulfonamide Antibiotics)  08/04/2016    Other (comments) Current Immunizations  Reviewed on 8/24/2017 Name Date Influenza Vaccine 3/1/2016 Pneumococcal Vaccine (Unspecified Type) 3/1/2016 Not reviewed this visit You Were Diagnosed With   
  
 Codes Comments Colorectal carcinoma ([facility] [address]    -  Primary ICD-10-CM: C19 
ICD-9-CM: 154.0 Antineoplastic chemotherapy induced anemia     ICD-10-CM: D64.81, T45.1X5A 
ICD-9-CM: 285.3, E933.1 Chemotherapy induced neutropenia (HCC)     ICD-10-CM: D70.1, T45.1X5A 
ICD-9-CM: 288.03, E933.1 Iron deficiency anemia due to chronic blood loss     ICD-10-CM: D50.0 ICD-9-CM: 280.0 Vitals Smoking Status Never Smoker Preferred Pharmacy Pharmacy Name Phone [facility] [address], [address] [facility] Your Updated Medication List  
  
   
This list is accurate as of: 9/6/17  1:06 PM.  Always use your most recent med list.  
  
  
  
  
 acetaminophen 650 mg CR tablet Commonly known as:  TYLENOL  
1,300 mg.  
  
 albuterol 90 mcg/actuation inhaler Commonly known as:  PROVENTIL HFA, VENTOLIN HFA, PROAIR HFA  
2 Puffs. allopurinol 300 mg tablet Commonly known as:  ZYLOPRIM  
300 mg.  
  
 bisoprolol-hydroCHLOROthiazide 10-6.25 mg per tablet Commonly known as:  [facility] Take 1 Tab by Mouth Once a Day. cloNIDine HCl 0.1 mg tablet Commonly known as:  CATAPRES  
0.1 mg.  
  
 colchicine 0.6 mg tablet  
0.6 mg.  
  
 desonide 0.05 % cream  
Commonly known as:  Leitha Sprinkles Use 1 Application to affected area Twice Daily. DILAUDID 2 mg tablet Generic drug:  HYDROmorphone Take 2 mg by mouth every four (4) hours as needed for Pain. diphenhydrAMINE 25 mg capsule Commonly known as:  BENADRYL Take 1 with compazine every 6 hours for nausea for 3 days then, as needed. ergocalciferol 50,000 unit capsule Commonly known as:  ERGOCALCIFEROL  
50,000 Units. fluconazole 150 mg tablet Commonly known as:  DIFLUCAN  
TAKE 1 TABLET BY MOUTH ONCE DAILY TODAY, MAY REPEAT AFTER 3 DAYS AS NEEDED  
  
 furosemide 20 mg tablet Commonly known as:  LASIX Take 1/2-1 tablet daily if needed for swelling.  
  
 gabapentin 300 mg capsule Commonly known as:  NEURONTIN  
300 mg.  
  
 glimepiride 4 mg tablet Commonly known as:  AMARYL Take one in the am.  New dose. glipiZIDE 10 mg tablet Commonly known as:  Kayode Thompson 10 mg.  
  
 lidocaine-prilocaine topical cream  
Commonly known as:  EMLA Place cream over mediport 1 hour prior to chemotherapy and then place saran wrap over area. Every chemo treatment. LORazepam 0.5 mg tablet Commonly known as:  ATIVAN Take 1 Tab by mouth every eight (8) hours as needed for Anxiety. Max Daily Amount: 1.5 mg. Indications: ANXIETY  
  
 nitrofurantoin (macrocrystal-monohydrate) 100 mg capsule Commonly known as:  MACROBID Take 1 Cap by mouth two (2) times a day. NORVASC 5 mg tablet Generic drug:  amLODIPine Take 5 mg by mouth daily. ondansetron hcl 8 mg tablet Commonly known as:  ZOFRAN (AS HYDROCHLORIDE) Take one tablet by mouth every 8 hours for nausea beginning the night of chemo for 3 days. * predniSONE 20 mg tablet Commonly known as:  Kedar Mishra Take 2 Tabs by mouth daily. Take 2 tabs on days 2 and 3 of each chemo cycle * predniSONE 20 mg tablet Commonly known as:  DELTASONE  
TAKE 2 TABLETS BY MOUTH WITH BREAKFAST - TAKE 40 MG ON DAY 2 AND DAY 3 OF TREATMENT  
  
 prochlorperazine 10 mg tablet Commonly known as:  COMPAZINE Take 1 tablet every 6 hours with Benadryl 25mg for nausea for 3 days then, as needed. warfarin 1 mg tablet Commonly known as:  COUMADIN Take one 1 tablet by mouth everyday at 6pm or after. You will continue to take this medication. Everyday until mediport is removed. * Notice: This list has 2 medication(s) that are the same as other medications prescribed for you. Read the directions carefully, and ask your doctor or other care provider to review them with you. To-Do List   
 09/06/2017 Lab:  CEA   
  
 09/06/2017 Lab:  CEA   
  
 09/06/2017 Lab:  FERRITIN   
  
 09/06/2017 Lab:  FERRITIN   
  
 09/06/2017 Lab:  IRON PROFILE   
  
 09/06/2017 Lab:  IRON PROFILE   
  
 09/06/2017 Lab:  METABOLIC PANEL, COMPREHENSIVE   
  
 09/06/2017   Lab:  METABOLIC PANEL, COMPREHENSIVE   
  
 09/07/2017  2:30 PM  
  Appointment with HBV FAST TRACK NURSE at [facility] OP INFUSION ([phone])  
  
 09/13/2017 9:00 AM  
 Appointment with HBV INFUSION NURSE 1 at [facility] ([phone])  
  
 09/15/2017 1:00 PM  
  Appointment with HBV INFUSION NURSE 1 at [facility] ([phone])  
  
 09/21/2017 2:30 PM  
  Appointment with HBV FAST TRACK NURSE at [facility] ([phone])  
  
 09/27/2017 10:00 AM  
  Appointment with HBV INFUSION NURSE 1 at [facility] ([phone])  
  
 09/29/2017 1:00 PM  
  Appointment with HBV INFUSION NURSE 3 at [facility] ([phone]) Patient Instructions Colon Cancer: Care Instructions Your Care Instructions Colon cancer occurs when abnormal cells grow out of control in the lower part of your intestine (your colon). If the tumor was small and had not spread, your doctor may have removed it during the colonoscopy. But you may need surgery to remove the cancer if the tumor was too big or had spread too far to be removed during a colonoscopy. If cancer has spread to another part of your body, such as the liver, you may need more far-reaching surgery. Treatment for colon cancer may also include radiation therapy and medicines that destroy cancer cells (chemotherapy). Being treated for cancer can weaken your body, and you may feel very tired. Get the rest your body needs so that you can feel better. When you find out that you have cancer, you may feel many emotions and may need some help coping. Seek out family, friends, and counselors for support. You also can do things at home to make yourself feel better while you go through treatment. Call the [employer] (3-938.542.4899) or visit its website at [employer]. StorPool for more information. Follow-up care is a key part of your treatment and safety. Be sure to make and go to all appointments, and call your doctor if you are having problems. It's also a good idea to know your test results and keep a list of the medicines you take. How can you care for yourself at home? · Take your medicines exactly as prescribed. Call your doctor if you think you are having a problem with your medicine. You may get medicine for nausea and vomiting if you have these side effects. · Follow your doctor's instructions to relieve pain. Pain from cancer and surgery can almost always be controlled. Use pain medicine when you first notice pain, before it becomes severe. · Eat healthy food. If you do not feel like eating, try to eat food that has protein and extra calories to keep up your strength and prevent weight loss. Drink liquid meal replacements for extra calories and protein. Try to eat your main meal early. · Get some physical activity every day, but do not get too tired. Keep doing the hobbies you enjoy as your energy allows. · Take steps to control your stress and workload. Learn relaxation techniques. ¨ Share your feelings. Stress and tension affect our emotions. By expressing your feelings to others, you may be able to understand and cope with them. ¨ Consider joining a support group. Talking about a problem with your spouse, a good friend, or other people with similar problems is a good way to reduce tension and stress. ¨ Express yourself through art. Try writing, dance, art, or crafts to relieve stress. Some dance, writing, or art groups may be available just for people who have cancer. ¨ Be kind to your body and mind. Getting enough sleep, eating a healthy diet, and taking time to do things you enjoy can contribute to an overall feeling of balance in your life and help reduce stress. ¨ Get help if you need it. Discuss your concerns with your doctor or counselor. · If you are vomiting or have diarrhea: ¨ Drink plenty of fluids (enough so that your urine is light yellow or clear like water) to prevent dehydration. Choose water and other caffeine-free clear liquids.  If you have kidney, heart, or liver disease and have to limit fluids, talk with your doctor before you increase the amount of fluids you drink. ¨ When you are able to eat, try clear soups, mild foods, and liquids until all symptoms are gone for 12 to 48 hours. Other good choices include dry toast, crackers, cooked cereal, and gelatin dessert, such as Jell-O. · If you have not already done so, prepare a list of advance directives. Advance directives are instructions to your doctor and family members about what kind of care you want if you become unable to speak or express yourself. When should you call for help? Call 911 anytime you think you may need emergency care. For example, call if: 
· You pass maroon or very bloody stools. · You vomit blood or what looks like coffee grounds. · You have sudden chest pain and shortness of breath, or you cough up blood. · You have severe belly pain. Call your doctor now or seek immediate medical care if: 
· You have signs of a blood clot, such as: 
¨ Pain in your calf, back of the knee, thigh, or groin. ¨ Redness and swelling in your leg or groin. · You have a fever. · You have nausea or vomiting. · You are very constipated or have diarrhea for several days. · Your stools are black and tarlike or have streaks of blood. Watch closely for changes in your health, and be sure to contact your doctor if: 
· Your pain is not controlled by medicine. · Your symptoms get worse or are not improving as expected. Where can you learn more? Go to http://ledy-toño.info/. Enter W815 in the search box to learn more about \"Colon Cancer: Care Instructions. \" Current as of: July 26, 2016 Content Version: 11.3 © 7282-5017 [employer]ju[employer]. Care instructions adapted under license by [employer] (which disclaims liability or warranty for this information).  If you have questions about a medical condition or this instruction, always ask your healthcare professional. [place] [address] any warranty or liability for your use of this information. Introducing [facility]! Tello Garner introduces "[employer]" patient portal. Now you can access parts of your medical record, email your doctor's office, and request medication refills online. 1. In your internet browser, go to [email or web address] 2. Click on the First Time User? Click Here link in the Sign In box. You will see the New Member Sign Up page. 3. Enter your "[employer]" Access Code exactly as it appears below. You will not need to use this code after youve completed the sign-up process. If you do not sign up before the expiration date, you must request a new code. · "[employer]" Access Code: [email or web address] Expires: 11/7/2017  9:22 AM 
 
4. Enter the last four digits of your Social Security Number (xxxx) and Date of Birth (mm/dd/yyyy) as indicated and click Submit. You will be taken to the next sign-up page. 5. Create a "[employer]" ID. This will be your "[employer]" login ID and cannot be changed, so think of one that is secure and easy to remember. 6. Create a "[employer]" password. You can change your password at any time. 7. Enter your Password Reset Question and Answer. This can be used at a later time if you forget your password. 8. Enter your e-mail address. You will receive e-mail notification when new information is available in [address] 9. Click Sign Up. You can now view and download portions of your medical record. 10. Click the Download Summary menu link to download a portable copy of your medical information. If you have questions, please visit the Frequently Asked Questions section of the "[employer]" website. Remember, "[employer]" is NOT to be used for urgent needs. For medical emergencies, dial 911. Now available from your iPhone and Android! Please provide this summary of care documentation to your next provider. Your primary care clinician is listed as Rachelle Maurice.  If you have any questions after today's visit, please call [phone].

## 2017-09-06 NOTE — PATIENT INSTRUCTIONS
Colon Cancer: Care Instructions  Your Care Instructions    Colon cancer occurs when abnormal cells grow out of control in the lower part of your intestine (your colon). If the tumor was small and had not spread, your doctor may have removed it during the colonoscopy. But you may need surgery to remove the cancer if the tumor was too big or had spread too far to be removed during a colonoscopy. If cancer has spread to another part of your body, such as the liver, you may need more far-reaching surgery. Treatment for colon cancer may also include radiation therapy and medicines that destroy cancer cells (chemotherapy). Being treated for cancer can weaken your body, and you may feel very tired. Get the rest your body needs so that you can feel better. When you find out that you have cancer, you may feel many emotions and may need some help coping. Seek out family, friends, and counselors for support. You also can do things at home to make yourself feel better while you go through treatment. Call the [employer] (5-978.818.8354) or visit its website at [employer] for more information. Follow-up care is a key part of your treatment and safety. Be sure to make and go to all appointments, and call your doctor if you are having problems. It's also a good idea to know your test results and keep a list of the medicines you take. How can you care for yourself at home? · Take your medicines exactly as prescribed. Call your doctor if you think you are having a problem with your medicine. You may get medicine for nausea and vomiting if you have these side effects. · Follow your doctor's instructions to relieve pain. Pain from cancer and surgery can almost always be controlled. Use pain medicine when you first notice pain, before it becomes severe. · Eat healthy food. If you do not feel like eating, try to eat food that has protein and extra calories to keep up your strength and prevent weight loss. Drink liquid meal replacements for extra calories and protein. Try to eat your main meal early. · Get some physical activity every day, but do not get too tired. Keep doing the hobbies you enjoy as your energy allows. · Take steps to control your stress and workload. Learn relaxation techniques. ¨ Share your feelings. Stress and tension affect our emotions. By expressing your feelings to others, you may be able to understand and cope with them. ¨ Consider joining a support group. Talking about a problem with your spouse, a good friend, or other people with similar problems is a good way to reduce tension and stress. ¨ Express yourself through art. Try writing, dance, art, or crafts to relieve stress. Some dance, writing, or art groups may be available just for people who have cancer. ¨ Be kind to your body and mind. Getting enough sleep, eating a healthy diet, and taking time to do things you enjoy can contribute to an overall feeling of balance in your life and help reduce stress. ¨ Get help if you need it. Discuss your concerns with your doctor or counselor. · If you are vomiting or have diarrhea:  ¨ Drink plenty of fluids (enough so that your urine is light yellow or clear like water) to prevent dehydration. Choose water and other caffeine-free clear liquids. If you have kidney, heart, or liver disease and have to limit fluids, talk with your doctor before you increase the amount of fluids you drink. ¨ When you are able to eat, try clear soups, mild foods, and liquids until all symptoms are gone for 12 to 48 hours. Other good choices include dry toast, crackers, cooked cereal, and gelatin dessert, such as Jell-O.  · If you have not already done so, prepare a list of advance directives. Advance directives are instructions to your doctor and family members about what kind of care you want if you become unable to speak or express yourself. When should you call for help?   Call 911 anytime you think you may need emergency care. For example, call if:  · You pass maroon or very bloody stools. · You vomit blood or what looks like coffee grounds. · You have sudden chest pain and shortness of breath, or you cough up blood. · You have severe belly pain. Call your doctor now or seek immediate medical care if:  · You have signs of a blood clot, such as:  ¨ Pain in your calf, back of the knee, thigh, or groin. ¨ Redness and swelling in your leg or groin. · You have a fever. · You have nausea or vomiting. · You are very constipated or have diarrhea for several days. · Your stools are black and tarlike or have streaks of blood. Watch closely for changes in your health, and be sure to contact your doctor if:  · Your pain is not controlled by medicine. · Your symptoms get worse or are not improving as expected. Where can you learn more? Go to http://ledy-toño.info/. Enter U828 in the search box to learn more about \"Colon Cancer: Care Instructions. \"  Current as of: July 26, 2016  Content Version: 11.3  © 0377-7108 [employer]er[employer]. Care instructions adapted under license by [employer] (which disclaims liability or warranty for this information). If you have questions about a medical condition or this instruction, always ask your healthcare professional. [address] any warranty or liability for your use of this information.

## 2017-09-07 ENCOUNTER — HOSPITAL ENCOUNTER (OUTPATIENT)
Dept: INFUSION THERAPY | Age: 62
End: 2017-09-07
Payer: MEDICARE

## 2017-09-11 RX ORDER — FLUOROURACIL 50 MG/ML
700 INJECTION, SOLUTION INTRAVENOUS ONCE
Status: COMPLETED | OUTPATIENT
Start: 2017-09-13 | End: 2017-09-13

## 2017-09-11 NOTE — PROGRESS NOTES
Pharmacy Note     Name: Regla Le  : 1955  Estimated body surface area is 1.8 meters squared as calculated from the following:    Height as of 17: 152.4 cm (60\"). Weight as of 17: 76.7 kg (169 lb 3.2 oz). Diagnosis: Metastatic Colon Cancer  Treatment Plan: FOLFIRI  Cycle/Day: Cycle 2 Day 1  Cycle Start Date: 17    Lab Results   Component Value Date/Time    WBC 4.8 2017 09:40 AM    PLATELET 051  09:40 AM     Lab Results   Component Value Date/Time    ABS. NEUTROPHILS 2.2 2017 09:40 AM     Lab Results   Component Value Date/Time    Creatinine, POC 1.4 2017 10:30 AM    Creatinine 1.74 2017 09:40 AM     Most Recent Creatinine Clearance:  CrCl: 29.9 mL/min (based on Adjusted Body Weight)  Creatinine: 1.74 mg/dL (2017)        Pharmacy Intervention: Confirmed with Dr. Lazarus Kc that he did not want Bevacizumab (Avastin) in addition to the FOLFIRI regimen due to her past history of proteinuria with the mFOLFOX6 + Bevacizumab regimen.     Pharmacist: Taryn Henderson, DARIOD

## 2017-09-13 ENCOUNTER — HOSPITAL ENCOUNTER (OUTPATIENT)
Dept: INFUSION THERAPY | Age: 62
Discharge: HOME | End: 2017-09-13
Payer: MEDICARE

## 2017-09-13 VITALS
BODY MASS INDEX: 33.2 KG/M2 | SYSTOLIC BLOOD PRESSURE: 176 MMHG | OXYGEN SATURATION: 99 % | HEIGHT: 60 IN | TEMPERATURE: 98 F | HEART RATE: 66 BPM | DIASTOLIC BLOOD PRESSURE: 92 MMHG | WEIGHT: 169.1 LBS | RESPIRATION RATE: 18 BRPM

## 2017-09-13 LAB
ALBUMIN SERPL-MCNC: 3.2 G/DL (ref 3.4–5)
ALBUMIN/GLOB SERPL: 1 {RATIO} (ref 0.8–1.7)
ALP SERPL-CCNC: 142 U/L (ref 45–117)
ALT SERPL-CCNC: 21 U/L (ref 13–56)
ANION GAP SERPL CALC-SCNC: 8 MMOL/L (ref 3–18)
AST SERPL-CCNC: 16 U/L (ref 15–37)
BASO+EOS+MONOS # BLD AUTO: 0.8 K/UL (ref 0–2.3)
BASO+EOS+MONOS # BLD AUTO: 7 % (ref 0.1–17)
BILIRUB SERPL-MCNC: 0.3 MG/DL (ref 0.2–1)
BUN SERPL-MCNC: 21 MG/DL (ref 7–18)
BUN/CREAT SERPL: 14 (ref 12–20)
CALCIUM SERPL-MCNC: 9.6 MG/DL (ref 8.5–10.1)
CHLORIDE SERPL-SCNC: 105 MMOL/L (ref 100–108)
CO2 SERPL-SCNC: 26 MMOL/L (ref 21–32)
CREAT SERPL-MCNC: 1.55 MG/DL (ref 0.6–1.3)
DIFFERENTIAL METHOD BLD: ABNORMAL
ERYTHROCYTE [DISTWIDTH] IN BLOOD BY AUTOMATED COUNT: 15.5 % (ref 11.5–14.5)
FERRITIN SERPL-MCNC: 683 NG/ML (ref 8–388)
GLOBULIN SER CALC-MCNC: 3.2 G/DL (ref 2–4)
GLUCOSE SERPL-MCNC: 96 MG/DL (ref 74–99)
HCT VFR BLD AUTO: 32.3 % (ref 36–48)
HGB BLD-MCNC: 10.5 G/DL (ref 12–16)
IRON SATN MFR SERPL: 30 %
IRON SERPL-MCNC: 72 UG/DL (ref 50–175)
LYMPHOCYTES # BLD: 2.3 K/UL (ref 1.1–5.9)
LYMPHOCYTES NFR BLD: 19 % (ref 14–44)
MCH RBC QN AUTO: 30.5 PG (ref 25–35)
MCHC RBC AUTO-ENTMCNC: 32.5 G/DL (ref 31–37)
MCV RBC AUTO: 93.9 FL (ref 78–102)
NEUTS SEG # BLD: 9.2 K/UL (ref 1.8–9.5)
NEUTS SEG NFR BLD: 75 % (ref 40–70)
PLATELET # BLD AUTO: 238 K/UL (ref 140–440)
POTASSIUM SERPL-SCNC: 3.3 MMOL/L (ref 3.5–5.5)
PROT SERPL-MCNC: 6.4 G/DL (ref 6.4–8.2)
RBC # BLD AUTO: 3.44 M/UL (ref 4.1–5.1)
SODIUM SERPL-SCNC: 139 MMOL/L (ref 136–145)
TIBC SERPL-MCNC: 237 UG/DL (ref 250–450)
WBC # BLD AUTO: 12.3 K/UL (ref 4.5–13)

## 2017-09-13 PROCEDURE — 80053 COMPREHEN METABOLIC PANEL: CPT | Performed by: INTERNAL MEDICINE

## 2017-09-13 PROCEDURE — 77030012965 HC NDL HUBR BBMI -A

## 2017-09-13 PROCEDURE — 74011250636 HC RX REV CODE- 250/636

## 2017-09-13 PROCEDURE — 96416 CHEMO PROLONG INFUSE W/PUMP: CPT

## 2017-09-13 PROCEDURE — 82378 CARCINOEMBRYONIC ANTIGEN: CPT | Performed by: INTERNAL MEDICINE

## 2017-09-13 PROCEDURE — 96413 CHEMO IV INFUSION 1 HR: CPT

## 2017-09-13 PROCEDURE — 83540 ASSAY OF IRON: CPT | Performed by: INTERNAL MEDICINE

## 2017-09-13 PROCEDURE — 85025 COMPLETE CBC W/AUTO DIFF WBC: CPT | Performed by: INTERNAL MEDICINE

## 2017-09-13 PROCEDURE — 74011250636 HC RX REV CODE- 250/636: Performed by: INTERNAL MEDICINE

## 2017-09-13 PROCEDURE — 74011000258 HC RX REV CODE- 258: Performed by: INTERNAL MEDICINE

## 2017-09-13 PROCEDURE — 96368 THER/DIAG CONCURRENT INF: CPT

## 2017-09-13 PROCEDURE — 96415 CHEMO IV INFUSION ADDL HR: CPT

## 2017-09-13 PROCEDURE — 82728 ASSAY OF FERRITIN: CPT | Performed by: INTERNAL MEDICINE

## 2017-09-13 PROCEDURE — 96375 TX/PRO/DX INJ NEW DRUG ADDON: CPT

## 2017-09-13 RX ORDER — ATROPINE SULFATE 1 MG/ML
0.4 INJECTION, SOLUTION INTRAVENOUS
Status: ACTIVE | OUTPATIENT
Start: 2017-09-13 | End: 2017-09-13

## 2017-09-13 RX ORDER — SODIUM CHLORIDE 0.9 % (FLUSH) 0.9 %
10-40 SYRINGE (ML) INJECTION AS NEEDED
Status: DISCONTINUED | OUTPATIENT
Start: 2017-09-13 | End: 2017-09-17 | Stop reason: HOSPADM

## 2017-09-13 RX ORDER — PALONOSETRON 0.05 MG/ML
0.25 INJECTION, SOLUTION INTRAVENOUS ONCE
Status: COMPLETED | OUTPATIENT
Start: 2017-09-13 | End: 2017-09-13

## 2017-09-13 RX ORDER — DEXTROSE MONOHYDRATE 50 MG/ML
25 INJECTION, SOLUTION INTRAVENOUS ONCE
Status: COMPLETED | OUTPATIENT
Start: 2017-09-13 | End: 2017-09-13

## 2017-09-13 RX ADMIN — DEXTROSE MONOHYDRATE 320 MG: 5 INJECTION, SOLUTION INTRAVENOUS at 11:16

## 2017-09-13 RX ADMIN — Medication 30 ML: at 10:12

## 2017-09-13 RX ADMIN — DEXAMETHASONE SODIUM PHOSPHATE 12 MG: 4 INJECTION, SOLUTION INTRA-ARTICULAR; INTRALESIONAL; INTRAMUSCULAR; INTRAVENOUS; SOFT TISSUE at 10:45

## 2017-09-13 RX ADMIN — FLUOROURACIL 4300 MG: 50 INJECTION, SOLUTION INTRAVENOUS at 13:50

## 2017-09-13 RX ADMIN — PALONOSETRON HYDROCHLORIDE 0.25 MG: 0.25 INJECTION INTRAVENOUS at 10:37

## 2017-09-13 RX ADMIN — FLUOROURACIL 700 MG: 50 INJECTION, SOLUTION INTRAVENOUS at 13:45

## 2017-09-13 RX ADMIN — DEXTROSE MONOHYDRATE 700 MG: 5 INJECTION, SOLUTION INTRAVENOUS at 11:16

## 2017-09-13 RX ADMIN — DEXTROSE MONOHYDRATE 25 ML/HR: 5 INJECTION, SOLUTION INTRAVENOUS at 10:36

## 2017-09-13 NOTE — PROGRESS NOTES
[facility] Progress Note    Date: 2017    Name: Martina Bain    MRN: [id number]         : 1955      Ms. Polk arrived to [facility] at 1000. Ms. Verónica Olsen was assessed and education was provided. Pt is here today for Folfiri, Cycle 2 of 12. Ms. Polk's vitals were reviewed. Visit Vitals    BP (!) 162/91 (BP 1 Location: Right arm, BP Patient Position: Sitting)    Pulse 61    Temp 98.1 °F (36.7 °C)    Resp 18    Ht 5' (1.524 m)    Wt 76.7 kg (169 lb 1.6 oz)    SpO2 99%    BMI 33.03 kg/m2       Pt was observed for 5 minutes after obtaining vital signs prior to initiating treatment. Patient's right upper chest port accessed and blood drawn for labs. Lab results were obtained and reviewed. Labs okay for chemo. Recent Results (from the past 12 hour(s))   CBC WITH 3 PART DIFF    Collection Time: 17 10:12 AM   Result Value Ref Range    WBC 12.3 4.5 - 13.0 K/uL    RBC 3.44 (L) 4.10 - 5.10 M/uL    HGB 10.5 (L) 12.0 - 16 g/dL    HCT 32.3 (L) 36 - 48 %    MCV 93.9 78 - 102 FL    MCH 30.5 25.0 - 35.0 PG    MCHC 32.5 31 - 37 g/dL    RDW 15.5 (H) 11.5 - 14.5 %    PLATELET 943 703 - 257 K/uL    NEUTROPHILS 75 (H) 40 - 70 %    MIXED CELLS 7 0.1 - 17 %    LYMPHOCYTES 19 14 - 44 %    ABS. NEUTROPHILS 9.2 1.8 - 9.5 K/UL    ABS. MIXED CELLS 0.8 0.0 - 2.3 K/uL    ABS. LYMPHOCYTES 2.3 1.1 - 5.9 K/UL    DF AUTOMATED         D5W infusing as ordered at [facility]. Aloxi 0.25mg IV and Dexamethasone 12mg IV administered as ordered (pre-medications). Irinotecan 320mg IV and Leucovorin 700mg IV administered concurrently as ordered followed by D5W flush. 5FU 700mg IV bolus administered as ordered. CADD pump loaded with 5FU 4300mg cassette and infusing via port without difficulty. Tubing connections reinforced with tape. Ms. Verónica Olsen tolerated infusion without complaints. Ms. Verónica Olsen was discharged from [place] [address] in stable condition at [phone].   She is to return on 9/15/17 at 1300 for her next appointment.     Breezy Matthew RN  September 13, 2017

## 2017-09-14 LAB — CEA SERPL-MCNC: 132.2 NG/ML

## 2017-09-15 ENCOUNTER — HOSPITAL ENCOUNTER (OUTPATIENT)
Dept: INFUSION THERAPY | Age: 62
Discharge: HOME | End: 2017-09-15
Payer: MEDICARE

## 2017-09-15 VITALS
SYSTOLIC BLOOD PRESSURE: 160 MMHG | RESPIRATION RATE: 18 BRPM | TEMPERATURE: 98.2 F | DIASTOLIC BLOOD PRESSURE: 92 MMHG | HEART RATE: 64 BPM

## 2017-09-15 PROCEDURE — 96377 APPLICATON ON-BODY INJECTOR: CPT

## 2017-09-15 PROCEDURE — 74011250636 HC RX REV CODE- 250/636

## 2017-09-15 PROCEDURE — 74011250636 HC RX REV CODE- 250/636: Performed by: INTERNAL MEDICINE

## 2017-09-15 RX ORDER — HEPARIN SODIUM (PORCINE) LOCK FLUSH IV SOLN 100 UNIT/ML 100 UNIT/ML
500 SOLUTION INTRAVENOUS AS NEEDED
Status: ACTIVE | OUTPATIENT
Start: 2017-09-15 | End: 2017-09-16

## 2017-09-15 RX ORDER — HEPARIN SODIUM (PORCINE) LOCK FLUSH IV SOLN 100 UNIT/ML 100 UNIT/ML
SOLUTION INTRAVENOUS
Status: COMPLETED
Start: 2017-09-15 | End: 2017-09-15

## 2017-09-15 RX ORDER — SODIUM CHLORIDE 0.9 % (FLUSH) 0.9 %
10-40 SYRINGE (ML) INJECTION AS NEEDED
Status: DISCONTINUED | OUTPATIENT
Start: 2017-09-15 | End: 2017-09-19 | Stop reason: HOSPADM

## 2017-09-15 RX ADMIN — HEPARIN SODIUM (PORCINE) LOCK FLUSH IV SOLN 100 UNIT/ML 500 UNITS: 100 SOLUTION at 13:46

## 2017-09-15 RX ADMIN — PEGFILGRASTIM 6 MG: KIT SUBCUTANEOUS at 13:46

## 2017-09-15 RX ADMIN — Medication 20 ML: at 13:46

## 2017-09-15 NOTE — PROGRESS NOTES
[facility] Progress Note    Date: September 15, 2017    Name: Khushi Michel    MRN: [id number]         : 1955    D/C pump    Ms. Polk arrived ambulatory to [facility] at 1340. She  was assessed and education was provided. Ms. Polk's vitals were reviewed and patient was observed for 5 minutes prior to procedure. Visit Vitals    BP (!) 160/92 (BP 1 Location: Right arm, BP Patient Position: Sitting)    Pulse 64    Temp 98.2 °F (36.8 °C)    Resp 18     CADD pump completed infusion and removed from patient. Port flushed with 20 mL and 500 units of heparin after blood return was verified. Band-aid applied. Neulasta 6 mg applied to the back of the right arm. Green light verified to be blinking. Ms. Manish Ortez tolerated the procedure, and had no complaints. Ms. Manish Ortez was discharged from [place] [address] in stable condition at 1400. She is to return on 17 at 1000 for her next appointment for chemo.     Jesus Rushing RN  September 15, 2017

## 2017-09-21 ENCOUNTER — APPOINTMENT (OUTPATIENT)
Dept: INFUSION THERAPY | Age: 62
End: 2017-09-21
Payer: MEDICARE

## 2017-09-25 RX ORDER — FLUOROURACIL 50 MG/ML
700 INJECTION, SOLUTION INTRAVENOUS ONCE
Status: COMPLETED | OUTPATIENT
Start: 2017-09-27 | End: 2017-09-27

## 2017-09-27 ENCOUNTER — HOSPITAL ENCOUNTER (OUTPATIENT)
Dept: INFUSION THERAPY | Age: 62
Discharge: HOME | End: 2017-09-27
Payer: MEDICARE

## 2017-09-27 VITALS
DIASTOLIC BLOOD PRESSURE: 74 MMHG | RESPIRATION RATE: 18 BRPM | OXYGEN SATURATION: 99 % | HEIGHT: 60 IN | TEMPERATURE: 98.3 F | HEART RATE: 76 BPM | WEIGHT: 169.7 LBS | SYSTOLIC BLOOD PRESSURE: 147 MMHG | BODY MASS INDEX: 33.31 KG/M2

## 2017-09-27 LAB
ANION GAP BLD CALC-SCNC: 17 MMOL/L (ref 10–20)
BASO+EOS+MONOS # BLD AUTO: 1.9 K/UL (ref 0–2.3)
BASO+EOS+MONOS # BLD AUTO: 9 % (ref 0.1–17)
BUN BLD-MCNC: 21 MG/DL (ref 7–18)
CA-I BLD-MCNC: 1.29 MMOL/L (ref 1.12–1.32)
CHLORIDE BLD-SCNC: 106 MMOL/L (ref 100–108)
CO2 BLD-SCNC: 24 MMOL/L (ref 19–24)
CREAT UR-MCNC: 1.8 MG/DL (ref 0.6–1.3)
DIFFERENTIAL METHOD BLD: ABNORMAL
ERYTHROCYTE [DISTWIDTH] IN BLOOD BY AUTOMATED COUNT: 16 % (ref 11.5–14.5)
GLUCOSE BLD STRIP.AUTO-MCNC: 109 MG/DL (ref 74–106)
HCT VFR BLD AUTO: 29.2 % (ref 36–48)
HCT VFR BLD CALC: 29 % (ref 36–49)
HGB BLD-MCNC: 9.3 G/DL (ref 12–16)
HGB BLD-MCNC: 9.9 G/DL (ref 12–16)
LYMPHOCYTES # BLD: 2.8 K/UL (ref 1.1–5.9)
LYMPHOCYTES NFR BLD: 13 % (ref 14–44)
MCH RBC QN AUTO: 30.3 PG (ref 25–35)
MCHC RBC AUTO-ENTMCNC: 31.8 G/DL (ref 31–37)
MCV RBC AUTO: 95.1 FL (ref 78–102)
NEUTS SEG # BLD: 17.6 K/UL (ref 1.8–9.5)
NEUTS SEG NFR BLD: 79 % (ref 40–70)
PLATELET # BLD AUTO: 298 K/UL (ref 135–420)
POTASSIUM BLD-SCNC: 3.2 MMOL/L (ref 3.5–5.5)
RBC # BLD AUTO: 3.07 M/UL (ref 4.1–5.1)
SODIUM BLD-SCNC: 143 MMOL/L (ref 136–145)
WBC # BLD AUTO: 22.3 K/UL (ref 4.5–13)

## 2017-09-27 PROCEDURE — 96416 CHEMO PROLONG INFUSE W/PUMP: CPT

## 2017-09-27 PROCEDURE — 96375 TX/PRO/DX INJ NEW DRUG ADDON: CPT

## 2017-09-27 PROCEDURE — 74011250636 HC RX REV CODE- 250/636: Performed by: INTERNAL MEDICINE

## 2017-09-27 PROCEDURE — 96411 CHEMO IV PUSH ADDL DRUG: CPT

## 2017-09-27 PROCEDURE — 96413 CHEMO IV INFUSION 1 HR: CPT

## 2017-09-27 PROCEDURE — 96415 CHEMO IV INFUSION ADDL HR: CPT

## 2017-09-27 PROCEDURE — 74011000258 HC RX REV CODE- 258: Performed by: INTERNAL MEDICINE

## 2017-09-27 PROCEDURE — 96367 TX/PROPH/DG ADDL SEQ IV INF: CPT

## 2017-09-27 PROCEDURE — 74011250636 HC RX REV CODE- 250/636

## 2017-09-27 PROCEDURE — 85049 AUTOMATED PLATELET COUNT: CPT | Performed by: INTERNAL MEDICINE

## 2017-09-27 PROCEDURE — 80047 BASIC METABLC PNL IONIZED CA: CPT

## 2017-09-27 PROCEDURE — 85025 COMPLETE CBC W/AUTO DIFF WBC: CPT | Performed by: INTERNAL MEDICINE

## 2017-09-27 PROCEDURE — 77030012965 HC NDL HUBR BBMI -A

## 2017-09-27 RX ORDER — PALONOSETRON 0.05 MG/ML
0.25 INJECTION, SOLUTION INTRAVENOUS ONCE
Status: COMPLETED | OUTPATIENT
Start: 2017-09-27 | End: 2017-09-27

## 2017-09-27 RX ORDER — ATROPINE SULFATE 1 MG/ML
0.4 INJECTION, SOLUTION INTRAVENOUS
Status: ACTIVE | OUTPATIENT
Start: 2017-09-27 | End: 2017-09-27

## 2017-09-27 RX ORDER — SODIUM CHLORIDE 9 MG/ML
250 INJECTION, SOLUTION INTRAVENOUS ONCE
Status: COMPLETED | OUTPATIENT
Start: 2017-09-27 | End: 2017-09-27

## 2017-09-27 RX ORDER — DEXTROSE MONOHYDRATE 50 MG/ML
250 INJECTION, SOLUTION INTRAVENOUS ONCE
Status: COMPLETED | OUTPATIENT
Start: 2017-09-27 | End: 2017-09-27

## 2017-09-27 RX ORDER — SODIUM CHLORIDE 0.9 % (FLUSH) 0.9 %
10-40 SYRINGE (ML) INJECTION AS NEEDED
Status: DISCONTINUED | OUTPATIENT
Start: 2017-09-27 | End: 2017-10-01 | Stop reason: HOSPADM

## 2017-09-27 RX ADMIN — DEXAMETHASONE SODIUM PHOSPHATE 12 MG: 4 INJECTION, SOLUTION INTRA-ARTICULAR; INTRALESIONAL; INTRAMUSCULAR; INTRAVENOUS; SOFT TISSUE at 11:05

## 2017-09-27 RX ADMIN — SODIUM CHLORIDE 250 ML: 900 INJECTION, SOLUTION INTRAVENOUS at 11:00

## 2017-09-27 RX ADMIN — DEXTROSE MONOHYDRATE 700 MG: 5 INJECTION, SOLUTION INTRAVENOUS at 12:12

## 2017-09-27 RX ADMIN — PALONOSETRON HYDROCHLORIDE 0.25 MG: 0.25 INJECTION INTRAVENOUS at 11:03

## 2017-09-27 RX ADMIN — Medication 10 ML: at 10:57

## 2017-09-27 RX ADMIN — FLUOROURACIL 4300 MG: 50 INJECTION, SOLUTION INTRAVENOUS at 14:25

## 2017-09-27 RX ADMIN — Medication 10 ML: at 14:17

## 2017-09-27 RX ADMIN — DEXTROSE MONOHYDRATE 320 MG: 5 INJECTION, SOLUTION INTRAVENOUS at 12:12

## 2017-09-27 RX ADMIN — Medication 10 ML: at 12:07

## 2017-09-27 RX ADMIN — FLUOROURACIL 700 MG: 50 INJECTION, SOLUTION INTRAVENOUS at 14:20

## 2017-09-27 RX ADMIN — DEXTROSE MONOHYDRATE 250 ML: 5 INJECTION, SOLUTION INTRAVENOUS at 12:08

## 2017-09-27 NOTE — PROGRESS NOTES
[facility] Progress Note    Date: 2017    Name: Bill Clarke              MRN: [id number]              : 1955    Folfiri: [id number]       Pt to [facility], ambulatory, at 1015. Ms. Dalton Rosas was assessed and education was provided. Ms. Polk's vitals were reviewed. Visit Vitals    /74 (BP 1 Location: Left arm, BP Patient Position: Sitting)    Pulse 76    Temp 98.3 °F (36.8 °C)    Resp 18    Ht 5' (1.524 m)    Wt 77 kg (169 lb 11.2 oz)    SpO2 99%    BMI 33.14 kg/m2       Right chest mediport accessed with 20 g 1 inch orozco needle. Port flushed easily and had brisk blood return. Blood drawn off and sent for CBC and BMP run on the Istat per written orders after 10 ml waste. NS initiated @ 25mL/hr. Lab results were obtained and reviewed. Recent Results (from the past 12 hour(s))   CBC WITH 3 PART DIFF    Collection Time: 17 10:36 AM   Result Value Ref Range    WBC 22.3 (HH) 4.5 - 13.0 K/uL    RBC 3.07 (L) 4.10 - 5.10 M/uL    HGB 9.3 (L) 12.0 - 16 g/dL    HCT 29.2 (L) 36 - 48 %    MCV 95.1 78 - 102 FL    MCH 30.3 25.0 - 35.0 PG    MCHC 31.8 31 - 37 g/dL    RDW 16.0 (H) 11.5 - 14.5 %    NEUTROPHILS 79 (H) 40 - 70 %    MIXED CELLS 9 0.1 - 17 %    LYMPHOCYTES 13 (L) 14 - 44 %    ABS. NEUTROPHILS 17.6 (H) 1.8 - 9.5 K/UL    ABS. MIXED CELLS 1.9 0.0 - 2.3 K/uL    ABS.  LYMPHOCYTES 2.8 1.1 - 5.9 K/UL    DF AUTOMATED     PLATELET    Collection Time: 17 10:36 AM   Result Value Ref Range    PLATELET 492 667 - 469 K/uL   POC CHEM8    Collection Time: 17 10:42 AM   Result Value Ref Range    CO2, POC 24 19 - 24 MMOL/L    Glucose,  (H) 74 - 106 MG/DL    BUN, POC 21 (H) 7 - 18 MG/DL    Creatinine, POC 1.8 (H) 0.6 - 1.3 MG/DL    GFRAA, POC 34 (L) >60 ml/min/1.73m2    GFRNA, POC 28 (L) >60 ml/min/1.73m2    Sodium,  136 - 145 MMOL/L    Potassium, POC 3.2 (L) 3.5 - 5.5 MMOL/L    Calcium, ionized (POC) 1.29 1.12 - 1.32 MMOL/L    Chloride,  100 - 108 MMOL/L Anion gap, POC 17 10 - 20      Hematocrit, POC 29 (L) 36 - 49 %    Hemoglobin, POC 9.9 (L) 12 - 16 G/DL     WBC= 22.3 and K=3.2. Katherin Bowers NP notified of the lab results. Patient receives Neulasta on her day 3 chemotherapy cycles. Her last Neulasta treatment was on 09/15/2017. No new orders received. Prescription was sent to the patients pharmacy on file for PO potassium pills. Patient was notified and stated understanding. Lab results within ordered parameters to give chemo today. ANC = 17.6, PLT = 321. Chemo dosages verified with today's BSA and found to be within 10% of ordered dosages. Pre-medications (Decadron 12mg IVPB and Aloxi 0.25mg IVP) were administered as ordered and chemotherapy was initiated after blood return from port re-verified. NS was stopped and line was flushed with D5. Irinotecan 320 mg  was infused over 90 minutes concurrently with Leucovorin 700 mg. VS stable at end of infusion and pt denied complaints. Line flushed with D5 and blood return from port re-verified. NS was resumed. Fluorouracil 400 mg was given slow IVP over 3 minutes. Line flushed with 10 mL NS and blood return was re-verified. Fluorouracil 4300 mg CADD pump was connected to patient. Connections were secured with tape and the volume was verified to be infusing. HGB= 9.3 and HCT= 29.2. Lab values are within range to receive her Procrit injection today however, insurance authorization pending. Debora Andrade and Greer Craven PharmD called and notified of the patient's lab results. Awaiting authorization. Ms. Sarahi James tolerated infusion/injection, and had no complaints at this time. Patient Vitals for the past 12 hrs:   Temp Pulse Resp BP SpO2   09/27/17 1435 98.3 °F (36.8 °C) 76 18 147/74 99 %   09/27/17 1021 98.4 °F (36.9 °C) 75 18 163/80 95 %     Patient armband removed and shredded. Ms. Sarahi James was discharged from [place] [address] in stable condition at 1440.  She is to return on 9/30/2017 at 1300 for her next appointment for pump D/C.     Rob Reyes RN  September 27, 2017

## 2017-09-29 ENCOUNTER — HOSPITAL ENCOUNTER (OUTPATIENT)
Dept: INFUSION THERAPY | Age: 62
Discharge: HOME | End: 2017-09-29
Payer: MEDICARE

## 2017-09-29 VITALS
HEART RATE: 55 BPM | RESPIRATION RATE: 18 BRPM | TEMPERATURE: 98.6 F | OXYGEN SATURATION: 95 % | SYSTOLIC BLOOD PRESSURE: 155 MMHG | DIASTOLIC BLOOD PRESSURE: 76 MMHG

## 2017-09-29 PROCEDURE — 96372 THER/PROPH/DIAG INJ SC/IM: CPT

## 2017-09-29 PROCEDURE — 74011250636 HC RX REV CODE- 250/636: Performed by: INTERNAL MEDICINE

## 2017-09-29 PROCEDURE — 96377 APPLICATON ON-BODY INJECTOR: CPT

## 2017-09-29 RX ORDER — HEPARIN SODIUM (PORCINE) LOCK FLUSH IV SOLN 100 UNIT/ML 100 UNIT/ML
500 SOLUTION INTRAVENOUS AS NEEDED
Status: DISPENSED | OUTPATIENT
Start: 2017-09-29 | End: 2017-09-30

## 2017-09-29 RX ORDER — SODIUM CHLORIDE 0.9 % (FLUSH) 0.9 %
10-40 SYRINGE (ML) INJECTION AS NEEDED
Status: DISCONTINUED | OUTPATIENT
Start: 2017-09-29 | End: 2017-10-03 | Stop reason: HOSPADM

## 2017-09-29 RX ADMIN — HEPARIN SODIUM (PORCINE) LOCK FLUSH IV SOLN 100 UNIT/ML 500 UNITS: 100 SOLUTION at 13:19

## 2017-09-29 RX ADMIN — Medication 20 ML: at 13:18

## 2017-09-29 RX ADMIN — ERYTHROPOIETIN 20000 UNITS: 20000 INJECTION, SOLUTION INTRAVENOUS; SUBCUTANEOUS at 13:25

## 2017-09-29 RX ADMIN — PEGFILGRASTIM 6 MG: KIT SUBCUTANEOUS at 13:25

## 2017-09-29 RX ADMIN — ERYTHROPOIETIN 40000 UNITS: 40000 INJECTION, SOLUTION INTRAVENOUS; SUBCUTANEOUS at 13:25

## 2017-09-29 NOTE — PROGRESS NOTES
[facility] Progress Note    Date: 2017    Name: Doreen Ovalle    MRN: [id number]         : 1955    D/C pump    Ms. Polk arrived ambulatory to [facility] at 1310. She  was assessed and education was provided. Ms. Polk's vitals were reviewed and patient was observed for 5 minutes prior to procedure. Visit Vitals    /76 (BP 1 Location: Left arm, BP Patient Position: Sitting)    Pulse (!) 55    Temp 98.6 °F (37 °C)    Resp 18    SpO2 95%     CADD pump completed infusion and removed from patient. Port flushed with 20 mL and 500 units of heparin after blood return was verified. Band-aid applied. Neulasta 6 mg applied to the back of the right arm. Green light verified to be blinking. Procrit 60,000 units given sq in the back of the right arm per patients request.  Band aid applied to site. Ms. Sharon Pino tolerated the procedure, and had no complaints. Ms. Sharon Pino was discharged from [place] [address] in stable condition at 1340. She is to return on 10/11/17 at 1000 for her next appointment for chemo.     Meghan Lundy RN  2017

## 2017-10-04 ENCOUNTER — HOSPITAL ENCOUNTER (OUTPATIENT)
Dept: ONCOLOGY | Age: 62
Discharge: HOME | End: 2017-10-04

## 2017-10-04 ENCOUNTER — OFFICE VISIT (OUTPATIENT)
Dept: ONCOLOGY | Age: 62
End: 2017-10-04

## 2017-10-04 DIAGNOSIS — T45.1X5A ANTINEOPLASTIC CHEMOTHERAPY INDUCED ANEMIA: ICD-10-CM

## 2017-10-04 DIAGNOSIS — D70.1 CHEMOTHERAPY INDUCED NEUTROPENIA (HCC): ICD-10-CM

## 2017-10-04 DIAGNOSIS — N18.9 ANEMIA IN CHRONIC KIDNEY DISEASE, UNSPECIFIED CKD STAGE: ICD-10-CM

## 2017-10-04 DIAGNOSIS — D64.81 ANTINEOPLASTIC CHEMOTHERAPY INDUCED ANEMIA: ICD-10-CM

## 2017-10-04 DIAGNOSIS — C19 COLORECTAL CARCINOMA (HCC): Primary | ICD-10-CM

## 2017-10-04 DIAGNOSIS — D63.1 ANEMIA IN CHRONIC KIDNEY DISEASE, UNSPECIFIED CKD STAGE: ICD-10-CM

## 2017-10-04 DIAGNOSIS — C19 COLORECTAL CARCINOMA (HCC): ICD-10-CM

## 2017-10-04 DIAGNOSIS — T45.1X5A CHEMOTHERAPY INDUCED NEUTROPENIA (HCC): ICD-10-CM

## 2017-10-04 LAB
BASO+EOS+MONOS # BLD AUTO: 0.8 K/UL (ref 0–2.3)
BASO+EOS+MONOS # BLD AUTO: 16 % (ref 0.1–17)
DIFFERENTIAL METHOD BLD: ABNORMAL
ERYTHROCYTE [DISTWIDTH] IN BLOOD BY AUTOMATED COUNT: 15.8 % (ref 11.5–14.5)
HCT VFR BLD AUTO: 30.8 % (ref 36–48)
HGB BLD-MCNC: 9.6 G/DL (ref 12–16)
LYMPHOCYTES # BLD: 1.3 K/UL (ref 1.1–5.9)
LYMPHOCYTES NFR BLD: 26 % (ref 14–44)
MCH RBC QN AUTO: 29.7 PG (ref 25–35)
MCHC RBC AUTO-ENTMCNC: 31.2 G/DL (ref 31–37)
MCV RBC AUTO: 95.4 FL (ref 78–102)
NEUTS SEG # BLD: 3.1 K/UL (ref 1.8–9.5)
NEUTS SEG NFR BLD: 59 % (ref 40–70)
PLATELET # BLD AUTO: 161 K/UL (ref 140–440)
RBC # BLD AUTO: 3.23 M/UL (ref 4.1–5.1)
WBC # BLD AUTO: 5.2 K/UL (ref 4.5–13)

## 2017-10-04 RX ORDER — POTASSIUM CHLORIDE 20 MEQ/1
20 TABLET, EXTENDED RELEASE ORAL 2 TIMES DAILY
Qty: 60 TAB | Refills: 0 | Status: SHIPPED | OUTPATIENT
Start: 2017-10-04 | End: 2018-01-01

## 2017-10-04 NOTE — PROGRESS NOTES
Hematology/Oncology  Progress Note    Name: Herlinda Campos  Date: 10/4/2017  : 1955    PCP: Ludmila Mancilla NP     Ms. Ly Iraheta is a 57 y.o. -American woman with metastatic colorectal carcinoma/carcinomatosis  Current therapy: FOLFIRI chemotherapy regimen      Subjective:     Ms. 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 The patient was recently hospitalized with a small perforation in her large bowel. She recently underwent extensive surgery for her advanced/metastatic disease because of recurrent small bowel perforations and obstructive symptoms. She was started on FOLFIRI regimen and she is here today for follow up. She states that she has no pain, her appetite has increased and she also mentioned that her energy level is up. She denies diarrhea, constipation, or blood in the stool. She has no complaints to report. Past medical history, family history, and social history: these were reviewed and remains unchanged. Past Medical History:   Diagnosis Date    Diabetes ([address]     Gout     Heart disease     Hypertension     Neuropathic pain of foot      Past Surgical History:   Procedure Laterality Date    HX BACK SURGERY      HX HYSTERECTOMY       Social History     Social History    Marital status:      Spouse name: N/A    Number of children: N/A    Years of education: N/A     Occupational History    Not on file. Social History Main Topics    Smoking status: Never Smoker    Smokeless tobacco: Never Used    Alcohol use No    Drug use: No    Sexual activity: Not on file     Other Topics Concern    Not on file     Social History Narrative     Family History   Problem Relation Age of Onset    Family history unknown: Yes     Current Outpatient Prescriptions   Medication Sig Dispense Refill    potassium chloride (K-DUR, KLOR-CON) 20 mEq tablet Take 1 Tab by mouth two (2) times a day.  60 Tab 0    predniSONE (DELTASONE) 20 mg tablet TAKE 2 TABLETS BY MOUTH WITH BREAKFAST - TAKE 40 MG ON DAY 2 AND DAY 3 OF TREATMENT 4 Tab 0    ondansetron hcl (ZOFRAN, AS HYDROCHLORIDE,) 8 mg tablet Take one tablet by mouth every 8 hours for nausea beginning the night of chemo for 3 days. 9 Tab 3    HYDROmorphone (DILAUDID) 2 mg tablet Take 2 mg by mouth every four (4) hours as needed for Pain.  amLODIPine (NORVASC) 5 mg tablet Take 5 mg by mouth daily.  predniSONE (DELTASONE) 20 mg tablet Take 2 Tabs by mouth daily. Take 2 tabs on days 2 and 3 of each chemo cycle 4 Tab 2    LORazepam (ATIVAN) 0.5 mg tablet Take 1 Tab by mouth every eight (8) hours as needed for Anxiety. Max Daily Amount: 1.5 mg. Indications: ANXIETY 90 Tab 0    nitrofurantoin, macrocrystal-monohydrate, (MACROBID) 100 mg capsule Take 1 Cap by mouth two (2) times a day. 10 Cap 0    diphenhydrAMINE (BENADRYL) 25 mg capsule Take 1 with compazine every 6 hours for nausea for 3 days then, as needed. 60 Cap 3    prochlorperazine (COMPAZINE) 10 mg tablet Take 1 tablet every 6 hours with Benadryl 25mg for nausea for 3 days then, as needed. 60 Tab 3    warfarin (COUMADIN) 1 mg tablet Take one 1 tablet by mouth everyday at 6pm or after. You will continue to take this medication. Everyday until mediport is removed. (Patient taking differently: 3 mg every Tuesday and Thursday. Take one 1 tablet by mouth everyday at 6pm or after. You will continue to take this medication. Everyday until mediport is removed.) 30 Tab 3    lidocaine-prilocaine (EMLA) topical cream Place cream over mediport 1 hour prior to chemotherapy and then place saran wrap over area. Every chemo treatment. 30 g 0    acetaminophen (TYLENOL) 650 mg CR tablet 1,300 mg.      albuterol (PROVENTIL HFA, VENTOLIN HFA, PROAIR HFA) 90 mcg/actuation inhaler 2 Puffs.  colchicine 0.6 mg tablet 0.6 mg.      ergocalciferol (ERGOCALCIFEROL) 50,000 unit capsule 50,000 Units.       furosemide (LASIX) 20 mg tablet Take 1/2-1 tablet daily if needed for swelling.  allopurinol (ZYLOPRIM) 300 mg tablet 300 mg.      desonide (TRIDESILON) 0.05 % cream Use 1 Application to affected area Twice Daily.  fluconazole (DIFLUCAN) 150 mg tablet TAKE 1 TABLET BY MOUTH ONCE DAILY TODAY, MAY REPEAT AFTER 3 DAYS AS NEEDED      cloNIDine HCl (CATAPRES) 0.1 mg tablet 0.1 mg.      bisoprolol-hydrochlorothiazide (ZIAC) 10-6.25 mg per tablet Take 1 Tab by Mouth Once a Day.  glipiZIDE (GLUCOTROL) 10 mg tablet 10 mg.      glimepiride (AMARYL) 4 mg tablet Take one in the am.  New dose.  gabapentin (NEURONTIN) 300 mg capsule 300 mg. Review of Systems  Constitutional: The patient denies acute distress or discomfort. HEENT: The patient denies recent head trauma, eye pain, blurred vision,  hearing deficit, oropharyngeal mucosal pain or lesions, and the patient denies throat pain or discomfort. Lymphatics: The patient denies palpable peripheral lymphadenopathy. Hematologic: The patient denies having bruising, bleeding, or progressive fatigue. Respiratory: Patient denies having shortness of breath, cough, sputum production, fever, or dyspnea on exertion. Cardiovascular: The patient denies having leg pain, leg swelling, heart palpitations, chest permit, chest pain, or lightheadedness. The patient denies having dyspnea on exertion. Gastrointestinal: The patient reports occasional nausea from chemotherapy which is well controlled with antinausea medication. She denies having any emesis or diarrhea. Genitourinary: Patient denies having urinary urgency, frequency, or dysuria. The patient denies having blood in the urine. Psychological: The patient denies having symptoms of nervousness, anxiety, depression, or thoughts of harming self. Skin: Patient denies having skin rashes, skin, ulcerations, or unexplained itching or pruritus.   Musculoskeletal: The patient denies having pain in the joints or bones.      Objective: There were no vitals taken for this visit. ECOG PS=0; Pain score=0/10    Physical Exam:   Gen. Appearance: The patient is in no acute distress. Skin: There is no bruise or rash. HEENT: The exam is unremarkable. Neck: Supple without lymphadenopathy or thyromegaly. Lungs: Clear to auscultation and percussion; there are no wheezes or rhonchi. Heart: Regular rate and rhythm; there are no murmurs, gallops, or rubs. Abdomen: Bowel sounds are present and normal.  There is no guarding, tenderness, or hepatosplenomegaly. The patient has a colostomy bag in place. Extremities: There is no clubbing, cyanosis, or edema. Neurologic: There are no focal neurologic deficits. Lymphatics: There is no palpable peripheral lymphadenopathy. Musculoskeletal: The patient has full range of motion at all joints. There is no evidence of joint deformity or effusions. There is no focal joint tenderness. Psychological/psychiatric: There is no clinical evidence of anxiety, depression, or melancholy. Lab data:      Results for orders placed or performed during the hospital encounter of 10/04/17   CBC WITH 3 PART DIFF     Status: Abnormal   Result Value Ref Range Status    WBC 5.2 4.5 - 13.0 K/uL Final    RBC 3.23 (L) 4.10 - 5.10 M/uL Final    HGB 9.6 (L) 12.0 - 16 g/dL Final    HCT 30.8 (L) 36 - 48 % Final    MCV 95.4 78 - 102 FL Final    MCH 29.7 25.0 - 35.0 PG Final    MCHC 31.2 31 - 37 g/dL Final    RDW 15.8 (H) 11.5 - 14.5 % Final    PLATELET 732 834 - 647 K/uL Final    NEUTROPHILS 59 40 - 70 % Final    MIXED CELLS 16 0.1 - 17 % Final    LYMPHOCYTES 26 14 - 44 % Final    ABS. NEUTROPHILS 3.1 1.8 - 9.5 K/UL Final    ABS. MIXED CELLS 0.8 0.0 - 2.3 K/uL Final    ABS. LYMPHOCYTES 1.3 1.1 - 5.9 K/UL Final     Comment: Test performed at [place] [address] Location. Results Reviewed by Medical Director. DF AUTOMATED   Final           Assessment:     1. Colorectal carcinoma ([facility] [address]    2.  Anemia in chronic kidney disease, unspecified CKD stage    3. Antineoplastic chemotherapy induced anemia    4. Chemotherapy induced neutropenia (HCC)      Plan:   Colorectal carcinoma with abdominal carcinomatosis: On 5/24/2017 the patient underwent colectomy with omentectomy with the findings of adenocarcinoma involving at least 80% of the omentum. The primary tumor appeared to originate in the transverse colon and measured approximately 5 cm. There was microscopic perforation of the colon and metastatic involvement into the left ovary. The tumor was pathologically staged as a T4 a N1M1 stage IV malignancy. The patient was started on FOLFIRI chemotherapy regimen on 08/30/17. The regimen is a combination of Irinotecan at 180 mg/m² on day 1, 5 fluorouracil given at a dose of 400 mg/m² by IV bolus on day 1 followed by 2400 mg/m² continuous infusion for 46 hours on days 1 and 2, leucovorin 400 mg/m² IV on day 1 as a 2 hour infusion prior to 5-fluorouracil and with the addition of bevacizumab/avastin 5 mg/kg IV every 2 weeks to the regimen as well. A comprehensive metabolic panel, CEA level, ferritin level, iron profile, and CBC will be ordered at this time. Anemia associated with chronic kidney disease and chemotherapy-induced anemia (persistent problem): I have explained to the patient that her CBC on today showed a hemoglobin of 9.6 g/dL with hematocrit of 30.8%. Procrit at a dose of 60,000 units will be provided whenever her hemoglobin is below 10 g/dL hematocrit is below 30% every 2 weeks. The iron profile and ferritin levels will be ordered at this time. Chemotherapy-induced leukopenia/neutropenia (improved problem): The CBC on today showed a normal WBC count of 5.2 with an absolute neutrophil count of 3.1. This will be monitored on a regular basis. Neulasta following each chemo cycle will continue.   The patient is at a significantly increased risk for the development of neutropenia and fever due to her advanced stage disease, low baseline hemoglobin level and renal dysfunction. She also has pre-existing neutropenia with a history of neutropenia with the use of the FOLFOX 6 regimen. Additionally the patient has a very low albumin level of 3.5 g/dL and she has experienced myelosuppression from prior chemotherapy. I have explained to the patient that she will continue to received  Neulasta due to her increased risk for the development of neutropenia and fever. I will have the patient return to clinic for complete reassessment again in 4 weeks. Orders Placed This Encounter    COMPLETE CBC & AUTO DIFF WBC    InHouse CBC ([facility])     Standing Status:   Future     Number of Occurrences:   1     Standing Expiration Date:   96/08/6243    METABOLIC PANEL, COMPREHENSIVE     Standing Status:   Future     Number of Occurrences:   1     Standing Expiration Date:   10/5/2018    IRON PROFILE     Standing Status:   Future     Number of Occurrences:   1     Standing Expiration Date:   10/5/2018    CEA     Standing Status:   Future     Number of Occurrences:   1     Standing Expiration Date:   10/5/2018    FERRITIN     Standing Status:   Future     Number of Occurrences:   1     Standing Expiration Date:   10/5/2018    potassium chloride (K-DUR, KLOR-CON) 20 mEq tablet     Sig: Take 1 Tab by mouth two (2) times a day.      Dispense:  60 Tab     Refill:  0       Jim Bell MD  10/4/2017

## 2017-10-04 NOTE — MR AVS SNAPSHOT
Visit Information Date & Time Provider Department Dept. Phone Encounter #  
 10/4/2017 10:30 AM Cedric Mac MD [facility] Medical Oncology [phone] [id number] Follow-up Instructions Return in about 4 weeks (around 11/1/2017). Your Appointments 11/15/2017 11:30 AM  
Office Visit with Cedric Mac MD  
Via Tyron Noble  Oncology [facility]) Appt Note: 4 weeks [facility] [address], [place] [address], [address] [place] [address] Upcoming Health Maintenance Date Due Hepatitis C Screening 1955 DTaP/Tdap/Td series (1 - Tdap) 8/26/1974 PAP AKA CERVICAL CYTOLOGY 8/26/1974 BREAST CANCER SCRN MAMMOGRAM 8/26/2003 FOBT Q 1 YEAR AGE 50-75 8/26/2003 ZOSTER VACCINE AGE 60> 6/26/2013 Pneumococcal 19-64 Highest Risk (2 of 3 - PCV13) 3/1/2017 INFLUENZA AGE 9 TO ADULT 8/1/2017 Allergies as of 10/4/2017  Review Complete On: 9/29/2017 By: Swati Bruner RN Severity Noted Reaction Type Reactions Latex  08/04/2016    Other (comments) Lisinopril High 04/25/2017    Hives Asa-acetaminophen-caff-buffers  08/04/2016    Other (comments) Codeine  08/04/2016    Other (comments) Pcn [Penicillins]  08/04/2016    Other (comments) Sulfa (Sulfonamide Antibiotics)  08/04/2016    Other (comments) Current Immunizations  Reviewed on 9/29/2017 Name Date Influenza Vaccine 3/1/2016 Pneumococcal Vaccine (Unspecified Type) 3/1/2016 Not reviewed this visit You Were Diagnosed With   
  
 Codes Comments Colorectal carcinoma ([facility] [address]    -  Primary ICD-10-CM: C19 
ICD-9-CM: 154.0 Anemia in chronic kidney disease, unspecified CKD stage     ICD-10-CM: N18.9, D63.1 ICD-9-CM: 285.21 Antineoplastic chemotherapy induced anemia     ICD-10-CM: D64.81, T45.1X5A 
ICD-9-CM: 285.3, E933.1 Chemotherapy induced neutropenia (HCC)     ICD-10-CM: D70.1, T45.1X5A 
ICD-9-CM: 288.03, E933.1 Vitals Smoking Status Never Smoker Preferred Pharmacy Pharmacy Name Phone [facility] [address], [address] [facility] Your Updated Medication List  
  
   
This list is accurate as of: 10/4/17 11:37 AM.  Always use your most recent med list.  
  
  
  
  
 acetaminophen 650 mg CR tablet Commonly known as:  TYLENOL  
1,300 mg.  
  
 albuterol 90 mcg/actuation inhaler Commonly known as:  PROVENTIL HFA, VENTOLIN HFA, PROAIR HFA  
2 Puffs. allopurinol 300 mg tablet Commonly known as:  ZYLOPRIM  
300 mg.  
  
 bisoprolol-hydroCHLOROthiazide 10-6.25 mg per tablet Commonly known as:  [facility] Take 1 Tab by Mouth Once a Day. cloNIDine HCl 0.1 mg tablet Commonly known as:  CATAPRES  
0.1 mg.  
  
 colchicine 0.6 mg tablet  
0.6 mg.  
  
 desonide 0.05 % cream  
Commonly known as:  Berneta Goad Use 1 Application to affected area Twice Daily. DILAUDID 2 mg tablet Generic drug:  HYDROmorphone Take 2 mg by mouth every four (4) hours as needed for Pain. diphenhydrAMINE 25 mg capsule Commonly known as:  BENADRYL Take 1 with compazine every 6 hours for nausea for 3 days then, as needed. ergocalciferol 50,000 unit capsule Commonly known as:  ERGOCALCIFEROL  
50,000 Units. fluconazole 150 mg tablet Commonly known as:  DIFLUCAN  
TAKE 1 TABLET BY MOUTH ONCE DAILY TODAY, MAY REPEAT AFTER 3 DAYS AS NEEDED  
  
 furosemide 20 mg tablet Commonly known as:  LASIX Take 1/2-1 tablet daily if needed for swelling.  
  
 gabapentin 300 mg capsule Commonly known as:  NEURONTIN  
300 mg.  
  
 glimepiride 4 mg tablet Commonly known as:  AMARYL Take one in the am.  New dose. glipiZIDE 10 mg tablet Commonly known as:  Carolyn Barrier 10 mg.  
  
 lidocaine-prilocaine topical cream  
 Commonly known as:  EMLA Place cream over mediport 1 hour prior to chemotherapy and then place saran wrap over area. Every chemo treatment. LORazepam 0.5 mg tablet Commonly known as:  ATIVAN Take 1 Tab by mouth every eight (8) hours as needed for Anxiety. Max Daily Amount: 1.5 mg. Indications: ANXIETY  
  
 nitrofurantoin (macrocrystal-monohydrate) 100 mg capsule Commonly known as:  MACROBID Take 1 Cap by mouth two (2) times a day. NORVASC 5 mg tablet Generic drug:  amLODIPine Take 5 mg by mouth daily. ondansetron hcl 8 mg tablet Commonly known as:  ZOFRAN (AS HYDROCHLORIDE) Take one tablet by mouth every 8 hours for nausea beginning the night of chemo for 3 days. potassium chloride 20 mEq tablet Commonly known as:  K-DUR, KLOR-CON Take 1 Tab by mouth two (2) times a day. * predniSONE 20 mg tablet Commonly known as:  Jackie Bharathi Take 2 Tabs by mouth daily. Take 2 tabs on days 2 and 3 of each chemo cycle * predniSONE 20 mg tablet Commonly known as:  DELTASONE  
TAKE 2 TABLETS BY MOUTH WITH BREAKFAST - TAKE 40 MG ON DAY 2 AND DAY 3 OF TREATMENT  
  
 prochlorperazine 10 mg tablet Commonly known as:  COMPAZINE Take 1 tablet every 6 hours with Benadryl 25mg for nausea for 3 days then, as needed. warfarin 1 mg tablet Commonly known as:  COUMADIN Take one 1 tablet by mouth everyday at 6pm or after. You will continue to take this medication. Everyday until mediport is removed. * Notice: This list has 2 medication(s) that are the same as other medications prescribed for you. Read the directions carefully, and ask your doctor or other care provider to review them with you. Prescriptions Sent to Pharmacy Refills  
 potassium chloride (K-DUR, KLOR-CON) 20 mEq tablet 0 Sig: Take 1 Tab by mouth two (2) times a day. Class: Normal  
 Pharmacy: [address] Ph #: [phone] Route: Oral  
  
We Performed the Following COMPLETE CBC & AUTO DIFF WBC [43539 CPT(R)] Follow-up Instructions Return in about 4 weeks (around 11/1/2017). To-Do List   
 10/04/2017 Lab:  CBC WITH 3 PART DIFF   
  
 10/04/2017 Lab:  CEA   
  
 10/05/2017 Lab:  FERRITIN   
  
 10/05/2017 Lab:  IRON PROFILE   
  
 10/05/2017 Lab:  METABOLIC PANEL, COMPREHENSIVE   
  
 10/11/2017 10:00 AM  
  Appointment with [facility] NURSE 5 at [facility] ([phone]) 10/13/2017 10:00 AM  
  Appointment with [facility] NURSE 3 at [facility] ([phone]) Introducing [facility]! [facility] introduces [employer] patient portal. Now you can access parts of your medical record, email your doctor's office, and request medication refills online. 1. In your internet browser, go to [email or web address] 2. Click on the First Time User? Click Here link in the Sign In box. You will see the New Member Sign Up page. 3. Enter your [employer] Access Code exactly as it appears below. You will not need to use this code after youve completed the sign-up process. If you do not sign up before the expiration date, you must request a new code. · [employer] Access Code: [email or web address] Expires: 11/7/2017  9:22 AM 
 
4. Enter the last four digits of your Social Security Number (xxxx) and Date of Birth (mm/dd/yyyy) as indicated and click Submit. You will be taken to the next sign-up page. 5. Create a [employer] ID. This will be your [employer] login ID and cannot be changed, so think of one that is secure and easy to remember. 6. Create a [employer] password. You can change your password at any time. 7. Enter your Password Reset Question and Answer. This can be used at a later time if you forget your password. 8. Enter your e-mail address. You will receive e-mail notification when new information is available in [address] 9. Click Sign Up. You can now view and download portions of your medical record. 10. Click the Download Summary menu link to download a portable copy of your medical information. If you have questions, please visit the Frequently Asked Questions section of the [employer] website. Remember, [employer] is NOT to be used for urgent needs. For medical emergencies, dial 911. Now available from your iPhone and Android! Please provide this summary of care documentation to your next provider. Your primary care clinician is listed as Kofi Burrows. If you have any questions after today's visit, please call [phone].

## 2017-10-05 LAB
ALBUMIN SERPL-MCNC: 3.9 G/DL (ref 3.6–4.8)
ALBUMIN/GLOB SERPL: 1.4 {RATIO} (ref 1.2–2.2)
ALP SERPL-CCNC: 150 IU/L (ref 39–117)
ALT SERPL-CCNC: 19 IU/L (ref 0–32)
AST SERPL-CCNC: 13 IU/L (ref 0–40)
BILIRUB SERPL-MCNC: 0.3 MG/DL (ref 0–1.2)
BUN SERPL-MCNC: 29 MG/DL (ref 8–27)
BUN/CREAT SERPL: 18 (ref 12–28)
CALCIUM SERPL-MCNC: 10 MG/DL (ref 8.7–10.3)
CEA SERPL-MCNC: 186.2 NG/ML (ref 0–4.7)
CHLORIDE SERPL-SCNC: 106 MMOL/L (ref 96–106)
CO2 SERPL-SCNC: 22 MMOL/L (ref 18–29)
CREAT SERPL-MCNC: 1.59 MG/DL (ref 0.57–1)
FERRITIN SERPL-MCNC: 1189 NG/ML (ref 15–150)
GLOBULIN SER CALC-MCNC: 2.8 G/DL (ref 1.5–4.5)
GLUCOSE SERPL-MCNC: 132 MG/DL (ref 65–99)
IRON SATN MFR SERPL: 16 % (ref 15–55)
IRON SERPL-MCNC: 46 UG/DL (ref 27–139)
POTASSIUM SERPL-SCNC: 4.4 MMOL/L (ref 3.5–5.2)
PROT SERPL-MCNC: 6.7 G/DL (ref 6–8.5)
SODIUM SERPL-SCNC: 147 MMOL/L (ref 134–144)
TIBC SERPL-MCNC: 292 UG/DL (ref 250–450)
UIBC SERPL-MCNC: 246 UG/DL (ref 118–369)

## 2017-10-09 RX ORDER — FLUOROURACIL 50 MG/ML
700 INJECTION, SOLUTION INTRAVENOUS ONCE
Status: COMPLETED | OUTPATIENT
Start: 2017-10-11 | End: 2017-10-11

## 2017-10-11 ENCOUNTER — HOSPITAL ENCOUNTER (OUTPATIENT)
Dept: INFUSION THERAPY | Age: 62
Discharge: HOME | End: 2017-10-11
Payer: MEDICARE

## 2017-10-11 VITALS
DIASTOLIC BLOOD PRESSURE: 84 MMHG | SYSTOLIC BLOOD PRESSURE: 158 MMHG | HEART RATE: 74 BPM | RESPIRATION RATE: 18 BRPM | BODY MASS INDEX: 33.24 KG/M2 | HEIGHT: 60 IN | TEMPERATURE: 98.5 F | OXYGEN SATURATION: 99 % | WEIGHT: 169.3 LBS

## 2017-10-11 LAB
BASO+EOS+MONOS # BLD AUTO: 1.1 K/UL (ref 0–2.3)
BASO+EOS+MONOS # BLD AUTO: 6 % (ref 0.1–17)
DIFFERENTIAL METHOD BLD: ABNORMAL
ERYTHROCYTE [DISTWIDTH] IN BLOOD BY AUTOMATED COUNT: 18.7 % (ref 11.5–14.5)
HCT VFR BLD AUTO: 30.1 % (ref 36–48)
HGB BLD-MCNC: 9.3 G/DL (ref 12–16)
LYMPHOCYTES # BLD: 3.1 K/UL (ref 1.1–5.9)
LYMPHOCYTES NFR BLD: 16 % (ref 14–44)
MCH RBC QN AUTO: 30.3 PG (ref 25–35)
MCHC RBC AUTO-ENTMCNC: 30.9 G/DL (ref 31–37)
MCV RBC AUTO: 98 FL (ref 78–102)
NEUTS SEG # BLD: 14.8 K/UL (ref 1.8–9.5)
NEUTS SEG NFR BLD: 78 % (ref 40–70)
PLATELET # BLD AUTO: 389 K/UL (ref 140–440)
RBC # BLD AUTO: 3.07 M/UL (ref 4.1–5.1)
WBC # BLD AUTO: 19 K/UL (ref 4.5–13)

## 2017-10-11 PROCEDURE — 96415 CHEMO IV INFUSION ADDL HR: CPT

## 2017-10-11 PROCEDURE — 74011000258 HC RX REV CODE- 258: Performed by: INTERNAL MEDICINE

## 2017-10-11 PROCEDURE — 77030012965 HC NDL HUBR BBMI -A

## 2017-10-11 PROCEDURE — 96367 TX/PROPH/DG ADDL SEQ IV INF: CPT

## 2017-10-11 PROCEDURE — 96366 THER/PROPH/DIAG IV INF ADDON: CPT

## 2017-10-11 PROCEDURE — 74011250636 HC RX REV CODE- 250/636: Performed by: INTERNAL MEDICINE

## 2017-10-11 PROCEDURE — 96413 CHEMO IV INFUSION 1 HR: CPT

## 2017-10-11 PROCEDURE — 96365 THER/PROPH/DIAG IV INF INIT: CPT

## 2017-10-11 PROCEDURE — 80048 BASIC METABOLIC PNL TOTAL CA: CPT | Performed by: INTERNAL MEDICINE

## 2017-10-11 PROCEDURE — 96416 CHEMO PROLONG INFUSE W/PUMP: CPT

## 2017-10-11 PROCEDURE — 96411 CHEMO IV PUSH ADDL DRUG: CPT

## 2017-10-11 PROCEDURE — 74011250636 HC RX REV CODE- 250/636

## 2017-10-11 PROCEDURE — 96375 TX/PRO/DX INJ NEW DRUG ADDON: CPT

## 2017-10-11 PROCEDURE — 85025 COMPLETE CBC W/AUTO DIFF WBC: CPT | Performed by: INTERNAL MEDICINE

## 2017-10-11 RX ORDER — SODIUM CHLORIDE 0.9 % (FLUSH) 0.9 %
10 SYRINGE (ML) INJECTION AS NEEDED
Status: DISCONTINUED | OUTPATIENT
Start: 2017-10-11 | End: 2017-10-15 | Stop reason: HOSPADM

## 2017-10-11 RX ORDER — HEPARIN 100 UNIT/ML
500 SYRINGE INTRAVENOUS ONCE
Status: DISPENSED | OUTPATIENT
Start: 2017-10-11 | End: 2017-10-11

## 2017-10-11 RX ORDER — PALONOSETRON 0.05 MG/ML
0.25 INJECTION, SOLUTION INTRAVENOUS ONCE
Status: COMPLETED | OUTPATIENT
Start: 2017-10-11 | End: 2017-10-11

## 2017-10-11 RX ORDER — DEXTROSE MONOHYDRATE 50 MG/ML
250 INJECTION, SOLUTION INTRAVENOUS AS NEEDED
Status: DISPENSED | OUTPATIENT
Start: 2017-10-11 | End: 2017-10-12

## 2017-10-11 RX ORDER — ATROPINE SULFATE 1 MG/ML
0.4 INJECTION, SOLUTION INTRAVENOUS
Status: ACTIVE | OUTPATIENT
Start: 2017-10-11 | End: 2017-10-11

## 2017-10-11 RX ADMIN — DEXTROSE MONOHYDRATE 250 ML: 5 INJECTION, SOLUTION INTRAVENOUS at 10:45

## 2017-10-11 RX ADMIN — DEXAMETHASONE SODIUM PHOSPHATE 12 MG: 4 INJECTION, SOLUTION INTRA-ARTICULAR; INTRALESIONAL; INTRAMUSCULAR; INTRAVENOUS; SOFT TISSUE at 11:28

## 2017-10-11 RX ADMIN — DEXTROSE MONOHYDRATE 320 MG: 5 INJECTION, SOLUTION INTRAVENOUS at 12:15

## 2017-10-11 RX ADMIN — FLUOROURACIL 4300 MG: 50 INJECTION, SOLUTION INTRAVENOUS at 14:23

## 2017-10-11 RX ADMIN — Medication 10 ML: at 10:44

## 2017-10-11 RX ADMIN — LEUCOVORIN CALCIUM 700 MG: 200 INJECTION, POWDER, LYOPHILIZED, FOR SOLUTION INTRAMUSCULAR; INTRAVENOUS at 12:16

## 2017-10-11 RX ADMIN — Medication 10 ML: at 14:22

## 2017-10-11 RX ADMIN — PALONOSETRON HYDROCHLORIDE 0.25 MG: 0.25 INJECTION INTRAVENOUS at 11:23

## 2017-10-11 RX ADMIN — FLUOROURACIL 700 MG: 50 INJECTION, SOLUTION INTRAVENOUS at 14:07

## 2017-10-11 NOTE — PROGRESS NOTES
[facility] Progress Note    Date: 2017    Name: Jd Sim    MRN: [id number]         : 1955      Ms. Polk arrived to [facility] at 1020 in stable condition. Pt is here today for chemotherapy, Folfiri, Cycle 4, Day 1. Ms. Shira Lunsford was assessed and education was provided. Pt c/o continued numbness and tingling in hands and feet but denied any new complaints. Ms. Polk's vitals were reviewed. Visit Vitals    BP (P) 162/83 (BP 1 Location: Left arm, BP Patient Position: Sitting)    Pulse (P) 65    Temp (P) 98.6 °F (37 °C)    Resp (P) 18    Ht (P) 5' (1.524 m)    Wt (P) 76.8 kg (169 lb 4.8 oz)    SpO2 100%    Breastfeeding No    BMI (P) 33.06 kg/m2       Patient's right upper chest port accessed and blood drawn for CBC and BMP after 10 ml waste. D5W infusing at [facility] at this time as ordered. Lab results were obtained and reviewed. Labs okay for chemo. Recent Results (from the past 12 hour(s))   CBC WITH 3 PART DIFF    Collection Time: 10/11/17 10:41 AM   Result Value Ref Range    WBC 19.0 (H) 4.5 - 13.0 K/uL    RBC 3.07 (L) 4.10 - 5.10 M/uL    HGB 9.3 (L) 12.0 - 16 g/dL    HCT 30.1 (L) 36 - 48 %    MCV 98.0 78 - 102 FL    MCH 30.3 25.0 - 35.0 PG    MCHC 30.9 (L) 31 - 37 g/dL    RDW 18.7 (H) 11.5 - 14.5 %    PLATELET 482 709 - 710 K/uL    NEUTROPHILS 78 (H) 40 - 70 %    MIXED CELLS 6 0.1 - 17 %    LYMPHOCYTES 16 14 - 44 %    ABS. NEUTROPHILS 14.8 (H) 1.8 - 9.5 K/UL    ABS. MIXED CELLS 1.1 0.0 - 2.3 K/uL    ABS. LYMPHOCYTES 3.1 1.1 - 5.9 K/UL    DF AUTOMATED     Lab results within ordered parameters to give chemo today. ANC = 14.8, PLT = 389. Chemo dosages verified with today's BSA and found to be within 10% of ordered dosages. Pre-medications, Aloxi 0.25 ml and Decadron 12 mg,  administered as ordered and chemotherapy was initiated. Irinotecan 320 mg was infused over 90 minutes concurrently with Leucovorin 700 mg.  VS stable and patient denies complaints.   Fluorouracil 700 mg administered as ordered over 2 minutes. D5W stopped and line flushed with 10 ml NS. Blood return reverified. Fluorouracil 4300 mg CADD pump connected to patient. Connections were secured with tape and the volume was verified to be infusing. Ms. Denilson Villasenor tolerated infusion without complaints. Hgb = 9.3 and Hct = 30.1. Procrit not administered today as lab values are outside order parameters. Ms. Denilson Villasenor was discharged from [place] [address] in stable condition at 1435. She is to return on October 13 at 1300 for her next appointment for pump D/C.     Starr De Leon RN  October 11, 2017

## 2017-10-12 LAB
ANION GAP SERPL CALC-SCNC: 9 MMOL/L (ref 3–18)
BUN SERPL-MCNC: 28 MG/DL (ref 7–18)
BUN/CREAT SERPL: 16 (ref 12–20)
CALCIUM SERPL-MCNC: 8.9 MG/DL (ref 8.5–10.1)
CHLORIDE SERPL-SCNC: 110 MMOL/L (ref 100–108)
CO2 SERPL-SCNC: 25 MMOL/L (ref 21–32)
CREAT SERPL-MCNC: 1.8 MG/DL (ref 0.6–1.3)
GLUCOSE SERPL-MCNC: 101 MG/DL (ref 74–99)
POTASSIUM SERPL-SCNC: 3.7 MMOL/L (ref 3.5–5.5)
SODIUM SERPL-SCNC: 144 MMOL/L (ref 136–145)

## 2017-10-13 ENCOUNTER — HOSPITAL ENCOUNTER (OUTPATIENT)
Dept: INFUSION THERAPY | Age: 62
Discharge: HOME | End: 2017-10-13
Payer: MEDICARE

## 2017-10-13 VITALS
TEMPERATURE: 98.5 F | DIASTOLIC BLOOD PRESSURE: 80 MMHG | HEART RATE: 63 BPM | RESPIRATION RATE: 18 BRPM | SYSTOLIC BLOOD PRESSURE: 152 MMHG | OXYGEN SATURATION: 100 %

## 2017-10-13 LAB
BASO+EOS+MONOS # BLD AUTO: 0.2 K/UL (ref 0–2.3)
BASO+EOS+MONOS # BLD AUTO: 3 % (ref 0.1–17)
DIFFERENTIAL METHOD BLD: ABNORMAL
ERYTHROCYTE [DISTWIDTH] IN BLOOD BY AUTOMATED COUNT: 17.8 % (ref 11.5–14.5)
HCT VFR BLD AUTO: 30.3 % (ref 36–48)
HGB BLD-MCNC: 9.5 G/DL (ref 12–16)
LYMPHOCYTES # BLD: 1.4 K/UL (ref 1.1–5.9)
LYMPHOCYTES NFR BLD: 24 % (ref 14–44)
MCH RBC QN AUTO: 30.1 PG (ref 25–35)
MCHC RBC AUTO-ENTMCNC: 31.4 G/DL (ref 31–37)
MCV RBC AUTO: 95.9 FL (ref 78–102)
NEUTS SEG # BLD: 4.3 K/UL (ref 1.8–9.5)
NEUTS SEG NFR BLD: 74 % (ref 40–70)
PLATELET # BLD AUTO: 305 K/UL (ref 140–440)
RBC # BLD AUTO: 3.16 M/UL (ref 4.1–5.1)
WBC # BLD AUTO: 5.9 K/UL (ref 4.5–13)

## 2017-10-13 PROCEDURE — 74011250636 HC RX REV CODE- 250/636: Performed by: INTERNAL MEDICINE

## 2017-10-13 PROCEDURE — 96377 APPLICATON ON-BODY INJECTOR: CPT

## 2017-10-13 PROCEDURE — 85025 COMPLETE CBC W/AUTO DIFF WBC: CPT | Performed by: INTERNAL MEDICINE

## 2017-10-13 PROCEDURE — 36591 DRAW BLOOD OFF VENOUS DEVICE: CPT

## 2017-10-13 PROCEDURE — 96523 IRRIG DRUG DELIVERY DEVICE: CPT

## 2017-10-13 RX ORDER — HEPARIN SODIUM (PORCINE) LOCK FLUSH IV SOLN 100 UNIT/ML 100 UNIT/ML
500 SOLUTION INTRAVENOUS AS NEEDED
Status: DISPENSED | OUTPATIENT
Start: 2017-10-13 | End: 2017-10-14

## 2017-10-13 RX ORDER — SODIUM CHLORIDE 0.9 % (FLUSH) 0.9 %
10-40 SYRINGE (ML) INJECTION AS NEEDED
Status: DISCONTINUED | OUTPATIENT
Start: 2017-10-13 | End: 2017-10-17 | Stop reason: HOSPADM

## 2017-10-13 RX ADMIN — HEPARIN SODIUM (PORCINE) LOCK FLUSH IV SOLN 100 UNIT/ML 500 UNITS: 100 SOLUTION at 13:25

## 2017-10-13 RX ADMIN — PEGFILGRASTIM 6 MG: KIT SUBCUTANEOUS at 13:35

## 2017-10-13 RX ADMIN — Medication 20 ML: at 13:25

## 2017-10-13 NOTE — PROGRESS NOTES
[facility] Progress Note    Date: 2017    Name: Audrey Nicholson    MRN: [id number]         : 1955    D/C pump    Ms. Polk arrived ambulatory to [facility] at 1310. She  was assessed and education was provided. Ms. Polk's vitals were reviewed and patient was observed for 5 minutes prior to procedure. Visit Vitals    /80 (BP 1 Location: Left arm, BP Patient Position: Sitting)    Pulse 63    Temp 98.5 °F (36.9 °C)    Resp 18    SpO2 100%     CADD pump completed infusion and removed from patient. Port flushed with 20 mL and blood drawn off for a CBC after a 10 ml waste. Recent Results (from the past 12 hour(s))   CBC WITH 3 PART DIFF    Collection Time: 10/13/17  1:30 PM   Result Value Ref Range    WBC 5.9 4.5 - 13.0 K/uL    RBC 3.16 (L) 4.10 - 5.10 M/uL    HGB 9.5 (L) 12.0 - 16 g/dL    HCT 30.3 (L) 36 - 48 %    MCV 95.9 78 - 102 FL    MCH 30.1 25.0 - 35.0 PG    MCHC 31.4 31 - 37 g/dL    RDW 17.8 (H) 11.5 - 14.5 %    PLATELET 955 155 - 712 K/uL    NEUTROPHILS 74 (H) 40 - 70 %    MIXED CELLS 3 0.1 - 17 %    LYMPHOCYTES 24 14 - 44 %    ABS. NEUTROPHILS 4.3 1.8 - 9.5 K/UL    ABS. MIXED CELLS 0.2 0.0 - 2.3 K/uL    ABS. LYMPHOCYTES 1.4 1.1 - 5.9 K/UL    DF AUTOMATED       HGB= 9.5 and HCT= 30.3    Port flushed with 20 mL and 500 units of heparin after blood return was verified. Band-aid applied. Neulasta 6 mg applied to the back of the right arm. Green light verified to be blinking. Procrit HELD per written ordered parameters. Ms. Linda Yates tolerated the procedure, and had no complaints. Ms. Linda Yates was discharged from [place] [address] in stable condition at [phone]. She is to return on 10/25/17 at 1000 for her next appointment for chemo.     Lori Duron RN  2017

## 2017-10-23 RX ORDER — FLUOROURACIL 50 MG/ML
700 INJECTION, SOLUTION INTRAVENOUS ONCE
Status: COMPLETED | OUTPATIENT
Start: 2017-10-25 | End: 2017-10-25

## 2017-10-25 ENCOUNTER — HOSPITAL ENCOUNTER (OUTPATIENT)
Dept: INFUSION THERAPY | Age: 62
Discharge: HOME | End: 2017-10-25
Payer: MEDICARE

## 2017-10-25 VITALS
DIASTOLIC BLOOD PRESSURE: 76 MMHG | RESPIRATION RATE: 18 BRPM | WEIGHT: 173.3 LBS | OXYGEN SATURATION: 99 % | SYSTOLIC BLOOD PRESSURE: 138 MMHG | BODY MASS INDEX: 34.02 KG/M2 | HEIGHT: 60 IN | TEMPERATURE: 98 F | HEART RATE: 60 BPM

## 2017-10-25 LAB
ALBUMIN SERPL-MCNC: 2.9 G/DL (ref 3.4–5)
ALBUMIN/GLOB SERPL: 0.9 {RATIO} (ref 0.8–1.7)
ALP SERPL-CCNC: 236 U/L (ref 45–117)
ALT SERPL-CCNC: 108 U/L (ref 13–56)
ANION GAP SERPL CALC-SCNC: 9 MMOL/L (ref 3–18)
AST SERPL-CCNC: 52 U/L (ref 15–37)
BASO+EOS+MONOS # BLD AUTO: 1.3 K/UL (ref 0–2.3)
BASO+EOS+MONOS # BLD AUTO: 6 % (ref 0.1–17)
BILIRUB SERPL-MCNC: 0.2 MG/DL (ref 0.2–1)
BUN SERPL-MCNC: 27 MG/DL (ref 7–18)
BUN/CREAT SERPL: 16 (ref 12–20)
CALCIUM SERPL-MCNC: 8.6 MG/DL (ref 8.5–10.1)
CHLORIDE SERPL-SCNC: 109 MMOL/L (ref 100–108)
CO2 SERPL-SCNC: 23 MMOL/L (ref 21–32)
CREAT SERPL-MCNC: 1.69 MG/DL (ref 0.6–1.3)
DIFFERENTIAL METHOD BLD: ABNORMAL
ERYTHROCYTE [DISTWIDTH] IN BLOOD BY AUTOMATED COUNT: 18.9 % (ref 11.5–14.5)
GLOBULIN SER CALC-MCNC: 3.3 G/DL (ref 2–4)
GLUCOSE SERPL-MCNC: 94 MG/DL (ref 74–99)
HCT VFR BLD AUTO: 28.9 % (ref 36–48)
HGB BLD-MCNC: 9 G/DL (ref 12–16)
LYMPHOCYTES # BLD: 2.7 K/UL (ref 1.1–5.9)
LYMPHOCYTES NFR BLD: 12 % (ref 14–44)
MCH RBC QN AUTO: 30.5 PG (ref 25–35)
MCHC RBC AUTO-ENTMCNC: 31.1 G/DL (ref 31–37)
MCV RBC AUTO: 98 FL (ref 78–102)
NEUTS SEG # BLD: 18.4 K/UL (ref 1.8–9.5)
NEUTS SEG NFR BLD: 82 % (ref 40–70)
PLATELET # BLD AUTO: 379 K/UL (ref 140–440)
POTASSIUM SERPL-SCNC: 3.7 MMOL/L (ref 3.5–5.5)
PROT SERPL-MCNC: 6.2 G/DL (ref 6.4–8.2)
RBC # BLD AUTO: 2.95 M/UL (ref 4.1–5.1)
SODIUM SERPL-SCNC: 141 MMOL/L (ref 136–145)
WBC # BLD AUTO: 22.4 K/UL (ref 4.5–13)

## 2017-10-25 PROCEDURE — 77030012965 HC NDL HUBR BBMI -A

## 2017-10-25 PROCEDURE — 96416 CHEMO PROLONG INFUSE W/PUMP: CPT

## 2017-10-25 PROCEDURE — 74011250636 HC RX REV CODE- 250/636: Performed by: INTERNAL MEDICINE

## 2017-10-25 PROCEDURE — 96367 TX/PROPH/DG ADDL SEQ IV INF: CPT

## 2017-10-25 PROCEDURE — 96415 CHEMO IV INFUSION ADDL HR: CPT

## 2017-10-25 PROCEDURE — 96375 TX/PRO/DX INJ NEW DRUG ADDON: CPT

## 2017-10-25 PROCEDURE — 96368 THER/DIAG CONCURRENT INF: CPT

## 2017-10-25 PROCEDURE — 85025 COMPLETE CBC W/AUTO DIFF WBC: CPT | Performed by: INTERNAL MEDICINE

## 2017-10-25 PROCEDURE — 96413 CHEMO IV INFUSION 1 HR: CPT

## 2017-10-25 PROCEDURE — 96409 CHEMO IV PUSH SNGL DRUG: CPT

## 2017-10-25 PROCEDURE — 74011000258 HC RX REV CODE- 258: Performed by: INTERNAL MEDICINE

## 2017-10-25 PROCEDURE — 80053 COMPREHEN METABOLIC PANEL: CPT | Performed by: INTERNAL MEDICINE

## 2017-10-25 RX ORDER — PALONOSETRON 0.05 MG/ML
0.25 INJECTION, SOLUTION INTRAVENOUS ONCE
Status: COMPLETED | OUTPATIENT
Start: 2017-10-25 | End: 2017-10-25

## 2017-10-25 RX ORDER — SODIUM CHLORIDE 9 MG/ML
25 INJECTION, SOLUTION INTRAVENOUS CONTINUOUS
Status: DISPENSED | OUTPATIENT
Start: 2017-10-25 | End: 2017-10-26

## 2017-10-25 RX ORDER — SODIUM CHLORIDE 0.9 % (FLUSH) 0.9 %
10-40 SYRINGE (ML) INJECTION AS NEEDED
Status: DISCONTINUED | OUTPATIENT
Start: 2017-10-25 | End: 2017-10-29 | Stop reason: HOSPADM

## 2017-10-25 RX ORDER — DEXTROSE MONOHYDRATE 50 MG/ML
25 INJECTION, SOLUTION INTRAVENOUS AS NEEDED
Status: DISPENSED | OUTPATIENT
Start: 2017-10-25 | End: 2017-10-26

## 2017-10-25 RX ORDER — ATROPINE SULFATE 1 MG/ML
0.4 INJECTION, SOLUTION INTRAVENOUS
Status: ACTIVE | OUTPATIENT
Start: 2017-10-25 | End: 2017-10-25

## 2017-10-25 RX ADMIN — FLUOROURACIL 700 MG: 50 INJECTION, SOLUTION INTRAVENOUS at 13:50

## 2017-10-25 RX ADMIN — DEXTROSE MONOHYDRATE 320 MG: 5 INJECTION, SOLUTION INTRAVENOUS at 11:58

## 2017-10-25 RX ADMIN — LEUCOVORIN CALCIUM 700 MG: 200 INJECTION, POWDER, LYOPHILIZED, FOR SOLUTION INTRAMUSCULAR; INTRAVENOUS at 11:54

## 2017-10-25 RX ADMIN — SODIUM CHLORIDE 25 ML/HR: 9 INJECTION, SOLUTION INTRAVENOUS at 11:00

## 2017-10-25 RX ADMIN — FLUOROURACIL 4300 MG: 50 INJECTION, SOLUTION INTRAVENOUS at 13:55

## 2017-10-25 RX ADMIN — Medication 20 ML: at 13:55

## 2017-10-25 RX ADMIN — PALONOSETRON HYDROCHLORIDE 0.25 MG: 0.25 INJECTION INTRAVENOUS at 11:48

## 2017-10-25 RX ADMIN — DEXAMETHASONE SODIUM PHOSPHATE 12 MG: 4 INJECTION, SOLUTION INTRA-ARTICULAR; INTRALESIONAL; INTRAMUSCULAR; INTRAVENOUS; SOFT TISSUE at 11:11

## 2017-10-25 RX ADMIN — Medication 20 ML: at 10:30

## 2017-10-25 RX ADMIN — DEXTROSE MONOHYDRATE 25 ML/HR: 5 INJECTION, SOLUTION INTRAVENOUS at 11:50

## 2017-10-25 NOTE — PROGRESS NOTES
[facility] Progress Note    Date: 2017    Name: Wilton Shrestha              MRN: [id number]              : 1955    Chemotherapy Cycle: C5 of 12 FOLFIRI       Ms. Polk was assessed and education was provided. Denies any problems from previous infusion. Ms. Polk's vitals were reviewed. Visit Vitals    /76 (BP 1 Location: Right arm, BP Patient Position: Sitting)    Pulse 60    Temp 98 °F (36.7 °C)    Resp 18    Ht 5' (1.524 m)    Wt 78.6 kg (173 lb 4.8 oz)    SpO2 99%    BMI 33.85 kg/m2     Patient Vitals for the past 12 hrs:   Temp Pulse Resp BP SpO2   10/25/17 1410 98 °F (36.7 °C) 60 18 138/76 -   10/25/17 1018 98.7 °F (37.1 °C) 62 18 143/79 99 %     Mediport at right upper chest accessed with 20 gauge, 1 inch Hadley needle w/o difficulty. Good blood return obtained, labs drawn, then flushed with 20 ml NS. Lab results were obtained and reviewed. Recent Results (from the past 12 hour(s))   METABOLIC PANEL, COMPREHENSIVE    Collection Time: 10/25/17 10:35 AM   Result Value Ref Range    Sodium 141 136 - 145 mmol/L    Potassium 3.7 3.5 - 5.5 mmol/L    Chloride 109 (H) 100 - 108 mmol/L    CO2 23 21 - 32 mmol/L    Anion gap 9 3.0 - 18 mmol/L    Glucose 94 74 - 99 mg/dL    BUN 27 (H) 7.0 - 18 MG/DL    Creatinine 1.69 (H) 0.6 - 1.3 MG/DL    BUN/Creatinine ratio 16 12 - 20      GFR est AA 37 (L) >60 ml/min/1.73m2    GFR est non-AA 30 (L) >60 ml/min/1.73m2    Calcium 8.6 8.5 - 10.1 MG/DL    Bilirubin, total 0.2 0.2 - 1.0 MG/DL    ALT (SGPT) 108 (H) 13 - 56 U/L    AST (SGOT) 52 (H) 15 - 37 U/L    Alk.  phosphatase 236 (H) 45 - 117 U/L    Protein, total 6.2 (L) 6.4 - 8.2 g/dL    Albumin 2.9 (L) 3.4 - 5.0 g/dL    Globulin 3.3 2.0 - 4.0 g/dL    A-G Ratio 0.9 0.8 - 1.7     CBC WITH 3 PART DIFF    Collection Time: 10/25/17 10:35 AM   Result Value Ref Range    WBC 22.4 (HH) 4.5 - 13.0 K/uL    RBC 2.95 (L) 4.10 - 5.10 M/uL    HGB 9.0 (L) 12.0 - 16 g/dL    HCT 28.9 (L) 36 - 48 %    MCV 98.0 78 - 102 FL    MCH 30.5 25.0 - 35.0 PG    MCHC 31.1 31 - 37 g/dL    RDW 18.9 (H) 11.5 - 14.5 %    PLATELET 074 822 - 848 K/uL    NEUTROPHILS 82 (H) 40 - 70 %    MIXED CELLS 6 0.1 - 17 %    LYMPHOCYTES 12 (L) 14 - 44 %    ABS. NEUTROPHILS 18.4 (H) 1.8 - 9.5 K/UL    ABS. MIXED CELLS 1.3 0.0 - 2.3 K/uL    ABS. LYMPHOCYTES 2.7 1.1 - 5.9 K/UL    DF AUTOMATED           Pre-medications of Dexamethasone 12 mg in 500 ml NS IVPB and Aloxi 0.25 mg IVP were administered as ordered and chemotherapy was initiated. Irinotecan 320 mg in 566 ml D5W was infused at 377 ml/hr. Concurrent infusion of Leucovorin 700 mg in 310 ml D5W at 207 ml/hr via y-site. No s/s reaction noted. Fluorouracil 700 mg in 14 ml NS was infused via slow IVP over at least 2 minutes. No s/s reaction noted. Fluoruracil 4300 mg in 250 ml NS infusing at 5.4 ml/hr via CADD pump. Port connections secured and taped. Patient verbalized understanding of care of pump and accessed port. Ms. Lita Arteaga tolerated infusion, and had no complaints at this time. Armband removed and shredded. Ms. Lita Arteaga was discharged from [place] [address] in stable condition at 1405. She is to return on 10/27/2017 at 1300 for her next appointment pump removal, port flush and OBI appliecation.     Breanna Paz RN  October 25, 2017  2:52 PM

## 2017-10-27 ENCOUNTER — HOSPITAL ENCOUNTER (OUTPATIENT)
Dept: INFUSION THERAPY | Age: 62
Discharge: HOME | End: 2017-10-27
Payer: MEDICARE

## 2017-10-27 VITALS
DIASTOLIC BLOOD PRESSURE: 82 MMHG | TEMPERATURE: 98.6 F | SYSTOLIC BLOOD PRESSURE: 138 MMHG | RESPIRATION RATE: 18 BRPM | HEART RATE: 58 BPM

## 2017-10-27 PROCEDURE — 96377 APPLICATON ON-BODY INJECTOR: CPT

## 2017-10-27 PROCEDURE — 74011250636 HC RX REV CODE- 250/636

## 2017-10-27 PROCEDURE — 74011250636 HC RX REV CODE- 250/636: Performed by: INTERNAL MEDICINE

## 2017-10-27 RX ORDER — HEPARIN SODIUM (PORCINE) LOCK FLUSH IV SOLN 100 UNIT/ML 100 UNIT/ML
SOLUTION INTRAVENOUS
Status: COMPLETED
Start: 2017-10-27 | End: 2017-10-27

## 2017-10-27 RX ORDER — SODIUM CHLORIDE 0.9 % (FLUSH) 0.9 %
10-40 SYRINGE (ML) INJECTION AS NEEDED
Status: DISCONTINUED | OUTPATIENT
Start: 2017-10-27 | End: 2017-10-31 | Stop reason: HOSPADM

## 2017-10-27 RX ORDER — HEPARIN 100 UNIT/ML
500 SYRINGE INTRAVENOUS ONCE
Status: DISPENSED | OUTPATIENT
Start: 2017-10-27 | End: 2017-10-28

## 2017-10-27 RX ADMIN — HEPARIN SODIUM (PORCINE) LOCK FLUSH IV SOLN 100 UNIT/ML 500 UNITS: 100 SOLUTION at 13:41

## 2017-10-27 RX ADMIN — HEPARIN SODIUM (PORCINE) LOCK FLUSH IV SOLN 100 UNIT/ML 500 UNITS: 100 SOLUTION at 13:45

## 2017-10-27 RX ADMIN — PEGFILGRASTIM 6 MG: KIT SUBCUTANEOUS at 13:57

## 2017-10-27 RX ADMIN — Medication 10 ML: at 13:41

## 2017-10-27 NOTE — PROGRESS NOTES
[facility] Progress Note    Date: 2017    Name: Doreen Ovalle    MRN: [id number]         : 1955    Neulasta body injector application/DC pump    Ms. Polk arrived to [facility] at 1315. Ms. Sharon Pino was assessed and education was provided. Ms. Polk's vitals were reviewed. Visit Vitals    /82 (BP 1 Location: Right arm, BP Patient Position: Sitting)    Pulse (!) 58    Temp 98.6 °F (37 °C)    Resp 18       Upper left chest mediport with orozco needle in place attached to tubing and home pump. Pump already turned off upon admission. Tubing disconnected and mediport flushed with 10 ml NS and 500 units of heparin. Band aid applied. Neulasta OBI 6 mg  activated and placed on patient's right upper arm. Stockinette applied for protection. Patient instructed to remove after 1800 on 10/28/17. Patient verbalized understanding. Ms. Sharon Pino tolerated well without complaints. Ms. Sharon Pino was discharged from [place] [address] in stable condition at 1405. She is to return on 17 at 1000 for her next appointment.     Elian Murry RN  2017

## 2017-11-06 RX ORDER — FLUOROURACIL 50 MG/ML
700 INJECTION, SOLUTION INTRAVENOUS ONCE
Status: COMPLETED | OUTPATIENT
Start: 2017-11-08 | End: 2017-11-08

## 2017-11-08 ENCOUNTER — HOSPITAL ENCOUNTER (OUTPATIENT)
Dept: INFUSION THERAPY | Age: 62
Discharge: HOME | End: 2017-11-08
Payer: MEDICARE

## 2017-11-08 VITALS
HEART RATE: 63 BPM | OXYGEN SATURATION: 99 % | BODY MASS INDEX: 34.81 KG/M2 | DIASTOLIC BLOOD PRESSURE: 82 MMHG | HEIGHT: 60 IN | WEIGHT: 177.3 LBS | SYSTOLIC BLOOD PRESSURE: 146 MMHG | RESPIRATION RATE: 18 BRPM | TEMPERATURE: 98.6 F

## 2017-11-08 LAB
ANION GAP BLD CALC-SCNC: 17 MMOL/L (ref 10–20)
BASO+EOS+MONOS # BLD AUTO: 1.4 K/UL (ref 0–2.3)
BASO+EOS+MONOS # BLD AUTO: 6 % (ref 0.1–17)
BUN BLD-MCNC: 25 MG/DL (ref 7–18)
CA-I BLD-MCNC: 1.29 MMOL/L (ref 1.12–1.32)
CHLORIDE BLD-SCNC: 106 MMOL/L (ref 100–108)
CO2 BLD-SCNC: 23 MMOL/L (ref 19–24)
CREAT UR-MCNC: 1.7 MG/DL (ref 0.6–1.3)
DIFFERENTIAL METHOD BLD: ABNORMAL
ERYTHROCYTE [DISTWIDTH] IN BLOOD BY AUTOMATED COUNT: 18.7 % (ref 11.5–14.5)
GLUCOSE BLD STRIP.AUTO-MCNC: 116 MG/DL (ref 74–106)
HCT VFR BLD AUTO: 27.2 % (ref 36–48)
HCT VFR BLD CALC: 27 % (ref 36–49)
HGB BLD-MCNC: 8.7 G/DL (ref 12–16)
HGB BLD-MCNC: 9.2 G/DL (ref 12–16)
LYMPHOCYTES # BLD: 3.2 K/UL (ref 1.1–5.9)
LYMPHOCYTES NFR BLD: 13 % (ref 14–44)
MCH RBC QN AUTO: 31.9 PG (ref 25–35)
MCHC RBC AUTO-ENTMCNC: 32 G/DL (ref 31–37)
MCV RBC AUTO: 99.6 FL (ref 78–102)
NEUTS SEG # BLD: 20.1 K/UL (ref 1.8–9.5)
NEUTS SEG NFR BLD: 81 % (ref 40–70)
PLATELET # BLD AUTO: 331 K/UL (ref 140–440)
POTASSIUM BLD-SCNC: 3.4 MMOL/L (ref 3.5–5.5)
RBC # BLD AUTO: 2.73 M/UL (ref 4.1–5.1)
SODIUM BLD-SCNC: 141 MMOL/L (ref 136–145)
WBC # BLD AUTO: 24.7 K/UL (ref 4.5–13)

## 2017-11-08 PROCEDURE — 74011250636 HC RX REV CODE- 250/636

## 2017-11-08 PROCEDURE — 77030012965 HC NDL HUBR BBMI -A

## 2017-11-08 PROCEDURE — 96415 CHEMO IV INFUSION ADDL HR: CPT

## 2017-11-08 PROCEDURE — 96372 THER/PROPH/DIAG INJ SC/IM: CPT

## 2017-11-08 PROCEDURE — 85025 COMPLETE CBC W/AUTO DIFF WBC: CPT | Performed by: INTERNAL MEDICINE

## 2017-11-08 PROCEDURE — 74011250636 HC RX REV CODE- 250/636: Performed by: NURSE PRACTITIONER

## 2017-11-08 PROCEDURE — 74011000258 HC RX REV CODE- 258: Performed by: INTERNAL MEDICINE

## 2017-11-08 PROCEDURE — 96367 TX/PROPH/DG ADDL SEQ IV INF: CPT

## 2017-11-08 PROCEDURE — 96375 TX/PRO/DX INJ NEW DRUG ADDON: CPT

## 2017-11-08 PROCEDURE — 96416 CHEMO PROLONG INFUSE W/PUMP: CPT

## 2017-11-08 PROCEDURE — 96411 CHEMO IV PUSH ADDL DRUG: CPT

## 2017-11-08 PROCEDURE — 80047 BASIC METABLC PNL IONIZED CA: CPT

## 2017-11-08 PROCEDURE — 96413 CHEMO IV INFUSION 1 HR: CPT

## 2017-11-08 PROCEDURE — 74011250636 HC RX REV CODE- 250/636: Performed by: INTERNAL MEDICINE

## 2017-11-08 RX ORDER — SODIUM CHLORIDE 0.9 % (FLUSH) 0.9 %
10-40 SYRINGE (ML) INJECTION AS NEEDED
Status: DISCONTINUED | OUTPATIENT
Start: 2017-11-08 | End: 2017-11-12 | Stop reason: HOSPADM

## 2017-11-08 RX ORDER — ATROPINE SULFATE 1 MG/ML
0.4 INJECTION, SOLUTION INTRAVENOUS
Status: ACTIVE | OUTPATIENT
Start: 2017-11-08 | End: 2017-11-08

## 2017-11-08 RX ORDER — PALONOSETRON 0.05 MG/ML
0.25 INJECTION, SOLUTION INTRAVENOUS ONCE
Status: COMPLETED | OUTPATIENT
Start: 2017-11-08 | End: 2017-11-08

## 2017-11-08 RX ORDER — DEXTROSE MONOHYDRATE 50 MG/ML
250 INJECTION, SOLUTION INTRAVENOUS ONCE
Status: COMPLETED | OUTPATIENT
Start: 2017-11-08 | End: 2017-11-08

## 2017-11-08 RX ORDER — HEPARIN SODIUM (PORCINE) LOCK FLUSH IV SOLN 100 UNIT/ML 100 UNIT/ML
500 SOLUTION INTRAVENOUS AS NEEDED
Status: CANCELLED | OUTPATIENT
Start: 2017-11-08 | End: 2017-11-09

## 2017-11-08 RX ADMIN — DEXTROSE MONOHYDRATE 250 ML: 5 INJECTION, SOLUTION INTRAVENOUS at 11:25

## 2017-11-08 RX ADMIN — PALONOSETRON HYDROCHLORIDE 0.25 MG: 0.25 INJECTION INTRAVENOUS at 10:53

## 2017-11-08 RX ADMIN — DEXTROSE MONOHYDRATE 320 MG: 5 INJECTION, SOLUTION INTRAVENOUS at 12:00

## 2017-11-08 RX ADMIN — ERYTHROPOIETIN 20000 UNITS: 20000 INJECTION, SOLUTION INTRAVENOUS; SUBCUTANEOUS at 14:14

## 2017-11-08 RX ADMIN — ERYTHROPOIETIN 40000 UNITS: 40000 INJECTION, SOLUTION INTRAVENOUS; SUBCUTANEOUS at 14:14

## 2017-11-08 RX ADMIN — DEXAMETHASONE SODIUM PHOSPHATE 12 MG: 4 INJECTION, SOLUTION INTRA-ARTICULAR; INTRALESIONAL; INTRAMUSCULAR; INTRAVENOUS; SOFT TISSUE at 10:59

## 2017-11-08 RX ADMIN — FLUOROURACIL 4300 MG: 50 INJECTION, SOLUTION INTRAVENOUS at 14:10

## 2017-11-08 RX ADMIN — FLUOROURACIL 700 MG: 50 INJECTION, SOLUTION INTRAVENOUS at 14:07

## 2017-11-08 RX ADMIN — Medication 20 ML: at 10:53

## 2017-11-08 RX ADMIN — Medication 20 ML: at 14:16

## 2017-11-08 RX ADMIN — LEUCOVORIN CALCIUM 700 MG: 350 INJECTION, POWDER, LYOPHILIZED, FOR SOLUTION INTRAMUSCULAR; INTRAVENOUS at 12:00

## 2017-11-08 NOTE — PROGRESS NOTES
[facility] Progress Note    Date: 2017    Name: Khushi Michel              MRN: [id number]              : 1955    Folfiri: C6D1     Pt to [facility], ambulatory, at 1010. Ms. Manish Ortez was assessed and education was provided. Ms. Polk's vitals were reviewed. Visit Vitals    /82 (BP 1 Location: Right arm, BP Patient Position: Sitting)    Pulse 63    Temp 98.6 °F (37 °C)    Resp 18    Ht 5' (1.524 m)    Wt 80.4 kg (177 lb 4.8 oz)    SpO2 99%    Breastfeeding No    BMI 34.63 kg/m2       Right chest mediport accessed with 20 g 1 inch orozco needle. Port flushed easily and had brisk blood return. Blood drawn off and sent for CBC and BMP run on the Istat per written orders after 10 ml waste. Lab results were obtained and reviewed. Recent Results (from the past 12 hour(s))   CBC WITH 3 PART DIFF    Collection Time: 17 10:26 AM   Result Value Ref Range    WBC 24.7 (HH) 4.5 - 13.0 K/uL    RBC 2.73 (L) 4.10 - 5.10 M/uL    HGB 8.7 (L) 12.0 - 16 g/dL    HCT 27.2 (L) 36 - 48 %    MCV 99.6 78 - 102 FL    MCH 31.9 25.0 - 35.0 PG    MCHC 32.0 31 - 37 g/dL    RDW 18.7 (H) 11.5 - 14.5 %    PLATELET 080 264 - 006 K/uL    NEUTROPHILS 81 (H) 40 - 70 %    MIXED CELLS 6 0.1 - 17 %    LYMPHOCYTES 13 (L) 14 - 44 %    ABS. NEUTROPHILS 20.1 (H) 1.8 - 9.5 K/UL    ABS. MIXED CELLS 1.4 0.0 - 2.3 K/uL    ABS.  LYMPHOCYTES 3.2 1.1 - 5.9 K/UL    DF AUTOMATED     POC CHEM8    Collection Time: 17 10:32 AM   Result Value Ref Range    CO2, POC 23 19 - 24 MMOL/L    Glucose,  (H) 74 - 106 MG/DL    BUN, POC 25 (H) 7 - 18 MG/DL    Creatinine, POC 1.7 (H) 0.6 - 1.3 MG/DL    GFRAA, POC 37 (L) >60 ml/min/1.73m2    GFRNA, POC 30 (L) >60 ml/min/1.73m2    Sodium,  136 - 145 MMOL/L    Potassium, POC 3.4 (L) 3.5 - 5.5 MMOL/L    Calcium, ionized (POC) 1.29 1.12 - 1.32 MMOL/L    Chloride,  100 - 108 MMOL/L    Anion gap, POC 17 10 - 20      Hematocrit, POC 27 (L) 36 - 49 %    Hemoglobin, POC 9.2 (L) 12 - 16 G/DL     WBC= 24.7 and K=3.4. Patient receives Neulasta on her day 3 chemotherapy cycles. Her last Neulasta treatment was on 10/27/2017. Patient also states that she is currently taking 20 mEq of Potassium PO BID. Mirta Dasilva NP notified of the lab results, no new orders received at this time. Lab results within ordered parameters to give chemo today. ANC = 20.1, PLT = 331. Chemo dosages verified with today's BSA and found to be within 10% of ordered dosages. Pre-medications (Decadron 12mg IVPB and Aloxi 0.25mg IVP) were administered as ordered and chemotherapy was initiated after blood return from port re-verified. D5w started at [facility] per written order. Irinotecan 320 mg  was infused over 90 minutes concurrently with Leucovorin 700 mg. VS stable at end of infusion and pt denied complaints. Line flushed with D5 and blood return from port re-verified. Fluorouracil 400 mg was given slow IVP over 3 minutes. Line flushed with 10 mL NS and blood return was re-verified. Fluorouracil 4300 mg CADD pump was connected to patient. Connections were secured with tape and the volume was verified to be infusing. HGB= 8.7 and HCT= 27.2. Lab values are within range to receive her Procrit injection. Procrit 60,000 units administered SQ per written order in the back of the patients left arm. Band-aid applied to the site. Ms. Gilma Knapp tolerated infusion/injection. Patient states that she is feeling weak/tired and will go home and rest. Patient instructed to go to the ED or call the doctor if symptoms worsen. Patient Vitals for the past 12 hrs:   Temp Pulse Resp BP SpO2   11/08/17 1423 98.6 °F (37 °C) 63 18 146/82 99 %   11/08/17 1013 98.2 °F (36.8 °C) 60 18 136/72 100 %     Patient armband removed and shredded. Ms. Gilma Knapp was discharged from [place] [address] in stable condition at 1435.  She is to return on 11/10/2017 at 1300 for her next appointment for pump D/C.    Mary Hammond RN  November 8, 2017

## 2017-11-10 ENCOUNTER — HOSPITAL ENCOUNTER (OUTPATIENT)
Dept: INFUSION THERAPY | Age: 62
Discharge: HOME | End: 2017-11-10
Payer: MEDICARE

## 2017-11-10 VITALS
SYSTOLIC BLOOD PRESSURE: 155 MMHG | OXYGEN SATURATION: 100 % | HEART RATE: 65 BPM | RESPIRATION RATE: 18 BRPM | TEMPERATURE: 98 F | DIASTOLIC BLOOD PRESSURE: 85 MMHG

## 2017-11-10 PROCEDURE — 74011250636 HC RX REV CODE- 250/636: Performed by: INTERNAL MEDICINE

## 2017-11-10 PROCEDURE — 96377 APPLICATON ON-BODY INJECTOR: CPT

## 2017-11-10 RX ORDER — HEPARIN SODIUM (PORCINE) LOCK FLUSH IV SOLN 100 UNIT/ML 100 UNIT/ML
500 SOLUTION INTRAVENOUS AS NEEDED
Status: DISPENSED | OUTPATIENT
Start: 2017-11-10 | End: 2017-11-11

## 2017-11-10 RX ORDER — SODIUM CHLORIDE 0.9 % (FLUSH) 0.9 %
10-40 SYRINGE (ML) INJECTION AS NEEDED
Status: DISCONTINUED | OUTPATIENT
Start: 2017-11-10 | End: 2017-11-14 | Stop reason: HOSPADM

## 2017-11-10 RX ADMIN — PEGFILGRASTIM 6 MG: KIT SUBCUTANEOUS at 13:20

## 2017-11-10 NOTE — PROGRESS NOTES
[facility] Progress Note    Date: November 10, 2017    Name: Jd Sim    MRN: [id number]         : 1955    D/C pump    Ms. Polk arrived ambulatory to [facility] at 1305. She  was assessed and education was provided. Ms. Polk's vitals were reviewed and patient was observed for 5 minutes prior to procedure. Visit Vitals    /85 (BP 1 Location: Right arm, BP Patient Position: Sitting)    Pulse 65    Temp 98 °F (36.7 °C)    Resp 18    SpO2 100%     CADD pump completed infusion and removed from patient. Port flushed with 20 mL and blood drawn off for a CBC after a 10 ml waste. No results found for this or any previous visit (from the past 12 hour(s)). Port flushed with 20 mL and 500 units of heparin after blood return was verified. Band-aid applied. Neulasta 6 mg applied to the back of the right arm. Green light verified to be blinking. Ms. Shira Lunsford tolerated the procedure, and had no complaints. Ms. Shira Lunsford was discharged from [place] [address] in stable condition at 1330. She is to return on 17 at 0900 for her next appointment for chemo.     Lucinda Frank RN  November 10, 2017

## 2017-11-15 ENCOUNTER — HOSPITAL ENCOUNTER (OUTPATIENT)
Dept: ONCOLOGY | Age: 62
Discharge: HOME | End: 2017-11-15

## 2017-11-15 ENCOUNTER — OFFICE VISIT (OUTPATIENT)
Dept: ONCOLOGY | Age: 62
End: 2017-11-15

## 2017-11-15 VITALS
TEMPERATURE: 98.2 F | BODY MASS INDEX: 34.18 KG/M2 | DIASTOLIC BLOOD PRESSURE: 72 MMHG | SYSTOLIC BLOOD PRESSURE: 110 MMHG | WEIGHT: 175 LBS | HEART RATE: 68 BPM

## 2017-11-15 DIAGNOSIS — D70.1 CHEMOTHERAPY INDUCED NEUTROPENIA (HCC): ICD-10-CM

## 2017-11-15 DIAGNOSIS — T45.1X5A CHEMOTHERAPY INDUCED NEUTROPENIA (HCC): ICD-10-CM

## 2017-11-15 DIAGNOSIS — D64.81 ANTINEOPLASTIC CHEMOTHERAPY INDUCED ANEMIA: ICD-10-CM

## 2017-11-15 DIAGNOSIS — C78.6 PERITONEAL CARCINOMATOSIS (HCC): ICD-10-CM

## 2017-11-15 DIAGNOSIS — T45.1X5A ANTINEOPLASTIC CHEMOTHERAPY INDUCED ANEMIA: ICD-10-CM

## 2017-11-15 DIAGNOSIS — C18.9 COLON CANCER METASTASIZED TO MULTIPLE SITES (HCC): ICD-10-CM

## 2017-11-15 DIAGNOSIS — C19 COLORECTAL CARCINOMA (HCC): ICD-10-CM

## 2017-11-15 DIAGNOSIS — N18.30 ANEMIA IN STAGE 3 CHRONIC KIDNEY DISEASE (HCC): ICD-10-CM

## 2017-11-15 DIAGNOSIS — D50.0 IRON DEFICIENCY ANEMIA DUE TO CHRONIC BLOOD LOSS: ICD-10-CM

## 2017-11-15 DIAGNOSIS — C19 COLORECTAL CARCINOMA (HCC): Primary | ICD-10-CM

## 2017-11-15 DIAGNOSIS — D63.1 ANEMIA IN STAGE 3 CHRONIC KIDNEY DISEASE (HCC): ICD-10-CM

## 2017-11-15 LAB
BASO+EOS+MONOS # BLD AUTO: 1.6 K/UL (ref 0–2.3)
BASO+EOS+MONOS # BLD AUTO: 14 % (ref 0.1–17)
DIFFERENTIAL METHOD BLD: ABNORMAL
ERYTHROCYTE [DISTWIDTH] IN BLOOD BY AUTOMATED COUNT: 18.8 % (ref 11.5–14.5)
HCT VFR BLD AUTO: 26.5 % (ref 36–48)
HGB BLD-MCNC: 8.6 G/DL (ref 12–16)
LYMPHOCYTES # BLD: 2.1 K/UL (ref 1.1–5.9)
LYMPHOCYTES NFR BLD: 18 % (ref 14–44)
MCH RBC QN AUTO: 32.6 PG (ref 25–35)
MCHC RBC AUTO-ENTMCNC: 32.5 G/DL (ref 31–37)
MCV RBC AUTO: 100.4 FL (ref 78–102)
NEUTS SEG # BLD: 8.2 K/UL (ref 1.8–9.5)
NEUTS SEG NFR BLD: 69 % (ref 40–70)
PLATELET # BLD AUTO: 249 K/UL (ref 140–440)
RBC # BLD AUTO: 2.64 M/UL (ref 4.1–5.1)
WBC # BLD AUTO: 11.9 K/UL (ref 4.5–13)

## 2017-11-15 NOTE — MR AVS SNAPSHOT
Visit Information Date & Time Provider Department Dept. Phone Encounter #  
 11/15/2017 11:30 AM Nadya Niño MD [facility] Medical Oncology [phone] [id number] Follow-up Instructions Return in about 4 weeks (around 12/13/2017). Your Appointments 12/13/2017 11:45 AM  
Office Visit with Nadya Niño MD  
[address]  Oncology [facility]) Appt Note: 4 weeks [address], [address] [place] [address] Upcoming Health Maintenance Date Due Hepatitis C Screening 1955 DTaP/Tdap/Td series (1 - Tdap) 8/26/1974 PAP AKA CERVICAL CYTOLOGY 8/26/1974 BREAST CANCER SCRN MAMMOGRAM 8/26/2003 FOBT Q 1 YEAR AGE 50-75 8/26/2003 ZOSTER VACCINE AGE 60> 6/26/2013 Pneumococcal 19-64 Highest Risk (2 of 3 - PCV13) 3/1/2017 Influenza Age 5 to Adult 8/1/2017 Allergies as of 11/15/2017  Review Complete On: 11/10/2017 By: Ron Kamara RN Severity Noted Reaction Type Reactions Latex  08/04/2016    Other (comments) Lisinopril High 04/25/2017    Hives Asa-acetaminophen-caff-buffers  08/04/2016    Other (comments) Codeine  08/04/2016    Other (comments) Pcn [Penicillins]  08/04/2016    Other (comments) Sulfa (Sulfonamide Antibiotics)  08/04/2016    Other (comments) Current Immunizations  Reviewed on 11/10/2017 Name Date Influenza Vaccine 3/1/2016 Pneumococcal Vaccine (Unspecified Type) 3/1/2016 Not reviewed this visit You Were Diagnosed With   
  
 Codes Comments Colorectal carcinoma ([facility] [address]    -  Primary ICD-10-CM: C19 
ICD-9-CM: 154.0 Peritoneal carcinomatosis ([facility] [address]     ICD-10-CM: C78.6, C80.1 ICD-9-CM: 197.6, 199.1 Colon cancer metastasized to multiple sites [facility])     ICD-10-CM: C18.9 ICD-9-CM: 153.9 Antineoplastic chemotherapy induced anemia     ICD-10-CM: D64.81, T45.1X5A 
ICD-9-CM: 285.3, E933.1 Chemotherapy induced neutropenia (HCC)     ICD-10-CM: D70.1, T45.1X5A 
ICD-9-CM: 288.03, E933.1 Anemia in stage 3 chronic kidney disease     ICD-10-CM: N18.3, D63.1 ICD-9-CM: 285.21, 585.3 Iron deficiency anemia due to chronic blood loss     ICD-10-CM: D50.0 ICD-9-CM: 280.0 Vitals BP Pulse Temp Weight(growth percentile) BMI Smoking Status 110/72 (BP 1 Location: Right arm, BP Patient Position: Sitting) 68 98.2 °F (36.8 °C) (Oral) 175 lb (79.4 kg) 34.18 kg/m2 Never Smoker Vitals History BMI and BSA Data Body Mass Index Body Surface Area  
 34.18 kg/m 2 1.83 m 2 Preferred Pharmacy Pharmacy Name [facility] [address] [id number] [facility] Your Updated Medication List  
  
   
This list is accurate as of: 11/15/17 12:06 PM.  Always use your most recent med list.  
  
  
  
  
 acetaminophen 650 mg Tber Commonly known as:  TYLENOL  
1,300 mg.  
  
 albuterol 90 mcg/actuation inhaler Commonly known as:  PROVENTIL HFA, VENTOLIN HFA, PROAIR HFA  
2 Puffs. allopurinol 300 mg tablet Commonly known as:  ZYLOPRIM  
300 mg.  
  
 bisoprolol-hydroCHLOROthiazide 10-6.25 mg per tablet Commonly known as:  Count includes the [facility] Take 1 Tab by Mouth Once a Day. cloNIDine HCl 0.1 mg tablet Commonly known as:  CATAPRES  
0.1 mg.  
  
 colchicine 0.6 mg tablet  
0.6 mg.  
  
 desonide 0.05 % cream  
Commonly known as:  Shana Carte Use 1 Application to affected area Twice Daily. diphenhydrAMINE 25 mg capsule Commonly known as:  BENADRYL Take 1 with compazine every 6 hours for nausea for 3 days then, as needed. ergocalciferol 50,000 unit capsule Commonly known as:  ERGOCALCIFEROL  
50,000 Units. furosemide 20 mg tablet Commonly known as:  LASIX Take 1/2-1 tablet daily if needed for swelling. glimepiride 4 mg tablet Commonly known as:  AMARYL Take one in the am.  New dose. glipiZIDE 10 mg tablet Commonly known as:  David Sequeiraden 10 mg.  
  
 lidocaine-prilocaine topical cream  
Commonly known as:  EMLA Place cream over mediport 1 hour prior to chemotherapy and then place saran wrap over area. Every chemo treatment. NORVASC 5 mg tablet Generic drug:  amLODIPine Take 5 mg by mouth daily. ondansetron hcl 8 mg tablet Commonly known as:  ZOFRAN (AS HYDROCHLORIDE) Take one tablet by mouth every 8 hours for nausea beginning the night of chemo for 3 days. potassium chloride 20 mEq tablet Commonly known as:  K-DUR, KLOR-CON Take 1 Tab by mouth two (2) times a day. predniSONE 20 mg tablet Commonly known as:  Jojo Chill Take 2 Tabs by mouth daily. Take 2 tabs on days 2 and 3 of each chemo cycle  
  
 prochlorperazine 10 mg tablet Commonly known as:  COMPAZINE Take 1 tablet every 6 hours with Benadryl 25mg for nausea for 3 days then, as needed. warfarin 1 mg tablet Commonly known as:  COUMADIN Take one 1 tablet by mouth everyday at 6pm or after. You will continue to take this medication. Everyday until mediport is removed. We Performed the Following CEA [id number] CPT(R)] COMPLETE CBC & AUTO DIFF WBC [94621 CPT(R)] FERRITIN [98254 CPT(R)] IRON PROFILE [id number] CPT(R)] METABOLIC PANEL, COMPREHENSIVE [73427 CPT(R)] Follow-up Instructions Return in about 4 weeks (around 12/13/2017). To-Do List   
 11/15/2017 Lab:  CBC WITH 3 PART DIFF   
  
 11/22/2017 9:00 AM  
  Appointment with [facility] at [facility] ([phone])  
  
 11/24/2017 11:00 AM  
  Appointment with [facility]URSE [facility] at [facility] ([phone]) Patient Instructions Colon Cancer: Care Instructions Your Care Instructions Colon cancer occurs when abnormal cells grow out of control in the lower part of your intestine (your colon). If the tumor was small and had not spread, your doctor may have removed it during the colonoscopy. But you may need surgery to remove the cancer if the tumor was too big or had spread too far to be removed during a colonoscopy. If cancer has spread to another part of your body, such as the liver, you may need more far-reaching surgery. Treatment for colon cancer may also include radiation therapy and medicines that destroy cancer cells (chemotherapy). Being treated for cancer can weaken your body, and you may feel very tired. Get the rest your body needs so that you can feel better. When you find out that you have cancer, you may feel many emotions and may need some help coping. Seek out family, friends, and counselors for support. You also can do things at home to make yourself feel better while you go through treatment. Call the [employer] (6-260.756.7938) or visit its website at "[employer] for more information. Follow-up care is a key part of your treatment and safety. Be sure to make and go to all appointments, and call your doctor if you are having problems. It's also a good idea to know your test results and keep a list of the medicines you take. How can you care for yourself at home? · Take your medicines exactly as prescribed. Call your doctor if you think you are having a problem with your medicine. You may get medicine for nausea and vomiting if you have these side effects. · Follow your doctor's instructions to relieve pain. Pain from cancer and surgery can almost always be controlled. Use pain medicine when you first notice pain, before it becomes severe. · Eat healthy food. If you do not feel like eating, try to eat food that has protein and extra calories to keep up your strength and prevent weight loss. Drink liquid meal replacements for extra calories and protein. Try to eat your main meal early. · Get some physical activity every day, but do not get too tired. Keep doing the hobbies you enjoy as your energy allows. · Take steps to control your stress and workload. Learn relaxation techniques. ¨ Share your feelings. Stress and tension affect our emotions. By expressing your feelings to others, you may be able to understand and cope with them. ¨ Consider joining a support group. Talking about a problem with your spouse, a good friend, or other people with similar problems is a good way to reduce tension and stress. ¨ Express yourself through art. Try writing, dance, art, or crafts to relieve stress. Some dance, writing, or art groups may be available just for people who have cancer. ¨ Be kind to your body and mind. Getting enough sleep, eating a healthy diet, and taking time to do things you enjoy can contribute to an overall feeling of balance in your life and help reduce stress. ¨ Get help if you need it. Discuss your concerns with your doctor or counselor. · If you are vomiting or have diarrhea: ¨ Drink plenty of fluids (enough so that your urine is light yellow or clear like water) to prevent dehydration. Choose water and other caffeine-free clear liquids. If you have kidney, heart, or liver disease and have to limit fluids, talk with your doctor before you increase the amount of fluids you drink. ¨ When you are able to eat, try clear soups, mild foods, and liquids until all symptoms are gone for 12 to 48 hours. Other good choices include dry toast, crackers, cooked cereal, and gelatin dessert, such as Jell-O. · If you have not already done so, prepare a list of advance directives. Advance directives are instructions to your doctor and family members about what kind of care you want if you become unable to speak or express yourself. When should you call for help? Call 911 anytime you think you may need emergency care. For example, call if: 
? · You pass maroon or very bloody stools. ? · You passed out (lost consciousness). ?Call your doctor now or seek immediate medical care if: 
? · You have a fever. ? · You are vomiting. ? · You have new or worse belly pain. ? · You cannot pass stools or gas. ? · You have new or more blood in your stool. ? Watch closely for changes in your health, and be sure to contact your doctor if: 
? · You are losing weight. ? · You have new or worse symptoms. ? · You do not get better as expected. Where can you learn more? Go to http://ledy-toño.info/. Enter R776 in the search box to learn more about \"Colon Cancer: Care Instructions. \" Current as of: May 12, 2017 Content Version: 11.4 © 0421-9427 [employer]. Care instructions adapted under license by [employer] (which disclaims liability or warranty for this information). If you have questions about a medical condition or this instruction, always ask your healthcare professional. [address] any warranty or liability for your use of this information. Introducing [facility]! Kiki Anderson introduces [employer] patient portal. Now you can access parts of your medical record, email your doctor's office, and request medication refills online. 1. In your internet browser, go to [email or web address] 2. Click on the First Time User? Click Here link in the Sign In box. You will see the New Member Sign Up page. 3. Enter your [employer] Access Code exactly as it appears below. You will not need to use this code after youve completed the sign-up process. If you do not sign up before the expiration date, you must request a new code. · [employer] Access Code: [email or web address] Expires: 2/5/2018  9:35 AM 
 
4. Enter the last four digits of your Social Security Number (xxxx) and Date of Birth (mm/dd/yyyy) as indicated and click Submit. You will be taken to the next sign-up page. 5. Create a Tirendo ID. This will be your Ti[employer]do login ID and cannot be changed, so think of one that is secure and easy to remember. 6. Create a Ti[employer] password. You can change your password at any time. 7. Enter your Password Reset Question and Answer. This can be used at a later time if you forget your password. 8. Enter your e-mail address. You will receive e-mail notification when new information is available in [address] 9. Click Sign Up. You can now view and download portions of your medical record. 10. Click the Download Summary menu link to download a portable copy of your medical information. If you have questions, please visit the Frequently Asked Questions section of the [employer] website. Remember, [employer] is NOT to be used for urgent needs. For medical emergencies, dial 911. Now available from your iPhone and Android! Please provide this summary of care documentation to your next provider. Your primary care clinician is listed as July Cleary. If you have any questions after today's visit, please call [phone].

## 2017-11-15 NOTE — PATIENT INSTRUCTIONS
Colon Cancer: Care Instructions  Your Care Instructions    Colon cancer occurs when abnormal cells grow out of control in the lower part of your intestine (your colon). If the tumor was small and had not spread, your doctor may have removed it during the colonoscopy. But you may need surgery to remove the cancer if the tumor was too big or had spread too far to be removed during a colonoscopy. If cancer has spread to another part of your body, such as the liver, you may need more far-reaching surgery. Treatment for colon cancer may also include radiation therapy and medicines that destroy cancer cells (chemotherapy). Being treated for cancer can weaken your body, and you may feel very tired. Get the rest your body needs so that you can feel better. When you find out that you have cancer, you may feel many emotions and may need some help coping. Seek out family, friends, and counselors for support. You also can do things at home to make yourself feel better while you go through treatment. Call the [employer] (0-338.403.7017) or visit its website at [employer] for more information. Follow-up care is a key part of your treatment and safety. Be sure to make and go to all appointments, and call your doctor if you are having problems. It's also a good idea to know your test results and keep a list of the medicines you take. How can you care for yourself at home? · Take your medicines exactly as prescribed. Call your doctor if you think you are having a problem with your medicine. You may get medicine for nausea and vomiting if you have these side effects. · Follow your doctor's instructions to relieve pain. Pain from cancer and surgery can almost always be controlled. Use pain medicine when you first notice pain, before it becomes severe. · Eat healthy food. If you do not feel like eating, try to eat food that has protein and extra calories to keep up your strength and prevent weight loss. Drink liquid meal replacements for extra calories and protein. Try to eat your main meal early. · Get some physical activity every day, but do not get too tired. Keep doing the hobbies you enjoy as your energy allows. · Take steps to control your stress and workload. Learn relaxation techniques. ¨ Share your feelings. Stress and tension affect our emotions. By expressing your feelings to others, you may be able to understand and cope with them. ¨ Consider joining a support group. Talking about a problem with your spouse, a good friend, or other people with similar problems is a good way to reduce tension and stress. ¨ Express yourself through art. Try writing, dance, art, or crafts to relieve stress. Some dance, writing, or art groups may be available just for people who have cancer. ¨ Be kind to your body and mind. Getting enough sleep, eating a healthy diet, and taking time to do things you enjoy can contribute to an overall feeling of balance in your life and help reduce stress. ¨ Get help if you need it. Discuss your concerns with your doctor or counselor. · If you are vomiting or have diarrhea:  ¨ Drink plenty of fluids (enough so that your urine is light yellow or clear like water) to prevent dehydration. Choose water and other caffeine-free clear liquids. If you have kidney, heart, or liver disease and have to limit fluids, talk with your doctor before you increase the amount of fluids you drink. ¨ When you are able to eat, try clear soups, mild foods, and liquids until all symptoms are gone for 12 to 48 hours. Other good choices include dry toast, crackers, cooked cereal, and gelatin dessert, such as Jell-O.  · If you have not already done so, prepare a list of advance directives. Advance directives are instructions to your doctor and family members about what kind of care you want if you become unable to speak or express yourself. When should you call for help?   Call 911 anytime you think you may need emergency care. For example, call if:  ? · You pass maroon or very bloody stools. ? · You passed out (lost consciousness). ?Call your doctor now or seek immediate medical care if:  ? · You have a fever. ? · You are vomiting. ? · You have new or worse belly pain. ? · You cannot pass stools or gas. ? · You have new or more blood in your stool. ? Watch closely for changes in your health, and be sure to contact your doctor if:  ? · You are losing weight. ? · You have new or worse symptoms. ? · You do not get better as expected. Where can you learn more? Go to http://ledy-toño.info/. Enter C123 in the search box to learn more about \"Colon Cancer: Care Instructions. \"  Current as of: May 12, 2017  Content Version: 11.4  © 1408-1771 [employer]. Care instructions adapted under license by [employer] (which disclaims liability or warranty for this information). If you have questions about a medical condition or this instruction, always ask your healthcare professional. [address] any warranty or liability for your use of this information.

## 2017-11-15 NOTE — PROGRESS NOTES
Hematology/Oncology  Progress Note    Name: Rosy Loredo  Date: 11/15/2017  : 1955    PCP: Mary Wright NP     Ms. Richa Huff is a 57 y.o. -American woman with metastatic colorectal carcinoma/carcinomatosis  Current therapy: FOLFIRI chemotherapy regimen      Subjective:     Ms. Richa Huff is a 20-year-old -American woman with abdominal carcinomatosis from metastatic colorectal carcinoma. The patient was recently hospitalized with a small perforation in her large bowel. She recently underwent extensive surgery for her advanced/metastatic disease because of recurrent small bowel perforations and obstructive symptoms. She was started on FOLFIRI regimen and she is here today for follow up. She states that she has no pain, her appetite has increased and she also mentioned that her energy level is up. She reports having diarrhea for a few days following chemotherapy but denies having any constipation or blood in stools. Otherwise there are no additional complaints to report. Past medical history, family history, and social history: these were reviewed and remains unchanged. Past Medical History:   Diagnosis Date    Diabetes ([address]     Gout     Heart disease     Hypertension     Neuropathic pain of foot      Past Surgical History:   Procedure Laterality Date    HX BACK SURGERY      HX HYSTERECTOMY       Social History     Social History    Marital status:      Spouse name: N/A    Number of children: N/A    Years of education: N/A     Occupational History    Not on file.      Social History Main Topics    Smoking status: Never Smoker    Smokeless tobacco: Never Used    Alcohol use No    Drug use: No    Sexual activity: Not on file     Other Topics Concern    Not on file     Social History Narrative     Family History   Problem Relation Age of Onset    Family history unknown: Yes     Current Outpatient Prescriptions   Medication Sig Dispense Refill    potassium chloride (K-DUR, KLOR-CON) 20 mEq tablet Take 1 Tab by mouth two (2) times a day. 60 Tab 0    ondansetron hcl (ZOFRAN, AS HYDROCHLORIDE,) 8 mg tablet Take one tablet by mouth every 8 hours for nausea beginning the night of chemo for 3 days. 9 Tab 3    amLODIPine (NORVASC) 5 mg tablet Take 5 mg by mouth daily.  predniSONE (DELTASONE) 20 mg tablet Take 2 Tabs by mouth daily. Take 2 tabs on days 2 and 3 of each chemo cycle 4 Tab 2    diphenhydrAMINE (BENADRYL) 25 mg capsule Take 1 with compazine every 6 hours for nausea for 3 days then, as needed. 60 Cap 3    prochlorperazine (COMPAZINE) 10 mg tablet Take 1 tablet every 6 hours with Benadryl 25mg for nausea for 3 days then, as needed. 60 Tab 3    warfarin (COUMADIN) 1 mg tablet Take one 1 tablet by mouth everyday at 6pm or after. You will continue to take this medication. Everyday until mediport is removed. (Patient taking differently: 3 mg every Tuesday and Thursday. Take one 1 tablet by mouth everyday at 6pm or after. You will continue to take this medication. Everyday until mediport is removed.) 30 Tab 3    lidocaine-prilocaine (EMLA) topical cream Place cream over mediport 1 hour prior to chemotherapy and then place saran wrap over area. Every chemo treatment. 30 g 0    acetaminophen (TYLENOL) 650 mg CR tablet 1,300 mg.      albuterol (PROVENTIL HFA, VENTOLIN HFA, PROAIR HFA) 90 mcg/actuation inhaler 2 Puffs.  colchicine 0.6 mg tablet 0.6 mg.      ergocalciferol (ERGOCALCIFEROL) 50,000 unit capsule 50,000 Units.  furosemide (LASIX) 20 mg tablet Take 1/2-1 tablet daily if needed for swelling.  allopurinol (ZYLOPRIM) 300 mg tablet 300 mg.      desonide (TRIDESILON) 0.05 % cream Use 1 Application to affected area Twice Daily.  cloNIDine HCl (CATAPRES) 0.1 mg tablet 0.1 mg.      bisoprolol-hydrochlorothiazide (ZIAC) 10-6.25 mg per tablet Take 1 Tab by Mouth Once a Day.       glipiZIDE (GLUCOTROL) 10 mg tablet 10 mg.      glimepiride (AMARYL) 4 mg tablet Take one in the am.  New dose. Review of Systems  Constitutional: The patient denies acute distress or discomfort. HEENT: The patient denies recent head trauma, eye pain, blurred vision,  hearing deficit, oropharyngeal mucosal pain or lesions, and the patient denies throat pain or discomfort. Lymphatics: The patient denies palpable peripheral lymphadenopathy. Hematologic: The patient denies having bruising, bleeding, or progressive fatigue. Respiratory: Patient denies having shortness of breath, cough, sputum production, fever, or dyspnea on exertion. Cardiovascular: The patient denies having leg pain, leg swelling, heart palpitations, chest permit, chest pain, or lightheadedness. The patient denies having dyspnea on exertion. Gastrointestinal: The patient reports occasional nausea from chemotherapy which is well controlled with antinausea medication. She denies having any emesis or diarrhea. Genitourinary: Patient denies having urinary urgency, frequency, or dysuria. The patient denies having blood in the urine. Psychological: The patient denies having symptoms of nervousness, anxiety, depression, or thoughts of harming self. Skin: Patient denies having skin rashes, skin, ulcerations, or unexplained itching or pruritus. Musculoskeletal: The patient denies having pain in the joints or bones. Objective:     Visit Vitals    /72 (BP 1 Location: Right arm, BP Patient Position: Sitting)    Pulse 68    Temp 98.2 °F (36.8 °C) (Oral)    Wt 79.4 kg (175 lb)    BMI 34.18 kg/m2     ECOG PS=0; Pain score=0/10    Physical Exam:   Gen. Appearance: The patient is in no acute distress. Skin: There is no bruise or rash. HEENT: The exam is unremarkable. Neck: Supple without lymphadenopathy or thyromegaly. Lungs: Clear to auscultation and percussion; there are no wheezes or rhonchi. Heart: Regular rate and rhythm; there are no murmurs, gallops, or rubs. Abdomen:  Bowel sounds are present and normal.  There is no guarding, tenderness, or hepatosplenomegaly. The patient has a colostomy bag in place. Extremities: There is no clubbing, cyanosis, or edema. Neurologic: There are no focal neurologic deficits. Lymphatics: There is no palpable peripheral lymphadenopathy. Musculoskeletal: The patient has full range of motion at all joints. There is no evidence of joint deformity or effusions. There is no focal joint tenderness. Psychological/psychiatric: There is no clinical evidence of anxiety, depression, or melancholy. Lab data:      Results for orders placed or performed during the hospital encounter of 11/15/17   CBC WITH 3 PART DIFF     Status: Abnormal   Result Value Ref Range Status    WBC 11.9 4.5 - 13.0 K/uL Final    RBC 2.64 (L) 4.10 - 5.10 M/uL Final    HGB 8.6 (L) 12.0 - 16 g/dL Final    HCT 26.5 (L) 36 - 48 % Final    .4 78 - 102 FL Final    MCH 32.6 25.0 - 35.0 PG Final    MCHC 32.5 31 - 37 g/dL Final    RDW 18.8 (H) 11.5 - 14.5 % Final    PLATELET 112 601 - 244 K/uL Final    NEUTROPHILS 69 40 - 70 % Final    MIXED CELLS 14 0.1 - 17 % Final    LYMPHOCYTES 18 14 - 44 % Final    ABS. NEUTROPHILS 8.2 1.8 - 9.5 K/UL Final    ABS. MIXED CELLS 1.6 0.0 - 2.3 K/uL Final    ABS. LYMPHOCYTES 2.1 1.1 - 5.9 K/UL Final     Comment: Test performed at [address] Results Reviewed by Medical Director. DF AUTOMATED   Final           Assessment:     1. Colorectal carcinoma ([facility]    2. Peritoneal carcinomatosis ([facility]    3. Colon cancer metastasized to multiple sites ([facility]    4. Antineoplastic chemotherapy induced anemia    5. Chemotherapy induced neutropenia (HCC)    6. Anemia in stage 3 chronic kidney disease    7.  Iron deficiency anemia due to chronic blood loss      Plan:   Colorectal carcinoma with abdominal carcinomatosis: On 5/24/2017 the patient underwent colectomy with omentectomy with the findings of adenocarcinoma involving at least 80% of the omentum. The primary tumor appeared to originate in the transverse colon and measured approximately 5 cm. There was microscopic perforation of the colon and metastatic involvement into the left ovary. The tumor was pathologically staged as a T4 a N1M1 stage IV malignancy. The patient was started on FOLFIRI chemotherapy regimen on 08/30/17. The regimen is a combination of Irinotecan at 180 mg/m² on day 1, 5 fluorouracil given at a dose of 400 mg/m² by IV bolus on day 1 followed by 2400 mg/m² continuous infusion for 46 hours on days 1 and 2, leucovorin 400 mg/m² IV on day 1 as a 2 hour infusion prior to 5-fluorouracil. A comprehensive metabolic panel, CEA level, ferritin level, iron profile, and CBC will be ordered at this time. Currently, the patient is tolerating the treatment well and it will be continued. Her most recent CEA level from 10/4/2017 was 186.2 ng/mL. Anemia associated with chronic kidney disease and chemotherapy-induced anemia (persistent problem): I have explained to the patient that her CBC on today showed a hemoglobin of 8.6 g/dL with hematocrit of 26.5 %. Procrit at a dose of 60,000 units will be provided whenever her hemoglobin is below 10 g/dL hematocrit is below 30% every 2 weeks. The iron profile and ferritin levels will be ordered at this time. Chemotherapy-induced leukopenia/neutropenia (improved problem): The CBC on today showed a normal WBC count of 11.9 with an absolute neutrophil count of 8.2. This will be monitored on a regular basis. Neulasta following each chemo cycle will continue. The patient is at a significantly increased risk for the development of neutropenia and fever due to her advanced stage disease, low baseline hemoglobin level and renal dysfunction. She also has pre-existing neutropenia with a history of neutropenia with the use of the FOLFOX 6 regimen.   Additionally the patient has a very low albumin level of 3.5 g/dL and she has experienced myelosuppression from prior chemotherapy. I have explained to the patient that she will continue to received  Neulasta due to her increased risk for the development of neutropenia and fever. I will have the patient return to clinic for complete reassessment again in 4 weeks.     Orders Placed This Encounter    COMPLETE CBC & AUTO DIFF WBC    InHouse CBC ([facility])     Standing Status:   Future     Number of Occurrences:   1     Standing Expiration Date:   65/76/7412    METABOLIC PANEL, COMPREHENSIVE     Standing Status:   Future     Number of Occurrences:   1     Standing Expiration Date:   11/16/2018    IRON PROFILE     Standing Status:   Future     Number of Occurrences:   1     Standing Expiration Date:   11/16/2018    FERRITIN     Standing Status:   Future     Number of Occurrences:   1     Standing Expiration Date:   11/16/2018    CEA     Standing Status:   Future     Number of Occurrences:   1     Standing Expiration Date:   11/16/2018       Cayden Camejo MD  11/15/2017

## 2017-11-16 LAB
ALBUMIN SERPL-MCNC: 4 G/DL (ref 3.6–4.8)
ALBUMIN/GLOB SERPL: 1.5 {RATIO} (ref 1.2–2.2)
ALP SERPL-CCNC: 268 IU/L (ref 39–117)
ALT SERPL-CCNC: 81 IU/L (ref 0–32)
AST SERPL-CCNC: 21 IU/L (ref 0–40)
BILIRUB SERPL-MCNC: 0.3 MG/DL (ref 0–1.2)
BUN SERPL-MCNC: 42 MG/DL (ref 8–27)
BUN/CREAT SERPL: 26 (ref 12–28)
CALCIUM SERPL-MCNC: 9.1 MG/DL (ref 8.7–10.3)
CHLORIDE SERPL-SCNC: 108 MMOL/L (ref 96–106)
CO2 SERPL-SCNC: 16 MMOL/L (ref 18–29)
CREAT SERPL-MCNC: 1.63 MG/DL (ref 0.57–1)
FERRITIN SERPL-MCNC: 1064 NG/ML (ref 15–150)
GFR SERPLBLD CREATININE-BSD FMLA CKD-EPI: 33 ML/MIN/1.73
GFR SERPLBLD CREATININE-BSD FMLA CKD-EPI: 38 ML/MIN/1.73
GLOBULIN SER CALC-MCNC: 2.6 G/DL (ref 1.5–4.5)
GLUCOSE SERPL-MCNC: 116 MG/DL (ref 65–99)
IRON SATN MFR SERPL: 24 % (ref 15–55)
IRON SERPL-MCNC: 68 UG/DL (ref 27–139)
POTASSIUM SERPL-SCNC: 4.5 MMOL/L (ref 3.5–5.2)
PROT SERPL-MCNC: 6.6 G/DL (ref 6–8.5)
SODIUM SERPL-SCNC: 143 MMOL/L (ref 134–144)
TIBC SERPL-MCNC: 284 UG/DL (ref 250–450)
UIBC SERPL-MCNC: 216 UG/DL (ref 118–369)

## 2017-11-17 LAB
CEA SERPL-MCNC: 197.8 NG/ML (ref 0–4.7)
SPECIMEN STATUS REPORT, ROLRST: NORMAL

## 2017-11-20 RX ORDER — FLUOROURACIL 50 MG/ML
700 INJECTION, SOLUTION INTRAVENOUS ONCE
Status: COMPLETED | OUTPATIENT
Start: 2017-11-22 | End: 2017-11-22

## 2017-11-22 ENCOUNTER — HOSPITAL ENCOUNTER (OUTPATIENT)
Dept: INFUSION THERAPY | Age: 62
Discharge: HOME | End: 2017-11-22
Payer: MEDICARE

## 2017-11-22 VITALS
DIASTOLIC BLOOD PRESSURE: 83 MMHG | OXYGEN SATURATION: 100 % | WEIGHT: 178.9 LBS | RESPIRATION RATE: 18 BRPM | HEIGHT: 60 IN | TEMPERATURE: 98.4 F | SYSTOLIC BLOOD PRESSURE: 156 MMHG | HEART RATE: 71 BPM | BODY MASS INDEX: 35.12 KG/M2

## 2017-11-22 LAB
ANION GAP BLD CALC-SCNC: 16 MMOL/L (ref 10–20)
BASO+EOS+MONOS # BLD AUTO: 1.2 K/UL (ref 0–2.3)
BASO+EOS+MONOS # BLD AUTO: 7 % (ref 0.1–17)
BUN BLD-MCNC: 34 MG/DL (ref 7–18)
CA-I BLD-MCNC: 1.29 MMOL/L (ref 1.12–1.32)
CHLORIDE BLD-SCNC: 108 MMOL/L (ref 100–108)
CO2 BLD-SCNC: 22 MMOL/L (ref 19–24)
CREAT UR-MCNC: 2.3 MG/DL (ref 0.6–1.3)
DIFFERENTIAL METHOD BLD: ABNORMAL
ERYTHROCYTE [DISTWIDTH] IN BLOOD BY AUTOMATED COUNT: 20.4 % (ref 11.5–14.5)
GLUCOSE BLD STRIP.AUTO-MCNC: 98 MG/DL (ref 74–106)
HCT VFR BLD AUTO: 28.6 % (ref 36–48)
HCT VFR BLD CALC: 30 % (ref 36–49)
HGB BLD-MCNC: 10.2 G/DL (ref 12–16)
HGB BLD-MCNC: 9.3 G/DL (ref 12–16)
LYMPHOCYTES # BLD: 3 K/UL (ref 1.1–5.9)
LYMPHOCYTES NFR BLD: 17 % (ref 14–44)
MCH RBC QN AUTO: 33.7 PG (ref 25–35)
MCHC RBC AUTO-ENTMCNC: 32.5 G/DL (ref 31–37)
MCV RBC AUTO: 103.6 FL (ref 78–102)
NEUTS SEG # BLD: 13.4 K/UL (ref 1.8–9.5)
NEUTS SEG NFR BLD: 76 % (ref 40–70)
PLATELET # BLD AUTO: 346 K/UL (ref 140–440)
POTASSIUM BLD-SCNC: 3.7 MMOL/L (ref 3.5–5.5)
RBC # BLD AUTO: 2.76 M/UL (ref 4.1–5.1)
SODIUM BLD-SCNC: 141 MMOL/L (ref 136–145)
WBC # BLD AUTO: 17.6 K/UL (ref 4.5–13)

## 2017-11-22 PROCEDURE — 96415 CHEMO IV INFUSION ADDL HR: CPT

## 2017-11-22 PROCEDURE — 96375 TX/PRO/DX INJ NEW DRUG ADDON: CPT

## 2017-11-22 PROCEDURE — 74011250636 HC RX REV CODE- 250/636

## 2017-11-22 PROCEDURE — 85025 COMPLETE CBC W/AUTO DIFF WBC: CPT | Performed by: INTERNAL MEDICINE

## 2017-11-22 PROCEDURE — 74011250636 HC RX REV CODE- 250/636: Performed by: INTERNAL MEDICINE

## 2017-11-22 PROCEDURE — 96372 THER/PROPH/DIAG INJ SC/IM: CPT

## 2017-11-22 PROCEDURE — 77030012965 HC NDL HUBR BBMI -A

## 2017-11-22 PROCEDURE — 96413 CHEMO IV INFUSION 1 HR: CPT

## 2017-11-22 PROCEDURE — 96367 TX/PROPH/DG ADDL SEQ IV INF: CPT

## 2017-11-22 PROCEDURE — 74011000258 HC RX REV CODE- 258: Performed by: INTERNAL MEDICINE

## 2017-11-22 PROCEDURE — 80047 BASIC METABLC PNL IONIZED CA: CPT

## 2017-11-22 PROCEDURE — 74011250636 HC RX REV CODE- 250/636: Performed by: NURSE PRACTITIONER

## 2017-11-22 RX ORDER — PALONOSETRON 0.05 MG/ML
0.25 INJECTION, SOLUTION INTRAVENOUS ONCE
Status: COMPLETED | OUTPATIENT
Start: 2017-11-22 | End: 2017-11-22

## 2017-11-22 RX ORDER — SODIUM CHLORIDE 0.9 % (FLUSH) 0.9 %
10-40 SYRINGE (ML) INJECTION AS NEEDED
Status: DISCONTINUED | OUTPATIENT
Start: 2017-11-22 | End: 2017-11-26 | Stop reason: HOSPADM

## 2017-11-22 RX ORDER — DEXTROSE MONOHYDRATE 50 MG/ML
250 INJECTION, SOLUTION INTRAVENOUS ONCE
Status: COMPLETED | OUTPATIENT
Start: 2017-11-22 | End: 2017-11-22

## 2017-11-22 RX ORDER — ATROPINE SULFATE 1 MG/ML
0.4 INJECTION, SOLUTION INTRAVENOUS
Status: ACTIVE | OUTPATIENT
Start: 2017-11-22 | End: 2017-11-22

## 2017-11-22 RX ADMIN — DEXAMETHASONE SODIUM PHOSPHATE 12 MG: 4 INJECTION, SOLUTION INTRA-ARTICULAR; INTRALESIONAL; INTRAMUSCULAR; INTRAVENOUS; SOFT TISSUE at 09:57

## 2017-11-22 RX ADMIN — DEXTROSE MONOHYDRATE 320 MG: 5 INJECTION, SOLUTION INTRAVENOUS at 10:50

## 2017-11-22 RX ADMIN — ERYTHROPOIETIN 20000 UNITS: 20000 INJECTION, SOLUTION INTRAVENOUS; SUBCUTANEOUS at 10:07

## 2017-11-22 RX ADMIN — DEXTROSE MONOHYDRATE 250 ML: 5 INJECTION, SOLUTION INTRAVENOUS at 10:30

## 2017-11-22 RX ADMIN — Medication 20 ML: at 09:52

## 2017-11-22 RX ADMIN — ERYTHROPOIETIN 40000 UNITS: 40000 INJECTION, SOLUTION INTRAVENOUS; SUBCUTANEOUS at 10:07

## 2017-11-22 RX ADMIN — LEUCOVORIN CALCIUM 700 MG: 350 INJECTION, POWDER, LYOPHILIZED, FOR SOLUTION INTRAMUSCULAR; INTRAVENOUS at 10:50

## 2017-11-22 RX ADMIN — FLUOROURACIL 700 MG: 50 INJECTION, SOLUTION INTRAVENOUS at 12:50

## 2017-11-22 RX ADMIN — PALONOSETRON HYDROCHLORIDE 0.25 MG: 0.25 INJECTION INTRAVENOUS at 09:52

## 2017-11-22 RX ADMIN — FLUOROURACIL 4300 MG: 50 INJECTION, SOLUTION INTRAVENOUS at 13:00

## 2017-11-22 NOTE — PROGRESS NOTES
[facility] Progress Note    Date: 2017    Name: Jd Sim              MRN: [id number]              : 1955    Folfiri: [id number]     Pt to [facility], ambulatory, at 0900. Ms. Shira Lunsford was assessed and education was provided. Ms. Polk's vitals were reviewed. Visit Vitals    /83 (BP 1 Location: Left arm, BP Patient Position: Sitting)    Pulse 71    Temp 98.4 °F (36.9 °C)    Resp 18    Ht 5' (1.524 m)    Wt 81.1 kg (178 lb 14.4 oz)    SpO2 100%    BMI 34.94 kg/m2       Right chest mediport accessed with 20 g 1 inch orozco needle. Port flushed easily and had brisk blood return. Blood drawn off and sent for CBC and BMP run on the Istat per written orders after 10 ml waste. Lab results were obtained and reviewed. Recent Results (from the past 12 hour(s))   CBC WITH 3 PART DIFF    Collection Time: 17  9:20 AM   Result Value Ref Range    WBC 17.6 (H) 4.5 - 13.0 K/uL    RBC 2.76 (L) 4.10 - 5.10 M/uL    HGB 9.3 (L) 12.0 - 16 g/dL    HCT 28.6 (L) 36 - 48 %    .6 (H) 78 - 102 FL    MCH 33.7 25.0 - 35.0 PG    MCHC 32.5 31 - 37 g/dL    RDW 20.4 (H) 11.5 - 14.5 %    PLATELET 657 411 - 030 K/uL    NEUTROPHILS 76 (H) 40 - 70 %    MIXED CELLS 7 0.1 - 17 %    LYMPHOCYTES 17 14 - 44 %    ABS. NEUTROPHILS 13.4 (H) 1.8 - 9.5 K/UL    ABS. MIXED CELLS 1.2 0.0 - 2.3 K/uL    ABS.  LYMPHOCYTES 3.0 1.1 - 5.9 K/UL    DF AUTOMATED     POC CHEM8    Collection Time: 17  9:25 AM   Result Value Ref Range    CO2, POC 22 19 - 24 MMOL/L    Glucose, POC 98 74 - 106 MG/DL    BUN, POC 34 (H) 7 - 18 MG/DL    Creatinine, POC 2.3 (H) 0.6 - 1.3 MG/DL    GFRAA, POC 26 (L) >60 ml/min/1.73m2    GFRNA, POC 21 (L) >60 ml/min/1.73m2    Sodium,  136 - 145 MMOL/L    Potassium, POC 3.7 3.5 - 5.5 MMOL/L    Calcium, ionized (POC) 1.29 1.12 - 1.32 MMOL/L    Chloride,  100 - 108 MMOL/L    Anion gap, POC 16 10 - 20      Hematocrit, POC 30 (L) 36 - 49 %    Hemoglobin, POC 10.2 (L) 12 - 16 G/DL Lab results within ordered parameters to give chemo today. ANC = 13.4, PLT = 346. Chemo dosages verified with today's BSA and found to be within 10% of ordered dosages. Pre-medications (Decadron 12mg IVPB and Aloxi 0.25mg IVP) were administered as ordered and chemotherapy was initiated after blood return from port re-verified. D5w started at [facility] per written order. Irinotecan 320 mg  was infused over 90 minutes concurrently with Leucovorin 700 mg. VS stable at end of infusion and pt denied complaints. Line flushed with D5 and blood return from port re-verified. Fluorouracil 400 mg was given slow IVP over 3 minutes. Line flushed with 10 mL NS and blood return was re-verified. Fluorouracil 4300 mg CADD pump was connected to patient. Connections were secured with tape and the volume was verified to be infusing. HGB= 9.3 and HCT= 28.6. Lab values are within range to receive her Procrit injection. Procrit 60,000 units administered SQ per written order in the back of the patients left arm. Band-aid applied to the site. Ms. Jak Silverio tolerated infusion/injection. Patient Vitals for the past 12 hrs:   Temp Pulse Resp BP SpO2   11/22/17 1304 98.4 °F (36.9 °C) 71 18 156/83 100 %   11/22/17 0905 98.7 °F (37.1 °C) 80 18 154/83 100 %     Patient armband removed and shredded. Ms. Jak Silverio was discharged from [place] [address] in stable condition at . She is to return on 11/24/2017 at 1100 for her next appointment for pump D/C.     Yanelis Sethi RN  November 22, 2017

## 2017-11-24 ENCOUNTER — HOSPITAL ENCOUNTER (OUTPATIENT)
Dept: INFUSION THERAPY | Age: 62
Discharge: HOME | End: 2017-11-24
Payer: MEDICARE

## 2017-11-24 VITALS
HEART RATE: 75 BPM | DIASTOLIC BLOOD PRESSURE: 82 MMHG | SYSTOLIC BLOOD PRESSURE: 149 MMHG | TEMPERATURE: 98.8 F | RESPIRATION RATE: 18 BRPM | OXYGEN SATURATION: 100 %

## 2017-11-24 RX ORDER — HEPARIN SODIUM (PORCINE) LOCK FLUSH IV SOLN 100 UNIT/ML 100 UNIT/ML
500 SOLUTION INTRAVENOUS AS NEEDED
Status: ACTIVE | OUTPATIENT
Start: 2017-11-24 | End: 2017-11-25

## 2017-11-24 RX ORDER — SODIUM CHLORIDE 0.9 % (FLUSH) 0.9 %
10-40 SYRINGE (ML) INJECTION AS NEEDED
Status: DISCONTINUED | OUTPATIENT
Start: 2017-11-24 | End: 2017-11-28 | Stop reason: HOSPADM

## 2017-11-24 RX ORDER — SODIUM CHLORIDE 0.9 % (FLUSH) 0.9 %
10-40 SYRINGE (ML) INJECTION AS NEEDED
Status: CANCELLED | OUTPATIENT
Start: 2017-11-24

## 2017-11-24 RX ORDER — HEPARIN SODIUM (PORCINE) LOCK FLUSH IV SOLN 100 UNIT/ML 100 UNIT/ML
500 SOLUTION INTRAVENOUS AS NEEDED
Status: CANCELLED | OUTPATIENT
Start: 2017-11-24 | End: 2017-11-25

## 2017-11-24 RX ADMIN — HEPARIN SODIUM (PORCINE) LOCK FLUSH IV SOLN 100 UNIT/ML 500 UNITS: 100 SOLUTION at 11:17

## 2017-11-24 RX ADMIN — Medication 20 ML: at 11:16

## 2017-11-24 RX ADMIN — PEGFILGRASTIM 6 MG: KIT SUBCUTANEOUS at 11:10

## 2017-11-24 NOTE — PROGRESS NOTES
[facility] Progress Note    Date: 2017    Name: Neyda Lees    MRN: [id number]         : 1955      Ms. Polk was assessed and education was provided. Patient arrived ambulatory in [facility] for pump removal and neulasta OBI. Denies any nausea vomiting fever chills constipation diarrhea or decrease po intake. Patient encouraged use of gloves, scarf and hat for colder temperatures. Verbalizes understanding. Ms. Polk's vitals were reviewed and patient was observed for 5 minutes prior to treatment. Visit Vitals    /82 (BP 1 Location: Left arm, BP Patient Position: Sitting)    Pulse 75    Temp 98.8 °F (37.1 °C)    Resp 18    SpO2 100%       CADD pump DC 'd. Mediport flushed and Dc'd    Neulasta OBI  was administered subcutaneous to left arm, instructions reinforced with patient at this time. Ms. Eleonora Seals tolerated well, and had no complaints. Patient armband removed and shredded. Ms. Eleonora Seals was discharged from [place] [address] in stable condition at 1120. She is to return on 2017 at 0800 for her next appointment.     Adenike Reyna RN  2017  11:27 AM

## 2017-12-04 RX ORDER — FLUOROURACIL 50 MG/ML
700 INJECTION, SOLUTION INTRAVENOUS ONCE
Status: COMPLETED | OUTPATIENT
Start: 2017-12-06 | End: 2017-12-06

## 2017-12-06 ENCOUNTER — HOSPITAL ENCOUNTER (OUTPATIENT)
Dept: INFUSION THERAPY | Age: 62
Discharge: HOME | End: 2017-12-06
Payer: MEDICARE

## 2017-12-06 VITALS
HEIGHT: 60 IN | TEMPERATURE: 98.5 F | SYSTOLIC BLOOD PRESSURE: 167 MMHG | BODY MASS INDEX: 35.89 KG/M2 | WEIGHT: 182.8 LBS | HEART RATE: 68 BPM | RESPIRATION RATE: 16 BRPM | DIASTOLIC BLOOD PRESSURE: 90 MMHG

## 2017-12-06 LAB
ANION GAP BLD CALC-SCNC: 14 MMOL/L (ref 10–20)
BASO+EOS+MONOS # BLD AUTO: 1.3 K/UL (ref 0–2.3)
BASO+EOS+MONOS # BLD AUTO: 8 % (ref 0.1–17)
BUN BLD-MCNC: 26 MG/DL (ref 7–18)
CA-I BLD-MCNC: 1.26 MMOL/L (ref 1.12–1.32)
CHLORIDE BLD-SCNC: 111 MMOL/L (ref 100–108)
CO2 BLD-SCNC: 23 MMOL/L (ref 19–24)
CREAT UR-MCNC: 2 MG/DL (ref 0.6–1.3)
DIFFERENTIAL METHOD BLD: ABNORMAL
ERYTHROCYTE [DISTWIDTH] IN BLOOD BY AUTOMATED COUNT: 18.6 % (ref 11.5–14.5)
GLUCOSE BLD STRIP.AUTO-MCNC: 136 MG/DL (ref 74–106)
HCT VFR BLD AUTO: 29.1 % (ref 36–48)
HCT VFR BLD CALC: 31 % (ref 36–49)
HGB BLD-MCNC: 10.5 G/DL (ref 12–16)
HGB BLD-MCNC: 9.2 G/DL (ref 12–16)
LYMPHOCYTES # BLD: 2 K/UL (ref 1.1–5.9)
LYMPHOCYTES NFR BLD: 12 % (ref 14–44)
MCH RBC QN AUTO: 33.5 PG (ref 25–35)
MCHC RBC AUTO-ENTMCNC: 31.6 G/DL (ref 31–37)
MCV RBC AUTO: 105.8 FL (ref 78–102)
NEUTS SEG # BLD: 12.7 K/UL (ref 1.8–9.5)
NEUTS SEG NFR BLD: 80 % (ref 40–70)
PLATELET # BLD AUTO: 305 K/UL (ref 140–440)
POTASSIUM BLD-SCNC: 3.9 MMOL/L (ref 3.5–5.5)
RBC # BLD AUTO: 2.75 M/UL (ref 4.1–5.1)
SODIUM BLD-SCNC: 142 MMOL/L (ref 136–145)
WBC # BLD AUTO: 16 K/UL (ref 4.5–13)

## 2017-12-06 PROCEDURE — 74011000258 HC RX REV CODE- 258: Performed by: INTERNAL MEDICINE

## 2017-12-06 PROCEDURE — 74011250636 HC RX REV CODE- 250/636: Performed by: INTERNAL MEDICINE

## 2017-12-06 PROCEDURE — 74011250636 HC RX REV CODE- 250/636

## 2017-12-06 PROCEDURE — 85025 COMPLETE CBC W/AUTO DIFF WBC: CPT | Performed by: INTERNAL MEDICINE

## 2017-12-06 PROCEDURE — 96413 CHEMO IV INFUSION 1 HR: CPT

## 2017-12-06 PROCEDURE — 80047 BASIC METABLC PNL IONIZED CA: CPT

## 2017-12-06 PROCEDURE — 96368 THER/DIAG CONCURRENT INF: CPT

## 2017-12-06 PROCEDURE — 99211 OFF/OP EST MAY X REQ PHY/QHP: CPT

## 2017-12-06 PROCEDURE — 96416 CHEMO PROLONG INFUSE W/PUMP: CPT

## 2017-12-06 PROCEDURE — 96415 CHEMO IV INFUSION ADDL HR: CPT

## 2017-12-06 PROCEDURE — 77030012965 HC NDL HUBR BBMI -A

## 2017-12-06 PROCEDURE — 96375 TX/PRO/DX INJ NEW DRUG ADDON: CPT

## 2017-12-06 PROCEDURE — 96367 TX/PROPH/DG ADDL SEQ IV INF: CPT

## 2017-12-06 PROCEDURE — 96411 CHEMO IV PUSH ADDL DRUG: CPT

## 2017-12-06 RX ORDER — SODIUM CHLORIDE 0.9 % (FLUSH) 0.9 %
10-40 SYRINGE (ML) INJECTION AS NEEDED
Status: DISCONTINUED | OUTPATIENT
Start: 2017-12-06 | End: 2017-12-10 | Stop reason: HOSPADM

## 2017-12-06 RX ORDER — SODIUM CHLORIDE 9 MG/ML
250 INJECTION, SOLUTION INTRAVENOUS ONCE
Status: COMPLETED | OUTPATIENT
Start: 2017-12-06 | End: 2017-12-06

## 2017-12-06 RX ORDER — PALONOSETRON 0.05 MG/ML
0.25 INJECTION, SOLUTION INTRAVENOUS ONCE
Status: COMPLETED | OUTPATIENT
Start: 2017-12-06 | End: 2017-12-06

## 2017-12-06 RX ORDER — ATROPINE SULFATE 1 MG/ML
0.4 INJECTION, SOLUTION INTRAVENOUS
Status: ACTIVE | OUTPATIENT
Start: 2017-12-06 | End: 2017-12-06

## 2017-12-06 RX ADMIN — Medication 10 ML: at 14:40

## 2017-12-06 RX ADMIN — DEXTROSE MONOHYDRATE 320 MG: 5 INJECTION, SOLUTION INTRAVENOUS at 12:30

## 2017-12-06 RX ADMIN — ERYTHROPOIETIN 20000 UNITS: 20000 INJECTION, SOLUTION INTRAVENOUS; SUBCUTANEOUS at 14:00

## 2017-12-06 RX ADMIN — FLUOROURACIL 700 MG: 50 INJECTION, SOLUTION INTRAVENOUS at 14:30

## 2017-12-06 RX ADMIN — DEXAMETHASONE SODIUM PHOSPHATE 12 MG: 4 INJECTION, SOLUTION INTRA-ARTICULAR; INTRALESIONAL; INTRAMUSCULAR; INTRAVENOUS; SOFT TISSUE at 11:35

## 2017-12-06 RX ADMIN — Medication 10 ML: at 11:25

## 2017-12-06 RX ADMIN — LEUCOVORIN CALCIUM 700 MG: 350 INJECTION, POWDER, LYOPHILIZED, FOR SOLUTION INTRAMUSCULAR; INTRAVENOUS at 12:30

## 2017-12-06 RX ADMIN — PALONOSETRON HYDROCHLORIDE 0.25 MG: 0.25 INJECTION INTRAVENOUS at 11:33

## 2017-12-06 RX ADMIN — FLUOROURACIL 4300 MG: 50 INJECTION, SOLUTION INTRAVENOUS at 14:40

## 2017-12-06 RX ADMIN — ERYTHROPOIETIN 40000 UNITS: 40000 INJECTION, SOLUTION INTRAVENOUS; SUBCUTANEOUS at 14:00

## 2017-12-06 RX ADMIN — Medication 30 ML: at 10:10

## 2017-12-06 RX ADMIN — SODIUM CHLORIDE 250 ML: 900 INJECTION, SOLUTION INTRAVENOUS at 11:25

## 2017-12-06 NOTE — PROGRESS NOTES
[facility] Progress Note    Date: 2017    Name: Luna Santos    MRN: [id number]         : 1955      Ms. Polk arrived in the [facility] today, at 1010, in stable condition, here for Cycle 8, Day 1, IV FOLFIRI Chemotherapy Regimen, and Q 2 Week CBC/Procrit injection. She was assessed and education was provided. Ms. Polk's vitals were reviewed. Visit Vitals    /82 (BP 1 Location: Right arm, BP Patient Position: At rest;Sitting)    Pulse 77    Temp 98.4 °F (36.9 °C)    Resp 16    Ht 5' (1.524 m)    Wt 82.9 kg (182 lb 12.8 oz)    Breastfeeding No    BMI 35.7 kg/m2           Her right chest single lumen port was accessed without incident at 1030, and blood for a CBC & BMP was drawn, per order. The CBC was processed on the Sysmex analyzer, and the BMP was processed on the I-STAT analyzer. Lab results were obtained and reviewed, and the results listed below, were reported to Jasson Merritt NP (especially the elevated WBC Count & Creatinine Count)  Order was received from [address] [place] [address], to proceed with chemotherapy today as planned. Recent Results (from the past 12 hour(s))   CBC WITH 3 PART DIFF    Collection Time: 17 10:30 AM   Result Value Ref Range    WBC 16.0 (H) 4.5 - 13.0 K/uL    RBC 2.75 (L) 4.10 - 5.10 M/uL    HGB 9.2 (L) 12.0 - 16 g/dL    HCT 29.1 (L) 36 - 48 %    .8 (H) 78 - 102 FL    MCH 33.5 25.0 - 35.0 PG    MCHC 31.6 31 - 37 g/dL    RDW 18.6 (H) 11.5 - 14.5 %    PLATELET 670 794 - 279 K/uL    NEUTROPHILS 80 (H) 40 - 70 %    MIXED CELLS 8 0.1 - 17 %    LYMPHOCYTES 12 (L) 14 - 44 %    ABS. NEUTROPHILS 12.7 (H) 1.8 - 9.5 K/UL    ABS. MIXED CELLS 1.3 0.0 - 2.3 K/uL    ABS.  LYMPHOCYTES 2.0 1.1 - 5.9 K/UL    DF AUTOMATED     POC CHEM8    Collection Time: 17 10:37 AM   Result Value Ref Range    CO2, POC 23 19 - 24 MMOL/L    Glucose,  (H) 74 - 106 MG/DL    BUN, POC 26 (H) 7 - 18 MG/DL    Creatinine, POC 2.0 (H) 0.6 - 1.3 MG/DL    GFRAA, POC 30 (L) >60 ml/min/1.73m2    GFRNA, POC 25 (L) >60 ml/min/1.73m2    Sodium,  136 - 145 MMOL/L    Potassium, POC 3.9 3.5 - 5.5 MMOL/L    Calcium, ionized (POC) 1.26 1.12 - 1.32 MMOL/L    Chloride,  (H) 100 - 108 MMOL/L    Anion gap, POC 14 10 - 20      Hematocrit, POC 31 (L) 36 - 49 %    Hemoglobin, POC 10.5 (L) 12 - 16 G/DL             Procrit 60,000 units, was administered SQ, in her left arm, at 1400, as ordered, and without incident, for HGB 9.2 & HCT 29.1. (The H&H from the Sysmex analyzer was used to determine whether or not Procrit injection was indicated. )          Pre-medications consisting of IV Aloxi 0.25 mg, and IV Decadron 12 mg, were administered pre-chemotherapy, per order, and without incident. Camptosar (Irinotecan) 320 mg IV, was administered concurrently with Leucovorin 700 mg IV, both over approximately 90 minutes, and without incident. 5FU IVP Bolus, 700 mg, was administered per order, and without incident. 5FU 4300 mg IV, via CADD Pump, was initiated at 1440, and was set to infuse over 46 hours, per order, and without incident. (250 ml CADD Cassette was set to infuse at 5.4 ml/hr)        Dressing over the orozco needle remained dry and intact, and at time of discharge, the CADD pump was infusing, without incident. Ms. Dewayne Owens tolerated well, and had no complaints. Ms. Dewayne Owens was discharged from [place] [address] in stable condition at 1450. Kae Gallegos She is to return on Friday, 12-8-17,  at 1300,  for her next appointment, to have the CADD pump removed and to receive Neulasta. And then, day 1, of her next cycle of FOLFIRI Chemotherapy, and CBC/Procrit injection, will be scheduled in 2 weeks, on  Monday, 12-20-17.     Susana Munoz RN  December 6, 2017  1:07 PM

## 2017-12-08 ENCOUNTER — HOSPITAL ENCOUNTER (OUTPATIENT)
Dept: INFUSION THERAPY | Age: 62
Discharge: HOME | End: 2017-12-08
Payer: MEDICARE

## 2017-12-08 VITALS
OXYGEN SATURATION: 98 % | TEMPERATURE: 98.8 F | HEART RATE: 71 BPM | SYSTOLIC BLOOD PRESSURE: 141 MMHG | DIASTOLIC BLOOD PRESSURE: 78 MMHG | RESPIRATION RATE: 18 BRPM

## 2017-12-08 PROCEDURE — 96377 APPLICATON ON-BODY INJECTOR: CPT

## 2017-12-08 PROCEDURE — 74011250636 HC RX REV CODE- 250/636: Performed by: INTERNAL MEDICINE

## 2017-12-08 RX ORDER — SODIUM CHLORIDE 0.9 % (FLUSH) 0.9 %
10-40 SYRINGE (ML) INJECTION AS NEEDED
Status: DISCONTINUED | OUTPATIENT
Start: 2017-12-08 | End: 2017-12-12 | Stop reason: HOSPADM

## 2017-12-08 RX ORDER — HEPARIN SODIUM (PORCINE) LOCK FLUSH IV SOLN 100 UNIT/ML 100 UNIT/ML
500 SOLUTION INTRAVENOUS AS NEEDED
Status: ACTIVE | OUTPATIENT
Start: 2017-12-08 | End: 2017-12-09

## 2017-12-08 RX ADMIN — Medication 20 ML: at 13:33

## 2017-12-08 RX ADMIN — HEPARIN SODIUM (PORCINE) LOCK FLUSH IV SOLN 100 UNIT/ML 500 UNITS: 100 SOLUTION at 13:33

## 2017-12-08 RX ADMIN — PEGFILGRASTIM 6 MG: KIT SUBCUTANEOUS at 13:28

## 2017-12-08 NOTE — PROGRESS NOTES
Name: Cassandra Pratt     MRN: [id number]                                                                                                               : 1955     D/C pump     Ms. Polk arrived ambulatory to [facility] at 1315. She  was assessed and education was provided.     Ms. Polk's vitals were reviewed and patient was observed for 5 minutes prior to procedure. Patient Vitals for the past 12 hrs:   Temp Pulse Resp BP SpO2   17 1345 98.8 °F (37.1 °C) 71 18 141/78 98 %     CADD pump completed infusion and removed from patient. Port flushed with 20 mL and blood drawn off for a CBC after a 10 ml waste.       Port flushed with 20 mL and 500 units of heparin after blood return was verified. Band-aid applied.      Neulasta 6 mg applied to the back of the right arm. Green light verified to be blinking.      Ms. Polk tolerated the procedure, and had no complaints.     Ms. Polk was discharged from [place] [address] in stable condition at 1340.  She is to return on 1/3/18 at 0900 for her next appointment for chemo.     Justo Randolph RN  November 10, 2017

## 2017-12-13 ENCOUNTER — OFFICE VISIT (OUTPATIENT)
Dept: ONCOLOGY | Age: 62
End: 2017-12-13

## 2017-12-13 ENCOUNTER — HOSPITAL ENCOUNTER (OUTPATIENT)
Dept: ONCOLOGY | Age: 62
Discharge: HOME | End: 2017-12-13

## 2017-12-13 VITALS
HEART RATE: 71 BPM | BODY MASS INDEX: 35.82 KG/M2 | DIASTOLIC BLOOD PRESSURE: 66 MMHG | SYSTOLIC BLOOD PRESSURE: 154 MMHG | TEMPERATURE: 98.3 F | WEIGHT: 183.4 LBS

## 2017-12-13 DIAGNOSIS — D70.1 CHEMOTHERAPY INDUCED NEUTROPENIA (HCC): ICD-10-CM

## 2017-12-13 DIAGNOSIS — D64.81 ANTINEOPLASTIC CHEMOTHERAPY INDUCED ANEMIA: ICD-10-CM

## 2017-12-13 DIAGNOSIS — T45.1X5A ANTINEOPLASTIC CHEMOTHERAPY INDUCED ANEMIA: ICD-10-CM

## 2017-12-13 DIAGNOSIS — T45.1X5A CHEMOTHERAPY INDUCED NEUTROPENIA (HCC): ICD-10-CM

## 2017-12-13 DIAGNOSIS — C19 COLORECTAL CARCINOMA (HCC): ICD-10-CM

## 2017-12-13 DIAGNOSIS — C19 COLORECTAL CARCINOMA (HCC): Primary | ICD-10-CM

## 2017-12-13 LAB
BASO+EOS+MONOS # BLD AUTO: 1.3 K/UL (ref 0–2.3)
BASO+EOS+MONOS # BLD AUTO: 11 % (ref 0.1–17)
DIFFERENTIAL METHOD BLD: ABNORMAL
ERYTHROCYTE [DISTWIDTH] IN BLOOD BY AUTOMATED COUNT: 17.2 % (ref 11.5–14.5)
HCT VFR BLD AUTO: 31.6 % (ref 36–48)
HGB BLD-MCNC: 9.9 G/DL (ref 12–16)
LYMPHOCYTES # BLD: 1.8 K/UL (ref 1.1–5.9)
LYMPHOCYTES NFR BLD: 15 % (ref 14–44)
MCH RBC QN AUTO: 33.7 PG (ref 25–35)
MCHC RBC AUTO-ENTMCNC: 31.3 G/DL (ref 31–37)
MCV RBC AUTO: 107.5 FL (ref 78–102)
NEUTS SEG # BLD: 9.2 K/UL (ref 1.8–9.5)
NEUTS SEG NFR BLD: 74 % (ref 40–70)
PLATELET # BLD AUTO: 274 K/UL (ref 140–440)
RBC # BLD AUTO: 2.94 M/UL (ref 4.1–5.1)
WBC # BLD AUTO: 12.3 K/UL (ref 4.5–13)

## 2017-12-13 RX ORDER — LIDOCAINE AND PRILOCAINE 25; 25 MG/G; MG/G
CREAM TOPICAL
Qty: 30 G | Refills: 1 | Status: SHIPPED | OUTPATIENT
Start: 2017-12-13

## 2017-12-13 NOTE — MR AVS SNAPSHOT
Visit Information Date & Time Provider Department Dept. Phone Encounter #  
 12/13/2017 11:45 AM Nadya Niño MD [facility] Medical Oncology [phone] [id number] Follow-up Instructions Return in about 4 weeks (around 1/10/2018). Upcoming Health Maintenance Date Due Hepatitis C Screening 1955 PAP AKA CERVICAL CYTOLOGY 8/26/1974 FOBT Q 1 YEAR AGE 50-75 8/26/2003 ZOSTER VACCINE AGE 60> 6/26/2013 Pneumococcal 19-64 Highest Risk (2 of 3 - PCV13) 3/1/2017 Influenza Age 5 to Adult 8/1/2017 DTaP/Tdap/Td series (2 - Td) 3/15/2026 Allergies as of 12/13/2017  Review Complete On: 12/8/2017 By: Ron Kamara RN Severity Noted Reaction Type Reactions Latex  08/04/2016    Other (comments) Lisinopril High 04/25/2017    Hives Asa-acetaminophen-caff-buffers  08/04/2016    Other (comments) Codeine  08/04/2016    Other (comments) Pcn [Penicillins]  08/04/2016    Other (comments) Sulfa (Sulfonamide Antibiotics)  08/04/2016    Other (comments) Current Immunizations  Reviewed on 12/8/2017 Name Date Influenza Vaccine 3/15/2016 12:00 AM, 3/1/2016 Pneumococcal Vaccine (Unspecified Type) 3/1/2016 Tdap 3/15/2016 12:00 AM  
  
 Not reviewed this visit You Were Diagnosed With   
  
 Codes Comments Colorectal carcinoma ([facility]    -  Primary ICD-10-CM: C19 
ICD-9-CM: 154.0 Antineoplastic chemotherapy induced anemia     ICD-10-CM: D64.81, T45.1X5A 
ICD-9-CM: 285.3, E933.1 Chemotherapy induced neutropenia (HCC)     ICD-10-CM: D70.1, T45.1X5A 
ICD-9-CM: 288.03, E933.1 Vitals BP Pulse Temp Weight(growth percentile) BMI Smoking Status 154/66 (BP 1 Location: Right arm, BP Patient Position: Sitting) 71 98.3 °F (36.8 °C) (Oral) 183 lb 6.4 oz (83.2 kg) 35.82 kg/m2 Never Smoker Vitals History BMI and BSA Data  Body Mass Index Body Surface Area  
 35.82 kg/m 2 1.88 m 2  
  
  
 Preferred Pharmacy Pharmacy Name Phone [facility] [address], [address] [facility] Your Updated Medication List  
  
   
This list is accurate as of: 12/13/17 12:21 PM.  Always use your most recent med list.  
  
  
  
  
 acetaminophen 650 mg Tber Commonly known as:  TYLENOL  
1,300 mg.  
  
 albuterol 90 mcg/actuation inhaler Commonly known as:  PROVENTIL HFA, VENTOLIN HFA, PROAIR HFA  
2 Puffs. allopurinol 300 mg tablet Commonly known as:  ZYLOPRIM  
300 mg.  
  
 bisoprolol-hydroCHLOROthiazide 10-6.25 mg per tablet Commonly known as:  [facility] Take 1 Tab by Mouth Once a Day. cloNIDine HCl 0.1 mg tablet Commonly known as:  CATAPRES  
0.1 mg.  
  
 colchicine 0.6 mg tablet  
0.6 mg.  
  
 desonide 0.05 % cream  
Commonly known as:  William Mulch Use 1 Application to affected area Twice Daily. diphenhydrAMINE 25 mg capsule Commonly known as:  BENADRYL Take 1 with compazine every 6 hours for nausea for 3 days then, as needed. ergocalciferol 50,000 unit capsule Commonly known as:  ERGOCALCIFEROL  
50,000 Units. furosemide 20 mg tablet Commonly known as:  LASIX Take 1/2-1 tablet daily if needed for swelling. glimepiride 4 mg tablet Commonly known as:  AMARYL Take one in the am.  New dose. glipiZIDE 10 mg tablet Commonly known as:  Janith Ket 10 mg.  
  
 lidocaine-prilocaine topical cream  
Commonly known as:  EMLA Place cream over mediport 1 hour prior to chemotherapy and then place saran wrap over area. Every chemo treatment. NORVASC 5 mg tablet Generic drug:  amLODIPine Take 5 mg by mouth daily. ondansetron hcl 8 mg tablet Commonly known as:  ZOFRAN (AS HYDROCHLORIDE) Take one tablet by mouth every 8 hours for nausea beginning the night of chemo for 3 days. potassium chloride 20 mEq tablet Commonly known as:  K-DUR, KLOR-CON Take 1 Tab by mouth two (2) times a day. predniSONE 20 mg tablet Commonly known as:  Reino Bhagat Take 2 Tabs by mouth daily. Take 2 tabs on days 2 and 3 of each chemo cycle  
  
 prochlorperazine 10 mg tablet Commonly known as:  COMPAZINE Take 1 tablet every 6 hours with Benadryl 25mg for nausea for 3 days then, as needed. warfarin 1 mg tablet Commonly known as:  COUMADIN Take one 1 tablet by mouth everyday at 6pm or after. You will continue to take this medication. Everyday until mediport is removed. We Performed the Following COMPLETE CBC & AUTO DIFF WBC [39003 CPT(R)] Follow-up Instructions Return in about 4 weeks (around 1/10/2018). To-Do List   
 12/13/2017 Lab:  CBC WITH 3 PART DIFF   
  
 12/13/2017 Lab:  CEA   
  
 12/14/2017 Lab:  FERRITIN   
  
 12/14/2017 Lab:  IRON PROFILE   
  
 12/14/2017 Lab:  METABOLIC PANEL, COMPREHENSIVE   
  
 01/03/2018 9:00 AM  
  Appointment with [facility] NURSE 4 at [facility] ([phone])  
  
 01/05/2018 1:00 PM  
  Appointment with [facility] NURSE 3 at [facility] ([phone]) Introducing [facility]! Yuliet Dyson introduces [employer] patient portal. Now you can access parts of your medical record, email your doctor's office, and request medication refills online. 1. In your internet browser, go to [email or web address] 2. Click on the First Time User? Click Here link in the Sign In box. You will see the New Member Sign Up page. 3. Enter your [employer] Access Code exactly as it appears below. You will not need to use this code after youve completed the sign-up process. If you do not sign up before the expiration date, you must request a new code. · [employer] Access Code: [email or web address] Expires: 2/5/2018  9:35 AM 
 
4. Enter the last four digits of your Social Security Number (xxxx) and Date of Birth (mm/dd/yyyy) as indicated and click Submit. You will be taken to the next sign-up page. 5. Create a [employer] ID. This will be your [employer] login ID and cannot be changed, so think of one that is secure and easy to remember. 6. Create a [employer] password. You can change your password at any time. 7. Enter your Password Reset Question and Answer. This can be used at a later time if you forget your password. 8. Enter your e-mail address. You will receive e-mail notification when new information is available in [address] 9. Click Sign Up. You can now view and download portions of your medical record. 10. Click the Download Summary menu link to download a portable copy of your medical information. If you have questions, please visit the Frequently Asked Questions section of the [employer] website. Remember, [employer] is NOT to be used for urgent needs. For medical emergencies, dial 911. Now available from your iPhone and Android! Please provide this summary of care documentation to your next provider. Your primary care clinician is listed as Loree Harris. If you have any questions after today's visit, please call [phone].

## 2017-12-13 NOTE — PROGRESS NOTES
Hematology/Oncology  Progress Note    Name: Keya Safe  Date: 17  : 1955    PCP: Butch Farley NP     Ms. Debora Joseph is a 57 y.o. -American woman with metastatic colorectal carcinoma/carcinomatosis  Current therapy: FOLFIRI chemotherapy regimen      Subjective:     Ms. 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 She was started on FOLFIRI regimen and she is here today for follow up. She states that she has no pain, her appetite has increased and her energy level is up. She reports having diarrhea for a few days following chemotherapy but denies having any constipation or blood in stools. Otherwise there are no additional complaints to report. Past medical history, family history, and social history: these were reviewed and remains unchanged. Past Medical History:   Diagnosis Date    Diabetes ([address]     Gout     Heart disease     Hypertension     Neuropathic pain of foot      Past Surgical History:   Procedure Laterality Date    HX BACK SURGERY      HX HYSTERECTOMY       Social History     Social History    Marital status:      Spouse name: N/A    Number of children: N/A    Years of education: N/A     Occupational History    Not on file. Social History Main Topics    Smoking status: Never Smoker    Smokeless tobacco: Never Used    Alcohol use No    Drug use: No    Sexual activity: Not on file     Other Topics Concern    Not on file     Social History Narrative     Family History   Problem Relation Age of Onset    Family history unknown: Yes     Current Outpatient Prescriptions   Medication Sig Dispense Refill    lidocaine-prilocaine (EMLA) topical cream Place cream over mediport 1 hour prior to chemotherapy and then place saran wrap over area. Every chemo treatment. 30 g 1    potassium chloride (K-DUR, KLOR-CON) 20 mEq tablet Take 1 Tab by mouth two (2) times a day.  60 Tab 0  ondansetron hcl (ZOFRAN, AS HYDROCHLORIDE,) 8 mg tablet Take one tablet by mouth every 8 hours for nausea beginning the night of chemo for 3 days. 9 Tab 3    amLODIPine (NORVASC) 5 mg tablet Take 5 mg by mouth daily.  predniSONE (DELTASONE) 20 mg tablet Take 2 Tabs by mouth daily. Take 2 tabs on days 2 and 3 of each chemo cycle 4 Tab 2    diphenhydrAMINE (BENADRYL) 25 mg capsule Take 1 with compazine every 6 hours for nausea for 3 days then, as needed. 60 Cap 3    prochlorperazine (COMPAZINE) 10 mg tablet Take 1 tablet every 6 hours with Benadryl 25mg for nausea for 3 days then, as needed. 60 Tab 3    warfarin (COUMADIN) 1 mg tablet Take one 1 tablet by mouth everyday at 6pm or after. You will continue to take this medication. Everyday until mediport is removed. (Patient taking differently: 3 mg every Tuesday and Thursday. Take one 1 tablet by mouth everyday at 6pm or after. You will continue to take this medication. Everyday until mediport is removed.) 30 Tab 3    acetaminophen (TYLENOL) 650 mg CR tablet 1,300 mg.      albuterol (PROVENTIL HFA, VENTOLIN HFA, PROAIR HFA) 90 mcg/actuation inhaler 2 Puffs.  colchicine 0.6 mg tablet 0.6 mg.      ergocalciferol (ERGOCALCIFEROL) 50,000 unit capsule 50,000 Units.  furosemide (LASIX) 20 mg tablet Take 1/2-1 tablet daily if needed for swelling.  allopurinol (ZYLOPRIM) 300 mg tablet 300 mg.      desonide (TRIDESILON) 0.05 % cream Use 1 Application to affected area Twice Daily.  cloNIDine HCl (CATAPRES) 0.1 mg tablet 0.1 mg.      bisoprolol-hydrochlorothiazide (ZIAC) 10-6.25 mg per tablet Take 1 Tab by Mouth Once a Day.  glipiZIDE (GLUCOTROL) 10 mg tablet 10 mg.      glimepiride (AMARYL) 4 mg tablet Take one in the am.  New dose. Review of Systems  Constitutional: The patient denies acute distress or discomfort.   HEENT: The patient denies recent head trauma, eye pain, blurred vision,  hearing deficit, oropharyngeal mucosal pain or lesions, and the patient denies throat pain or discomfort. Lymphatics: The patient denies palpable peripheral lymphadenopathy. Hematologic: The patient denies having bruising, bleeding, or progressive fatigue. Respiratory: Patient denies having shortness of breath, cough, sputum production, fever, or dyspnea on exertion. Cardiovascular: The patient denies having leg pain, leg swelling, heart palpitations, chest permit, chest pain, or lightheadedness. The patient denies having dyspnea on exertion. Gastrointestinal: The patient reports occasional nausea from chemotherapy which is well controlled with antinausea medication. She denies having any emesis or diarrhea. Genitourinary: Patient denies having urinary urgency, frequency, or dysuria. The patient denies having blood in the urine. Psychological: The patient denies having symptoms of nervousness, anxiety, depression, or thoughts of harming self. Skin: Patient denies having skin rashes, skin, ulcerations, or unexplained itching or pruritus. Musculoskeletal: The patient denies having pain in the joints or bones. Objective:     Visit Vitals    /66 (BP 1 Location: Right arm, BP Patient Position: Sitting)    Pulse 71    Temp 98.3 °F (36.8 °C) (Oral)    Wt 83.2 kg (183 lb 6.4 oz)    BMI 35.82 kg/m2     ECOG PS=0; Pain score=0/10    Physical Exam:   Gen. Appearance: The patient is in no acute distress. Skin: There is no bruise or rash. HEENT: The exam is unremarkable. Neck: Supple without lymphadenopathy or thyromegaly. Lungs: Clear to auscultation and percussion; there are no wheezes or rhonchi. Heart: Regular rate and rhythm; there are no murmurs, gallops, or rubs. Abdomen: Bowel sounds are present and normal.  There is no guarding, tenderness, or hepatosplenomegaly. The patient has a colostomy bag in place. Extremities: There is no clubbing, cyanosis, or edema. Neurologic: There are no focal neurologic deficits.   Lymphatics: There is no palpable peripheral lymphadenopathy. Musculoskeletal: The patient has full range of motion at all joints. There is no evidence of joint deformity or effusions. There is no focal joint tenderness. Psychological/psychiatric: There is no clinical evidence of anxiety, depression, or melancholy. Lab data:      Results for orders placed or performed during the hospital encounter of 12/13/17   CBC WITH 3 PART DIFF     Status: Abnormal   Result Value Ref Range Status    WBC 12.3 4.5 - 13.0 K/uL Final    RBC 2.94 (L) 4.10 - 5.10 M/uL Final    HGB 9.9 (L) 12.0 - 16 g/dL Final    HCT 31.6 (L) 36 - 48 % Final    .5 (H) 78 - 102 FL Final    MCH 33.7 25.0 - 35.0 PG Final    MCHC 31.3 31 - 37 g/dL Final    RDW 17.2 (H) 11.5 - 14.5 % Final    PLATELET 360 869 - 408 K/uL Final    NEUTROPHILS 74 (H) 40 - 70 % Final    MIXED CELLS 11 0.1 - 17 % Final    LYMPHOCYTES 15 14 - 44 % Final    ABS. NEUTROPHILS 9.2 1.8 - 9.5 K/UL Final    ABS. MIXED CELLS 1.3 0.0 - 2.3 K/uL Final    ABS. LYMPHOCYTES 1.8 1.1 - 5.9 K/UL Final     Comment: Test performed at [place] [address] Location. Results Reviewed by Medical Director. DF AUTOMATED   Final           Assessment:     1. Colorectal carcinoma ([address]    2. Antineoplastic chemotherapy induced anemia    3. Chemotherapy induced neutropenia (HCC)      Plan:   Colorectal carcinoma with abdominal carcinomatosis: On 5/24/2017 the patient underwent colectomy with omentectomy with the findings of adenocarcinoma involving at least 80% of the omentum. The primary tumor appeared to originate in the transverse colon and measured approximately 5 cm. There was microscopic perforation of the colon and metastatic involvement into the left ovary. The tumor was pathologically staged as a T4 a N1M1 stage IV malignancy. The patient was started on FOLFIRI chemotherapy regimen on 08/30/17.  The regimen is a combination of Irinotecan at 180 mg/m² on day 1, 5 fluorouracil given at a dose of 400 mg/m² by IV bolus on day 1 followed by 2400 mg/m² continuous infusion for 46 hours on days 1 and 2, leucovorin 400 mg/m² IV on day 1 as a 2 hour infusion prior to 5-fluorouracil. A comprehensive metabolic panel, CEA level, ferritin level, iron profile, and CBC will be ordered at this time. Currently, the patient is tolerating the treatment well and it will be continued. Her most recent CEA level from 11/2017 was 197.8 ng/mL. Anemia associated with chronic kidney disease and chemotherapy-induced anemia (persistent problem): I have explained to the patient that her CBC on today showed a hemoglobin of 9.9 g/dL with hematocrit of 31.6 %. Procrit at a dose of 60,000 units will be provided whenever her hemoglobin is below 10 g/dL hematocrit is below 30% every 2 weeks. The iron profile and ferritin levels will be ordered at this time. Chemotherapy-induced leukopenia/neutropenia (improved problem): The CBC on today showed a normal WBC count of 12.3 with an absolute neutrophil count of 9.2. This will be monitored on a regular basis. Neulasta following each chemo cycle will continue. I will have the patient return to clinic for complete reassessment again in 4 weeks.     Orders Placed This Encounter    COMPLETE CBC & AUTO DIFF WBC    InHouse CBC (Getting-in)     Standing Status:   Future     Number of Occurrences:   1     Standing Expiration Date:   30/51/1489    METABOLIC PANEL, COMPREHENSIVE     Standing Status:   Future     Number of Occurrences:   1     Standing Expiration Date:   12/14/2018    IRON PROFILE     Standing Status:   Future     Number of Occurrences:   1     Standing Expiration Date:   12/14/2018    CEA     Standing Status:   Future     Number of Occurrences:   1     Standing Expiration Date:   12/14/2018    FERRITIN     Standing Status:   Future     Number of Occurrences:   1     Standing Expiration Date:   12/14/2018    lidocaine-prilocaine (EMLA) topical cream     Sig: Place cream over mediport 1 hour prior to chemotherapy and then place saran wrap over area. Every chemo treatment.      Dispense:  30 g     Refill:  1       Gianna Munoz MD  12/13/2017

## 2017-12-14 LAB
ALBUMIN SERPL-MCNC: 3.9 G/DL (ref 3.6–4.8)
ALBUMIN/GLOB SERPL: 1.3 {RATIO} (ref 1.2–2.2)
ALP SERPL-CCNC: 248 IU/L (ref 39–117)
ALT SERPL-CCNC: 37 IU/L (ref 0–32)
AST SERPL-CCNC: 18 IU/L (ref 0–40)
BILIRUB SERPL-MCNC: 0.3 MG/DL (ref 0–1.2)
BUN SERPL-MCNC: 35 MG/DL (ref 8–27)
BUN/CREAT SERPL: 21 (ref 12–28)
CALCIUM SERPL-MCNC: 9.2 MG/DL (ref 8.7–10.3)
CEA SERPL-MCNC: 163.3 NG/ML (ref 0–4.7)
CHLORIDE SERPL-SCNC: 105 MMOL/L (ref 96–106)
CO2 SERPL-SCNC: 20 MMOL/L (ref 18–29)
CREAT SERPL-MCNC: 1.67 MG/DL (ref 0.57–1)
FERRITIN SERPL-MCNC: 588 NG/ML (ref 15–150)
GFR SERPLBLD CREATININE-BSD FMLA CKD-EPI: 32 ML/MIN/1.73
GFR SERPLBLD CREATININE-BSD FMLA CKD-EPI: 37 ML/MIN/1.73
GLOBULIN SER CALC-MCNC: 3 G/DL (ref 1.5–4.5)
GLUCOSE SERPL-MCNC: 129 MG/DL (ref 65–99)
IRON SATN MFR SERPL: 25 % (ref 15–55)
IRON SERPL-MCNC: 72 UG/DL (ref 27–139)
POTASSIUM SERPL-SCNC: 4.1 MMOL/L (ref 3.5–5.2)
PROT SERPL-MCNC: 6.9 G/DL (ref 6–8.5)
SODIUM SERPL-SCNC: 142 MMOL/L (ref 134–144)
TIBC SERPL-MCNC: 288 UG/DL (ref 250–450)
UIBC SERPL-MCNC: 216 UG/DL (ref 118–369)

## 2017-12-15 ENCOUNTER — TELEPHONE (OUTPATIENT)
Dept: ONCOLOGY | Age: 62
End: 2017-12-15

## 2017-12-15 NOTE — TELEPHONE ENCOUNTER
Pt states she was seen on Zaynab. by you in the [facility] office. She states you were supposed to call her today to give her some lab results she was waiting on. Please call patient.

## 2017-12-15 NOTE — TELEPHONE ENCOUNTER
Patient called again and got me in [place] trying to talk to you, if you are still there please try to call her back.

## 2017-12-20 ENCOUNTER — APPOINTMENT (OUTPATIENT)
Dept: INFUSION THERAPY | Age: 62
End: 2017-12-20
Payer: MEDICARE

## 2017-12-22 ENCOUNTER — APPOINTMENT (OUTPATIENT)
Dept: INFUSION THERAPY | Age: 62
End: 2017-12-22
Payer: MEDICARE

## 2017-12-29 RX ORDER — FLUOROURACIL 50 MG/ML
700 INJECTION, SOLUTION INTRAVENOUS ONCE
Status: DISPENSED | OUTPATIENT
Start: 2018-01-03 | End: 2018-01-03

## 2017-12-29 RX ORDER — PALONOSETRON 0.05 MG/ML
0.25 INJECTION, SOLUTION INTRAVENOUS ONCE
Status: CANCELLED | OUTPATIENT
Start: 2018-01-03 | End: 2018-01-03

## 2017-12-29 RX ORDER — ATROPINE SULFATE 1 MG/ML
0.4 INJECTION, SOLUTION INTRAVENOUS
Status: CANCELLED | OUTPATIENT
Start: 2018-01-03 | End: 2018-01-03

## 2018-01-01 ENCOUNTER — HOSPITAL ENCOUNTER (OUTPATIENT)
Dept: LAB | Age: 63
Discharge: HOME | End: 2018-05-16
Payer: MEDICARE

## 2018-01-01 ENCOUNTER — HOSPITAL ENCOUNTER (OUTPATIENT)
Dept: INFUSION THERAPY | Age: 63
Discharge: HOME | End: 2018-06-20
Payer: MEDICARE

## 2018-01-01 ENCOUNTER — HOSPITAL ENCOUNTER (OUTPATIENT)
Dept: LAB | Age: 63
Discharge: HOME | End: 2018-03-14
Payer: MEDICARE

## 2018-01-01 ENCOUNTER — HOSPITAL ENCOUNTER (OUTPATIENT)
Dept: INFUSION THERAPY | Age: 63
End: 2018-01-01
Payer: MEDICARE

## 2018-01-01 ENCOUNTER — HOSPITAL ENCOUNTER (OUTPATIENT)
Dept: INFUSION THERAPY | Age: 63
Discharge: HOME | End: 2018-04-25
Payer: MEDICARE

## 2018-01-01 ENCOUNTER — OFFICE VISIT (OUTPATIENT)
Dept: ONCOLOGY | Age: 63
End: 2018-01-01

## 2018-01-01 ENCOUNTER — HOSPITAL ENCOUNTER (OUTPATIENT)
Dept: INFUSION THERAPY | Age: 63
Discharge: HOME | End: 2018-12-12
Payer: MEDICARE

## 2018-01-01 ENCOUNTER — HOSPITAL ENCOUNTER (OUTPATIENT)
Dept: INFUSION THERAPY | Age: 63
Discharge: HOME | End: 2018-10-29
Payer: MEDICARE

## 2018-01-01 ENCOUNTER — HOSPITAL ENCOUNTER (OUTPATIENT)
Dept: INFUSION THERAPY | Age: 63
Discharge: HOME | End: 2018-06-22
Payer: MEDICARE

## 2018-01-01 ENCOUNTER — HOSPITAL ENCOUNTER (OUTPATIENT)
Dept: ONCOLOGY | Age: 63
Discharge: HOME | End: 2018-10-15

## 2018-01-01 ENCOUNTER — HOSPITAL ENCOUNTER (OUTPATIENT)
Dept: INFUSION THERAPY | Age: 63
Discharge: HOME | End: 2018-10-01
Payer: MEDICARE

## 2018-01-01 ENCOUNTER — HOSPITAL ENCOUNTER (OUTPATIENT)
Dept: INFUSION THERAPY | Age: 63
Discharge: HOME | End: 2018-08-28
Payer: MEDICARE

## 2018-01-01 ENCOUNTER — HOSPITAL ENCOUNTER (OUTPATIENT)
Dept: INFUSION THERAPY | Age: 63
Discharge: HOME | End: 2018-08-30
Payer: MEDICARE

## 2018-01-01 ENCOUNTER — HOSPITAL ENCOUNTER (OUTPATIENT)
Dept: LAB | Age: 63
Discharge: HOME | End: 2018-10-15
Payer: MEDICARE

## 2018-01-01 ENCOUNTER — APPOINTMENT (OUTPATIENT)
Dept: INFUSION THERAPY | Age: 63
End: 2018-01-01
Payer: MEDICARE

## 2018-01-01 ENCOUNTER — TELEPHONE (OUTPATIENT)
Dept: ORTHOPEDIC SURGERY | Age: 63
End: 2018-01-01

## 2018-01-01 ENCOUNTER — HOSPITAL ENCOUNTER (OUTPATIENT)
Dept: INFUSION THERAPY | Age: 63
Discharge: HOME | End: 2018-08-14
Payer: MEDICARE

## 2018-01-01 ENCOUNTER — HOSPITAL ENCOUNTER (OUTPATIENT)
Dept: INFUSION THERAPY | Age: 63
Discharge: HOME | End: 2018-05-25
Payer: MEDICARE

## 2018-01-01 ENCOUNTER — HOSPITAL ENCOUNTER (OUTPATIENT)
Dept: INFUSION THERAPY | Age: 63
Discharge: HOME | End: 2018-12-18
Payer: MEDICARE

## 2018-01-01 ENCOUNTER — HOSPITAL ENCOUNTER (OUTPATIENT)
Dept: INFUSION THERAPY | Age: 63
Discharge: HOME | End: 2018-04-27
Payer: MEDICARE

## 2018-01-01 ENCOUNTER — ANESTHESIA (OUTPATIENT)
Dept: SURGERY | Age: 63
End: 2018-01-01
Payer: MEDICARE

## 2018-01-01 ENCOUNTER — HOSPITAL ENCOUNTER (OUTPATIENT)
Dept: LAB | Age: 63
Discharge: HOME | End: 2018-09-17
Payer: MEDICARE

## 2018-01-01 ENCOUNTER — HOSPITAL ENCOUNTER (OUTPATIENT)
Dept: ONCOLOGY | Age: 63
Discharge: HOME | End: 2018-07-11

## 2018-01-01 ENCOUNTER — HOSPITAL ENCOUNTER (OUTPATIENT)
Dept: INFUSION THERAPY | Age: 63
Discharge: HOME | End: 2018-03-30
Payer: MEDICARE

## 2018-01-01 ENCOUNTER — HOSPITAL ENCOUNTER (OUTPATIENT)
Dept: LAB | Age: 63
Discharge: HOME | End: 2018-04-18
Payer: MEDICARE

## 2018-01-01 ENCOUNTER — APPOINTMENT (OUTPATIENT)
Dept: GENERAL RADIOLOGY | Age: 63
End: 2018-01-01
Attending: UROLOGY
Payer: MEDICARE

## 2018-01-01 ENCOUNTER — HOSPITAL ENCOUNTER (OUTPATIENT)
Dept: LAB | Age: 63
Discharge: HOME | End: 2018-12-12
Payer: MEDICARE

## 2018-01-01 ENCOUNTER — HOSPITAL ENCOUNTER (OUTPATIENT)
Dept: INFUSION THERAPY | Age: 63
Discharge: HOME | End: 2018-10-15
Payer: MEDICARE

## 2018-01-01 ENCOUNTER — HOSPITAL ENCOUNTER (OUTPATIENT)
Dept: INFUSION THERAPY | Age: 63
Discharge: HOME | End: 2018-06-06
Payer: MEDICARE

## 2018-01-01 ENCOUNTER — HOSPITAL ENCOUNTER (OUTPATIENT)
Dept: INFUSION THERAPY | Age: 63
Discharge: HOME | End: 2018-05-09
Payer: MEDICARE

## 2018-01-01 ENCOUNTER — HOSPITAL ENCOUNTER (OUTPATIENT)
Dept: ONCOLOGY | Age: 63
Discharge: HOME | End: 2018-12-12

## 2018-01-01 ENCOUNTER — ANESTHESIA EVENT (OUTPATIENT)
Dept: SURGERY | Age: 63
End: 2018-01-01
Payer: MEDICARE

## 2018-01-01 ENCOUNTER — HOSPITAL ENCOUNTER (OUTPATIENT)
Dept: LAB | Age: 63
Discharge: HOME | End: 2018-06-13
Payer: MEDICARE

## 2018-01-01 ENCOUNTER — HOSPITAL ENCOUNTER (OUTPATIENT)
Dept: INFUSION THERAPY | Age: 63
Discharge: HOME | End: 2018-07-31
Payer: MEDICARE

## 2018-01-01 ENCOUNTER — HOSPITAL ENCOUNTER (OUTPATIENT)
Dept: ONCOLOGY | Age: 63
Discharge: HOME | End: 2018-08-20

## 2018-01-01 ENCOUNTER — HOSPITAL ENCOUNTER (OUTPATIENT)
Dept: INFUSION THERAPY | Age: 63
End: 2018-01-01

## 2018-01-01 ENCOUNTER — HOSPITAL ENCOUNTER (OUTPATIENT)
Age: 63
Setting detail: OUTPATIENT SURGERY
Discharge: HOME | End: 2018-03-19
Attending: UROLOGY | Admitting: UROLOGY
Payer: MEDICARE

## 2018-01-01 ENCOUNTER — HOSPITAL ENCOUNTER (OUTPATIENT)
Dept: ONCOLOGY | Age: 63
Discharge: HOME | End: 2018-04-18

## 2018-01-01 ENCOUNTER — HOSPITAL ENCOUNTER (OUTPATIENT)
Dept: LAB | Age: 63
Discharge: HOME | End: 2018-07-11
Payer: MEDICARE

## 2018-01-01 ENCOUNTER — HOSPITAL ENCOUNTER (OUTPATIENT)
Dept: ONCOLOGY | Age: 63
Discharge: HOME | End: 2018-03-14

## 2018-01-01 ENCOUNTER — OFFICE VISIT (OUTPATIENT)
Dept: ORTHOPEDIC SURGERY | Age: 63
End: 2018-01-01

## 2018-01-01 ENCOUNTER — HOSPITAL ENCOUNTER (OUTPATIENT)
Dept: INFUSION THERAPY | Age: 63
Discharge: HOME | End: 2018-02-23
Payer: MEDICARE

## 2018-01-01 ENCOUNTER — HOSPITAL ENCOUNTER (OUTPATIENT)
Dept: INFUSION THERAPY | Age: 63
Discharge: HOME | End: 2018-03-09
Payer: MEDICARE

## 2018-01-01 ENCOUNTER — HOSPITAL ENCOUNTER (OUTPATIENT)
Dept: INFUSION THERAPY | Age: 63
Discharge: HOME | End: 2018-07-03
Payer: MEDICARE

## 2018-01-01 ENCOUNTER — HOSPITAL ENCOUNTER (OUTPATIENT)
Dept: LAB | Age: 63
Discharge: HOME | End: 2018-08-20
Payer: MEDICARE

## 2018-01-01 ENCOUNTER — HOSPITAL ENCOUNTER (OUTPATIENT)
Dept: INFUSION THERAPY | Age: 63
Discharge: HOME | End: 2018-12-26
Payer: MEDICARE

## 2018-01-01 ENCOUNTER — DOCUMENTATION ONLY (OUTPATIENT)
Dept: ONCOLOGY | Age: 63
End: 2018-01-01

## 2018-01-01 ENCOUNTER — HOSPITAL ENCOUNTER (OUTPATIENT)
Dept: INFUSION THERAPY | Age: 63
Discharge: HOME | End: 2018-06-08
Payer: MEDICARE

## 2018-01-01 ENCOUNTER — HOSPITAL ENCOUNTER (OUTPATIENT)
Dept: ONCOLOGY | Age: 63
Discharge: HOME | End: 2018-05-16

## 2018-01-01 ENCOUNTER — HOSPITAL ENCOUNTER (OUTPATIENT)
Age: 63
Setting detail: OUTPATIENT SURGERY
Discharge: HOME | End: 2018-07-30
Attending: UROLOGY | Admitting: UROLOGY
Payer: MEDICARE

## 2018-01-01 ENCOUNTER — HOSPITAL ENCOUNTER (OUTPATIENT)
Dept: INFUSION THERAPY | Age: 63
Discharge: HOME | End: 2018-04-11
Payer: MEDICARE

## 2018-01-01 ENCOUNTER — HOSPITAL ENCOUNTER (OUTPATIENT)
Dept: INFUSION THERAPY | Age: 63
Discharge: HOME | End: 2018-05-10
Payer: MEDICARE

## 2018-01-01 ENCOUNTER — HOSPITAL ENCOUNTER (OUTPATIENT)
Dept: INFUSION THERAPY | Age: 63
Discharge: HOME | End: 2018-07-05
Payer: MEDICARE

## 2018-01-01 ENCOUNTER — HOSPITAL ENCOUNTER (OUTPATIENT)
Dept: LAB | Age: 63
Discharge: HOME | End: 2018-07-23
Payer: MEDICARE

## 2018-01-01 ENCOUNTER — HOSPITAL ENCOUNTER (OUTPATIENT)
Dept: INFUSION THERAPY | Age: 63
Discharge: HOME | End: 2018-09-17
Payer: MEDICARE

## 2018-01-01 ENCOUNTER — HOSPITAL ENCOUNTER (OUTPATIENT)
Dept: INFUSION THERAPY | Age: 63
Discharge: HOME | End: 2018-03-28
Payer: MEDICARE

## 2018-01-01 ENCOUNTER — HOSPITAL ENCOUNTER (OUTPATIENT)
Dept: ONCOLOGY | Age: 63
Discharge: HOME | End: 2018-09-17

## 2018-01-01 ENCOUNTER — HOSPITAL ENCOUNTER (OUTPATIENT)
Dept: INFUSION THERAPY | Age: 63
Discharge: HOME | End: 2018-07-17
Payer: MEDICARE

## 2018-01-01 ENCOUNTER — HOSPITAL ENCOUNTER (OUTPATIENT)
Dept: INFUSION THERAPY | Age: 63
Discharge: HOME | End: 2018-03-07
Payer: MEDICARE

## 2018-01-01 ENCOUNTER — HOSPITAL ENCOUNTER (OUTPATIENT)
Dept: INFUSION THERAPY | Age: 63
Discharge: HOME | End: 2018-02-21
Payer: MEDICARE

## 2018-01-01 ENCOUNTER — TELEPHONE (OUTPATIENT)
Dept: ONCOLOGY | Age: 63
End: 2018-01-01

## 2018-01-01 ENCOUNTER — HOSPITAL ENCOUNTER (OUTPATIENT)
Dept: ONCOLOGY | Age: 63
Discharge: HOME | End: 2018-06-13

## 2018-01-01 ENCOUNTER — HOSPITAL ENCOUNTER (OUTPATIENT)
Dept: INFUSION THERAPY | Age: 63
Discharge: HOME | End: 2018-04-13
Payer: MEDICARE

## 2018-01-01 ENCOUNTER — HOSPITAL ENCOUNTER (OUTPATIENT)
Dept: INFUSION THERAPY | Age: 63
Discharge: HOME | End: 2018-07-19
Payer: MEDICARE

## 2018-01-01 ENCOUNTER — HOSPITAL ENCOUNTER (OUTPATIENT)
Dept: INFUSION THERAPY | Age: 63
Discharge: HOME | End: 2018-05-23
Payer: MEDICARE

## 2018-01-01 VITALS
RESPIRATION RATE: 18 BRPM | DIASTOLIC BLOOD PRESSURE: 83 MMHG | HEART RATE: 58 BPM | WEIGHT: 200.8 LBS | SYSTOLIC BLOOD PRESSURE: 143 MMHG | HEIGHT: 60 IN | OXYGEN SATURATION: 98 % | TEMPERATURE: 98.4 F | BODY MASS INDEX: 39.42 KG/M2

## 2018-01-01 VITALS
HEART RATE: 74 BPM | RESPIRATION RATE: 18 BRPM | BODY MASS INDEX: 39.17 KG/M2 | OXYGEN SATURATION: 100 % | SYSTOLIC BLOOD PRESSURE: 107 MMHG | TEMPERATURE: 98.7 F | HEIGHT: 59 IN | WEIGHT: 194.3 LBS | DIASTOLIC BLOOD PRESSURE: 68 MMHG

## 2018-01-01 VITALS
WEIGHT: 207 LBS | HEIGHT: 59 IN | TEMPERATURE: 98 F | OXYGEN SATURATION: 100 % | SYSTOLIC BLOOD PRESSURE: 130 MMHG | DIASTOLIC BLOOD PRESSURE: 85 MMHG | RESPIRATION RATE: 18 BRPM | HEART RATE: 78 BPM | BODY MASS INDEX: 41.73 KG/M2

## 2018-01-01 VITALS
DIASTOLIC BLOOD PRESSURE: 91 MMHG | HEIGHT: 60 IN | SYSTOLIC BLOOD PRESSURE: 155 MMHG | TEMPERATURE: 98.3 F | WEIGHT: 194.8 LBS | RESPIRATION RATE: 18 BRPM | HEART RATE: 73 BPM | BODY MASS INDEX: 38.24 KG/M2

## 2018-01-01 VITALS
HEART RATE: 72 BPM | HEIGHT: 60 IN | DIASTOLIC BLOOD PRESSURE: 72 MMHG | BODY MASS INDEX: 40.42 KG/M2 | WEIGHT: 205.9 LBS | SYSTOLIC BLOOD PRESSURE: 140 MMHG | RESPIRATION RATE: 18 BRPM | OXYGEN SATURATION: 99 % | TEMPERATURE: 98.7 F

## 2018-01-01 VITALS
BODY MASS INDEX: 33.57 KG/M2 | WEIGHT: 171 LBS | TEMPERATURE: 98.8 F | RESPIRATION RATE: 16 BRPM | SYSTOLIC BLOOD PRESSURE: 131 MMHG | HEIGHT: 60 IN | HEART RATE: 91 BPM | DIASTOLIC BLOOD PRESSURE: 84 MMHG | OXYGEN SATURATION: 100 %

## 2018-01-01 VITALS
BODY MASS INDEX: 40.37 KG/M2 | WEIGHT: 205.6 LBS | SYSTOLIC BLOOD PRESSURE: 149 MMHG | OXYGEN SATURATION: 100 % | DIASTOLIC BLOOD PRESSURE: 83 MMHG | RESPIRATION RATE: 18 BRPM | TEMPERATURE: 98.2 F | HEIGHT: 60 IN | HEART RATE: 74 BPM

## 2018-01-01 VITALS
DIASTOLIC BLOOD PRESSURE: 66 MMHG | HEART RATE: 60 BPM | BODY MASS INDEX: 37.5 KG/M2 | RESPIRATION RATE: 18 BRPM | WEIGHT: 191 LBS | OXYGEN SATURATION: 100 % | SYSTOLIC BLOOD PRESSURE: 136 MMHG | HEIGHT: 60 IN | TEMPERATURE: 97.7 F

## 2018-01-01 VITALS
DIASTOLIC BLOOD PRESSURE: 87 MMHG | HEART RATE: 79 BPM | OXYGEN SATURATION: 100 % | TEMPERATURE: 99 F | SYSTOLIC BLOOD PRESSURE: 151 MMHG | RESPIRATION RATE: 18 BRPM

## 2018-01-01 VITALS
DIASTOLIC BLOOD PRESSURE: 76 MMHG | OXYGEN SATURATION: 98 % | HEART RATE: 58 BPM | SYSTOLIC BLOOD PRESSURE: 146 MMHG | TEMPERATURE: 97.6 F | RESPIRATION RATE: 18 BRPM

## 2018-01-01 VITALS
DIASTOLIC BLOOD PRESSURE: 104 MMHG | SYSTOLIC BLOOD PRESSURE: 152 MMHG | BODY MASS INDEX: 34.85 KG/M2 | RESPIRATION RATE: 18 BRPM | HEIGHT: 60 IN | WEIGHT: 177.5 LBS | TEMPERATURE: 98.4 F | OXYGEN SATURATION: 99 % | HEART RATE: 99 BPM

## 2018-01-01 VITALS
HEIGHT: 60 IN | HEART RATE: 72 BPM | BODY MASS INDEX: 40.64 KG/M2 | WEIGHT: 207 LBS | DIASTOLIC BLOOD PRESSURE: 83 MMHG | RESPIRATION RATE: 20 BRPM | TEMPERATURE: 98.6 F | SYSTOLIC BLOOD PRESSURE: 174 MMHG

## 2018-01-01 VITALS
DIASTOLIC BLOOD PRESSURE: 102 MMHG | TEMPERATURE: 98.6 F | HEART RATE: 76 BPM | OXYGEN SATURATION: 100 % | SYSTOLIC BLOOD PRESSURE: 171 MMHG | WEIGHT: 179.6 LBS | BODY MASS INDEX: 35.26 KG/M2 | RESPIRATION RATE: 18 BRPM | HEIGHT: 60 IN

## 2018-01-01 VITALS
HEART RATE: 56 BPM | RESPIRATION RATE: 18 BRPM | TEMPERATURE: 97.6 F | HEIGHT: 60 IN | WEIGHT: 191.4 LBS | BODY MASS INDEX: 37.58 KG/M2 | DIASTOLIC BLOOD PRESSURE: 74 MMHG | SYSTOLIC BLOOD PRESSURE: 128 MMHG | OXYGEN SATURATION: 100 %

## 2018-01-01 VITALS
TEMPERATURE: 98.7 F | DIASTOLIC BLOOD PRESSURE: 92 MMHG | OXYGEN SATURATION: 97 % | RESPIRATION RATE: 18 BRPM | HEART RATE: 77 BPM | SYSTOLIC BLOOD PRESSURE: 154 MMHG

## 2018-01-01 VITALS
WEIGHT: 201 LBS | HEIGHT: 60 IN | DIASTOLIC BLOOD PRESSURE: 72 MMHG | SYSTOLIC BLOOD PRESSURE: 129 MMHG | HEART RATE: 65 BPM | BODY MASS INDEX: 39.46 KG/M2

## 2018-01-01 VITALS
HEIGHT: 60 IN | SYSTOLIC BLOOD PRESSURE: 124 MMHG | TEMPERATURE: 98.5 F | OXYGEN SATURATION: 99 % | DIASTOLIC BLOOD PRESSURE: 74 MMHG | WEIGHT: 207.1 LBS | RESPIRATION RATE: 18 BRPM | BODY MASS INDEX: 40.66 KG/M2 | HEART RATE: 69 BPM

## 2018-01-01 VITALS
OXYGEN SATURATION: 100 % | DIASTOLIC BLOOD PRESSURE: 82 MMHG | RESPIRATION RATE: 18 BRPM | SYSTOLIC BLOOD PRESSURE: 114 MMHG | TEMPERATURE: 97.9 F | HEIGHT: 60 IN | BODY MASS INDEX: 36.32 KG/M2 | HEART RATE: 85 BPM | WEIGHT: 185 LBS

## 2018-01-01 VITALS
SYSTOLIC BLOOD PRESSURE: 138 MMHG | HEART RATE: 87 BPM | BODY MASS INDEX: 39.56 KG/M2 | WEIGHT: 201.5 LBS | OXYGEN SATURATION: 97 % | HEIGHT: 60 IN | RESPIRATION RATE: 18 BRPM | DIASTOLIC BLOOD PRESSURE: 82 MMHG | TEMPERATURE: 98.8 F

## 2018-01-01 VITALS
HEART RATE: 74 BPM | RESPIRATION RATE: 18 BRPM | OXYGEN SATURATION: 100 % | TEMPERATURE: 98.2 F | DIASTOLIC BLOOD PRESSURE: 98 MMHG | SYSTOLIC BLOOD PRESSURE: 144 MMHG

## 2018-01-01 VITALS
OXYGEN SATURATION: 98 % | RESPIRATION RATE: 18 BRPM | SYSTOLIC BLOOD PRESSURE: 155 MMHG | WEIGHT: 179 LBS | TEMPERATURE: 97.8 F | HEIGHT: 60 IN | BODY MASS INDEX: 35.14 KG/M2 | DIASTOLIC BLOOD PRESSURE: 105 MMHG | HEART RATE: 78 BPM

## 2018-01-01 VITALS
DIASTOLIC BLOOD PRESSURE: 58 MMHG | SYSTOLIC BLOOD PRESSURE: 130 MMHG | HEIGHT: 60 IN | RESPIRATION RATE: 18 BRPM | TEMPERATURE: 98 F | HEART RATE: 58 BPM | WEIGHT: 198.25 LBS | OXYGEN SATURATION: 99 % | BODY MASS INDEX: 38.92 KG/M2

## 2018-01-01 VITALS
HEIGHT: 59 IN | TEMPERATURE: 98.7 F | BODY MASS INDEX: 40.12 KG/M2 | HEART RATE: 78 BPM | DIASTOLIC BLOOD PRESSURE: 81 MMHG | OXYGEN SATURATION: 98 % | RESPIRATION RATE: 18 BRPM | WEIGHT: 199 LBS | SYSTOLIC BLOOD PRESSURE: 137 MMHG

## 2018-01-01 VITALS
TEMPERATURE: 97.1 F | DIASTOLIC BLOOD PRESSURE: 79 MMHG | HEART RATE: 66 BPM | WEIGHT: 203 LBS | BODY MASS INDEX: 39.65 KG/M2 | SYSTOLIC BLOOD PRESSURE: 140 MMHG

## 2018-01-01 VITALS
DIASTOLIC BLOOD PRESSURE: 82 MMHG | SYSTOLIC BLOOD PRESSURE: 132 MMHG | HEART RATE: 71 BPM | OXYGEN SATURATION: 100 % | RESPIRATION RATE: 18 BRPM | TEMPERATURE: 98.3 F

## 2018-01-01 VITALS
TEMPERATURE: 99 F | OXYGEN SATURATION: 100 % | SYSTOLIC BLOOD PRESSURE: 150 MMHG | RESPIRATION RATE: 20 BRPM | HEART RATE: 86 BPM | DIASTOLIC BLOOD PRESSURE: 82 MMHG

## 2018-01-01 VITALS
BODY MASS INDEX: 41.65 KG/M2 | WEIGHT: 206.6 LBS | TEMPERATURE: 98.3 F | HEART RATE: 74 BPM | HEIGHT: 59 IN | SYSTOLIC BLOOD PRESSURE: 138 MMHG | DIASTOLIC BLOOD PRESSURE: 67 MMHG

## 2018-01-01 VITALS
RESPIRATION RATE: 18 BRPM | HEART RATE: 73 BPM | TEMPERATURE: 99 F | SYSTOLIC BLOOD PRESSURE: 149 MMHG | DIASTOLIC BLOOD PRESSURE: 103 MMHG | OXYGEN SATURATION: 97 %

## 2018-01-01 VITALS — TEMPERATURE: 98.5 F | HEART RATE: 61 BPM | DIASTOLIC BLOOD PRESSURE: 60 MMHG | SYSTOLIC BLOOD PRESSURE: 110 MMHG

## 2018-01-01 VITALS
TEMPERATURE: 98.5 F | DIASTOLIC BLOOD PRESSURE: 71 MMHG | WEIGHT: 188 LBS | SYSTOLIC BLOOD PRESSURE: 147 MMHG | HEART RATE: 63 BPM | BODY MASS INDEX: 36.72 KG/M2

## 2018-01-01 VITALS
SYSTOLIC BLOOD PRESSURE: 137 MMHG | OXYGEN SATURATION: 100 % | TEMPERATURE: 98.9 F | DIASTOLIC BLOOD PRESSURE: 81 MMHG | HEART RATE: 78 BPM | RESPIRATION RATE: 20 BRPM

## 2018-01-01 VITALS
TEMPERATURE: 97.3 F | HEIGHT: 60 IN | BODY MASS INDEX: 40.25 KG/M2 | HEART RATE: 71 BPM | WEIGHT: 205 LBS | DIASTOLIC BLOOD PRESSURE: 63 MMHG | SYSTOLIC BLOOD PRESSURE: 111 MMHG | RESPIRATION RATE: 14 BRPM | OXYGEN SATURATION: 100 %

## 2018-01-01 VITALS
DIASTOLIC BLOOD PRESSURE: 84 MMHG | SYSTOLIC BLOOD PRESSURE: 113 MMHG | OXYGEN SATURATION: 99 % | HEART RATE: 100 BPM | RESPIRATION RATE: 18 BRPM

## 2018-01-01 VITALS
HEART RATE: 123 BPM | DIASTOLIC BLOOD PRESSURE: 87 MMHG | TEMPERATURE: 98.1 F | RESPIRATION RATE: 18 BRPM | SYSTOLIC BLOOD PRESSURE: 131 MMHG

## 2018-01-01 VITALS
RESPIRATION RATE: 18 BRPM | HEART RATE: 61 BPM | DIASTOLIC BLOOD PRESSURE: 81 MMHG | SYSTOLIC BLOOD PRESSURE: 127 MMHG | TEMPERATURE: 98.4 F | OXYGEN SATURATION: 99 %

## 2018-01-01 VITALS
DIASTOLIC BLOOD PRESSURE: 63 MMHG | SYSTOLIC BLOOD PRESSURE: 139 MMHG | TEMPERATURE: 98.6 F | HEART RATE: 63 BPM | RESPIRATION RATE: 18 BRPM | BODY MASS INDEX: 39.14 KG/M2 | WEIGHT: 200.4 LBS

## 2018-01-01 VITALS
OXYGEN SATURATION: 100 % | HEART RATE: 90 BPM | RESPIRATION RATE: 18 BRPM | TEMPERATURE: 98.2 F | DIASTOLIC BLOOD PRESSURE: 82 MMHG | SYSTOLIC BLOOD PRESSURE: 136 MMHG

## 2018-01-01 VITALS
DIASTOLIC BLOOD PRESSURE: 80 MMHG | HEIGHT: 60 IN | OXYGEN SATURATION: 100 % | WEIGHT: 201.2 LBS | TEMPERATURE: 98.7 F | BODY MASS INDEX: 39.5 KG/M2 | SYSTOLIC BLOOD PRESSURE: 143 MMHG | HEART RATE: 79 BPM | RESPIRATION RATE: 18 BRPM

## 2018-01-01 VITALS — HEART RATE: 70 BPM | TEMPERATURE: 98.4 F | SYSTOLIC BLOOD PRESSURE: 120 MMHG | DIASTOLIC BLOOD PRESSURE: 59 MMHG

## 2018-01-01 VITALS
TEMPERATURE: 98.6 F | SYSTOLIC BLOOD PRESSURE: 130 MMHG | OXYGEN SATURATION: 97 % | RESPIRATION RATE: 18 BRPM | HEART RATE: 87 BPM | DIASTOLIC BLOOD PRESSURE: 83 MMHG

## 2018-01-01 VITALS
OXYGEN SATURATION: 99 % | TEMPERATURE: 98.8 F | HEART RATE: 69 BPM | SYSTOLIC BLOOD PRESSURE: 130 MMHG | DIASTOLIC BLOOD PRESSURE: 81 MMHG | RESPIRATION RATE: 16 BRPM

## 2018-01-01 DIAGNOSIS — D70.1 CHEMOTHERAPY INDUCED NEUTROPENIA (HCC): ICD-10-CM

## 2018-01-01 DIAGNOSIS — N18.30 ANEMIA IN STAGE 3 CHRONIC KIDNEY DISEASE (HCC): ICD-10-CM

## 2018-01-01 DIAGNOSIS — C18.9 COLON CANCER METASTASIZED TO MULTIPLE SITES (HCC): ICD-10-CM

## 2018-01-01 DIAGNOSIS — T45.1X5A CHEMOTHERAPY INDUCED NEUTROPENIA (HCC): ICD-10-CM

## 2018-01-01 DIAGNOSIS — M19.071 PRIMARY OSTEOARTHRITIS OF BOTH ANKLES: ICD-10-CM

## 2018-01-01 DIAGNOSIS — G89.3 CANCER ASSOCIATED PAIN: ICD-10-CM

## 2018-01-01 DIAGNOSIS — T45.1X5A ANTINEOPLASTIC CHEMOTHERAPY INDUCED ANEMIA: ICD-10-CM

## 2018-01-01 DIAGNOSIS — D50.0 IRON DEFICIENCY ANEMIA DUE TO CHRONIC BLOOD LOSS: ICD-10-CM

## 2018-01-01 DIAGNOSIS — Q62.11 HYDRONEPHROSIS WITH URETEROPELVIC JUNCTION (UPJ) OBSTRUCTION: ICD-10-CM

## 2018-01-01 DIAGNOSIS — C19 COLORECTAL CARCINOMA (HCC): ICD-10-CM

## 2018-01-01 DIAGNOSIS — M17.0 PRIMARY OSTEOARTHRITIS OF BOTH KNEES: Primary | ICD-10-CM

## 2018-01-01 DIAGNOSIS — C18.9 COLON CANCER METASTASIZED TO MULTIPLE SITES (HCC): Primary | ICD-10-CM

## 2018-01-01 DIAGNOSIS — D64.81 ANTINEOPLASTIC CHEMOTHERAPY INDUCED ANEMIA: ICD-10-CM

## 2018-01-01 DIAGNOSIS — C78.6 PERITONEAL CARCINOMATOSIS (HCC): ICD-10-CM

## 2018-01-01 DIAGNOSIS — C19 COLORECTAL CARCINOMA (HCC): Primary | ICD-10-CM

## 2018-01-01 DIAGNOSIS — D63.1 ANEMIA IN STAGE 3 CHRONIC KIDNEY DISEASE (HCC): ICD-10-CM

## 2018-01-01 DIAGNOSIS — D50.0 IRON DEFICIENCY ANEMIA DUE TO CHRONIC BLOOD LOSS: Primary | ICD-10-CM

## 2018-01-01 DIAGNOSIS — T45.1X5A MOUTH SORE SECONDARY TO CHEMOTHERAPY: Primary | ICD-10-CM

## 2018-01-01 DIAGNOSIS — G89.29 CHRONIC PAIN OF RIGHT ANKLE: ICD-10-CM

## 2018-01-01 DIAGNOSIS — M25.562 CHRONIC PAIN OF BOTH KNEES: ICD-10-CM

## 2018-01-01 DIAGNOSIS — C78.6 PERITONEAL CARCINOMATOSIS (HCC): Primary | ICD-10-CM

## 2018-01-01 DIAGNOSIS — K13.79 MOUTH SORE SECONDARY TO CHEMOTHERAPY: Primary | ICD-10-CM

## 2018-01-01 DIAGNOSIS — M19.072 PRIMARY OSTEOARTHRITIS OF BOTH ANKLES: ICD-10-CM

## 2018-01-01 DIAGNOSIS — M25.561 CHRONIC PAIN OF BOTH KNEES: ICD-10-CM

## 2018-01-01 DIAGNOSIS — R63.0 LOSS OF APPETITE: ICD-10-CM

## 2018-01-01 DIAGNOSIS — R11.15 INTRACTABLE CYCLICAL VOMITING WITH NAUSEA: ICD-10-CM

## 2018-01-01 DIAGNOSIS — M25.571 CHRONIC PAIN OF RIGHT ANKLE: ICD-10-CM

## 2018-01-01 DIAGNOSIS — G89.29 CHRONIC PAIN OF BOTH KNEES: ICD-10-CM

## 2018-01-01 LAB
ALBUMIN SERPL-MCNC: 2.8 G/DL (ref 3.4–5)
ALBUMIN SERPL-MCNC: 2.9 G/DL (ref 3.4–5)
ALBUMIN SERPL-MCNC: 2.9 G/DL (ref 3.4–5)
ALBUMIN SERPL-MCNC: 3 G/DL (ref 3.4–5)
ALBUMIN SERPL-MCNC: 3.1 G/DL (ref 3.4–5)
ALBUMIN SERPL-MCNC: 3.2 G/DL (ref 3.4–5)
ALBUMIN SERPL-MCNC: 3.3 G/DL (ref 3.4–5)
ALBUMIN SERPL-MCNC: 3.4 G/DL (ref 3.4–5)
ALBUMIN SERPL-MCNC: 3.5 G/DL (ref 3.4–5)
ALBUMIN SERPL-MCNC: 3.7 G/DL (ref 3.4–5)
ALBUMIN/GLOB SERPL: 0.7 {RATIO} (ref 0.8–1.7)
ALBUMIN/GLOB SERPL: 0.8 {RATIO} (ref 0.8–1.7)
ALBUMIN/GLOB SERPL: 0.9 {RATIO} (ref 0.8–1.7)
ALBUMIN/GLOB SERPL: 1 {RATIO} (ref 0.8–1.7)
ALBUMIN/GLOB SERPL: 1.1 {RATIO} (ref 0.8–1.7)
ALP SERPL-CCNC: 100 U/L (ref 45–117)
ALP SERPL-CCNC: 100 U/L (ref 45–117)
ALP SERPL-CCNC: 105 U/L (ref 45–117)
ALP SERPL-CCNC: 110 U/L (ref 45–117)
ALP SERPL-CCNC: 132 U/L (ref 45–117)
ALP SERPL-CCNC: 137 U/L (ref 45–117)
ALP SERPL-CCNC: 138 U/L (ref 45–117)
ALP SERPL-CCNC: 149 U/L (ref 45–117)
ALP SERPL-CCNC: 175 U/L (ref 45–117)
ALP SERPL-CCNC: 175 U/L (ref 45–117)
ALP SERPL-CCNC: 176 U/L (ref 45–117)
ALP SERPL-CCNC: 179 U/L (ref 45–117)
ALP SERPL-CCNC: 180 U/L (ref 45–117)
ALP SERPL-CCNC: 182 U/L (ref 45–117)
ALP SERPL-CCNC: 185 U/L (ref 45–117)
ALP SERPL-CCNC: 192 U/L (ref 45–117)
ALP SERPL-CCNC: 205 U/L (ref 45–117)
ALP SERPL-CCNC: 208 U/L (ref 45–117)
ALP SERPL-CCNC: 91 U/L (ref 45–117)
ALT SERPL-CCNC: 116 U/L (ref 13–56)
ALT SERPL-CCNC: 13 U/L (ref 13–56)
ALT SERPL-CCNC: 14 U/L (ref 13–56)
ALT SERPL-CCNC: 14 U/L (ref 13–56)
ALT SERPL-CCNC: 15 U/L (ref 13–56)
ALT SERPL-CCNC: 17 U/L (ref 13–56)
ALT SERPL-CCNC: 18 U/L (ref 13–56)
ALT SERPL-CCNC: 19 U/L (ref 13–56)
ALT SERPL-CCNC: 23 U/L (ref 13–56)
ALT SERPL-CCNC: 23 U/L (ref 13–56)
ALT SERPL-CCNC: 24 U/L (ref 13–56)
ALT SERPL-CCNC: 38 U/L (ref 13–56)
ALT SERPL-CCNC: 60 U/L (ref 13–56)
ALT SERPL-CCNC: 68 U/L (ref 13–56)
ANION GAP BLD CALC-SCNC: 14 MMOL/L (ref 10–20)
ANION GAP BLD CALC-SCNC: 14 MMOL/L (ref 10–20)
ANION GAP BLD CALC-SCNC: 15 MMOL/L (ref 10–20)
ANION GAP BLD CALC-SCNC: 16 MMOL/L (ref 10–20)
ANION GAP BLD CALC-SCNC: 16 MMOL/L (ref 10–20)
ANION GAP BLD CALC-SCNC: 17 MMOL/L (ref 10–20)
ANION GAP BLD CALC-SCNC: 18 MMOL/L (ref 10–20)
ANION GAP BLD CALC-SCNC: 18 MMOL/L (ref 10–20)
ANION GAP BLD CALC-SCNC: 19 MMOL/L (ref 10–20)
ANION GAP BLD CALC-SCNC: 21 MMOL/L (ref 10–20)
ANION GAP BLD CALC-SCNC: 22 MMOL/L (ref 10–20)
ANION GAP SERPL CALC-SCNC: 10 MMOL/L (ref 3–18)
ANION GAP SERPL CALC-SCNC: 11 MMOL/L (ref 3–18)
ANION GAP SERPL CALC-SCNC: 11 MMOL/L (ref 3–18)
ANION GAP SERPL CALC-SCNC: 12 MMOL/L (ref 3–18)
ANION GAP SERPL CALC-SCNC: 13 MMOL/L (ref 3–18)
ANION GAP SERPL CALC-SCNC: 14 MMOL/L (ref 3–18)
ANION GAP SERPL CALC-SCNC: 15 MMOL/L (ref 3–18)
ANION GAP SERPL CALC-SCNC: 7 MMOL/L (ref 3–18)
APPEARANCE UR: ABNORMAL
APTT PPP: 31.9 SEC (ref 23–36.4)
AST SERPL-CCNC: 11 U/L (ref 15–37)
AST SERPL-CCNC: 13 U/L (ref 15–37)
AST SERPL-CCNC: 14 U/L (ref 15–37)
AST SERPL-CCNC: 15 U/L (ref 15–37)
AST SERPL-CCNC: 16 U/L (ref 15–37)
AST SERPL-CCNC: 17 U/L (ref 15–37)
AST SERPL-CCNC: 17 U/L (ref 15–37)
AST SERPL-CCNC: 18 U/L (ref 15–37)
AST SERPL-CCNC: 20 U/L (ref 15–37)
AST SERPL-CCNC: 20 U/L (ref 15–37)
AST SERPL-CCNC: 26 U/L (ref 15–37)
AST SERPL-CCNC: 58 U/L (ref 15–37)
AST SERPL-CCNC: 67 U/L (ref 15–37)
AST SERPL-CCNC: 8 U/L (ref 15–37)
AST SERPL-CCNC: 9 U/L (ref 15–37)
BACTERIA SPEC CULT: NORMAL
BACTERIA URNS QL MICRO: ABNORMAL /HPF
BASO+EOS+MONOS # BLD AUTO: 0.2 K/UL (ref 0–2.3)
BASO+EOS+MONOS # BLD AUTO: 0.3 K/UL (ref 0–2.3)
BASO+EOS+MONOS # BLD AUTO: 0.4 K/UL (ref 0–2.3)
BASO+EOS+MONOS # BLD AUTO: 0.5 K/UL (ref 0–2.3)
BASO+EOS+MONOS # BLD AUTO: 0.6 K/UL (ref 0–2.3)
BASO+EOS+MONOS # BLD AUTO: 0.7 K/UL (ref 0–2.3)
BASO+EOS+MONOS # BLD AUTO: 0.8 K/UL (ref 0–2.3)
BASO+EOS+MONOS # BLD AUTO: 0.9 K/UL (ref 0–2.3)
BASO+EOS+MONOS # BLD AUTO: 0.9 K/UL (ref 0–2.3)
BASO+EOS+MONOS # BLD AUTO: 1 K/UL (ref 0–2.3)
BASO+EOS+MONOS # BLD AUTO: 1 K/UL (ref 0–2.3)
BASO+EOS+MONOS # BLD AUTO: 1.1 K/UL (ref 0–2.3)
BASO+EOS+MONOS # BLD AUTO: 1.2 K/UL (ref 0–2.3)
BASO+EOS+MONOS # BLD AUTO: 1.4 K/UL (ref 0–2.3)
BASO+EOS+MONOS # BLD AUTO: 1.5 K/UL (ref 0–2.3)
BASO+EOS+MONOS # BLD AUTO: 1.6 K/UL (ref 0–2.3)
BASO+EOS+MONOS # BLD AUTO: 10 % (ref 0.1–17)
BASO+EOS+MONOS # BLD AUTO: 11 % (ref 0.1–17)
BASO+EOS+MONOS # BLD AUTO: 11 % (ref 0.1–17)
BASO+EOS+MONOS # BLD AUTO: 12 % (ref 0.1–17)
BASO+EOS+MONOS # BLD AUTO: 15 % (ref 0.1–17)
BASO+EOS+MONOS # BLD AUTO: 18 % (ref 0.1–17)
BASO+EOS+MONOS # BLD AUTO: 19 % (ref 0.1–17)
BASO+EOS+MONOS # BLD AUTO: 19 % (ref 0.1–17)
BASO+EOS+MONOS # BLD AUTO: 2.1 K/UL (ref 0–2.3)
BASO+EOS+MONOS # BLD AUTO: 2.4 K/UL (ref 0–2.3)
BASO+EOS+MONOS # BLD AUTO: 20 % (ref 0.1–17)
BASO+EOS+MONOS # BLD AUTO: 6 % (ref 0.1–17)
BASO+EOS+MONOS # BLD AUTO: 7 % (ref 0.1–17)
BASO+EOS+MONOS # BLD AUTO: 8 % (ref 0.1–17)
BASO+EOS+MONOS # BLD AUTO: 9 % (ref 0.1–17)
BASOPHILS # BLD: 0 K/UL (ref 0–0.1)
BASOPHILS NFR BLD: 1 % (ref 0–2)
BILIRUB DIRECT SERPL-MCNC: 0.1 MG/DL (ref 0–0.2)
BILIRUB DIRECT SERPL-MCNC: 0.2 MG/DL (ref 0–0.2)
BILIRUB DIRECT SERPL-MCNC: <0.1 MG/DL (ref 0–0.2)
BILIRUB SERPL-MCNC: 0.2 MG/DL (ref 0.2–1)
BILIRUB SERPL-MCNC: 0.3 MG/DL (ref 0.2–1)
BILIRUB SERPL-MCNC: 0.4 MG/DL (ref 0.2–1)
BILIRUB SERPL-MCNC: 0.5 MG/DL (ref 0.2–1)
BILIRUB UR QL: NEGATIVE
BUN BLD-MCNC: 24 MG/DL (ref 7–18)
BUN BLD-MCNC: 25 MG/DL (ref 7–18)
BUN BLD-MCNC: 26 MG/DL (ref 7–18)
BUN BLD-MCNC: 27 MG/DL (ref 7–18)
BUN BLD-MCNC: 28 MG/DL (ref 7–18)
BUN BLD-MCNC: 29 MG/DL (ref 7–18)
BUN BLD-MCNC: 29 MG/DL (ref 7–18)
BUN BLD-MCNC: 30 MG/DL (ref 7–18)
BUN BLD-MCNC: 32 MG/DL (ref 7–18)
BUN BLD-MCNC: 33 MG/DL (ref 7–18)
BUN BLD-MCNC: 36 MG/DL (ref 7–18)
BUN BLD-MCNC: 37 MG/DL (ref 7–18)
BUN BLD-MCNC: 38 MG/DL (ref 7–18)
BUN BLD-MCNC: 46 MG/DL (ref 7–18)
BUN BLD-MCNC: 51 MG/DL (ref 7–18)
BUN SERPL-MCNC: 21 MG/DL (ref 7–18)
BUN SERPL-MCNC: 29 MG/DL (ref 7–18)
BUN SERPL-MCNC: 33 MG/DL (ref 7–18)
BUN SERPL-MCNC: 38 MG/DL (ref 7–18)
BUN SERPL-MCNC: 39 MG/DL (ref 7–18)
BUN SERPL-MCNC: 43 MG/DL (ref 7–18)
BUN SERPL-MCNC: 45 MG/DL (ref 7–18)
BUN SERPL-MCNC: 49 MG/DL (ref 7–18)
BUN SERPL-MCNC: 53 MG/DL (ref 7–18)
BUN SERPL-MCNC: 54 MG/DL (ref 7–18)
BUN/CREAT SERPL: 14 (ref 12–20)
BUN/CREAT SERPL: 15 (ref 12–20)
BUN/CREAT SERPL: 16 (ref 12–20)
BUN/CREAT SERPL: 17 (ref 12–20)
BUN/CREAT SERPL: 21 (ref 12–20)
BUN/CREAT SERPL: 23 (ref 12–20)
BUN/CREAT SERPL: 9 (ref 12–20)
BUN/CREAT SERPL: 9 (ref 12–20)
CA-I BLD-MCNC: 1.09 MMOL/L (ref 1.12–1.32)
CA-I BLD-MCNC: 1.13 MMOL/L (ref 1.12–1.32)
CA-I BLD-MCNC: 1.17 MMOL/L (ref 1.12–1.32)
CA-I BLD-MCNC: 1.18 MMOL/L (ref 1.12–1.32)
CA-I BLD-MCNC: 1.2 MMOL/L (ref 1.12–1.32)
CA-I BLD-MCNC: 1.21 MMOL/L (ref 1.12–1.32)
CA-I BLD-MCNC: 1.21 MMOL/L (ref 1.12–1.32)
CA-I BLD-MCNC: 1.24 MMOL/L (ref 1.12–1.32)
CA-I BLD-MCNC: 1.25 MMOL/L (ref 1.12–1.32)
CA-I BLD-MCNC: 1.25 MMOL/L (ref 1.12–1.32)
CA-I BLD-MCNC: 1.26 MMOL/L (ref 1.12–1.32)
CA-I BLD-MCNC: 1.26 MMOL/L (ref 1.12–1.32)
CA-I BLD-MCNC: 1.27 MMOL/L (ref 1.12–1.32)
CA-I BLD-MCNC: 1.29 MMOL/L (ref 1.12–1.32)
CALCIUM SERPL-MCNC: 8.1 MG/DL (ref 8.5–10.1)
CALCIUM SERPL-MCNC: 8.4 MG/DL (ref 8.5–10.1)
CALCIUM SERPL-MCNC: 8.5 MG/DL (ref 8.5–10.1)
CALCIUM SERPL-MCNC: 8.6 MG/DL (ref 8.5–10.1)
CALCIUM SERPL-MCNC: 8.7 MG/DL (ref 8.5–10.1)
CALCIUM SERPL-MCNC: 8.9 MG/DL (ref 8.5–10.1)
CALCIUM SERPL-MCNC: 9.5 MG/DL (ref 8.5–10.1)
CALCIUM SERPL-MCNC: 9.5 MG/DL (ref 8.5–10.1)
CALCIUM SERPL-MCNC: 9.8 MG/DL (ref 8.5–10.1)
CALCIUM SERPL-MCNC: 9.8 MG/DL (ref 8.5–10.1)
CEA SERPL-MCNC: 142 NG/ML
CEA SERPL-MCNC: 1507.6 NG/ML
CEA SERPL-MCNC: 2771.5 NG/ML
CEA SERPL-MCNC: 4275.7 NG/ML
CEA SERPL-MCNC: 577.1 NG/ML
CEA SERPL-MCNC: 6508.6 NG/ML
CEA SERPL-MCNC: 655.4 NG/ML
CEA SERPL-MCNC: 776.4 NG/ML
CEA SERPL-MCNC: 803.6 NG/ML
CHLORIDE BLD-SCNC: 103 MMOL/L (ref 100–108)
CHLORIDE BLD-SCNC: 105 MMOL/L (ref 100–108)
CHLORIDE BLD-SCNC: 106 MMOL/L (ref 100–108)
CHLORIDE BLD-SCNC: 107 MMOL/L (ref 100–108)
CHLORIDE BLD-SCNC: 107 MMOL/L (ref 100–108)
CHLORIDE BLD-SCNC: 108 MMOL/L (ref 100–108)
CHLORIDE BLD-SCNC: 109 MMOL/L (ref 100–108)
CHLORIDE BLD-SCNC: 109 MMOL/L (ref 100–108)
CHLORIDE BLD-SCNC: 111 MMOL/L (ref 100–108)
CHLORIDE BLD-SCNC: 114 MMOL/L (ref 100–108)
CHLORIDE SERPL-SCNC: 106 MMOL/L (ref 100–108)
CHLORIDE SERPL-SCNC: 108 MMOL/L (ref 100–108)
CHLORIDE SERPL-SCNC: 108 MMOL/L (ref 100–108)
CHLORIDE SERPL-SCNC: 109 MMOL/L (ref 100–108)
CHLORIDE SERPL-SCNC: 110 MMOL/L (ref 100–108)
CHLORIDE SERPL-SCNC: 111 MMOL/L (ref 100–108)
CHLORIDE SERPL-SCNC: 112 MMOL/L (ref 100–108)
CHLORIDE SERPL-SCNC: 114 MMOL/L (ref 100–108)
CO2 BLD-SCNC: 18 MMOL/L (ref 19–24)
CO2 BLD-SCNC: 19 MMOL/L (ref 19–24)
CO2 BLD-SCNC: 20 MMOL/L (ref 19–24)
CO2 BLD-SCNC: 21 MMOL/L (ref 19–24)
CO2 BLD-SCNC: 22 MMOL/L (ref 19–24)
CO2 BLD-SCNC: 23 MMOL/L (ref 19–24)
CO2 BLD-SCNC: 23 MMOL/L (ref 19–24)
CO2 SERPL-SCNC: 16 MMOL/L (ref 21–32)
CO2 SERPL-SCNC: 18 MMOL/L (ref 21–32)
CO2 SERPL-SCNC: 19 MMOL/L (ref 21–32)
CO2 SERPL-SCNC: 21 MMOL/L (ref 21–32)
CO2 SERPL-SCNC: 23 MMOL/L (ref 21–32)
CO2 SERPL-SCNC: 23 MMOL/L (ref 21–32)
COLOR UR: YELLOW
CREAT SERPL-MCNC: 2.11 MG/DL (ref 0.6–1.3)
CREAT SERPL-MCNC: 2.26 MG/DL (ref 0.6–1.3)
CREAT SERPL-MCNC: 2.32 MG/DL (ref 0.6–1.3)
CREAT SERPL-MCNC: 2.35 MG/DL (ref 0.6–1.3)
CREAT SERPL-MCNC: 2.38 MG/DL (ref 0.6–1.3)
CREAT SERPL-MCNC: 2.46 MG/DL (ref 0.6–1.3)
CREAT SERPL-MCNC: 2.54 MG/DL (ref 0.6–1.3)
CREAT SERPL-MCNC: 3.14 MG/DL (ref 0.6–1.3)
CREAT SERPL-MCNC: 3.2 MG/DL (ref 0.6–1.3)
CREAT SERPL-MCNC: 3.39 MG/DL (ref 0.6–1.3)
CREAT UR-MCNC: 2 MG/DL (ref 0.6–1.3)
CREAT UR-MCNC: 2.1 MG/DL (ref 0.6–1.3)
CREAT UR-MCNC: 2.1 MG/DL (ref 0.6–1.3)
CREAT UR-MCNC: 2.3 MG/DL (ref 0.6–1.3)
CREAT UR-MCNC: 2.3 MG/DL (ref 0.6–1.3)
CREAT UR-MCNC: 2.4 MG/DL (ref 0.6–1.3)
CREAT UR-MCNC: 2.4 MG/DL (ref 0.6–1.3)
CREAT UR-MCNC: 2.5 MG/DL (ref 0.6–1.3)
CREAT UR-MCNC: 2.7 MG/DL (ref 0.6–1.3)
CREAT UR-MCNC: 2.9 MG/DL (ref 0.6–1.3)
CREAT UR-MCNC: 3 MG/DL (ref 0.6–1.3)
CREAT UR-MCNC: 3.1 MG/DL (ref 0.6–1.3)
CREAT UR-MCNC: 3.2 MG/DL (ref 0.6–1.3)
CREAT UR-MCNC: 3.4 MG/DL (ref 0.6–1.3)
CREAT UR-MCNC: 3.4 MG/DL (ref 0.6–1.3)
CREAT UR-MCNC: 3.6 MG/DL (ref 0.6–1.3)
DIFFERENTIAL METHOD BLD: ABNORMAL
EOSINOPHIL # BLD: 0.9 K/UL (ref 0–0.4)
EOSINOPHIL NFR BLD: 15 % (ref 0–5)
EPITH CASTS URNS QL MICRO: ABNORMAL /LPF (ref 0–5)
ERYTHROCYTE [DISTWIDTH] IN BLOOD BY AUTOMATED COUNT: 14.9 % (ref 11.5–14.5)
ERYTHROCYTE [DISTWIDTH] IN BLOOD BY AUTOMATED COUNT: 15.6 % (ref 11.5–14.5)
ERYTHROCYTE [DISTWIDTH] IN BLOOD BY AUTOMATED COUNT: 15.6 % (ref 11.5–14.5)
ERYTHROCYTE [DISTWIDTH] IN BLOOD BY AUTOMATED COUNT: 15.8 % (ref 11.5–14.5)
ERYTHROCYTE [DISTWIDTH] IN BLOOD BY AUTOMATED COUNT: 15.8 % (ref 11.5–14.5)
ERYTHROCYTE [DISTWIDTH] IN BLOOD BY AUTOMATED COUNT: 15.9 % (ref 11.5–14.5)
ERYTHROCYTE [DISTWIDTH] IN BLOOD BY AUTOMATED COUNT: 15.9 % (ref 11.5–14.5)
ERYTHROCYTE [DISTWIDTH] IN BLOOD BY AUTOMATED COUNT: 16.1 % (ref 11.5–14.5)
ERYTHROCYTE [DISTWIDTH] IN BLOOD BY AUTOMATED COUNT: 16.2 % (ref 11.5–14.5)
ERYTHROCYTE [DISTWIDTH] IN BLOOD BY AUTOMATED COUNT: 16.2 % (ref 11.5–14.5)
ERYTHROCYTE [DISTWIDTH] IN BLOOD BY AUTOMATED COUNT: 16.4 % (ref 11.5–14.5)
ERYTHROCYTE [DISTWIDTH] IN BLOOD BY AUTOMATED COUNT: 16.5 % (ref 11.5–14.5)
ERYTHROCYTE [DISTWIDTH] IN BLOOD BY AUTOMATED COUNT: 16.7 % (ref 11.5–14.5)
ERYTHROCYTE [DISTWIDTH] IN BLOOD BY AUTOMATED COUNT: 16.9 % (ref 11.5–14.5)
ERYTHROCYTE [DISTWIDTH] IN BLOOD BY AUTOMATED COUNT: 17 % (ref 11.6–14.5)
ERYTHROCYTE [DISTWIDTH] IN BLOOD BY AUTOMATED COUNT: 17.2 % (ref 11.5–14.5)
ERYTHROCYTE [DISTWIDTH] IN BLOOD BY AUTOMATED COUNT: 17.3 % (ref 11.5–14.5)
ERYTHROCYTE [DISTWIDTH] IN BLOOD BY AUTOMATED COUNT: 17.4 % (ref 11.5–14.5)
ERYTHROCYTE [DISTWIDTH] IN BLOOD BY AUTOMATED COUNT: 17.4 % (ref 11.5–14.5)
ERYTHROCYTE [DISTWIDTH] IN BLOOD BY AUTOMATED COUNT: 17.6 % (ref 11.5–14.5)
ERYTHROCYTE [DISTWIDTH] IN BLOOD BY AUTOMATED COUNT: 17.6 % (ref 11.5–14.5)
ERYTHROCYTE [DISTWIDTH] IN BLOOD BY AUTOMATED COUNT: 18.3 % (ref 11.6–14.5)
ERYTHROCYTE [DISTWIDTH] IN BLOOD BY AUTOMATED COUNT: 19.1 % (ref 11.5–14.5)
ERYTHROCYTE [DISTWIDTH] IN BLOOD BY AUTOMATED COUNT: 19.1 % (ref 11.5–14.5)
FERRITIN SERPL-MCNC: 130 NG/ML (ref 8–388)
FERRITIN SERPL-MCNC: 169 NG/ML (ref 8–388)
FERRITIN SERPL-MCNC: 194 NG/ML (ref 8–388)
FERRITIN SERPL-MCNC: 354 NG/ML (ref 8–388)
FERRITIN SERPL-MCNC: 405 NG/ML (ref 8–388)
FERRITIN SERPL-MCNC: 426 NG/ML (ref 8–388)
FERRITIN SERPL-MCNC: 487 NG/ML (ref 8–388)
FERRITIN SERPL-MCNC: 507 NG/ML (ref 8–388)
FERRITIN SERPL-MCNC: 575 NG/ML (ref 8–388)
GLOBULIN SER CALC-MCNC: 3.2 G/DL (ref 2–4)
GLOBULIN SER CALC-MCNC: 3.3 G/DL (ref 2–4)
GLOBULIN SER CALC-MCNC: 3.4 G/DL (ref 2–4)
GLOBULIN SER CALC-MCNC: 3.6 G/DL (ref 2–4)
GLOBULIN SER CALC-MCNC: 3.6 G/DL (ref 2–4)
GLOBULIN SER CALC-MCNC: 3.8 G/DL (ref 2–4)
GLOBULIN SER CALC-MCNC: 3.8 G/DL (ref 2–4)
GLOBULIN SER CALC-MCNC: 3.9 G/DL (ref 2–4)
GLOBULIN SER CALC-MCNC: 4 G/DL (ref 2–4)
GLOBULIN SER CALC-MCNC: 4.1 G/DL (ref 2–4)
GLUCOSE BLD STRIP.AUTO-MCNC: 115 MG/DL (ref 74–106)
GLUCOSE BLD STRIP.AUTO-MCNC: 116 MG/DL (ref 70–110)
GLUCOSE BLD STRIP.AUTO-MCNC: 119 MG/DL (ref 70–110)
GLUCOSE BLD STRIP.AUTO-MCNC: 123 MG/DL (ref 70–110)
GLUCOSE BLD STRIP.AUTO-MCNC: 134 MG/DL (ref 74–106)
GLUCOSE BLD STRIP.AUTO-MCNC: 135 MG/DL (ref 70–110)
GLUCOSE BLD STRIP.AUTO-MCNC: 137 MG/DL (ref 74–106)
GLUCOSE BLD STRIP.AUTO-MCNC: 141 MG/DL (ref 74–106)
GLUCOSE BLD STRIP.AUTO-MCNC: 142 MG/DL (ref 74–106)
GLUCOSE BLD STRIP.AUTO-MCNC: 143 MG/DL (ref 74–106)
GLUCOSE BLD STRIP.AUTO-MCNC: 144 MG/DL (ref 74–106)
GLUCOSE BLD STRIP.AUTO-MCNC: 152 MG/DL (ref 74–106)
GLUCOSE BLD STRIP.AUTO-MCNC: 157 MG/DL (ref 74–106)
GLUCOSE BLD STRIP.AUTO-MCNC: 158 MG/DL (ref 74–106)
GLUCOSE BLD STRIP.AUTO-MCNC: 159 MG/DL (ref 70–110)
GLUCOSE BLD STRIP.AUTO-MCNC: 159 MG/DL (ref 74–106)
GLUCOSE BLD STRIP.AUTO-MCNC: 160 MG/DL (ref 74–106)
GLUCOSE BLD STRIP.AUTO-MCNC: 160 MG/DL (ref 74–106)
GLUCOSE BLD STRIP.AUTO-MCNC: 165 MG/DL (ref 74–106)
GLUCOSE BLD STRIP.AUTO-MCNC: 167 MG/DL (ref 74–106)
GLUCOSE BLD STRIP.AUTO-MCNC: 175 MG/DL (ref 74–106)
GLUCOSE BLD STRIP.AUTO-MCNC: 178 MG/DL (ref 74–106)
GLUCOSE BLD STRIP.AUTO-MCNC: 232 MG/DL (ref 74–106)
GLUCOSE SERPL-MCNC: 107 MG/DL (ref 74–99)
GLUCOSE SERPL-MCNC: 119 MG/DL (ref 74–99)
GLUCOSE SERPL-MCNC: 129 MG/DL (ref 74–99)
GLUCOSE SERPL-MCNC: 135 MG/DL (ref 74–99)
GLUCOSE SERPL-MCNC: 136 MG/DL (ref 74–99)
GLUCOSE SERPL-MCNC: 147 MG/DL (ref 74–99)
GLUCOSE SERPL-MCNC: 151 MG/DL (ref 74–99)
GLUCOSE SERPL-MCNC: 151 MG/DL (ref 74–99)
GLUCOSE SERPL-MCNC: 158 MG/DL (ref 74–99)
GLUCOSE SERPL-MCNC: 172 MG/DL (ref 74–99)
GLUCOSE UR STRIP.AUTO-MCNC: NEGATIVE MG/DL
HCT VFR BLD AUTO: 25.5 % (ref 36–48)
HCT VFR BLD AUTO: 26 % (ref 36–48)
HCT VFR BLD AUTO: 26.3 % (ref 36–48)
HCT VFR BLD AUTO: 26.7 % (ref 36–48)
HCT VFR BLD AUTO: 26.9 % (ref 36–48)
HCT VFR BLD AUTO: 27 % (ref 36–48)
HCT VFR BLD AUTO: 27 % (ref 36–48)
HCT VFR BLD AUTO: 27.1 % (ref 36–48)
HCT VFR BLD AUTO: 27.4 % (ref 36–48)
HCT VFR BLD AUTO: 27.6 % (ref 36–48)
HCT VFR BLD AUTO: 27.6 % (ref 36–48)
HCT VFR BLD AUTO: 27.8 % (ref 36–48)
HCT VFR BLD AUTO: 27.8 % (ref 36–48)
HCT VFR BLD AUTO: 27.9 % (ref 36–48)
HCT VFR BLD AUTO: 28 % (ref 36–48)
HCT VFR BLD AUTO: 28.3 % (ref 36–48)
HCT VFR BLD AUTO: 28.6 % (ref 35–45)
HCT VFR BLD AUTO: 29.3 % (ref 36–48)
HCT VFR BLD AUTO: 29.3 % (ref 36–48)
HCT VFR BLD AUTO: 29.9 % (ref 35–45)
HCT VFR BLD AUTO: 30.2 % (ref 36–48)
HCT VFR BLD AUTO: 30.9 % (ref 36–48)
HCT VFR BLD AUTO: 31.1 % (ref 36–48)
HCT VFR BLD AUTO: 31.5 % (ref 36–48)
HCT VFR BLD AUTO: 31.6 % (ref 36–48)
HCT VFR BLD AUTO: 34.5 % (ref 36–48)
HCT VFR BLD AUTO: 35 % (ref 36–48)
HCT VFR BLD CALC: 23 % (ref 36–49)
HCT VFR BLD CALC: 25 % (ref 36–49)
HCT VFR BLD CALC: 25 % (ref 36–49)
HCT VFR BLD CALC: 26 % (ref 36–49)
HCT VFR BLD CALC: 26 % (ref 36–49)
HCT VFR BLD CALC: 27 % (ref 36–49)
HCT VFR BLD CALC: 28 % (ref 36–49)
HCT VFR BLD CALC: 29 % (ref 36–49)
HCT VFR BLD CALC: 29 % (ref 36–49)
HCT VFR BLD CALC: 30 % (ref 36–49)
HCT VFR BLD CALC: 30 % (ref 36–49)
HCT VFR BLD CALC: 33 % (ref 36–49)
HCT VFR BLD CALC: 35 % (ref 36–49)
HCT VFR BLD CALC: 35 % (ref 36–49)
HGB BLD-MCNC: 10.2 G/DL (ref 12–16)
HGB BLD-MCNC: 10.2 G/DL (ref 12–16)
HGB BLD-MCNC: 10.5 G/DL (ref 12–16)
HGB BLD-MCNC: 10.9 G/DL (ref 12–16)
HGB BLD-MCNC: 11.2 G/DL (ref 12–16)
HGB BLD-MCNC: 11.9 G/DL (ref 12–16)
HGB BLD-MCNC: 11.9 G/DL (ref 12–16)
HGB BLD-MCNC: 7.8 G/DL (ref 12–16)
HGB BLD-MCNC: 8.1 G/DL (ref 12–16)
HGB BLD-MCNC: 8.4 G/DL (ref 12–16)
HGB BLD-MCNC: 8.5 G/DL (ref 12–16)
HGB BLD-MCNC: 8.6 G/DL (ref 12–16)
HGB BLD-MCNC: 8.7 G/DL (ref 12–16)
HGB BLD-MCNC: 8.8 G/DL (ref 12–16)
HGB BLD-MCNC: 8.9 G/DL (ref 12–16)
HGB BLD-MCNC: 9.1 G/DL (ref 12–16)
HGB BLD-MCNC: 9.2 G/DL (ref 12–16)
HGB BLD-MCNC: 9.4 G/DL (ref 12–16)
HGB BLD-MCNC: 9.5 G/DL (ref 12–16)
HGB BLD-MCNC: 9.7 G/DL (ref 12–16)
HGB BLD-MCNC: 9.8 G/DL (ref 12–16)
HGB BLD-MCNC: 9.8 G/DL (ref 12–16)
HGB BLD-MCNC: 9.9 G/DL (ref 12–16)
HGB UR QL STRIP: ABNORMAL
INR PPP: 1.2 (ref 0.8–1.2)
INR PPP: 1.7 (ref 0.8–1.2)
IRON SATN MFR SERPL: 13 %
IRON SATN MFR SERPL: 13 %
IRON SATN MFR SERPL: 16 %
IRON SATN MFR SERPL: 19 %
IRON SATN MFR SERPL: 23 %
IRON SATN MFR SERPL: 24 %
IRON SATN MFR SERPL: 24 %
IRON SATN MFR SERPL: 25 %
IRON SATN MFR SERPL: 27 %
IRON SERPL-MCNC: 37 UG/DL (ref 50–175)
IRON SERPL-MCNC: 37 UG/DL (ref 50–175)
IRON SERPL-MCNC: 41 UG/DL (ref 50–175)
IRON SERPL-MCNC: 43 UG/DL (ref 50–175)
IRON SERPL-MCNC: 48 UG/DL (ref 50–175)
IRON SERPL-MCNC: 58 UG/DL (ref 50–175)
IRON SERPL-MCNC: 61 UG/DL (ref 50–175)
IRON SERPL-MCNC: 69 UG/DL (ref 50–175)
IRON SERPL-MCNC: 71 UG/DL (ref 50–175)
KETONES UR QL STRIP.AUTO: NEGATIVE MG/DL
LEUKOCYTE ESTERASE UR QL STRIP.AUTO: ABNORMAL
LYMPHOCYTES # BLD: 0.8 K/UL (ref 1.1–5.9)
LYMPHOCYTES # BLD: 0.8 K/UL (ref 1.1–5.9)
LYMPHOCYTES # BLD: 1 K/UL (ref 1.1–5.9)
LYMPHOCYTES # BLD: 1.1 K/UL (ref 1.1–5.9)
LYMPHOCYTES # BLD: 1.1 K/UL (ref 1.1–5.9)
LYMPHOCYTES # BLD: 1.2 K/UL (ref 1.1–5.9)
LYMPHOCYTES # BLD: 1.3 K/UL (ref 1.1–5.9)
LYMPHOCYTES # BLD: 1.4 K/UL (ref 0.9–3.6)
LYMPHOCYTES # BLD: 1.4 K/UL (ref 1.1–5.9)
LYMPHOCYTES # BLD: 1.5 K/UL (ref 1.1–5.9)
LYMPHOCYTES # BLD: 1.5 K/UL (ref 1.1–5.9)
LYMPHOCYTES # BLD: 1.6 K/UL (ref 1.1–5.9)
LYMPHOCYTES # BLD: 1.8 K/UL (ref 1.1–5.9)
LYMPHOCYTES # BLD: 1.9 K/UL (ref 1.1–5.9)
LYMPHOCYTES # BLD: 2 K/UL (ref 1.1–5.9)
LYMPHOCYTES # BLD: 2 K/UL (ref 1.1–5.9)
LYMPHOCYTES # BLD: 2.1 K/UL (ref 1.1–5.9)
LYMPHOCYTES # BLD: 2.2 K/UL (ref 1.1–5.9)
LYMPHOCYTES # BLD: 2.4 K/UL (ref 1.1–5.9)
LYMPHOCYTES # BLD: 2.5 K/UL (ref 1.1–5.9)
LYMPHOCYTES # BLD: 2.6 K/UL (ref 1.1–5.9)
LYMPHOCYTES # BLD: 2.8 K/UL (ref 1.1–5.9)
LYMPHOCYTES # BLD: 2.9 K/UL (ref 1.1–5.9)
LYMPHOCYTES # BLD: 3.6 K/UL (ref 1.1–5.9)
LYMPHOCYTES NFR BLD: 10 % (ref 14–44)
LYMPHOCYTES NFR BLD: 11 % (ref 14–44)
LYMPHOCYTES NFR BLD: 12 % (ref 14–44)
LYMPHOCYTES NFR BLD: 13 % (ref 14–44)
LYMPHOCYTES NFR BLD: 14 % (ref 14–44)
LYMPHOCYTES NFR BLD: 16 % (ref 14–44)
LYMPHOCYTES NFR BLD: 17 % (ref 14–44)
LYMPHOCYTES NFR BLD: 17 % (ref 14–44)
LYMPHOCYTES NFR BLD: 18 % (ref 14–44)
LYMPHOCYTES NFR BLD: 20 % (ref 14–44)
LYMPHOCYTES NFR BLD: 20 % (ref 14–44)
LYMPHOCYTES NFR BLD: 21 % (ref 14–44)
LYMPHOCYTES NFR BLD: 22 % (ref 14–44)
LYMPHOCYTES NFR BLD: 22 % (ref 14–44)
LYMPHOCYTES NFR BLD: 23 % (ref 21–52)
LYMPHOCYTES NFR BLD: 24 % (ref 14–44)
LYMPHOCYTES NFR BLD: 24 % (ref 14–44)
LYMPHOCYTES NFR BLD: 25 % (ref 14–44)
LYMPHOCYTES NFR BLD: 26 % (ref 14–44)
LYMPHOCYTES NFR BLD: 27 % (ref 14–44)
LYMPHOCYTES NFR BLD: 31 % (ref 14–44)
LYMPHOCYTES NFR BLD: 37 % (ref 14–44)
LYMPHOCYTES NFR BLD: 38 % (ref 14–44)
LYMPHOCYTES NFR BLD: 8 % (ref 14–44)
Lab: NORMAL
MCH RBC QN AUTO: 27.3 PG (ref 25–35)
MCH RBC QN AUTO: 27.6 PG (ref 25–35)
MCH RBC QN AUTO: 28 PG (ref 24–34)
MCH RBC QN AUTO: 28 PG (ref 24–34)
MCH RBC QN AUTO: 28 PG (ref 25–35)
MCH RBC QN AUTO: 28 PG (ref 25–35)
MCH RBC QN AUTO: 28.1 PG (ref 25–35)
MCH RBC QN AUTO: 28.3 PG (ref 25–35)
MCH RBC QN AUTO: 28.4 PG (ref 25–35)
MCH RBC QN AUTO: 28.7 PG (ref 25–35)
MCH RBC QN AUTO: 29.1 PG (ref 25–35)
MCH RBC QN AUTO: 29.1 PG (ref 25–35)
MCH RBC QN AUTO: 29.2 PG (ref 25–35)
MCH RBC QN AUTO: 29.3 PG (ref 25–35)
MCH RBC QN AUTO: 29.4 PG (ref 25–35)
MCH RBC QN AUTO: 29.4 PG (ref 25–35)
MCH RBC QN AUTO: 29.6 PG (ref 25–35)
MCH RBC QN AUTO: 29.8 PG (ref 25–35)
MCH RBC QN AUTO: 30 PG (ref 25–35)
MCH RBC QN AUTO: 30.3 PG (ref 25–35)
MCH RBC QN AUTO: 30.6 PG (ref 25–35)
MCH RBC QN AUTO: 30.9 PG (ref 25–35)
MCH RBC QN AUTO: 30.9 PG (ref 25–35)
MCH RBC QN AUTO: 31.4 PG (ref 25–35)
MCH RBC QN AUTO: 31.5 PG (ref 25–35)
MCH RBC QN AUTO: 32.2 PG (ref 25–35)
MCH RBC QN AUTO: 32.6 PG (ref 25–35)
MCHC RBC AUTO-ENTMCNC: 30 G/DL (ref 31–37)
MCHC RBC AUTO-ENTMCNC: 30.1 G/DL (ref 31–37)
MCHC RBC AUTO-ENTMCNC: 30.4 G/DL (ref 31–37)
MCHC RBC AUTO-ENTMCNC: 30.4 G/DL (ref 31–37)
MCHC RBC AUTO-ENTMCNC: 30.6 G/DL (ref 31–37)
MCHC RBC AUTO-ENTMCNC: 30.6 G/DL (ref 31–37)
MCHC RBC AUTO-ENTMCNC: 30.7 G/DL (ref 31–37)
MCHC RBC AUTO-ENTMCNC: 30.8 G/DL (ref 31–37)
MCHC RBC AUTO-ENTMCNC: 30.9 G/DL (ref 31–37)
MCHC RBC AUTO-ENTMCNC: 31 G/DL (ref 31–37)
MCHC RBC AUTO-ENTMCNC: 31.1 G/DL (ref 31–37)
MCHC RBC AUTO-ENTMCNC: 31.2 G/DL (ref 31–37)
MCHC RBC AUTO-ENTMCNC: 31.4 G/DL (ref 31–37)
MCHC RBC AUTO-ENTMCNC: 31.5 G/DL (ref 31–37)
MCHC RBC AUTO-ENTMCNC: 31.7 G/DL (ref 31–37)
MCHC RBC AUTO-ENTMCNC: 31.8 G/DL (ref 31–37)
MCHC RBC AUTO-ENTMCNC: 31.9 G/DL (ref 31–37)
MCHC RBC AUTO-ENTMCNC: 32.1 G/DL (ref 31–37)
MCHC RBC AUTO-ENTMCNC: 32.2 G/DL (ref 31–37)
MCHC RBC AUTO-ENTMCNC: 33 G/DL (ref 31–37)
MCV RBC AUTO: 100.4 FL (ref 78–102)
MCV RBC AUTO: 88.2 FL (ref 74–97)
MCV RBC AUTO: 89.8 FL (ref 78–102)
MCV RBC AUTO: 89.9 FL (ref 78–102)
MCV RBC AUTO: 90.1 FL (ref 78–102)
MCV RBC AUTO: 90.4 FL (ref 78–102)
MCV RBC AUTO: 90.4 FL (ref 78–102)
MCV RBC AUTO: 90.7 FL (ref 78–102)
MCV RBC AUTO: 90.9 FL (ref 78–102)
MCV RBC AUTO: 93.2 FL (ref 74–97)
MCV RBC AUTO: 93.2 FL (ref 78–102)
MCV RBC AUTO: 93.4 FL (ref 78–102)
MCV RBC AUTO: 93.8 FL (ref 78–102)
MCV RBC AUTO: 94.4 FL (ref 78–102)
MCV RBC AUTO: 94.6 FL (ref 78–102)
MCV RBC AUTO: 94.9 FL (ref 78–102)
MCV RBC AUTO: 95 FL (ref 78–102)
MCV RBC AUTO: 95.6 FL (ref 78–102)
MCV RBC AUTO: 96.9 FL (ref 78–102)
MCV RBC AUTO: 97.1 FL (ref 78–102)
MCV RBC AUTO: 97.3 FL (ref 78–102)
MCV RBC AUTO: 97.9 FL (ref 78–102)
MCV RBC AUTO: 98.2 FL (ref 78–102)
MCV RBC AUTO: 98.4 FL (ref 78–102)
MCV RBC AUTO: 98.6 FL (ref 78–102)
MCV RBC AUTO: 98.6 FL (ref 78–102)
MCV RBC AUTO: 98.9 FL (ref 78–102)
MONOCYTES # BLD: 0.6 K/UL (ref 0.05–1.2)
MONOCYTES NFR BLD: 9 % (ref 3–10)
NEUTS SEG # BLD: 1.5 K/UL (ref 1.8–9.5)
NEUTS SEG # BLD: 1.7 K/UL (ref 1.8–9.5)
NEUTS SEG # BLD: 10 K/UL (ref 1.8–9.5)
NEUTS SEG # BLD: 16.1 K/UL (ref 1.8–9.5)
NEUTS SEG # BLD: 16.4 K/UL (ref 1.8–9.5)
NEUTS SEG # BLD: 16.6 K/UL (ref 1.8–9.5)
NEUTS SEG # BLD: 17.5 K/UL (ref 1.8–9.5)
NEUTS SEG # BLD: 2.4 K/UL (ref 1.8–9.5)
NEUTS SEG # BLD: 2.8 K/UL (ref 1.8–9.5)
NEUTS SEG # BLD: 2.9 K/UL (ref 1.8–9.5)
NEUTS SEG # BLD: 22.5 K/UL (ref 1.8–9.5)
NEUTS SEG # BLD: 24.1 K/UL (ref 1.8–9.5)
NEUTS SEG # BLD: 26.2 K/UL (ref 1.8–9.5)
NEUTS SEG # BLD: 3.1 K/UL (ref 1.8–9.5)
NEUTS SEG # BLD: 3.2 K/UL (ref 1.8–8)
NEUTS SEG # BLD: 3.3 K/UL (ref 1.8–9.5)
NEUTS SEG # BLD: 3.4 K/UL (ref 1.8–9.5)
NEUTS SEG # BLD: 3.8 K/UL (ref 1.8–9.5)
NEUTS SEG # BLD: 3.9 K/UL (ref 1.8–9.5)
NEUTS SEG # BLD: 4.7 K/UL (ref 1.8–9.5)
NEUTS SEG # BLD: 5.4 K/UL (ref 1.8–9.5)
NEUTS SEG # BLD: 5.8 K/UL (ref 1.8–9.5)
NEUTS SEG # BLD: 6.1 K/UL (ref 1.8–9.5)
NEUTS SEG # BLD: 6.3 K/UL (ref 1.8–9.5)
NEUTS SEG # BLD: 6.5 K/UL (ref 1.8–9.5)
NEUTS SEG # BLD: 9.9 K/UL (ref 1.8–9.5)
NEUTS SEG NFR BLD: 43 % (ref 40–70)
NEUTS SEG NFR BLD: 52 % (ref 40–73)
NEUTS SEG NFR BLD: 54 % (ref 40–70)
NEUTS SEG NFR BLD: 55 % (ref 40–70)
NEUTS SEG NFR BLD: 58 % (ref 40–70)
NEUTS SEG NFR BLD: 60 % (ref 40–70)
NEUTS SEG NFR BLD: 61 % (ref 40–70)
NEUTS SEG NFR BLD: 61 % (ref 40–70)
NEUTS SEG NFR BLD: 64 % (ref 40–70)
NEUTS SEG NFR BLD: 65 % (ref 40–70)
NEUTS SEG NFR BLD: 66 % (ref 40–70)
NEUTS SEG NFR BLD: 67 % (ref 40–70)
NEUTS SEG NFR BLD: 69 % (ref 40–70)
NEUTS SEG NFR BLD: 72 % (ref 40–70)
NEUTS SEG NFR BLD: 74 % (ref 40–70)
NEUTS SEG NFR BLD: 74 % (ref 40–70)
NEUTS SEG NFR BLD: 76 % (ref 40–70)
NEUTS SEG NFR BLD: 77 % (ref 40–70)
NEUTS SEG NFR BLD: 77 % (ref 40–70)
NEUTS SEG NFR BLD: 80 % (ref 40–70)
NEUTS SEG NFR BLD: 81 % (ref 40–70)
NEUTS SEG NFR BLD: 81 % (ref 40–70)
NEUTS SEG NFR BLD: 82 % (ref 40–70)
NEUTS SEG NFR BLD: 83 % (ref 40–70)
NEUTS SEG NFR BLD: 85 % (ref 40–70)
NEUTS SEG NFR BLD: 86 % (ref 40–70)
NITRITE UR QL STRIP.AUTO: NEGATIVE
PH UR STRIP: 6 [PH] (ref 5–8)
PLATELET # BLD AUTO: 198 K/UL (ref 140–440)
PLATELET # BLD AUTO: 200 K/UL (ref 140–440)
PLATELET # BLD AUTO: 228 K/UL (ref 140–440)
PLATELET # BLD AUTO: 236 K/UL (ref 140–440)
PLATELET # BLD AUTO: 240 K/UL (ref 140–440)
PLATELET # BLD AUTO: 248 K/UL (ref 140–440)
PLATELET # BLD AUTO: 249 K/UL (ref 135–420)
PLATELET # BLD AUTO: 255 K/UL (ref 140–440)
PLATELET # BLD AUTO: 265 K/UL (ref 135–420)
PLATELET # BLD AUTO: 270 K/UL (ref 135–420)
PLATELET # BLD AUTO: 270 K/UL (ref 140–440)
PLATELET # BLD AUTO: 277 K/UL (ref 140–440)
PLATELET # BLD AUTO: 296 K/UL (ref 140–440)
PLATELET # BLD AUTO: 303 K/UL (ref 140–440)
PLATELET # BLD AUTO: 307 K/UL (ref 140–440)
PLATELET # BLD AUTO: 312 K/UL (ref 135–420)
PLATELET # BLD AUTO: 317 K/UL (ref 140–440)
PLATELET # BLD AUTO: 317 K/UL (ref 140–440)
PLATELET # BLD AUTO: 325 K/UL (ref 140–440)
PLATELET # BLD AUTO: 333 K/UL (ref 140–440)
PLATELET # BLD AUTO: 366 K/UL (ref 140–440)
PLATELET # BLD AUTO: 395 K/UL (ref 140–440)
PLATELET # BLD AUTO: 400 K/UL (ref 140–440)
PLATELET # BLD AUTO: 405 K/UL (ref 140–440)
PLATELET # BLD AUTO: 428 K/UL (ref 140–440)
PLATELET # BLD AUTO: 448 K/UL (ref 140–440)
PLATELET # BLD AUTO: 450 K/UL (ref 140–440)
PMV BLD AUTO: 10.3 FL (ref 9.2–11.8)
PMV BLD AUTO: 9.3 FL (ref 9.2–11.8)
POTASSIUM BLD-SCNC: 3.4 MMOL/L (ref 3.5–5.5)
POTASSIUM BLD-SCNC: 3.4 MMOL/L (ref 3.5–5.5)
POTASSIUM BLD-SCNC: 3.6 MMOL/L (ref 3.5–5.5)
POTASSIUM BLD-SCNC: 3.7 MMOL/L (ref 3.5–5.5)
POTASSIUM BLD-SCNC: 3.7 MMOL/L (ref 3.5–5.5)
POTASSIUM BLD-SCNC: 3.8 MMOL/L (ref 3.5–5.5)
POTASSIUM BLD-SCNC: 3.8 MMOL/L (ref 3.5–5.5)
POTASSIUM BLD-SCNC: 3.9 MMOL/L (ref 3.5–5.5)
POTASSIUM BLD-SCNC: 4 MMOL/L (ref 3.5–5.5)
POTASSIUM BLD-SCNC: 4 MMOL/L (ref 3.5–5.5)
POTASSIUM BLD-SCNC: 4.1 MMOL/L (ref 3.5–5.5)
POTASSIUM BLD-SCNC: 4.2 MMOL/L (ref 3.5–5.5)
POTASSIUM BLD-SCNC: 4.2 MMOL/L (ref 3.5–5.5)
POTASSIUM BLD-SCNC: 4.3 MMOL/L (ref 3.5–5.5)
POTASSIUM BLD-SCNC: 4.6 MMOL/L (ref 3.5–5.5)
POTASSIUM SERPL-SCNC: 4 MMOL/L (ref 3.5–5.5)
POTASSIUM SERPL-SCNC: 4.3 MMOL/L (ref 3.5–5.5)
POTASSIUM SERPL-SCNC: 4.4 MMOL/L (ref 3.5–5.5)
POTASSIUM SERPL-SCNC: 4.5 MMOL/L (ref 3.5–5.5)
POTASSIUM SERPL-SCNC: 4.7 MMOL/L (ref 3.5–5.5)
POTASSIUM SERPL-SCNC: 4.8 MMOL/L (ref 3.5–5.5)
POTASSIUM SERPL-SCNC: 5.1 MMOL/L (ref 3.5–5.5)
POTASSIUM SERPL-SCNC: 5.3 MMOL/L (ref 3.5–5.5)
PROT SERPL-MCNC: 6.2 G/DL (ref 6.4–8.2)
PROT SERPL-MCNC: 6.3 G/DL (ref 6.4–8.2)
PROT SERPL-MCNC: 6.3 G/DL (ref 6.4–8.2)
PROT SERPL-MCNC: 6.4 G/DL (ref 6.4–8.2)
PROT SERPL-MCNC: 6.4 G/DL (ref 6.4–8.2)
PROT SERPL-MCNC: 6.5 G/DL (ref 6.4–8.2)
PROT SERPL-MCNC: 6.6 G/DL (ref 6.4–8.2)
PROT SERPL-MCNC: 6.9 G/DL (ref 6.4–8.2)
PROT SERPL-MCNC: 6.9 G/DL (ref 6.4–8.2)
PROT SERPL-MCNC: 7 G/DL (ref 6.4–8.2)
PROT SERPL-MCNC: 7.1 G/DL (ref 6.4–8.2)
PROT SERPL-MCNC: 7.2 G/DL (ref 6.4–8.2)
PROT SERPL-MCNC: 7.5 G/DL (ref 6.4–8.2)
PROT UR STRIP-MCNC: 300 MG/DL
PROTHROMBIN TIME: 14.8 SEC (ref 11.5–15.2)
PROTHROMBIN TIME: 19.6 SEC (ref 11.5–15.2)
RBC # BLD AUTO: 2.62 M/UL (ref 4.1–5.1)
RBC # BLD AUTO: 2.7 M/UL (ref 4.1–5.1)
RBC # BLD AUTO: 2.7 M/UL (ref 4.1–5.1)
RBC # BLD AUTO: 2.77 M/UL (ref 4.1–5.1)
RBC # BLD AUTO: 2.77 M/UL (ref 4.1–5.1)
RBC # BLD AUTO: 2.78 M/UL (ref 4.1–5.1)
RBC # BLD AUTO: 2.79 M/UL (ref 4.1–5.1)
RBC # BLD AUTO: 2.8 M/UL (ref 4.1–5.1)
RBC # BLD AUTO: 2.83 M/UL (ref 4.1–5.1)
RBC # BLD AUTO: 2.86 M/UL (ref 4.1–5.1)
RBC # BLD AUTO: 2.88 M/UL (ref 4.1–5.1)
RBC # BLD AUTO: 2.89 M/UL (ref 4.1–5.1)
RBC # BLD AUTO: 2.89 M/UL (ref 4.1–5.1)
RBC # BLD AUTO: 2.93 M/UL (ref 4.1–5.1)
RBC # BLD AUTO: 2.94 M/UL (ref 4.1–5.1)
RBC # BLD AUTO: 3.07 M/UL (ref 4.1–5.1)
RBC # BLD AUTO: 3.07 M/UL (ref 4.2–5.3)
RBC # BLD AUTO: 3.18 M/UL (ref 4.1–5.1)
RBC # BLD AUTO: 3.2 M/UL (ref 4.1–5.1)
RBC # BLD AUTO: 3.23 M/UL (ref 4.1–5.1)
RBC # BLD AUTO: 3.24 M/UL (ref 4.1–5.1)
RBC # BLD AUTO: 3.37 M/UL (ref 4.1–5.1)
RBC # BLD AUTO: 3.39 M/UL (ref 4.2–5.3)
RBC # BLD AUTO: 3.42 M/UL (ref 4.1–5.1)
RBC # BLD AUTO: 3.45 M/UL (ref 4.1–5.1)
RBC # BLD AUTO: 3.84 M/UL (ref 4.1–5.1)
RBC # BLD AUTO: 3.85 M/UL (ref 4.1–5.1)
RBC #/AREA URNS HPF: ABNORMAL /HPF (ref 0–5)
SERVICE CMNT-IMP: NORMAL
SODIUM BLD-SCNC: 138 MMOL/L (ref 136–145)
SODIUM BLD-SCNC: 138 MMOL/L (ref 136–145)
SODIUM BLD-SCNC: 139 MMOL/L (ref 136–145)
SODIUM BLD-SCNC: 140 MMOL/L (ref 136–145)
SODIUM BLD-SCNC: 141 MMOL/L (ref 136–145)
SODIUM BLD-SCNC: 141 MMOL/L (ref 136–145)
SODIUM BLD-SCNC: 142 MMOL/L (ref 136–145)
SODIUM BLD-SCNC: 143 MMOL/L (ref 136–145)
SODIUM BLD-SCNC: 144 MMOL/L (ref 136–145)
SODIUM BLD-SCNC: 144 MMOL/L (ref 136–145)
SODIUM SERPL-SCNC: 139 MMOL/L (ref 136–145)
SODIUM SERPL-SCNC: 139 MMOL/L (ref 136–145)
SODIUM SERPL-SCNC: 140 MMOL/L (ref 136–145)
SODIUM SERPL-SCNC: 141 MMOL/L (ref 136–145)
SODIUM SERPL-SCNC: 142 MMOL/L (ref 136–145)
SODIUM SERPL-SCNC: 142 MMOL/L (ref 136–145)
SODIUM SERPL-SCNC: 143 MMOL/L (ref 136–145)
SODIUM SERPL-SCNC: 143 MMOL/L (ref 136–145)
SP GR UR REFRACTOMETRY: 1.01 (ref 1–1.03)
TIBC SERPL-MCNC: 154 UG/DL (ref 250–450)
TIBC SERPL-MCNC: 221 UG/DL (ref 250–450)
TIBC SERPL-MCNC: 249 UG/DL (ref 250–450)
TIBC SERPL-MCNC: 251 UG/DL (ref 250–450)
TIBC SERPL-MCNC: 252 UG/DL (ref 250–450)
TIBC SERPL-MCNC: 281 UG/DL (ref 250–450)
TIBC SERPL-MCNC: 283 UG/DL (ref 250–450)
TIBC SERPL-MCNC: 291 UG/DL (ref 250–450)
TIBC SERPL-MCNC: 326 UG/DL (ref 250–450)
TSH SERPL DL<=0.05 MIU/L-ACNC: 1.49 UIU/ML (ref 0.36–3.74)
TSH SERPL DL<=0.05 MIU/L-ACNC: 3.56 UIU/ML (ref 0.36–3.74)
UROBILINOGEN UR QL STRIP.AUTO: 0.2 EU/DL (ref 0.2–1)
WBC # BLD AUTO: 12.7 K/UL (ref 4.6–13.2)
WBC # BLD AUTO: 12.9 K/UL (ref 4.5–13)
WBC # BLD AUTO: 13 K/UL (ref 4.5–13)
WBC # BLD AUTO: 19.1 K/UL (ref 4.5–13)
WBC # BLD AUTO: 19.9 K/UL (ref 4.5–13)
WBC # BLD AUTO: 20 K/UL (ref 4.5–13)
WBC # BLD AUTO: 21.6 K/UL (ref 4.5–13)
WBC # BLD AUTO: 27.5 K/UL (ref 4.5–13)
WBC # BLD AUTO: 28.5 K/UL (ref 4.5–13)
WBC # BLD AUTO: 3 K/UL (ref 4.5–13)
WBC # BLD AUTO: 3.5 K/UL (ref 4.5–13)
WBC # BLD AUTO: 3.6 K/UL (ref 4.5–13)
WBC # BLD AUTO: 3.9 K/UL (ref 4.5–13)
WBC # BLD AUTO: 32.2 K/UL (ref 4.5–13)
WBC # BLD AUTO: 4.5 K/UL (ref 4.5–13)
WBC # BLD AUTO: 5.4 K/UL (ref 4.5–13)
WBC # BLD AUTO: 5.6 K/UL (ref 4.5–13)
WBC # BLD AUTO: 6 K/UL (ref 4.5–13)
WBC # BLD AUTO: 6.1 K/UL (ref 4.5–13)
WBC # BLD AUTO: 6.2 K/UL (ref 4.5–13)
WBC # BLD AUTO: 6.2 K/UL (ref 4.6–13.2)
WBC # BLD AUTO: 6.4 K/UL (ref 4.5–13)
WBC # BLD AUTO: 7.3 K/UL (ref 4.5–13)
WBC # BLD AUTO: 8.7 K/UL (ref 4.5–13)
WBC # BLD AUTO: 8.9 K/UL (ref 4.5–13)
WBC # BLD AUTO: 9.1 K/UL (ref 4.5–13)
WBC # BLD AUTO: 9.8 K/UL (ref 4.5–13)
WBC URNS QL MICRO: ABNORMAL /HPF (ref 0–4)

## 2018-01-01 PROCEDURE — 74011250637 HC RX REV CODE- 250/637: Performed by: NURSE ANESTHETIST, CERTIFIED REGISTERED

## 2018-01-01 PROCEDURE — 74011250636 HC RX REV CODE- 250/636: Performed by: NURSE PRACTITIONER

## 2018-01-01 PROCEDURE — 85025 COMPLETE CBC W/AUTO DIFF WBC: CPT | Performed by: INTERNAL MEDICINE

## 2018-01-01 PROCEDURE — 96411 CHEMO IV PUSH ADDL DRUG: CPT

## 2018-01-01 PROCEDURE — 96417 CHEMO IV INFUS EACH ADDL SEQ: CPT

## 2018-01-01 PROCEDURE — 74011000258 HC RX REV CODE- 258: Performed by: INTERNAL MEDICINE

## 2018-01-01 PROCEDURE — 96413 CHEMO IV INFUSION 1 HR: CPT

## 2018-01-01 PROCEDURE — 80076 HEPATIC FUNCTION PANEL: CPT | Performed by: INTERNAL MEDICINE

## 2018-01-01 PROCEDURE — 77030012965 HC NDL HUBR BBMI -A

## 2018-01-01 PROCEDURE — 80047 BASIC METABLC PNL IONIZED CA: CPT

## 2018-01-01 PROCEDURE — 82962 GLUCOSE BLOOD TEST: CPT

## 2018-01-01 PROCEDURE — 74011250636 HC RX REV CODE- 250/636: Performed by: INTERNAL MEDICINE

## 2018-01-01 PROCEDURE — 84443 ASSAY THYROID STIM HORMONE: CPT | Performed by: INTERNAL MEDICINE

## 2018-01-01 PROCEDURE — 96372 THER/PROPH/DIAG INJ SC/IM: CPT

## 2018-01-01 PROCEDURE — 85027 COMPLETE CBC AUTOMATED: CPT | Performed by: UROLOGY

## 2018-01-01 PROCEDURE — 76210000006 HC OR PH I REC 0.5 TO 1 HR: Performed by: UROLOGY

## 2018-01-01 PROCEDURE — 96375 TX/PRO/DX INJ NEW DRUG ADDON: CPT

## 2018-01-01 PROCEDURE — 76210000021 HC REC RM PH II 0.5 TO 1 HR: Performed by: UROLOGY

## 2018-01-01 PROCEDURE — 82378 CARCINOEMBRYONIC ANTIGEN: CPT | Performed by: INTERNAL MEDICINE

## 2018-01-01 PROCEDURE — 96523 IRRIG DRUG DELIVERY DEVICE: CPT

## 2018-01-01 PROCEDURE — 83540 ASSAY OF IRON: CPT | Performed by: INTERNAL MEDICINE

## 2018-01-01 PROCEDURE — 74011250636 HC RX REV CODE- 250/636

## 2018-01-01 PROCEDURE — 96416 CHEMO PROLONG INFUSE W/PUMP: CPT

## 2018-01-01 PROCEDURE — 87086 URINE CULTURE/COLONY COUNT: CPT | Performed by: UROLOGY

## 2018-01-01 PROCEDURE — C1758 CATHETER, URETERAL: HCPCS | Performed by: UROLOGY

## 2018-01-01 PROCEDURE — 74011000250 HC RX REV CODE- 250

## 2018-01-01 PROCEDURE — 96377 APPLICATON ON-BODY INJECTOR: CPT

## 2018-01-01 PROCEDURE — 77030010509 HC AIRWY LMA MSK TELE -A: Performed by: ANESTHESIOLOGY

## 2018-01-01 PROCEDURE — C1769 GUIDE WIRE: HCPCS | Performed by: UROLOGY

## 2018-01-01 PROCEDURE — 36415 COLL VENOUS BLD VENIPUNCTURE: CPT

## 2018-01-01 PROCEDURE — 83540 ASSAY OF IRON: CPT

## 2018-01-01 PROCEDURE — 96368 THER/DIAG CONCURRENT INF: CPT

## 2018-01-01 PROCEDURE — 96361 HYDRATE IV INFUSION ADD-ON: CPT

## 2018-01-01 PROCEDURE — 96367 TX/PROPH/DG ADDL SEQ IV INF: CPT

## 2018-01-01 PROCEDURE — 82728 ASSAY OF FERRITIN: CPT | Performed by: INTERNAL MEDICINE

## 2018-01-01 PROCEDURE — 96415 CHEMO IV INFUSION ADDL HR: CPT

## 2018-01-01 PROCEDURE — 96360 HYDRATION IV INFUSION INIT: CPT

## 2018-01-01 PROCEDURE — 76060000032 HC ANESTHESIA 0.5 TO 1 HR: Performed by: UROLOGY

## 2018-01-01 PROCEDURE — 76010000138 HC OR TIME 0.5 TO 1 HR: Performed by: UROLOGY

## 2018-01-01 PROCEDURE — 82728 ASSAY OF FERRITIN: CPT

## 2018-01-01 PROCEDURE — 80053 COMPREHEN METABOLIC PANEL: CPT | Performed by: INTERNAL MEDICINE

## 2018-01-01 PROCEDURE — C2617 STENT, NON-COR, TEM W/O DEL: HCPCS | Performed by: UROLOGY

## 2018-01-01 PROCEDURE — 74011636320 HC RX REV CODE- 636/320: Performed by: UROLOGY

## 2018-01-01 PROCEDURE — 85025 COMPLETE CBC W/AUTO DIFF WBC: CPT

## 2018-01-01 PROCEDURE — 85049 AUTOMATED PLATELET COUNT: CPT | Performed by: INTERNAL MEDICINE

## 2018-01-01 PROCEDURE — 74011250636 HC RX REV CODE- 250/636: Performed by: ANESTHESIOLOGY

## 2018-01-01 PROCEDURE — 80053 COMPREHEN METABOLIC PANEL: CPT

## 2018-01-01 PROCEDURE — 80048 BASIC METABOLIC PNL TOTAL CA: CPT | Performed by: UROLOGY

## 2018-01-01 PROCEDURE — 74420 UROGRAPHY RTRGR +-KUB: CPT

## 2018-01-01 PROCEDURE — 99211 OFF/OP EST MAY X REQ PHY/QHP: CPT

## 2018-01-01 PROCEDURE — 74011250637 HC RX REV CODE- 250/637: Performed by: ANESTHESIOLOGY

## 2018-01-01 PROCEDURE — 77030018836 HC SOL IRR NACL ICUM -A: Performed by: UROLOGY

## 2018-01-01 PROCEDURE — 77030020782 HC GWN BAIR PAWS FLX 3M -B: Performed by: UROLOGY

## 2018-01-01 PROCEDURE — 36415 COLL VENOUS BLD VENIPUNCTURE: CPT | Performed by: INTERNAL MEDICINE

## 2018-01-01 PROCEDURE — 96374 THER/PROPH/DIAG INJ IV PUSH: CPT

## 2018-01-01 PROCEDURE — 85610 PROTHROMBIN TIME: CPT | Performed by: UROLOGY

## 2018-01-01 PROCEDURE — 74011000258 HC RX REV CODE- 258

## 2018-01-01 PROCEDURE — 85610 PROTHROMBIN TIME: CPT | Performed by: ANESTHESIOLOGY

## 2018-01-01 PROCEDURE — 74011250636 HC RX REV CODE- 250/636: Performed by: NURSE ANESTHETIST, CERTIFIED REGISTERED

## 2018-01-01 PROCEDURE — 80076 HEPATIC FUNCTION PANEL: CPT

## 2018-01-01 PROCEDURE — 81001 URINALYSIS AUTO W/SCOPE: CPT | Performed by: UROLOGY

## 2018-01-01 PROCEDURE — 77030019927 HC TBNG IRR CYSTO BAXT -A: Performed by: UROLOGY

## 2018-01-01 PROCEDURE — 74011636637 HC RX REV CODE- 636/637: Performed by: NURSE ANESTHETIST, CERTIFIED REGISTERED

## 2018-01-01 PROCEDURE — 36415 COLL VENOUS BLD VENIPUNCTURE: CPT | Performed by: UROLOGY

## 2018-01-01 PROCEDURE — 77030032490 HC SLV COMPR SCD KNE COVD -B: Performed by: UROLOGY

## 2018-01-01 PROCEDURE — 74011250636 HC RX REV CODE- 250/636: Performed by: UROLOGY

## 2018-01-01 PROCEDURE — 74011000258 HC RX REV CODE- 258: Performed by: NURSE PRACTITIONER

## 2018-01-01 PROCEDURE — 74011250637 HC RX REV CODE- 250/637: Performed by: NURSE PRACTITIONER

## 2018-01-01 PROCEDURE — 76210000022 HC REC RM PH II 1.5 TO 2 HR: Performed by: UROLOGY

## 2018-01-01 PROCEDURE — 82378 CARCINOEMBRYONIC ANTIGEN: CPT

## 2018-01-01 PROCEDURE — 85730 THROMBOPLASTIN TIME PARTIAL: CPT | Performed by: UROLOGY

## 2018-01-01 DEVICE — URETERAL STENT
Type: IMPLANTABLE DEVICE | Site: URETER | Status: FUNCTIONAL
Brand: CONTOUR™

## 2018-01-01 DEVICE — URETERAL STENT
Type: IMPLANTABLE DEVICE | Site: URETHRA | Status: FUNCTIONAL
Brand: POLARIS™ ULTRA

## 2018-01-01 RX ORDER — HEPARIN 100 UNIT/ML
SYRINGE INTRAVENOUS
Status: COMPLETED
Start: 2018-01-01 | End: 2018-01-01

## 2018-01-01 RX ORDER — HEPARIN 100 UNIT/ML
500 SYRINGE INTRAVENOUS AS NEEDED
Status: DISCONTINUED | OUTPATIENT
Start: 2018-01-01 | End: 2018-01-01 | Stop reason: HOSPADM

## 2018-01-01 RX ORDER — SODIUM CHLORIDE 0.9 % (FLUSH) 0.9 %
10-40 SYRINGE (ML) INJECTION AS NEEDED
Status: DISCONTINUED | OUTPATIENT
Start: 2018-01-01 | End: 2018-01-01 | Stop reason: HOSPADM

## 2018-01-01 RX ORDER — LIDOCAINE HYDROCHLORIDE 20 MG/ML
INJECTION, SOLUTION EPIDURAL; INFILTRATION; INTRACAUDAL; PERINEURAL AS NEEDED
Status: DISCONTINUED | OUTPATIENT
Start: 2018-01-01 | End: 2018-01-01 | Stop reason: HOSPADM

## 2018-01-01 RX ORDER — MIDAZOLAM HYDROCHLORIDE 1 MG/ML
INJECTION, SOLUTION INTRAMUSCULAR; INTRAVENOUS AS NEEDED
Status: DISCONTINUED | OUTPATIENT
Start: 2018-01-01 | End: 2018-01-01 | Stop reason: HOSPADM

## 2018-01-01 RX ORDER — OXYCODONE AND ACETAMINOPHEN 5; 325 MG/1; MG/1
1 TABLET ORAL ONCE
Status: COMPLETED | OUTPATIENT
Start: 2018-01-01 | End: 2018-01-01

## 2018-01-01 RX ORDER — DEXTROSE MONOHYDRATE 50 MG/ML
250 INJECTION, SOLUTION INTRAVENOUS AS NEEDED
Status: DISPENSED | OUTPATIENT
Start: 2018-01-01 | End: 2018-01-01

## 2018-01-01 RX ORDER — PALONOSETRON 0.05 MG/ML
0.25 INJECTION, SOLUTION INTRAVENOUS ONCE
Status: COMPLETED | OUTPATIENT
Start: 2018-01-01 | End: 2018-01-01

## 2018-01-01 RX ORDER — SODIUM CHLORIDE, SODIUM LACTATE, POTASSIUM CHLORIDE, CALCIUM CHLORIDE 600; 310; 30; 20 MG/100ML; MG/100ML; MG/100ML; MG/100ML
100 INJECTION, SOLUTION INTRAVENOUS CONTINUOUS
Status: DISCONTINUED | OUTPATIENT
Start: 2018-01-01 | End: 2018-01-01 | Stop reason: HOSPADM

## 2018-01-01 RX ORDER — FLUOROURACIL 50 MG/ML
780 INJECTION, SOLUTION INTRAVENOUS ONCE
Status: COMPLETED | OUTPATIENT
Start: 2018-01-01 | End: 2018-01-01

## 2018-01-01 RX ORDER — GENTAMICIN SULFATE 80 MG/100ML
80 INJECTION, SOLUTION INTRAVENOUS ONCE
Status: DISCONTINUED | OUTPATIENT
Start: 2018-01-01 | End: 2018-01-01 | Stop reason: HOSPADM

## 2018-01-01 RX ORDER — HEPARIN 100 UNIT/ML
500 SYRINGE INTRAVENOUS ONCE
Status: COMPLETED | OUTPATIENT
Start: 2018-01-01 | End: 2018-01-01

## 2018-01-01 RX ORDER — HEPARIN SODIUM (PORCINE) LOCK FLUSH IV SOLN 100 UNIT/ML 100 UNIT/ML
500 SOLUTION INTRAVENOUS AS NEEDED
Status: CANCELLED | OUTPATIENT
Start: 2018-01-01 | End: 2018-01-01

## 2018-01-01 RX ORDER — ONDANSETRON 2 MG/ML
INJECTION INTRAMUSCULAR; INTRAVENOUS AS NEEDED
Status: DISCONTINUED | OUTPATIENT
Start: 2018-01-01 | End: 2018-01-01 | Stop reason: HOSPADM

## 2018-01-01 RX ORDER — INSULIN LISPRO 100 [IU]/ML
INJECTION, SOLUTION INTRAVENOUS; SUBCUTANEOUS ONCE
Status: DISCONTINUED | OUTPATIENT
Start: 2018-01-01 | End: 2018-01-01 | Stop reason: HOSPADM

## 2018-01-01 RX ORDER — DEXTROSE 50 % IN WATER (D50W) INTRAVENOUS SYRINGE
25-50 AS NEEDED
Status: DISCONTINUED | OUTPATIENT
Start: 2018-01-01 | End: 2018-01-01 | Stop reason: HOSPADM

## 2018-01-01 RX ORDER — DEXTROSE MONOHYDRATE 50 MG/ML
25 INJECTION, SOLUTION INTRAVENOUS AS NEEDED
Status: DISPENSED | OUTPATIENT
Start: 2018-01-01 | End: 2018-01-01

## 2018-01-01 RX ORDER — FENTANYL CITRATE 50 UG/ML
50 INJECTION, SOLUTION INTRAMUSCULAR; INTRAVENOUS AS NEEDED
Status: DISCONTINUED | OUTPATIENT
Start: 2018-01-01 | End: 2018-01-01 | Stop reason: HOSPADM

## 2018-01-01 RX ORDER — LORAZEPAM 1 MG/1
1 TABLET ORAL
Qty: 90 TAB | Refills: 3 | Status: SHIPPED | OUTPATIENT
Start: 2018-01-01

## 2018-01-01 RX ORDER — SODIUM CHLORIDE 9 MG/ML
1000 INJECTION, SOLUTION INTRAVENOUS CONTINUOUS
Status: DISPENSED | OUTPATIENT
Start: 2018-01-01 | End: 2018-01-01

## 2018-01-01 RX ORDER — SODIUM CHLORIDE 9 MG/ML
25 INJECTION, SOLUTION INTRAVENOUS CONTINUOUS
Status: DISPENSED | OUTPATIENT
Start: 2018-01-01 | End: 2018-01-01

## 2018-01-01 RX ORDER — SODIUM CHLORIDE 9 MG/ML
50 INJECTION, SOLUTION INTRAVENOUS ONCE
Status: DISPENSED | OUTPATIENT
Start: 2018-01-01 | End: 2018-01-01

## 2018-01-01 RX ORDER — HEPARIN SODIUM (PORCINE) LOCK FLUSH IV SOLN 100 UNIT/ML 100 UNIT/ML
500 SOLUTION INTRAVENOUS AS NEEDED
Status: ACTIVE | OUTPATIENT
Start: 2018-01-01 | End: 2018-01-01

## 2018-01-01 RX ORDER — DEXTROSE MONOHYDRATE 50 MG/ML
250 INJECTION, SOLUTION INTRAVENOUS CONTINUOUS
Status: DISPENSED | OUTPATIENT
Start: 2018-01-01 | End: 2018-01-01

## 2018-01-01 RX ORDER — ONDANSETRON 8 MG/1
8 TABLET, ORALLY DISINTEGRATING ORAL
Qty: 90 TAB | Refills: 2 | Status: SHIPPED | OUTPATIENT
Start: 2018-01-01

## 2018-01-01 RX ORDER — PROPOFOL 10 MG/ML
INJECTION, EMULSION INTRAVENOUS AS NEEDED
Status: DISCONTINUED | OUTPATIENT
Start: 2018-01-01 | End: 2018-01-01 | Stop reason: HOSPADM

## 2018-01-01 RX ORDER — ONDANSETRON 8 MG/1
8 TABLET, ORALLY DISINTEGRATING ORAL
Qty: 90 TAB | Refills: 2 | Status: SHIPPED | OUTPATIENT
Start: 2018-01-01 | End: 2018-01-01 | Stop reason: SDUPTHER

## 2018-01-01 RX ORDER — ATROPINE SULFATE 1 MG/ML
0.4 INJECTION, SOLUTION INTRAVENOUS
Status: ACTIVE | OUTPATIENT
Start: 2018-01-01 | End: 2018-01-01

## 2018-01-01 RX ORDER — SODIUM CHLORIDE 9 MG/ML
25 INJECTION, SOLUTION INTRAVENOUS ONCE
Status: DISPENSED | OUTPATIENT
Start: 2018-01-01 | End: 2018-01-01

## 2018-01-01 RX ORDER — ONDANSETRON 2 MG/ML
INJECTION INTRAMUSCULAR; INTRAVENOUS
Status: DISPENSED
Start: 2018-01-01 | End: 2018-01-01

## 2018-01-01 RX ORDER — ATROPINE SULFATE 1 MG/ML
0.4 INJECTION, SOLUTION INTRAVENOUS
Status: DISPENSED | OUTPATIENT
Start: 2018-01-01 | End: 2018-01-01

## 2018-01-01 RX ORDER — SODIUM CHLORIDE 0.9 % (FLUSH) 0.9 %
10-40 SYRINGE (ML) INJECTION AS NEEDED
Status: CANCELLED | OUTPATIENT
Start: 2018-01-01

## 2018-01-01 RX ORDER — SODIUM CHLORIDE, SODIUM LACTATE, POTASSIUM CHLORIDE, CALCIUM CHLORIDE 600; 310; 30; 20 MG/100ML; MG/100ML; MG/100ML; MG/100ML
75 INJECTION, SOLUTION INTRAVENOUS CONTINUOUS
Status: DISCONTINUED | OUTPATIENT
Start: 2018-01-01 | End: 2018-01-01 | Stop reason: HOSPADM

## 2018-01-01 RX ORDER — LIDOCAINE HYDROCHLORIDE 10 MG/ML
0.1 INJECTION, SOLUTION EPIDURAL; INFILTRATION; INTRACAUDAL; PERINEURAL AS NEEDED
Status: DISCONTINUED | OUTPATIENT
Start: 2018-01-01 | End: 2018-01-01 | Stop reason: HOSPADM

## 2018-01-01 RX ORDER — DRONABINOL 2.5 MG/1
2.5 CAPSULE ORAL 2 TIMES DAILY
Qty: 60 CAP | Refills: 1 | Status: SHIPPED | OUTPATIENT
Start: 2018-01-01

## 2018-01-01 RX ORDER — MAGNESIUM SULFATE 100 %
4 CRYSTALS MISCELLANEOUS AS NEEDED
Status: DISCONTINUED | OUTPATIENT
Start: 2018-01-01 | End: 2018-01-01 | Stop reason: HOSPADM

## 2018-01-01 RX ORDER — ACYCLOVIR 800 MG/1
800 TABLET ORAL
COMMUNITY

## 2018-01-01 RX ORDER — SODIUM CHLORIDE 9 MG/ML
500 INJECTION, SOLUTION INTRAVENOUS CONTINUOUS
Status: DISPENSED | OUTPATIENT
Start: 2018-01-01 | End: 2018-01-01

## 2018-01-01 RX ORDER — HEPARIN SODIUM (PORCINE) LOCK FLUSH IV SOLN 100 UNIT/ML 100 UNIT/ML
500 SOLUTION INTRAVENOUS AS NEEDED
Status: DISPENSED | OUTPATIENT
Start: 2018-01-01 | End: 2018-01-01

## 2018-01-01 RX ORDER — WARFARIN 3 MG/1
3 TABLET ORAL DAILY
COMMUNITY
Start: 2017-09-07

## 2018-01-01 RX ORDER — HEPARIN SODIUM (PORCINE) LOCK FLUSH IV SOLN 100 UNIT/ML 100 UNIT/ML
SOLUTION INTRAVENOUS
Status: COMPLETED
Start: 2018-01-01 | End: 2018-01-01

## 2018-01-01 RX ORDER — SODIUM CHLORIDE 900 MG/100ML
INJECTION INTRAVENOUS
Status: COMPLETED
Start: 2018-01-01 | End: 2018-01-01

## 2018-01-01 RX ORDER — CIPROFLOXACIN 2 MG/ML
400 INJECTION, SOLUTION INTRAVENOUS ONCE
Status: COMPLETED | OUTPATIENT
Start: 2018-01-01 | End: 2018-01-01

## 2018-01-01 RX ORDER — FENTANYL CITRATE 50 UG/ML
INJECTION, SOLUTION INTRAMUSCULAR; INTRAVENOUS AS NEEDED
Status: DISCONTINUED | OUTPATIENT
Start: 2018-01-01 | End: 2018-01-01 | Stop reason: HOSPADM

## 2018-01-01 RX ORDER — DEXTROSE MONOHYDRATE 50 MG/ML
25 INJECTION, SOLUTION INTRAVENOUS ONCE
Status: COMPLETED | OUTPATIENT
Start: 2018-01-01 | End: 2018-01-01

## 2018-01-01 RX ORDER — HEPARIN 100 UNIT/ML
500 SYRINGE INTRAVENOUS AS NEEDED
Status: CANCELLED | OUTPATIENT
Start: 2018-01-01

## 2018-01-01 RX ORDER — CIPROFLOXACIN 2 MG/ML
400 INJECTION, SOLUTION INTRAVENOUS ONCE
Status: DISCONTINUED | OUTPATIENT
Start: 2018-01-01 | End: 2018-01-01 | Stop reason: HOSPADM

## 2018-01-01 RX ORDER — SODIUM CHLORIDE 0.9 % (FLUSH) 0.9 %
10-40 SYRINGE (ML) INJECTION EVERY 8 HOURS
Status: DISCONTINUED | OUTPATIENT
Start: 2018-01-01 | End: 2018-01-01 | Stop reason: HOSPADM

## 2018-01-01 RX ORDER — FAMOTIDINE 20 MG/1
20 TABLET, FILM COATED ORAL ONCE
Status: COMPLETED | OUTPATIENT
Start: 2018-01-01 | End: 2018-01-01

## 2018-01-01 RX ORDER — SODIUM CHLORIDE 9 MG/ML
250 INJECTION, SOLUTION INTRAVENOUS CONTINUOUS
Status: DISPENSED | OUTPATIENT
Start: 2018-01-01 | End: 2018-01-01

## 2018-01-01 RX ORDER — SODIUM CHLORIDE 0.9 % (FLUSH) 0.9 %
5-10 SYRINGE (ML) INJECTION AS NEEDED
Status: DISCONTINUED | OUTPATIENT
Start: 2018-01-01 | End: 2018-01-01 | Stop reason: HOSPADM

## 2018-01-01 RX ORDER — INSULIN LISPRO 100 [IU]/ML
INJECTION, SOLUTION INTRAVENOUS; SUBCUTANEOUS ONCE
Status: COMPLETED | OUTPATIENT
Start: 2018-01-01 | End: 2018-01-01

## 2018-01-01 RX ORDER — HYDROMORPHONE HYDROCHLORIDE 2 MG/1
2 TABLET ORAL
Qty: 12 TAB | Refills: 0 | Status: SHIPPED | OUTPATIENT
Start: 2018-01-01

## 2018-01-01 RX ORDER — OXYCODONE HYDROCHLORIDE 15 MG/1
15 TABLET ORAL
Qty: 180 TAB | Refills: 0 | Status: SHIPPED | OUTPATIENT
Start: 2018-01-01

## 2018-01-01 RX ORDER — SODIUM CHLORIDE 9 MG/ML
1000 INJECTION, SOLUTION INTRAVENOUS ONCE
Status: COMPLETED | OUTPATIENT
Start: 2018-01-01 | End: 2018-01-01

## 2018-01-01 RX ORDER — HEPARIN 100 UNIT/ML
SYRINGE INTRAVENOUS
Status: DISPENSED
Start: 2018-01-01 | End: 2018-01-01

## 2018-01-01 RX ORDER — ONDANSETRON 2 MG/ML
4 INJECTION INTRAMUSCULAR; INTRAVENOUS ONCE
Status: DISCONTINUED | OUTPATIENT
Start: 2018-01-01 | End: 2018-01-01 | Stop reason: HOSPADM

## 2018-01-01 RX ORDER — FENTANYL CITRATE 50 UG/ML
50 INJECTION, SOLUTION INTRAMUSCULAR; INTRAVENOUS
Status: DISCONTINUED | OUTPATIENT
Start: 2018-01-01 | End: 2018-01-01 | Stop reason: HOSPADM

## 2018-01-01 RX ORDER — GENTAMICIN SULFATE 80 MG/100ML
80 INJECTION, SOLUTION INTRAVENOUS ONCE
Status: CANCELLED | OUTPATIENT
Start: 2018-01-01 | End: 2018-01-01

## 2018-01-01 RX ORDER — METOCLOPRAMIDE 10 MG/1
10 TABLET ORAL EVERY 8 HOURS
Qty: 90 TAB | Refills: 6 | Status: SHIPPED | OUTPATIENT
Start: 2018-01-01 | End: 2018-01-01

## 2018-01-01 RX ORDER — LIDOCAINE AND PRILOCAINE 25; 25 MG/G; MG/G
CREAM TOPICAL AS NEEDED
Qty: 30 G | Refills: 1 | Status: SHIPPED | OUTPATIENT
Start: 2018-01-01

## 2018-01-01 RX ORDER — FLUOROURACIL 50 MG/ML
700 INJECTION, SOLUTION INTRAVENOUS ONCE
Status: COMPLETED | OUTPATIENT
Start: 2018-01-01 | End: 2018-01-01

## 2018-01-01 RX ORDER — CEFAZOLIN SODIUM 2 G/50ML
2 SOLUTION INTRAVENOUS ONCE
Status: CANCELLED | OUTPATIENT
Start: 2018-01-01 | End: 2018-01-01

## 2018-01-01 RX ORDER — BISOPROLOL FUMARATE AND HYDROCHLOROTHIAZIDE 10; 6.25 MG/1; MG/1
1 TABLET ORAL DAILY
COMMUNITY
Start: 2017-12-11 | End: 2018-01-01 | Stop reason: SDUPTHER

## 2018-01-01 RX ORDER — HEPARIN 100 UNIT/ML
500 SYRINGE INTRAVENOUS ONCE
Status: ACTIVE | OUTPATIENT
Start: 2018-01-01 | End: 2018-01-01

## 2018-01-01 RX ORDER — DEXTROSE MONOHYDRATE 50 MG/ML
250 INJECTION, SOLUTION INTRAVENOUS ONCE
Status: COMPLETED | OUTPATIENT
Start: 2018-01-01 | End: 2018-01-01

## 2018-01-01 RX ORDER — GENTAMICIN SULFATE 80 MG/100ML
INJECTION, SOLUTION INTRAVENOUS AS NEEDED
Status: DISCONTINUED | OUTPATIENT
Start: 2018-01-01 | End: 2018-01-01 | Stop reason: HOSPADM

## 2018-01-01 RX ORDER — SODIUM CHLORIDE 0.9 % (FLUSH) 0.9 %
5-10 SYRINGE (ML) INJECTION EVERY 8 HOURS
Status: DISCONTINUED | OUTPATIENT
Start: 2018-01-01 | End: 2018-01-01 | Stop reason: HOSPADM

## 2018-01-01 RX ORDER — HEPARIN 100 UNIT/ML
500 SYRINGE INTRAVENOUS ONCE
Status: DISPENSED | OUTPATIENT
Start: 2018-01-01 | End: 2018-01-01

## 2018-01-01 RX ORDER — ONDANSETRON 4 MG/1
8 TABLET, FILM COATED ORAL ONCE
Status: COMPLETED | OUTPATIENT
Start: 2018-01-01 | End: 2018-01-01

## 2018-01-01 RX ORDER — CIPROFLOXACIN 2 MG/ML
INJECTION, SOLUTION INTRAVENOUS AS NEEDED
Status: DISCONTINUED | OUTPATIENT
Start: 2018-01-01 | End: 2018-01-01 | Stop reason: HOSPADM

## 2018-01-01 RX ADMIN — ERYTHROPOIETIN 40000 UNITS: 40000 INJECTION, SOLUTION INTRAVENOUS; SUBCUTANEOUS at 10:12

## 2018-01-01 RX ADMIN — FLUOROURACIL 4680 MG: 50 INJECTION, SOLUTION INTRAVENOUS at 14:19

## 2018-01-01 RX ADMIN — DEXTROSE MONOHYDRATE 350 MG: 5 INJECTION, SOLUTION INTRAVENOUS at 11:48

## 2018-01-01 RX ADMIN — SODIUM CHLORIDE, PRESERVATIVE FREE 500 UNITS: 5 INJECTION INTRAVENOUS at 13:30

## 2018-01-01 RX ADMIN — DEXAMETHASONE SODIUM PHOSPHATE 12 MG: 4 INJECTION, SOLUTION INTRA-ARTICULAR; INTRALESIONAL; INTRAMUSCULAR; INTRAVENOUS; SOFT TISSUE at 10:00

## 2018-01-01 RX ADMIN — Medication 20 ML: at 14:51

## 2018-01-01 RX ADMIN — Medication 20 ML: at 14:54

## 2018-01-01 RX ADMIN — Medication 20 ML: at 12:12

## 2018-01-01 RX ADMIN — PEGFILGRASTIM 6 MG: KIT SUBCUTANEOUS at 14:59

## 2018-01-01 RX ADMIN — ERYTHROPOIETIN 40000 UNITS: 40000 INJECTION, SOLUTION INTRAVENOUS; SUBCUTANEOUS at 11:31

## 2018-01-01 RX ADMIN — SODIUM CHLORIDE 1000 ML: 900 INJECTION, SOLUTION INTRAVENOUS at 11:09

## 2018-01-01 RX ADMIN — DEXAMETHASONE SODIUM PHOSPHATE 12 MG: 4 INJECTION, SOLUTION INTRA-ARTICULAR; INTRALESIONAL; INTRAMUSCULAR; INTRAVENOUS; SOFT TISSUE at 10:50

## 2018-01-01 RX ADMIN — FLUOROURACIL 780 MG: 50 INJECTION, SOLUTION INTRAVENOUS at 13:54

## 2018-01-01 RX ADMIN — Medication 500 UNITS: at 15:00

## 2018-01-01 RX ADMIN — SODIUM CHLORIDE 50 ML: 900 INJECTION INTRAVENOUS at 11:10

## 2018-01-01 RX ADMIN — LEUCOVORIN CALCIUM 700 MG: 350 INJECTION, POWDER, LYOPHILIZED, FOR SOLUTION INTRAMUSCULAR; INTRAVENOUS at 11:16

## 2018-01-01 RX ADMIN — Medication 20 ML: at 13:30

## 2018-01-01 RX ADMIN — HEPARIN 500 UNITS: 100 SYRINGE at 10:41

## 2018-01-01 RX ADMIN — ERYTHROPOIETIN 40000 UNITS: 40000 INJECTION, SOLUTION INTRAVENOUS; SUBCUTANEOUS at 09:54

## 2018-01-01 RX ADMIN — SODIUM CHLORIDE, SODIUM LACTATE, POTASSIUM CHLORIDE, AND CALCIUM CHLORIDE 75 ML/HR: 600; 310; 30; 20 INJECTION, SOLUTION INTRAVENOUS at 09:58

## 2018-01-01 RX ADMIN — LEUCOVORIN CALCIUM 780 MG: 350 INJECTION, POWDER, LYOPHILIZED, FOR SOLUTION INTRAMUSCULAR; INTRAVENOUS at 11:48

## 2018-01-01 RX ADMIN — PALONOSETRON HYDROCHLORIDE 0.25 MG: 0.25 INJECTION INTRAVENOUS at 10:45

## 2018-01-01 RX ADMIN — DEXAMETHASONE SODIUM PHOSPHATE 12 MG: 4 INJECTION, SOLUTION INTRA-ARTICULAR; INTRALESIONAL; INTRAMUSCULAR; INTRAVENOUS; SOFT TISSUE at 09:55

## 2018-01-01 RX ADMIN — SODIUM CHLORIDE 240 MG: 900 INJECTION, SOLUTION INTRAVENOUS at 14:04

## 2018-01-01 RX ADMIN — SODIUM CHLORIDE, SODIUM LACTATE, POTASSIUM CHLORIDE, AND CALCIUM CHLORIDE 75 ML/HR: 600; 310; 30; 20 INJECTION, SOLUTION INTRAVENOUS at 11:14

## 2018-01-01 RX ADMIN — FLUOROURACIL 780 MG: 50 INJECTION, SOLUTION INTRAVENOUS at 12:41

## 2018-01-01 RX ADMIN — ONDANSETRON HYDROCHLORIDE 8 MG: 4 TABLET, FILM COATED ORAL at 14:18

## 2018-01-01 RX ADMIN — Medication 20 ML: at 10:41

## 2018-01-01 RX ADMIN — Medication 20 ML: at 14:32

## 2018-01-01 RX ADMIN — ERYTHROPOIETIN 40000 UNITS: 40000 INJECTION, SOLUTION INTRAVENOUS; SUBCUTANEOUS at 09:52

## 2018-01-01 RX ADMIN — ERYTHROPOIETIN 20000 UNITS: 20000 INJECTION, SOLUTION INTRAVENOUS; SUBCUTANEOUS at 13:28

## 2018-01-01 RX ADMIN — FENTANYL CITRATE 50 MCG: 50 INJECTION, SOLUTION INTRAMUSCULAR; INTRAVENOUS at 16:03

## 2018-01-01 RX ADMIN — FLUOROURACIL 4680 MG: 50 INJECTION, SOLUTION INTRAVENOUS at 13:55

## 2018-01-01 RX ADMIN — DEXTROSE MONOHYDRATE 250 ML: 5 INJECTION, SOLUTION INTRAVENOUS at 09:50

## 2018-01-01 RX ADMIN — LEUCOVORIN CALCIUM 780 MG: 200 INJECTION, POWDER, LYOPHILIZED, FOR SOLUTION INTRAMUSCULAR; INTRAVENOUS at 10:36

## 2018-01-01 RX ADMIN — ERYTHROPOIETIN 40000 UNITS: 40000 INJECTION, SOLUTION INTRAVENOUS; SUBCUTANEOUS at 15:43

## 2018-01-01 RX ADMIN — FAMOTIDINE 20 MG: 20 TABLET, FILM COATED ORAL at 09:41

## 2018-01-01 RX ADMIN — PALONOSETRON HYDROCHLORIDE 0.25 MG: 0.25 INJECTION INTRAVENOUS at 12:03

## 2018-01-01 RX ADMIN — ERYTHROPOIETIN 40000 UNITS: 40000 INJECTION, SOLUTION INTRAVENOUS; SUBCUTANEOUS at 10:46

## 2018-01-01 RX ADMIN — HEPARIN 500 UNITS: 100 SYRINGE at 13:15

## 2018-01-01 RX ADMIN — INSULIN LISPRO 3 UNITS: 100 INJECTION, SOLUTION INTRAVENOUS; SUBCUTANEOUS at 11:30

## 2018-01-01 RX ADMIN — FLUOROURACIL 4680 MG: 50 INJECTION, SOLUTION INTRAVENOUS at 14:00

## 2018-01-01 RX ADMIN — SODIUM CHLORIDE, PRESERVATIVE FREE 500 UNITS: 5 INJECTION INTRAVENOUS at 16:33

## 2018-01-01 RX ADMIN — DEXTROSE MONOHYDRATE 350 MG: 5 INJECTION, SOLUTION INTRAVENOUS at 10:35

## 2018-01-01 RX ADMIN — Medication 30 ML: at 10:05

## 2018-01-01 RX ADMIN — Medication 30 ML: at 13:20

## 2018-01-01 RX ADMIN — FLUOROURACIL 4680 MG: 50 INJECTION, SOLUTION INTRAVENOUS at 15:10

## 2018-01-01 RX ADMIN — FENTANYL CITRATE 50 MCG: 50 INJECTION INTRAMUSCULAR; INTRAVENOUS at 11:22

## 2018-01-01 RX ADMIN — OXYCODONE HYDROCHLORIDE AND ACETAMINOPHEN 1 TABLET: 5; 325 TABLET ORAL at 17:21

## 2018-01-01 RX ADMIN — DEXTROSE MONOHYDRATE 25 ML/HR: 5 INJECTION, SOLUTION INTRAVENOUS at 09:50

## 2018-01-01 RX ADMIN — ATROPINE SULFATE 0.4 MG: 1 INJECTION, SOLUTION INTRAMUSCULAR; INTRAVENOUS; SUBCUTANEOUS at 12:10

## 2018-01-01 RX ADMIN — FLUOROURACIL 780 MG: 50 INJECTION, SOLUTION INTRAVENOUS at 13:55

## 2018-01-01 RX ADMIN — PEGFILGRASTIM 6 MG: KIT SUBCUTANEOUS at 14:10

## 2018-01-01 RX ADMIN — HEPARIN SODIUM (PORCINE) LOCK FLUSH IV SOLN 100 UNIT/ML 500 UNITS: 100 SOLUTION at 14:32

## 2018-01-01 RX ADMIN — Medication 20 ML: at 12:59

## 2018-01-01 RX ADMIN — PEGFILGRASTIM 6 MG: KIT SUBCUTANEOUS at 13:40

## 2018-01-01 RX ADMIN — DEXTROSE MONOHYDRATE 250 ML: 5 INJECTION, SOLUTION INTRAVENOUS at 11:25

## 2018-01-01 RX ADMIN — DEXTROSE MONOHYDRATE 350 MG: 5 INJECTION, SOLUTION INTRAVENOUS at 13:02

## 2018-01-01 RX ADMIN — DEXAMETHASONE SODIUM PHOSPHATE 12 MG: 4 INJECTION, SOLUTION INTRA-ARTICULAR; INTRALESIONAL; INTRAMUSCULAR; INTRAVENOUS; SOFT TISSUE at 09:49

## 2018-01-01 RX ADMIN — ERYTHROPOIETIN 40000 UNITS: 40000 INJECTION, SOLUTION INTRAVENOUS; SUBCUTANEOUS at 13:28

## 2018-01-01 RX ADMIN — Medication 40 ML: at 09:30

## 2018-01-01 RX ADMIN — Medication 10 ML: at 12:28

## 2018-01-01 RX ADMIN — SODIUM CHLORIDE 240 MG: 900 INJECTION, SOLUTION INTRAVENOUS at 15:02

## 2018-01-01 RX ADMIN — FLUOROURACIL 4680 MG: 50 INJECTION, SOLUTION INTRAVENOUS at 12:40

## 2018-01-01 RX ADMIN — Medication 20 ML: at 12:42

## 2018-01-01 RX ADMIN — Medication 20 ML: at 08:55

## 2018-01-01 RX ADMIN — LEUCOVORIN CALCIUM 780 MG: 350 INJECTION, POWDER, LYOPHILIZED, FOR SOLUTION INTRAMUSCULAR; INTRAVENOUS at 11:30

## 2018-01-01 RX ADMIN — DEXTROSE MONOHYDRATE 350 MG: 5 INJECTION, SOLUTION INTRAVENOUS at 11:30

## 2018-01-01 RX ADMIN — PEGFILGRASTIM 6 MG: KIT SUBCUTANEOUS at 14:30

## 2018-01-01 RX ADMIN — Medication 20 ML: at 13:35

## 2018-01-01 RX ADMIN — ERYTHROPOIETIN 20000 UNITS: 20000 INJECTION, SOLUTION INTRAVENOUS; SUBCUTANEOUS at 15:43

## 2018-01-01 RX ADMIN — SODIUM CHLORIDE 480 MG: 900 INJECTION, SOLUTION INTRAVENOUS at 10:06

## 2018-01-01 RX ADMIN — HEPARIN 500 UNITS: 100 SYRINGE at 16:33

## 2018-01-01 RX ADMIN — Medication 20 ML: at 16:33

## 2018-01-01 RX ADMIN — LEUCOVORIN CALCIUM 780 MG: 350 INJECTION, POWDER, LYOPHILIZED, FOR SOLUTION INTRAMUSCULAR; INTRAVENOUS at 13:02

## 2018-01-01 RX ADMIN — FLUOROURACIL 780 MG: 50 INJECTION, SOLUTION INTRAVENOUS at 12:37

## 2018-01-01 RX ADMIN — LIDOCAINE HYDROCHLORIDE 80 MG: 20 INJECTION, SOLUTION EPIDURAL; INFILTRATION; INTRACAUDAL; PERINEURAL at 10:33

## 2018-01-01 RX ADMIN — PEGFILGRASTIM 6 MG: KIT SUBCUTANEOUS at 14:32

## 2018-01-01 RX ADMIN — Medication 10 ML: at 14:18

## 2018-01-01 RX ADMIN — FLUOROURACIL 4300 MG: 50 INJECTION, SOLUTION INTRAVENOUS at 13:21

## 2018-01-01 RX ADMIN — Medication 20 ML: at 09:22

## 2018-01-01 RX ADMIN — Medication 10 ML: at 09:31

## 2018-01-01 RX ADMIN — DEXTROSE MONOHYDRATE 250 ML: 5 INJECTION, SOLUTION INTRAVENOUS at 10:15

## 2018-01-01 RX ADMIN — ERYTHROPOIETIN 20000 UNITS: 20000 INJECTION, SOLUTION INTRAVENOUS; SUBCUTANEOUS at 09:55

## 2018-01-01 RX ADMIN — ONDANSETRON 4 MG: 2 INJECTION INTRAMUSCULAR; INTRAVENOUS at 15:44

## 2018-01-01 RX ADMIN — SODIUM CHLORIDE 240 MG: 900 INJECTION, SOLUTION INTRAVENOUS at 13:23

## 2018-01-01 RX ADMIN — DEXTROSE MONOHYDRATE 25 ML/HR: 5 INJECTION, SOLUTION INTRAVENOUS at 09:48

## 2018-01-01 RX ADMIN — HEPARIN 500 UNITS: 100 SYRINGE at 14:07

## 2018-01-01 RX ADMIN — HEPARIN 500 UNITS: 100 SYRINGE at 15:43

## 2018-01-01 RX ADMIN — ERYTHROPOIETIN 20000 UNITS: 20000 INJECTION, SOLUTION INTRAVENOUS; SUBCUTANEOUS at 10:40

## 2018-01-01 RX ADMIN — FLUOROURACIL 4680 MG: 50 INJECTION, SOLUTION INTRAVENOUS at 12:59

## 2018-01-01 RX ADMIN — FLUOROURACIL 4680 MG: 50 INJECTION, SOLUTION INTRAVENOUS at 13:59

## 2018-01-01 RX ADMIN — FENTANYL CITRATE 50 MCG: 50 INJECTION, SOLUTION INTRAMUSCULAR; INTRAVENOUS at 10:27

## 2018-01-01 RX ADMIN — FLUOROURACIL 780 MG: 50 INJECTION, SOLUTION INTRAVENOUS at 12:36

## 2018-01-01 RX ADMIN — ERYTHROPOIETIN 20000 UNITS: 20000 INJECTION, SOLUTION INTRAVENOUS; SUBCUTANEOUS at 09:52

## 2018-01-01 RX ADMIN — Medication 20 ML: at 14:00

## 2018-01-01 RX ADMIN — SODIUM CHLORIDE, PRESERVATIVE FREE 500 UNITS: 5 INJECTION INTRAVENOUS at 12:28

## 2018-01-01 RX ADMIN — ERYTHROPOIETIN 20000 UNITS: 20000 INJECTION, SOLUTION INTRAVENOUS; SUBCUTANEOUS at 09:51

## 2018-01-01 RX ADMIN — FENTANYL CITRATE 50 MCG: 50 INJECTION, SOLUTION INTRAMUSCULAR; INTRAVENOUS at 15:23

## 2018-01-01 RX ADMIN — PEGFILGRASTIM 6 MG: KIT SUBCUTANEOUS at 14:13

## 2018-01-01 RX ADMIN — Medication 20 ML: at 13:47

## 2018-01-01 RX ADMIN — ERYTHROPOIETIN 40000 UNITS: 40000 INJECTION, SOLUTION INTRAVENOUS; SUBCUTANEOUS at 09:51

## 2018-01-01 RX ADMIN — MIDAZOLAM HYDROCHLORIDE 2 MG: 1 INJECTION, SOLUTION INTRAMUSCULAR; INTRAVENOUS at 15:17

## 2018-01-01 RX ADMIN — SODIUM CHLORIDE 240 MG: 900 INJECTION, SOLUTION INTRAVENOUS at 12:18

## 2018-01-01 RX ADMIN — FLUOROURACIL 700 MG: 50 INJECTION, SOLUTION INTRAVENOUS at 13:18

## 2018-01-01 RX ADMIN — PALONOSETRON 0.25 MG: 0.05 INJECTION, SOLUTION INTRAVENOUS at 09:48

## 2018-01-01 RX ADMIN — DEXAMETHASONE SODIUM PHOSPHATE 12 MG: 4 INJECTION, SOLUTION INTRA-ARTICULAR; INTRALESIONAL; INTRAMUSCULAR; INTRAVENOUS; SOFT TISSUE at 12:07

## 2018-01-01 RX ADMIN — LEUCOVORIN CALCIUM 780 MG: 350 INJECTION, POWDER, LYOPHILIZED, FOR SOLUTION INTRAMUSCULAR; INTRAVENOUS at 10:32

## 2018-01-01 RX ADMIN — Medication 30 ML: at 12:45

## 2018-01-01 RX ADMIN — LEUCOVORIN CALCIUM 780 MG: 100 INJECTION, POWDER, LYOPHILIZED, FOR SOLUTION INTRAMUSCULAR; INTRAVENOUS at 10:37

## 2018-01-01 RX ADMIN — DEXTROSE MONOHYDRATE 250 ML: 5 INJECTION, SOLUTION INTRAVENOUS at 09:45

## 2018-01-01 RX ADMIN — SODIUM CHLORIDE, PRESERVATIVE FREE 500 UNITS: 5 INJECTION INTRAVENOUS at 12:56

## 2018-01-01 RX ADMIN — FLUOROURACIL 780 MG: 50 INJECTION, SOLUTION INTRAVENOUS at 14:12

## 2018-01-01 RX ADMIN — Medication 30 ML: at 09:20

## 2018-01-01 RX ADMIN — Medication 20 ML: at 12:55

## 2018-01-01 RX ADMIN — PALONOSETRON HYDROCHLORIDE 0.25 MG: 0.25 INJECTION INTRAVENOUS at 09:50

## 2018-01-01 RX ADMIN — PALONOSETRON HYDROCHLORIDE 0.25 MG: 0.25 INJECTION INTRAVENOUS at 10:24

## 2018-01-01 RX ADMIN — SODIUM CHLORIDE, PRESERVATIVE FREE 500 UNITS: 5 INJECTION INTRAVENOUS at 12:42

## 2018-01-01 RX ADMIN — ERYTHROPOIETIN 20000 UNITS: 20000 INJECTION, SOLUTION INTRAVENOUS; SUBCUTANEOUS at 09:58

## 2018-01-01 RX ADMIN — ERYTHROPOIETIN 20000 UNITS: 20000 INJECTION, SOLUTION INTRAVENOUS; SUBCUTANEOUS at 10:46

## 2018-01-01 RX ADMIN — Medication 30 ML: at 14:12

## 2018-01-01 RX ADMIN — PEGFILGRASTIM 6 MG: KIT SUBCUTANEOUS at 14:03

## 2018-01-01 RX ADMIN — GENTAMICIN SULFATE 80 MG: 80 INJECTION, SOLUTION INTRAVENOUS at 15:38

## 2018-01-01 RX ADMIN — PALONOSETRON HYDROCHLORIDE 0.25 MG: 0.25 INJECTION INTRAVENOUS at 10:13

## 2018-01-01 RX ADMIN — FAMOTIDINE 20 MG: 20 TABLET ORAL at 11:44

## 2018-01-01 RX ADMIN — PEGFILGRASTIM 6 MG: KIT SUBCUTANEOUS at 16:33

## 2018-01-01 RX ADMIN — Medication 30 ML: at 09:22

## 2018-01-01 RX ADMIN — SODIUM CHLORIDE, PRESERVATIVE FREE 500 UNITS: 5 INJECTION INTRAVENOUS at 13:50

## 2018-01-01 RX ADMIN — FLUOROURACIL 780 MG: 50 INJECTION, SOLUTION INTRAVENOUS at 12:16

## 2018-01-01 RX ADMIN — PALONOSETRON HYDROCHLORIDE 0.25 MG: 0.25 INJECTION INTRAVENOUS at 10:30

## 2018-01-01 RX ADMIN — DEXAMETHASONE SODIUM PHOSPHATE 12 MG: 4 INJECTION, SOLUTION INTRA-ARTICULAR; INTRALESIONAL; INTRAMUSCULAR; INTRAVENOUS; SOFT TISSUE at 10:24

## 2018-01-01 RX ADMIN — PROPOFOL 150 MG: 10 INJECTION, EMULSION INTRAVENOUS at 15:23

## 2018-01-01 RX ADMIN — SODIUM CHLORIDE, PRESERVATIVE FREE 500 UNITS: 5 INJECTION INTRAVENOUS at 14:18

## 2018-01-01 RX ADMIN — PEGFILGRASTIM 6 MG: KIT SUBCUTANEOUS at 13:47

## 2018-01-01 RX ADMIN — HEPARIN 500 UNITS: 100 SYRINGE at 14:04

## 2018-01-01 RX ADMIN — ATROPINE SULFATE 0.4 MG: 1 INJECTION, SOLUTION INTRAMUSCULAR; INTRAVENOUS; SUBCUTANEOUS at 12:18

## 2018-01-01 RX ADMIN — Medication 10 ML: at 13:15

## 2018-01-01 RX ADMIN — SODIUM CHLORIDE 500 ML: 9 INJECTION, SOLUTION INTRAVENOUS at 14:21

## 2018-01-01 RX ADMIN — DEXTROSE MONOHYDRATE 320 MG: 5 INJECTION, SOLUTION INTRAVENOUS at 11:16

## 2018-01-01 RX ADMIN — DEXTROSE MONOHYDRATE 350 MG: 50 INJECTION, SOLUTION INTRAVENOUS at 10:36

## 2018-01-01 RX ADMIN — ERYTHROPOIETIN 20000 UNITS: 20000 INJECTION, SOLUTION INTRAVENOUS; SUBCUTANEOUS at 11:31

## 2018-01-01 RX ADMIN — HEPARIN 500 UNITS: 100 SYRINGE at 14:00

## 2018-01-01 RX ADMIN — SODIUM CHLORIDE 240 MG: 900 INJECTION, SOLUTION INTRAVENOUS at 11:46

## 2018-01-01 RX ADMIN — DEXAMETHASONE SODIUM PHOSPHATE 12 MG: 4 INJECTION, SOLUTION INTRA-ARTICULAR; INTRALESIONAL; INTRAMUSCULAR; INTRAVENOUS; SOFT TISSUE at 11:28

## 2018-01-01 RX ADMIN — FENTANYL CITRATE 50 MCG: 50 INJECTION, SOLUTION INTRAMUSCULAR; INTRAVENOUS at 10:33

## 2018-01-01 RX ADMIN — Medication 20 ML: at 13:31

## 2018-01-01 RX ADMIN — Medication 20 ML: at 15:43

## 2018-01-01 RX ADMIN — Medication 20 ML: at 12:03

## 2018-01-01 RX ADMIN — Medication 30 ML: at 09:35

## 2018-01-01 RX ADMIN — FLUOROURACIL 4680 MG: 50 INJECTION, SOLUTION INTRAVENOUS at 12:20

## 2018-01-01 RX ADMIN — Medication 10 ML: at 12:55

## 2018-01-01 RX ADMIN — Medication 20 ML: at 13:18

## 2018-01-01 RX ADMIN — ERYTHROPOIETIN 20000 UNITS: 20000 INJECTION, SOLUTION INTRAVENOUS; SUBCUTANEOUS at 12:27

## 2018-01-01 RX ADMIN — PALONOSETRON HYDROCHLORIDE 0.25 MG: 0.25 INJECTION INTRAVENOUS at 11:26

## 2018-01-01 RX ADMIN — DEXTROSE MONOHYDRATE 350 MG: 50 INJECTION, SOLUTION INTRAVENOUS at 10:32

## 2018-01-01 RX ADMIN — MIDAZOLAM HYDROCHLORIDE 1 MG: 1 INJECTION, SOLUTION INTRAMUSCULAR; INTRAVENOUS at 10:27

## 2018-01-01 RX ADMIN — FENTANYL CITRATE 50 MCG: 50 INJECTION INTRAMUSCULAR; INTRAVENOUS at 11:27

## 2018-01-01 RX ADMIN — PALONOSETRON HYDROCHLORIDE 0.25 MG: 0.25 INJECTION INTRAVENOUS at 10:05

## 2018-01-01 RX ADMIN — DEXTROSE MONOHYDRATE 250 ML: 5 INJECTION, SOLUTION INTRAVENOUS at 10:39

## 2018-01-01 RX ADMIN — DEXAMETHASONE SODIUM PHOSPHATE 12 MG: 4 INJECTION, SOLUTION INTRA-ARTICULAR; INTRALESIONAL; INTRAMUSCULAR; INTRAVENOUS; SOFT TISSUE at 10:35

## 2018-01-01 RX ADMIN — LEUCOVORIN CALCIUM 780 MG: 100 INJECTION, POWDER, LYOPHILIZED, FOR SOLUTION INTRAMUSCULAR; INTRAVENOUS at 12:23

## 2018-01-01 RX ADMIN — Medication 10 ML: at 10:45

## 2018-01-01 RX ADMIN — Medication 10 ML: at 14:03

## 2018-01-01 RX ADMIN — SODIUM CHLORIDE, PRESERVATIVE FREE 500 UNITS: 5 INJECTION INTRAVENOUS at 14:52

## 2018-01-01 RX ADMIN — ERYTHROPOIETIN 20000 UNITS: 20000 INJECTION, SOLUTION INTRAVENOUS; SUBCUTANEOUS at 10:12

## 2018-01-01 RX ADMIN — SODIUM CHLORIDE 480 MG: 900 INJECTION, SOLUTION INTRAVENOUS at 12:00

## 2018-01-01 RX ADMIN — Medication 30 ML: at 11:18

## 2018-01-01 RX ADMIN — PEGFILGRASTIM 6 MG: KIT SUBCUTANEOUS at 13:15

## 2018-01-01 RX ADMIN — ATROPINE SULFATE 0.4 MG: 1 INJECTION, SOLUTION INTRAMUSCULAR; INTRAVENOUS; SUBCUTANEOUS at 13:20

## 2018-01-01 RX ADMIN — SODIUM CHLORIDE 1000 ML: 900 INJECTION, SOLUTION INTRAVENOUS at 12:22

## 2018-01-01 RX ADMIN — ONDANSETRON 4 MG: 2 INJECTION INTRAMUSCULAR; INTRAVENOUS at 10:43

## 2018-01-01 RX ADMIN — ERYTHROPOIETIN 40000 UNITS: 40000 INJECTION, SOLUTION INTRAVENOUS; SUBCUTANEOUS at 09:58

## 2018-01-01 RX ADMIN — Medication 30 ML: at 10:10

## 2018-01-01 RX ADMIN — HEPARIN SODIUM (PORCINE) LOCK FLUSH IV SOLN 100 UNIT/ML 500 UNITS: 100 SOLUTION at 13:32

## 2018-01-01 RX ADMIN — LEUCOVORIN CALCIUM 780 MG: 350 INJECTION, POWDER, LYOPHILIZED, FOR SOLUTION INTRAMUSCULAR; INTRAVENOUS at 11:17

## 2018-01-01 RX ADMIN — DEXTROSE MONOHYDRATE 25 ML/HR: 5 INJECTION, SOLUTION INTRAVENOUS at 10:24

## 2018-01-01 RX ADMIN — DEXTROSE MONOHYDRATE 350 MG: 50 INJECTION, SOLUTION INTRAVENOUS at 11:00

## 2018-01-01 RX ADMIN — Medication 10 ML: at 11:46

## 2018-01-01 RX ADMIN — HEPARIN SODIUM (PORCINE) LOCK FLUSH IV SOLN 100 UNIT/ML 500 UNITS: 100 SOLUTION at 14:13

## 2018-01-01 RX ADMIN — DEXTROSE MONOHYDRATE 350 MG: 5 INJECTION, SOLUTION INTRAVENOUS at 11:17

## 2018-01-01 RX ADMIN — FENTANYL CITRATE 50 MCG: 50 INJECTION INTRAMUSCULAR; INTRAVENOUS at 16:18

## 2018-01-01 RX ADMIN — DEXTROSE MONOHYDRATE 350 MG: 50 INJECTION, SOLUTION INTRAVENOUS at 12:23

## 2018-01-01 RX ADMIN — FLUOROURACIL 780 MG: 50 INJECTION, SOLUTION INTRAVENOUS at 13:51

## 2018-01-01 RX ADMIN — DEXAMETHASONE SODIUM PHOSPHATE 12 MG: 4 INJECTION, SOLUTION INTRA-ARTICULAR; INTRALESIONAL; INTRAMUSCULAR; INTRAVENOUS; SOFT TISSUE at 09:50

## 2018-01-01 RX ADMIN — ERYTHROPOIETIN 40000 UNITS: 40000 INJECTION, SOLUTION INTRAVENOUS; SUBCUTANEOUS at 10:40

## 2018-01-01 RX ADMIN — Medication 10 ML: at 15:05

## 2018-01-01 RX ADMIN — FLUOROURACIL 780 MG: 50 INJECTION, SOLUTION INTRAVENOUS at 15:05

## 2018-01-01 RX ADMIN — PROPOFOL 150 MG: 10 INJECTION, EMULSION INTRAVENOUS at 10:33

## 2018-01-01 RX ADMIN — Medication 20 ML: at 14:07

## 2018-01-01 RX ADMIN — ONDANSETRON 8 MG: 2 INJECTION INTRAMUSCULAR; INTRAVENOUS at 11:06

## 2018-01-01 RX ADMIN — ERYTHROPOIETIN 40000 UNITS: 40000 INJECTION, SOLUTION INTRAVENOUS; SUBCUTANEOUS at 12:27

## 2018-01-01 RX ADMIN — CIPROFLOXACIN 400 MG: 2 INJECTION, SOLUTION INTRAVENOUS at 10:28

## 2018-01-01 RX ADMIN — DEXAMETHASONE SODIUM PHOSPHATE 12 MG: 4 INJECTION, SOLUTION INTRA-ARTICULAR; INTRALESIONAL; INTRAMUSCULAR; INTRAVENOUS; SOFT TISSUE at 10:10

## 2018-01-01 RX ADMIN — DEXTROSE MONOHYDRATE 250 ML: 5 INJECTION, SOLUTION INTRAVENOUS at 12:30

## 2018-01-01 RX ADMIN — SODIUM CHLORIDE, PRESERVATIVE FREE 500 UNITS: 5 INJECTION INTRAVENOUS at 14:00

## 2018-01-01 RX ADMIN — Medication 20 ML: at 10:30

## 2018-01-01 RX ADMIN — MIDAZOLAM HYDROCHLORIDE 1 MG: 1 INJECTION, SOLUTION INTRAMUSCULAR; INTRAVENOUS at 10:33

## 2018-01-01 RX ADMIN — CIPROFLOXACIN 400 MG: 2 INJECTION, SOLUTION INTRAVENOUS at 15:19

## 2018-01-01 RX ADMIN — LEUCOVORIN CALCIUM 780 MG: 350 INJECTION, POWDER, LYOPHILIZED, FOR SOLUTION INTRAMUSCULAR; INTRAVENOUS at 11:00

## 2018-01-03 ENCOUNTER — HOSPITAL ENCOUNTER (OUTPATIENT)
Dept: INFUSION THERAPY | Age: 63
Discharge: HOME | End: 2018-01-03
Payer: MEDICARE

## 2018-01-05 ENCOUNTER — APPOINTMENT (OUTPATIENT)
Dept: INFUSION THERAPY | Age: 63
End: 2018-01-05
Payer: MEDICARE

## 2018-01-05 RX ORDER — FLUOROURACIL 50 MG/ML
700 INJECTION, SOLUTION INTRAVENOUS ONCE
Status: COMPLETED | OUTPATIENT
Start: 2018-01-09 | End: 2018-01-09

## 2018-01-09 ENCOUNTER — HOSPITAL ENCOUNTER (OUTPATIENT)
Dept: INFUSION THERAPY | Age: 63
Discharge: HOME | End: 2018-01-09
Payer: MEDICARE

## 2018-01-09 VITALS
SYSTOLIC BLOOD PRESSURE: 146 MMHG | OXYGEN SATURATION: 100 % | HEIGHT: 60 IN | WEIGHT: 191.6 LBS | BODY MASS INDEX: 37.62 KG/M2 | TEMPERATURE: 98.5 F | HEART RATE: 68 BPM | DIASTOLIC BLOOD PRESSURE: 84 MMHG | RESPIRATION RATE: 18 BRPM

## 2018-01-09 LAB
ALBUMIN SERPL-MCNC: 3.1 G/DL (ref 3.4–5)
ALBUMIN/GLOB SERPL: 0.8 {RATIO} (ref 0.8–1.7)
ALP SERPL-CCNC: 193 U/L (ref 45–117)
ALT SERPL-CCNC: 54 U/L (ref 13–56)
ANION GAP BLD CALC-SCNC: 18 MMOL/L (ref 10–20)
AST SERPL-CCNC: 39 U/L (ref 15–37)
BASO+EOS+MONOS # BLD AUTO: 0.9 K/UL (ref 0–2.3)
BASO+EOS+MONOS # BLD AUTO: 15 % (ref 0.1–17)
BILIRUB DIRECT SERPL-MCNC: <0.1 MG/DL (ref 0–0.2)
BILIRUB SERPL-MCNC: 0.2 MG/DL (ref 0.2–1)
BUN BLD-MCNC: 48 MG/DL (ref 7–18)
CA-I BLD-MCNC: 1.23 MMOL/L (ref 1.12–1.32)
CHLORIDE BLD-SCNC: 109 MMOL/L (ref 100–108)
CO2 BLD-SCNC: 22 MMOL/L (ref 19–24)
CREAT UR-MCNC: 3 MG/DL (ref 0.6–1.3)
DIFFERENTIAL METHOD BLD: ABNORMAL
ERYTHROCYTE [DISTWIDTH] IN BLOOD BY AUTOMATED COUNT: 14.6 % (ref 11.5–14.5)
GLOBULIN SER CALC-MCNC: 3.7 G/DL (ref 2–4)
GLUCOSE BLD STRIP.AUTO-MCNC: 142 MG/DL (ref 74–106)
HCT VFR BLD AUTO: 31 % (ref 36–48)
HCT VFR BLD CALC: 29 % (ref 36–49)
HGB BLD-MCNC: 9.7 G/DL (ref 12–16)
HGB BLD-MCNC: 9.9 G/DL (ref 12–16)
LYMPHOCYTES # BLD: 1.5 K/UL (ref 1.1–5.9)
LYMPHOCYTES NFR BLD: 24 % (ref 14–44)
MCH RBC QN AUTO: 32.1 PG (ref 25–35)
MCHC RBC AUTO-ENTMCNC: 31.3 G/DL (ref 31–37)
MCV RBC AUTO: 102.6 FL (ref 78–102)
NEUTS SEG # BLD: 3.8 K/UL (ref 1.8–9.5)
NEUTS SEG NFR BLD: 61 % (ref 40–70)
PLATELET # BLD AUTO: 297 K/UL (ref 140–440)
POTASSIUM BLD-SCNC: 4.4 MMOL/L (ref 3.5–5.5)
PROT SERPL-MCNC: 6.8 G/DL (ref 6.4–8.2)
RBC # BLD AUTO: 3.02 M/UL (ref 4.1–5.1)
SODIUM BLD-SCNC: 143 MMOL/L (ref 136–145)
WBC # BLD AUTO: 6.2 K/UL (ref 4.5–13)

## 2018-01-09 PROCEDURE — 85025 COMPLETE CBC W/AUTO DIFF WBC: CPT | Performed by: INTERNAL MEDICINE

## 2018-01-09 PROCEDURE — 96415 CHEMO IV INFUSION ADDL HR: CPT

## 2018-01-09 PROCEDURE — 74011000258 HC RX REV CODE- 258: Performed by: INTERNAL MEDICINE

## 2018-01-09 PROCEDURE — 96375 TX/PRO/DX INJ NEW DRUG ADDON: CPT

## 2018-01-09 PROCEDURE — 96411 CHEMO IV PUSH ADDL DRUG: CPT

## 2018-01-09 PROCEDURE — 96413 CHEMO IV INFUSION 1 HR: CPT

## 2018-01-09 PROCEDURE — 96416 CHEMO PROLONG INFUSE W/PUMP: CPT

## 2018-01-09 PROCEDURE — 74011250636 HC RX REV CODE- 250/636: Performed by: INTERNAL MEDICINE

## 2018-01-09 PROCEDURE — 80076 HEPATIC FUNCTION PANEL: CPT | Performed by: INTERNAL MEDICINE

## 2018-01-09 PROCEDURE — 96367 TX/PROPH/DG ADDL SEQ IV INF: CPT

## 2018-01-09 PROCEDURE — 77030012965 HC NDL HUBR BBMI -A

## 2018-01-09 PROCEDURE — 74011250636 HC RX REV CODE- 250/636

## 2018-01-09 PROCEDURE — 80047 BASIC METABLC PNL IONIZED CA: CPT

## 2018-01-09 RX ORDER — PREDNISONE 20 MG/1
40 TABLET ORAL DAILY
Qty: 4 TAB | Refills: 2 | Status: SHIPPED | OUTPATIENT
Start: 2018-01-09

## 2018-01-09 RX ORDER — DEXTROSE MONOHYDRATE 50 MG/ML
250 INJECTION, SOLUTION INTRAVENOUS ONCE
Status: COMPLETED | OUTPATIENT
Start: 2018-01-09 | End: 2018-01-09

## 2018-01-09 RX ORDER — SODIUM CHLORIDE 0.9 % (FLUSH) 0.9 %
10-40 SYRINGE (ML) INJECTION AS NEEDED
Status: DISCONTINUED | OUTPATIENT
Start: 2018-01-09 | End: 2018-01-13 | Stop reason: HOSPADM

## 2018-01-09 RX ORDER — ATROPINE SULFATE 1 MG/ML
0.4 INJECTION, SOLUTION INTRAVENOUS
Status: ACTIVE | OUTPATIENT
Start: 2018-01-09 | End: 2018-01-09

## 2018-01-09 RX ORDER — PALONOSETRON 0.05 MG/ML
0.25 INJECTION, SOLUTION INTRAVENOUS ONCE
Status: COMPLETED | OUTPATIENT
Start: 2018-01-09 | End: 2018-01-09

## 2018-01-09 RX ADMIN — DEXAMETHASONE SODIUM PHOSPHATE 12 MG: 4 INJECTION, SOLUTION INTRA-ARTICULAR; INTRALESIONAL; INTRAMUSCULAR; INTRAVENOUS; SOFT TISSUE at 09:12

## 2018-01-09 RX ADMIN — Medication 10 ML: at 09:07

## 2018-01-09 RX ADMIN — FLUOROURACIL 4300 MG: 50 INJECTION, SOLUTION INTRAVENOUS at 12:25

## 2018-01-09 RX ADMIN — FLUOROURACIL 700 MG: 50 INJECTION, SOLUTION INTRAVENOUS at 12:17

## 2018-01-09 RX ADMIN — Medication 10 ML: at 09:35

## 2018-01-09 RX ADMIN — DEXTROSE MONOHYDRATE 320 MG: 5 INJECTION, SOLUTION INTRAVENOUS at 10:12

## 2018-01-09 RX ADMIN — DEXTROSE MONOHYDRATE 250 ML: 5 INJECTION, SOLUTION INTRAVENOUS at 09:38

## 2018-01-09 RX ADMIN — PALONOSETRON HYDROCHLORIDE 0.25 MG: 0.25 INJECTION INTRAVENOUS at 09:10

## 2018-01-09 RX ADMIN — LEUCOVORIN CALCIUM 700 MG: 350 INJECTION, POWDER, LYOPHILIZED, FOR SOLUTION INTRAMUSCULAR; INTRAVENOUS at 10:12

## 2018-01-09 NOTE — PROGRESS NOTES
[facility] Progress Note    Date: 2018    Name: Leslie Tapia              MRN: [id number]              : 1955    Folfiri: [id number]     Pt to [facility], ambulatory, at 0920. Ms. Denilson Villasenor was assessed and education was provided. Ms. Polk's vitals were reviewed. Visit Vitals    /84 (BP 1 Location: Right arm, BP Patient Position: Sitting)    Pulse 68    Temp 98.5 °F (36.9 °C)    Resp 18    Ht 4' 11.84\" (1.52 m)    Wt 86.9 kg (191 lb 9.6 oz)    SpO2 100%    Breastfeeding No    BMI 37.62 kg/m2       Right chest mediport accessed with 20 g 1 inch orozco needle. Port flushed easily and had brisk blood return. Blood drawn off and sent for CBC, Hepatic Function and BMP run on the Istat per written orders after 10 ml waste. Lab results were obtained and reviewed. Recent Results (from the past 12 hour(s))   CBC WITH 3 PART DIFF    Collection Time: 18  8:44 AM   Result Value Ref Range    WBC 6.2 4.5 - 13.0 K/uL    RBC 3.02 (L) 4.10 - 5.10 M/uL    HGB 9.7 (L) 12.0 - 16 g/dL    HCT 31.0 (L) 36 - 48 %    .6 (H) 78 - 102 FL    MCH 32.1 25.0 - 35.0 PG    MCHC 31.3 31 - 37 g/dL    RDW 14.6 (H) 11.5 - 14.5 %    PLATELET 151 293 - 891 K/uL    NEUTROPHILS 61 40 - 70 %    MIXED CELLS 15 0.1 - 17 %    LYMPHOCYTES 24 14 - 44 %    ABS. NEUTROPHILS 3.8 1.8 - 9.5 K/UL    ABS. MIXED CELLS 0.9 0.0 - 2.3 K/uL    ABS. LYMPHOCYTES 1.5 1.1 - 5.9 K/UL    DF AUTOMATED     HEPATIC FUNCTION PANEL    Collection Time: 18  8:44 AM   Result Value Ref Range    Protein, total 6.8 6.4 - 8.2 g/dL    Albumin 3.1 (L) 3.4 - 5.0 g/dL    Globulin 3.7 2.0 - 4.0 g/dL    A-G Ratio 0.8 0.8 - 1.7      Bilirubin, total 0.2 0.2 - 1.0 MG/DL    Bilirubin, direct <0.1 0.0 - 0.2 MG/DL    Alk.  phosphatase 193 (H) 45 - 117 U/L    AST (SGOT) 39 (H) 15 - 37 U/L    ALT (SGPT) 54 13 - 56 U/L   POC CHEM8    Collection Time: 18  8:49 AM   Result Value Ref Range    CO2, POC 22 19 - 24 MMOL/L    Glucose,  (H) 74 - 106 MG/DL    BUN, POC 48 (H) 7 - 18 MG/DL    Creatinine, POC 3.0 (H) 0.6 - 1.3 MG/DL    GFRAA, POC 19 (L) >60 ml/min/1.73m2    GFRNA, POC 16 (L) >60 ml/min/1.73m2    Sodium,  136 - 145 MMOL/L    Potassium, POC 4.4 3.5 - 5.5 MMOL/L    Calcium, ionized (POC) 1.23 1.12 - 1.32 MMOL/L    Chloride,  (H) 100 - 108 MMOL/L    Anion gap, POC 18 10 - 20      Hematocrit, POC 29 (L) 36 - 49 %    Hemoglobin, POC 9.9 (L) 12 - 16 G/DL     Lab results within ordered parameters to give chemo today. ANC = 3.8, PLT = 297. Creatinine= 3.0 and BUN= 50 Toni in Pharmacy was notified of the patient's lab results and states that it is OK to proceed with chemotherapy today. Chemo dosages verified with today's BSA and found to be within 10% of ordered dosages. Pre-medications (Decadron 12mg IVPB and Aloxi 0.25mg IVP) were administered as ordered and chemotherapy was initiated after blood return from port re-verified. D5w started at [facility] per written order. Irinotecan 320 mg  was infused over 90 minutes concurrently with Leucovorin 700 mg. VS stable at end of infusion and pt denied complaints. Line flushed with D5 and blood return from port re-verified. Fluorouracil 400 mg was given slow IVP over 2 minutes. Line flushed with 10 mL NS and blood return was re-verified. Fluorouracil 4300 mg CADD pump was connected to patient. Connections were secured with tape and the volume was verified to be infusing. HGB= 9.7 and HCT= 31.0  Lab values are within range to HOLD her Procrit injection due to the written order. Ms. Jak Silverio tolerated infusion/injection. Patient Vitals for the past 12 hrs:   Temp Pulse Resp BP SpO2   01/09/18 1234 98.5 °F (36.9 °C) 68 18 146/84 100 %   01/09/18 0905 - 62 - 147/80 -   01/09/18 0827 98.5 °F (36.9 °C) 74 18 173/82 99 %     Patient armband removed and shredded. Ms. Jak Silverio was discharged from [place] [address] in stable condition at 1240.  She is to return on 01/11/2018 at 1300 for her next appointment for pump D/C.     Tr Hou RN  January 9, 2018

## 2018-01-11 ENCOUNTER — HOSPITAL ENCOUNTER (OUTPATIENT)
Dept: INFUSION THERAPY | Age: 63
Discharge: HOME | End: 2018-01-11
Payer: MEDICARE

## 2018-01-11 VITALS
RESPIRATION RATE: 18 BRPM | TEMPERATURE: 98.4 F | HEART RATE: 60 BPM | DIASTOLIC BLOOD PRESSURE: 95 MMHG | OXYGEN SATURATION: 100 % | SYSTOLIC BLOOD PRESSURE: 159 MMHG

## 2018-01-11 PROCEDURE — 96377 APPLICATON ON-BODY INJECTOR: CPT

## 2018-01-11 PROCEDURE — 74011250636 HC RX REV CODE- 250/636: Performed by: INTERNAL MEDICINE

## 2018-01-11 RX ORDER — SODIUM CHLORIDE 0.9 % (FLUSH) 0.9 %
10-40 SYRINGE (ML) INJECTION AS NEEDED
Status: DISCONTINUED | OUTPATIENT
Start: 2018-01-11 | End: 2018-01-15 | Stop reason: HOSPADM

## 2018-01-11 RX ORDER — HEPARIN SODIUM (PORCINE) LOCK FLUSH IV SOLN 100 UNIT/ML 100 UNIT/ML
500 SOLUTION INTRAVENOUS AS NEEDED
Status: ACTIVE | OUTPATIENT
Start: 2018-01-11 | End: 2018-01-12

## 2018-01-11 RX ADMIN — Medication 20 ML: at 13:23

## 2018-01-11 RX ADMIN — HEPARIN SODIUM (PORCINE) LOCK FLUSH IV SOLN 100 UNIT/ML 500 UNITS: 100 SOLUTION at 13:23

## 2018-01-11 RX ADMIN — PEGFILGRASTIM 6 MG: KIT SUBCUTANEOUS at 13:30

## 2018-01-11 NOTE — PROGRESS NOTES
Name: Emi Sellers     MRN: [id number]                                                                                                               : 1955     D/C pump     Ms. Polk arrived ambulatory to [facility] at 1315. She  was assessed and education was provided.     Ms. Polk's vitals were reviewed and patient was observed for 5 minutes prior to procedure. Visit Vitals    BP (!) 159/95 (BP 1 Location: Left arm, BP Patient Position: Sitting)    Pulse 60    Temp 98.4 °F (36.9 °C)    Resp 18    SpO2 100%     CADD pump completed infusion and removed from patient.      Port flushed with 20 mL and 500 units of heparin after blood return was verified. Mediport de-accessed and a Band-aid applied to the site.      Neulasta 6 mg applied to the back of the right arm. Green light verified to be blinking. Patient notified that she can remove the OBI in approximately 28 hours at [phone]. Patient verbalized understanding.      Ms. Polk tolerated the procedure, and had no complaints.     Ms. Polk was discharged from [place] [address] in stable condition at 1335.  She is to return on 18 at 0800 for her next appointment for chemo.  Jeanine Cruz RN  18

## 2018-01-17 ENCOUNTER — HOSPITAL ENCOUNTER (OUTPATIENT)
Dept: ONCOLOGY | Age: 63
Discharge: HOME | End: 2018-01-17

## 2018-01-17 ENCOUNTER — OFFICE VISIT (OUTPATIENT)
Dept: ONCOLOGY | Age: 63
End: 2018-01-17

## 2018-01-17 VITALS
SYSTOLIC BLOOD PRESSURE: 161 MMHG | DIASTOLIC BLOOD PRESSURE: 72 MMHG | TEMPERATURE: 98.7 F | WEIGHT: 188 LBS | HEART RATE: 72 BPM | BODY MASS INDEX: 36.91 KG/M2

## 2018-01-17 DIAGNOSIS — T45.1X5A CHEMOTHERAPY INDUCED NEUTROPENIA (HCC): ICD-10-CM

## 2018-01-17 DIAGNOSIS — T45.1X5A ANTINEOPLASTIC CHEMOTHERAPY INDUCED ANEMIA: ICD-10-CM

## 2018-01-17 DIAGNOSIS — N18.9 ANEMIA IN CHRONIC KIDNEY DISEASE, UNSPECIFIED CKD STAGE: ICD-10-CM

## 2018-01-17 DIAGNOSIS — C19 COLORECTAL CARCINOMA (HCC): ICD-10-CM

## 2018-01-17 DIAGNOSIS — D70.1 CHEMOTHERAPY INDUCED NEUTROPENIA (HCC): ICD-10-CM

## 2018-01-17 DIAGNOSIS — C19 COLORECTAL CARCINOMA (HCC): Primary | ICD-10-CM

## 2018-01-17 DIAGNOSIS — D63.1 ANEMIA IN CHRONIC KIDNEY DISEASE, UNSPECIFIED CKD STAGE: ICD-10-CM

## 2018-01-17 DIAGNOSIS — D64.81 ANTINEOPLASTIC CHEMOTHERAPY INDUCED ANEMIA: ICD-10-CM

## 2018-01-17 LAB
BASO+EOS+MONOS # BLD AUTO: 1.6 K/UL (ref 0–2.3)
BASO+EOS+MONOS # BLD AUTO: 9 % (ref 0.1–17)
DIFFERENTIAL METHOD BLD: ABNORMAL
ERYTHROCYTE [DISTWIDTH] IN BLOOD BY AUTOMATED COUNT: 14.6 % (ref 11.5–14.5)
HCT VFR BLD AUTO: 32.9 % (ref 36–48)
HGB BLD-MCNC: 10.3 G/DL (ref 12–16)
LYMPHOCYTES # BLD: 2.5 K/UL (ref 1.1–5.9)
LYMPHOCYTES NFR BLD: 14 % (ref 14–44)
MCH RBC QN AUTO: 32 PG (ref 25–35)
MCHC RBC AUTO-ENTMCNC: 31.3 G/DL (ref 31–37)
MCV RBC AUTO: 102.2 FL (ref 78–102)
NEUTS SEG # BLD: 14.1 K/UL (ref 1.8–9.5)
NEUTS SEG NFR BLD: 77 % (ref 40–70)
PLATELET # BLD AUTO: 225 K/UL (ref 135–420)
RBC # BLD AUTO: 3.22 M/UL (ref 4.1–5.1)
WBC # BLD AUTO: 18.2 K/UL (ref 4.5–13)

## 2018-01-17 RX ORDER — DICYCLOMINE HYDROCHLORIDE 10 MG/1
10 CAPSULE ORAL
Qty: 90 CAP | Refills: 0 | Status: SHIPPED | OUTPATIENT
Start: 2018-01-17 | End: 2018-01-01

## 2018-01-17 NOTE — MR AVS SNAPSHOT
[facility] [place] [address] [address] [facility] [address] 
[phone] Patient: Baltazar Nicholson MRN: [id number] DZE:3/73/0501 Visit Information Date & Time Provider Department Dept. Phone Encounter #  
 1/17/2018  9:00 AM Chela Horne MD [email or web address] Oncology [phone] [id number] Follow-up Instructions Return in about 4 weeks (around 2/14/2018). Your Appointments 2/14/2018  9:00 AM  
Office Visit with Chela Horne MD  
[facility] On[facility]) Appt Note: 4 wk fu  
 [facility] [place] [address] [address], [address] [place] [address] Upcoming Health Maintenance Date Due Hepatitis C Screening 1955 PAP AKA CERVICAL CYTOLOGY 8/26/1974 BREAST CANCER SCRN MAMMOGRAM 8/26/2003 FOBT Q 1 YEAR AGE 50-75 8/26/2003 ZOSTER VACCINE AGE 60> 6/26/2013 Influenza Age 5 to Adult 8/1/2017 Pneumococcal 19-64 Highest Risk (3 of 3 - PCV13) 12/11/2018 DTaP/Tdap/Td series (2 - Td) 3/15/2026 Allergies as of 1/17/2018  Review Complete On: 1/11/2018 By: Julianna Bruner RN Severity Noted Reaction Type Reactions Latex  08/04/2016    Other (comments) Lisinopril High 04/25/2017    Hives Asa-acetaminophen-caff-buffers  08/04/2016    Other (comments) Codeine  08/04/2016    Other (comments) Pcn [Penicillins]  08/04/2016    Other (comments) Sulfa (Sulfonamide Antibiotics)  08/04/2016    Other (comments) Current Immunizations  Reviewed on 1/11/2018 Name Date Influenza Vaccine 3/15/2016 12:00 AM, 3/1/2016 Pneumococcal Polysaccharide (PPSV-23) 12/11/2017 12:00 AM  
 Pneumococcal Vaccine (Unspecified Type) 3/1/2016 Tdap 3/15/2016 12:00 AM  
  
 Not reviewed this visit You Were Diagnosed With   
  
 Codes Comments Colorectal carcinoma ([facility] [address]    -  Primary ICD-10-CM: C19 
ICD-9-CM: 154.0 Antineoplastic chemotherapy induced anemia     ICD-10-CM: D64.81, T45.1X5A 
ICD-9-CM: 285.3, E933.1 Chemotherapy induced neutropenia (HCC)     ICD-10-CM: D70.1, T45.1X5A 
ICD-9-CM: 288.03, E933.1 Vitals BP Pulse Temp Weight(growth percentile) BMI Smoking Status 161/72 (BP 1 Location: Right arm) 72 98.7 °F (37.1 °C) 188 lb (85.3 kg) 36.91 kg/m2 Never Smoker Vitals History BMI and BSA Data Body Mass Index Body Surface Area  
 36.91 kg/m 2 1.9 m 2 Preferred Pharmacy Pharmacy Name Phone 500 [facility] [address], [address] [facility] Your Updated Medication List  
  
   
This list is accurate as of: 1/17/18 10:22 AM.  Always use your most recent med list.  
  
  
  
  
 acetaminophen 650 mg Tber Commonly known as:  TYLENOL  
1,300 mg.  
  
 albuterol 90 mcg/actuation inhaler Commonly known as:  PROVENTIL HFA, VENTOLIN HFA, PROAIR HFA  
2 Puffs. allopurinol 300 mg tablet Commonly known as:  ZYLOPRIM  
300 mg.  
  
 bisoprolol-hydroCHLOROthiazide 10-6.25 mg per tablet Commonly known as:  [facility] Take 1 Tab by Mouth Once a Day. cloNIDine HCl 0.1 mg tablet Commonly known as:  CATAPRES  
0.1 mg.  
  
 colchicine 0.6 mg tablet  
0.6 mg.  
  
 desonide 0.05 % cream  
Commonly known as:  Carmen Mc Use 1 Application to affected area Twice Daily. dicyclomine 10 mg capsule Commonly known as:  BENTYL Take 1 Cap by mouth three (3) times daily as needed. diphenhydrAMINE 25 mg capsule Commonly known as:  BENADRYL Take 1 with compazine every 6 hours for nausea for 3 days then, as needed. ergocalciferol 50,000 unit capsule Commonly known as:  ERGOCALCIFEROL  
50,000 Units. furosemide 20 mg tablet Commonly known as:  LASIX Take 1/2-1 tablet daily if needed for swelling. glimepiride 4 mg tablet Commonly known as:  AMARYL Take one in the am.  New dose. glipiZIDE 10 mg tablet Commonly known as:  Ples [place] 10 mg.  
  
 lidocaine-prilocaine topical cream  
Commonly known as:  EMLA Place cream over mediport 1 hour prior to chemotherapy and then place saran wrap over area. Every chemo treatment. NORVASC 5 mg tablet Generic drug:  amLODIPine Take 5 mg by mouth daily. ondansetron hcl 8 mg tablet Commonly known as:  ZOFRAN (AS HYDROCHLORIDE) Take one tablet by mouth every 8 hours for nausea beginning the night of chemo for 3 days. potassium chloride 20 mEq tablet Commonly known as:  K-DUR, KLOR-CON Take 1 Tab by mouth two (2) times a day. predniSONE 20 mg tablet Commonly known as:  Keysha Found Take 2 Tabs by mouth daily. Take 2 tabs on days 2 and 3 of each chemo cycle  
  
 prochlorperazine 10 mg tablet Commonly known as:  COMPAZINE Take 1 tablet every 6 hours with Benadryl 25mg for nausea for 3 days then, as needed. warfarin 1 mg tablet Commonly known as:  COUMADIN Take one 1 tablet by mouth everyday at 6pm or after. You will continue to take this medication. Everyday until mediport is removed. Prescriptions Sent to Pharmacy Refills  
 dicyclomine (BENTYL) 10 mg capsule 0 Sig: Take 1 Cap by mouth three (3) times daily as needed. Class: Normal  
 Pharmacy: [facility] [address] EMELY RAMIREZ [address] Ph #: [phone] Route: Oral  
  
We Performed the Following CEA [id number] CPT(R)] COMPLETE CBC & AUTO DIFF WBC [91234 CPT(R)] FERRITIN [59902 CPT(R)] IRON PROFILE [id number] CPT(R)] METABOLIC PANEL, COMPREHENSIVE [26305 CPT(R)] Follow-up Instructions Return in about 4 weeks (around 2/14/2018). To-Do List   
 01/17/2018 Lab:  CBC WITH 3 PART DIFF   
  
 01/17/2018 Lab:  CEA   
  
 01/18/2018 Lab:  FERRITIN   
  
 01/18/2018 Lab:  IRON PROFILE   
  
 01/18/2018 Lab: METABOLIC PANEL, COMPREHENSIVE   
  
 01/23/2018 8:00 AM  
  Appointment with [facility] NURSE 2 at [facility] ([phone])  
  
 01/25/2018 1:00 PM  
  Appointment with [facility] NURSE 1 at [facility] ([phone]) Introducing [facility] SERVICES! Nikiaever Jeremias introduces Meet My Friends patient portal. Now you can access parts of your medical record, email your doctor's office, and request medication refills online. 1. In your internet browser, go to [email or web address] 2. Click on the First Time User? Click Here link in the Sign In box. You will see the New Member Sign Up page. 3. Enter your [employer] Access Code exactly as it appears below. You will not need to use this code after youve completed the sign-up process. If you do not sign up before the expiration date, you must request a new code. · [employer] Access Code: [email or web address] Expires: 2/5/2018  9:35 AM 
 
4. Enter the last four digits of your Social Security Number (xxxx) and Date of Birth (mm/dd/yyyy) as indicated and click Submit. You will be taken to the next sign-up page. 5. Create a Meet My Friends ID. This will be your Meet My Friends login ID and cannot be changed, so think of one that is secure and easy to remember. 6. Create a [employer] Friends password. You can change your password at any time. 7. Enter your Password Reset Question and Answer. This can be used at a later time if you forget your password. 8. Enter your e-mail address. You will receive e-mail notification when new information is available in [address] 9. Click Sign Up. You can now view and download portions of your medical record. 10. Click the Download Summary menu link to download a portable copy of your medical information. If you have questions, please visit the Frequently Asked Questions section of the [employer] website. Remember, [employer] My [employer] is NOT to be used for urgent needs. For medical emergencies, dial 911. Now available from your iPhone and Android! Please provide this summary of care documentation to your next provider. Your primary care clinician is listed as Riley Sosa. If you have any questions after today's visit, please call [phone].

## 2018-01-17 NOTE — PROGRESS NOTES
Hematology/Oncology  Progress Note    Name: Jada Laughlin  Date: 18  : 1955    PCP: Vasiliy Whatley NP     Ms. Janelle Rashid is a 57 y.o. -American woman with metastatic colorectal carcinoma/carcinomatosis  Current therapy: FOLFIRI chemotherapy regimen      Subjective:     Ms. 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 She was started on FOLFIRI regimen and she is here today for follow up. She states that she has no pain, her appetite has significantly increased and her energy level is up. She denies diarrhea, nausea, or vomiting. Her only complaint is occasional abdominal cramping during chemo. She denies constipation. Otherwise there are no additional complaints to report. Past medical history, family history, and social history: these were reviewed and remains unchanged. Past Medical History:   Diagnosis Date    Diabetes ([address]     Gout     Heart disease     Hypertension     Neuropathic pain of foot      Past Surgical History:   Procedure Laterality Date    HX BACK SURGERY      HX HYSTERECTOMY       Social History     Social History    Marital status:      Spouse name: N/A    Number of children: N/A    Years of education: N/A     Occupational History    Not on file. Social History Main Topics    Smoking status: Never Smoker    Smokeless tobacco: Never Used    Alcohol use No    Drug use: No    Sexual activity: Not on file     Other Topics Concern    Not on file     Social History Narrative     Family History   Problem Relation Age of Onset    Family history unknown: Yes     Current Outpatient Prescriptions   Medication Sig Dispense Refill    dicyclomine (BENTYL) 10 mg capsule Take 1 Cap by mouth three (3) times daily as needed. 90 Cap 0    predniSONE (DELTASONE) 20 mg tablet Take 2 Tabs by mouth daily.  Take 2 tabs on days 2 and 3 of each chemo cycle 4 Tab 2    lidocaine-prilocaine (EMLA) topical cream Place cream over mediport 1 hour prior to chemotherapy and then place saran wrap over area. Every chemo treatment. 30 g 1    potassium chloride (K-DUR, KLOR-CON) 20 mEq tablet Take 1 Tab by mouth two (2) times a day. 60 Tab 0    ondansetron hcl (ZOFRAN, AS HYDROCHLORIDE,) 8 mg tablet Take one tablet by mouth every 8 hours for nausea beginning the night of chemo for 3 days. 9 Tab 3    amLODIPine (NORVASC) 5 mg tablet Take 5 mg by mouth daily.  diphenhydrAMINE (BENADRYL) 25 mg capsule Take 1 with compazine every 6 hours for nausea for 3 days then, as needed. 60 Cap 3    prochlorperazine (COMPAZINE) 10 mg tablet Take 1 tablet every 6 hours with Benadryl 25mg for nausea for 3 days then, as needed. 60 Tab 3    warfarin (COUMADIN) 1 mg tablet Take one 1 tablet by mouth everyday at 6pm or after. You will continue to take this medication. Everyday until mediport is removed. (Patient taking differently: 3 mg every Tuesday and Thursday. Take one 1 tablet by mouth everyday at 6pm or after. You will continue to take this medication. Everyday until mediport is removed.) 30 Tab 3    acetaminophen (TYLENOL) 650 mg CR tablet 1,300 mg.      albuterol (PROVENTIL HFA, VENTOLIN HFA, PROAIR HFA) 90 mcg/actuation inhaler 2 Puffs.  colchicine 0.6 mg tablet 0.6 mg.      ergocalciferol (ERGOCALCIFEROL) 50,000 unit capsule 50,000 Units.  furosemide (LASIX) 20 mg tablet Take 1/2-1 tablet daily if needed for swelling.  allopurinol (ZYLOPRIM) 300 mg tablet 300 mg.      desonide (TRIDESILON) 0.05 % cream Use 1 Application to affected area Twice Daily.  cloNIDine HCl (CATAPRES) 0.1 mg tablet 0.1 mg.      bisoprolol-hydrochlorothiazide (ZIAC) 10-6.25 mg per tablet Take 1 Tab by Mouth Once a Day.  glipiZIDE (GLUCOTROL) 10 mg tablet 10 mg.      glimepiride (AMARYL) 4 mg tablet Take one in the am.  New dose.        Facility-Administered Medications Ordered in Other Visits   Medication Dose Route Frequency Provider Last Rate Last Dose    [START ON 1/25/2018] pegfilgrastim (NEULASTA) wearable SQ injector 6 mg  6 mg SubCUTAneous ONCE Charles Mohs, MD        sodium chloride (NS) flush 10-40 mL  10-40 mL IntraVENous PRN Charles Mohs, MD   20 mL at 01/23/18 1257    [START ON 1/25/2018] pegfilgrastim (NEULASTA) wearable SQ injector 6 mg  6 mg SubCUTAneous ONCE Charles Mohs, MD        fluorouracil (ADRUCIL) 4,300 mg in 0.9% sodium chloride 250 mL CADD Cassette  4,300 mg IntraVENous ONCE Charles Mohs, MD   4,300 mg at 01/23/18 1257       Review of Systems  Constitutional: The patient denies acute distress or discomfort. HEENT: The patient denies recent head trauma, eye pain, blurred vision,  hearing deficit, oropharyngeal mucosal pain or lesions, and the patient denies throat pain or discomfort. Lymphatics: The patient denies palpable peripheral lymphadenopathy. Hematologic: The patient denies having bruising, bleeding, or progressive fatigue. Respiratory: Patient denies having shortness of breath, cough, sputum production, fever, or dyspnea on exertion. Cardiovascular: The patient denies having leg pain, leg swelling, heart palpitations, chest permit, chest pain, or lightheadedness. The patient denies having dyspnea on exertion. Gastrointestinal: The patient reports occasional nausea from chemotherapy which is well controlled with antinausea medication. She denies having any emesis or diarrhea. Genitourinary: Patient denies having urinary urgency, frequency, or dysuria. The patient denies having blood in the urine. Psychological: The patient denies having symptoms of nervousness, anxiety, depression, or thoughts of harming self. Skin: Patient denies having skin rashes, skin, ulcerations, or unexplained itching or pruritus. Musculoskeletal: The patient denies having pain in the joints or bones.       Objective:     Visit Vitals    /72 (BP 1 Location: Right arm)    Pulse 72    Temp 98.7 °F (37.1 °C)    Wt 85.3 kg (188 lb)    BMI 36.91 kg/m2     ECOG PS=0; Pain score=0/10    Physical Exam:   Gen. Appearance: The patient is in no acute distress. Skin: There is no bruise or rash. HEENT: The exam is unremarkable. Neck: Supple without lymphadenopathy or thyromegaly. Lungs: Clear to auscultation and percussion; there are no wheezes or rhonchi. Heart: Regular rate and rhythm; there are no murmurs, gallops, or rubs. Abdomen: Bowel sounds are present and normal.  There is no guarding, tenderness, or hepatosplenomegaly. The patient has a colostomy bag in place. Extremities: There is no clubbing, cyanosis, or edema. Neurologic: There are no focal neurologic deficits. Lymphatics: There is no palpable peripheral lymphadenopathy. Musculoskeletal: The patient has full range of motion at all joints. There is no evidence of joint deformity or effusions. There is no focal joint tenderness. Psychological/psychiatric: There is no clinical evidence of anxiety, depression, or melancholy. Lab data:      Results for orders placed or performed during the hospital encounter of 01/23/18   CBC WITH 3 PART DIFF     Status: Abnormal   Result Value Ref Range Status    WBC 26.2 (HH) 4.5 - 13.0 K/uL Final    RBC 3.05 (L) 4.10 - 5.10 M/uL Final    HGB 9.9 (L) 12.0 - 16 g/dL Final    HCT 30.6 (L) 36 - 48 % Final    .3 78 - 102 FL Final    MCH 32.5 25.0 - 35.0 PG Final    MCHC 32.4 31 - 37 g/dL Final    RDW 14.4 11.5 - 14.5 % Final    PLATELET 810 114 - 859 K/uL Final    NEUTROPHILS 86 (H) 40 - 70 % Final    MIXED CELLS 4 0.1 - 17 % Final    LYMPHOCYTES 11 (L) 14 - 44 % Final    ABS. NEUTROPHILS 22.5 (H) 1.8 - 9.5 K/UL Final    ABS. MIXED CELLS 0.9 0.0 - 2.3 K/uL Final    ABS. LYMPHOCYTES 2.8 1.1 - 5.9 K/UL Final     Comment: Test performed at [address] Location. Results Reviewed by Medical Director. DF AUTOMATED   Final           Assessment:     1. Colorectal carcinoma ([facility] [address]    2. Antineoplastic chemotherapy induced anemia    3. Chemotherapy induced neutropenia (HCC)    4. Anemia in chronic kidney disease, unspecified CKD stage      Plan:   Colorectal carcinoma with abdominal carcinomatosis: On 5/24/2017 the patient underwent colectomy with omentectomy with the findings of adenocarcinoma involving at least 80% of the omentum. The primary tumor appeared to originate in the transverse colon and measured approximately 5 cm. There was microscopic perforation of the colon and metastatic involvement into the left ovary. The tumor was pathologically staged as a T4 a N1M1 stage IV malignancy. The patient was started on FOLFIRI chemotherapy regimen on 08/30/17. The regimen is a combination of Irinotecan at 180 mg/m² on day 1, 5 fluorouracil given at a dose of 400 mg/m² by IV bolus on day 1 followed by 2400 mg/m² continuous infusion for 46 hours on days 1 and 2, leucovorin 400 mg/m² IV on day 1 as a 2 hour infusion prior to 5-fluorouracil. A comprehensive metabolic panel, CEA level, ferritin level, iron profile, and CBC will be ordered at this time. Currently, the patient is tolerating the treatment well and it will be continued. Her most recent CEA level from 12/2017 was 163.3 ng/mL. Anemia associated with chronic kidney disease and chemotherapy-induced anemia (persistent problem): I have explained to the patient that her CBC on today showed a hemoglobin of 10.3 g/dL with hematocrit of 32.9 %. Procrit at a dose of 60,000 units will be provided whenever her hemoglobin is below 10 g/dL hematocrit is below 30% every 2 weeks. The iron profile and ferritin levels will be ordered at this time. Chemotherapy-induced leukopenia/neutropenia (improved problem): The CBC on today showed a normal WBC count of 18.2 with an absolute neutrophil count of 14.1. This will be monitored on a regular basis. Neulasta following each chemo cycle will continue.        I will have the patient return to clinic for complete reassessment again in 4 weeks. Orders Placed This Encounter    COMPLETE CBC & AUTO DIFF WBC    InHouse CBC ([employer])     Standing Status:   Future     Number of Occurrences:   1     Standing Expiration Date:   1/52/4486    METABOLIC PANEL, COMPREHENSIVE     Standing Status:   Future     Number of Occurrences:   1     Standing Expiration Date:   1/18/2019    IRON PROFILE     Standing Status:   Future     Number of Occurrences:   1     Standing Expiration Date:   1/18/2019    CEA     Standing Status:   Future     Number of Occurrences:   1     Standing Expiration Date:   1/18/2019    FERRITIN     Standing Status:   Future     Number of Occurrences:   1     Standing Expiration Date:   1/18/2019    dicyclomine (BENTYL) 10 mg capsule     Sig: Take 1 Cap by mouth three (3) times daily as needed.      Dispense:  90 Cap     Refill:  0       Chayito Mora MD  1/17/2018

## 2018-01-18 RX ORDER — FLUOROURACIL 50 MG/ML
700 INJECTION, SOLUTION INTRAVENOUS ONCE
Status: COMPLETED | OUTPATIENT
Start: 2018-01-23 | End: 2018-01-23

## 2018-01-19 LAB
ALBUMIN SERPL-MCNC: 3.8 G/DL (ref 3.6–4.8)
ALBUMIN/GLOB SERPL: 1.3 {RATIO} (ref 1.2–2.2)
ALP SERPL-CCNC: 232 IU/L (ref 39–117)
ALT SERPL-CCNC: 23 IU/L (ref 0–32)
AST SERPL-CCNC: 11 IU/L (ref 0–40)
BILIRUB SERPL-MCNC: <0.2 MG/DL (ref 0–1.2)
BUN SERPL-MCNC: 53 MG/DL (ref 8–27)
BUN/CREAT SERPL: 18 (ref 12–28)
CALCIUM SERPL-MCNC: 9.4 MG/DL (ref 8.7–10.3)
CEA SERPL-MCNC: 234.7 NG/ML (ref 0–4.7)
CHLORIDE SERPL-SCNC: 106 MMOL/L (ref 96–106)
CO2 SERPL-SCNC: 20 MMOL/L (ref 18–29)
CREAT SERPL-MCNC: 2.91 MG/DL (ref 0.57–1)
FERRITIN SERPL-MCNC: 572 NG/ML (ref 15–150)
GLOBULIN SER CALC-MCNC: 2.9 G/DL (ref 1.5–4.5)
GLUCOSE SERPL-MCNC: 101 MG/DL (ref 65–99)
IRON SATN MFR SERPL: 24 % (ref 15–55)
IRON SERPL-MCNC: 64 UG/DL (ref 27–139)
POTASSIUM SERPL-SCNC: 4.8 MMOL/L (ref 3.5–5.2)
PROT SERPL-MCNC: 6.7 G/DL (ref 6–8.5)
SODIUM SERPL-SCNC: 142 MMOL/L (ref 134–144)
TIBC SERPL-MCNC: 264 UG/DL (ref 250–450)
UIBC SERPL-MCNC: 200 UG/DL (ref 118–369)

## 2018-01-23 ENCOUNTER — HOSPITAL ENCOUNTER (OUTPATIENT)
Dept: INFUSION THERAPY | Age: 63
Discharge: HOME | End: 2018-01-23
Payer: MEDICARE

## 2018-01-23 VITALS
HEIGHT: 60 IN | TEMPERATURE: 98.2 F | HEART RATE: 69 BPM | OXYGEN SATURATION: 100 % | RESPIRATION RATE: 18 BRPM | DIASTOLIC BLOOD PRESSURE: 93 MMHG | SYSTOLIC BLOOD PRESSURE: 155 MMHG | BODY MASS INDEX: 37.52 KG/M2 | WEIGHT: 191.1 LBS

## 2018-01-23 LAB
ANION GAP BLD CALC-SCNC: 17 MMOL/L (ref 10–20)
BASO+EOS+MONOS # BLD AUTO: 0.9 K/UL (ref 0–2.3)
BASO+EOS+MONOS # BLD AUTO: 4 % (ref 0.1–17)
BUN BLD-MCNC: 41 MG/DL (ref 7–18)
CA-I BLD-MCNC: 1.29 MMOL/L (ref 1.12–1.32)
CHLORIDE BLD-SCNC: 106 MMOL/L (ref 100–108)
CO2 BLD-SCNC: 22 MMOL/L (ref 19–24)
CREAT UR-MCNC: 2.8 MG/DL (ref 0.6–1.3)
DIFFERENTIAL METHOD BLD: ABNORMAL
ERYTHROCYTE [DISTWIDTH] IN BLOOD BY AUTOMATED COUNT: 14.4 % (ref 11.5–14.5)
GLUCOSE BLD STRIP.AUTO-MCNC: 128 MG/DL (ref 74–106)
HCT VFR BLD AUTO: 30.6 % (ref 36–48)
HCT VFR BLD CALC: 29 % (ref 36–49)
HGB BLD-MCNC: 9.9 G/DL (ref 12–16)
HGB BLD-MCNC: 9.9 G/DL (ref 12–16)
LYMPHOCYTES # BLD: 2.8 K/UL (ref 1.1–5.9)
LYMPHOCYTES NFR BLD: 11 % (ref 14–44)
MCH RBC QN AUTO: 32.5 PG (ref 25–35)
MCHC RBC AUTO-ENTMCNC: 32.4 G/DL (ref 31–37)
MCV RBC AUTO: 100.3 FL (ref 78–102)
NEUTS SEG # BLD: 22.5 K/UL (ref 1.8–9.5)
NEUTS SEG NFR BLD: 86 % (ref 40–70)
PLATELET # BLD AUTO: 194 K/UL (ref 140–440)
POTASSIUM BLD-SCNC: 4 MMOL/L (ref 3.5–5.5)
RBC # BLD AUTO: 3.05 M/UL (ref 4.1–5.1)
SODIUM BLD-SCNC: 140 MMOL/L (ref 136–145)
WBC # BLD AUTO: 26.2 K/UL (ref 4.5–13)

## 2018-01-23 PROCEDURE — 96411 CHEMO IV PUSH ADDL DRUG: CPT

## 2018-01-23 PROCEDURE — 74011250636 HC RX REV CODE- 250/636: Performed by: INTERNAL MEDICINE

## 2018-01-23 PROCEDURE — 96416 CHEMO PROLONG INFUSE W/PUMP: CPT

## 2018-01-23 PROCEDURE — 74011250636 HC RX REV CODE- 250/636

## 2018-01-23 PROCEDURE — 80047 BASIC METABLC PNL IONIZED CA: CPT

## 2018-01-23 PROCEDURE — 96413 CHEMO IV INFUSION 1 HR: CPT

## 2018-01-23 PROCEDURE — 74011000258 HC RX REV CODE- 258: Performed by: INTERNAL MEDICINE

## 2018-01-23 PROCEDURE — 77030012965 HC NDL HUBR BBMI -A

## 2018-01-23 PROCEDURE — 85025 COMPLETE CBC W/AUTO DIFF WBC: CPT | Performed by: INTERNAL MEDICINE

## 2018-01-23 PROCEDURE — 96367 TX/PROPH/DG ADDL SEQ IV INF: CPT

## 2018-01-23 PROCEDURE — 96415 CHEMO IV INFUSION ADDL HR: CPT

## 2018-01-23 PROCEDURE — 96375 TX/PRO/DX INJ NEW DRUG ADDON: CPT

## 2018-01-23 RX ORDER — PALONOSETRON 0.05 MG/ML
0.25 INJECTION, SOLUTION INTRAVENOUS ONCE
Status: COMPLETED | OUTPATIENT
Start: 2018-01-23 | End: 2018-01-23

## 2018-01-23 RX ORDER — DEXTROSE MONOHYDRATE 50 MG/ML
250 INJECTION, SOLUTION INTRAVENOUS ONCE
Status: COMPLETED | OUTPATIENT
Start: 2018-01-23 | End: 2018-01-23

## 2018-01-23 RX ORDER — SODIUM CHLORIDE 0.9 % (FLUSH) 0.9 %
10-40 SYRINGE (ML) INJECTION AS NEEDED
Status: DISCONTINUED | OUTPATIENT
Start: 2018-01-23 | End: 2018-01-27 | Stop reason: HOSPADM

## 2018-01-23 RX ORDER — ATROPINE SULFATE 1 MG/ML
0.4 INJECTION, SOLUTION INTRAVENOUS
Status: ACTIVE | OUTPATIENT
Start: 2018-01-23 | End: 2018-01-23

## 2018-01-23 RX ADMIN — LEUCOVORIN CALCIUM 700 MG: 350 INJECTION, POWDER, LYOPHILIZED, FOR SOLUTION INTRAMUSCULAR; INTRAVENOUS at 11:09

## 2018-01-23 RX ADMIN — FLUOROURACIL 4300 MG: 50 INJECTION, SOLUTION INTRAVENOUS at 12:57

## 2018-01-23 RX ADMIN — FLUOROURACIL 700 MG: 50 INJECTION, SOLUTION INTRAVENOUS at 12:57

## 2018-01-23 RX ADMIN — Medication 20 ML: at 12:57

## 2018-01-23 RX ADMIN — DEXTROSE MONOHYDRATE 250 ML: 5 INJECTION, SOLUTION INTRAVENOUS at 10:25

## 2018-01-23 RX ADMIN — Medication 20 ML: at 09:56

## 2018-01-23 RX ADMIN — DEXTROSE MONOHYDRATE 320 MG: 5 INJECTION, SOLUTION INTRAVENOUS at 11:09

## 2018-01-23 RX ADMIN — DEXAMETHASONE SODIUM PHOSPHATE 12 MG: 4 INJECTION, SOLUTION INTRA-ARTICULAR; INTRALESIONAL; INTRAMUSCULAR; INTRAVENOUS; SOFT TISSUE at 10:01

## 2018-01-23 RX ADMIN — PALONOSETRON HYDROCHLORIDE 0.25 MG: 0.25 INJECTION INTRAVENOUS at 09:57

## 2018-01-23 NOTE — PROGRESS NOTES
[facility] Progress Note    Date: 2018    Name: Leslie Tapia              MRN: [id number]              : 1955    Folfiri: [id number]     Pt to [facility], ambulatory, at 0915. Ms. Denilson Villasenor was assessed and education was provided. Ms. Polk's vitals were reviewed. Visit Vitals    BP (!) 155/93 (BP 1 Location: Right arm, BP Patient Position: Sitting)    Pulse 69    Temp 98.2 °F (36.8 °C)    Resp 18    Ht 4' 11.84\" (1.52 m)    Wt 86.7 kg (191 lb 1.6 oz)    SpO2 100%    BMI 37.52 kg/m2       Right chest mediport accessed with 20 g 1 inch orozco needle. Port flushed easily and had brisk blood return. Blood drawn off and sent for CBC and BMP run on the Istat per written orders after 10 ml waste. Lab results were obtained and reviewed. Recent Results (from the past 12 hour(s))   CBC WITH 3 PART DIFF    Collection Time: 18  9:35 AM   Result Value Ref Range    WBC 26.2 (HH) 4.5 - 13.0 K/uL    RBC 3.05 (L) 4.10 - 5.10 M/uL    HGB 9.9 (L) 12.0 - 16 g/dL    HCT 30.6 (L) 36 - 48 %    .3 78 - 102 FL    MCH 32.5 25.0 - 35.0 PG    MCHC 32.4 31 - 37 g/dL    RDW 14.4 11.5 - 14.5 %    PLATELET 158 704 - 361 K/uL    NEUTROPHILS 86 (H) 40 - 70 %    MIXED CELLS 4 0.1 - 17 %    LYMPHOCYTES 11 (L) 14 - 44 %    ABS. NEUTROPHILS 22.5 (H) 1.8 - 9.5 K/UL    ABS. MIXED CELLS 0.9 0.0 - 2.3 K/uL    ABS.  LYMPHOCYTES 2.8 1.1 - 5.9 K/UL    DF AUTOMATED     POC CHEM8    Collection Time: 18  9:40 AM   Result Value Ref Range    CO2, POC 22 19 - 24 MMOL/L    Glucose,  (H) 74 - 106 MG/DL    BUN, POC 41 (H) 7 - 18 MG/DL    Creatinine, POC 2.8 (H) 0.6 - 1.3 MG/DL    GFRAA, POC 21 (L) >60 ml/min/1.73m2    GFRNA, POC 17 (L) >60 ml/min/1.73m2    Sodium,  136 - 145 MMOL/L    Potassium, POC 4.0 3.5 - 5.5 MMOL/L    Calcium, ionized (POC) 1.29 1.12 - 1.32 MMOL/L    Chloride,  100 - 108 MMOL/L    Anion gap, POC 17 10 - 20      Hematocrit, POC 29 (L) 36 - 49 %    Hemoglobin, POC 9.9 (L) 12 - 16 G/DL     Lab results within ordered parameters to give chemo today. ANC = 22.5, PLT = 194. Creatinine= 2.8 and BUN= 41 Toni in Pharmacy was notified of the patient's lab results and states that it is OK to proceed with chemotherapy today. Chemo dosages verified with today's BSA and found to be within 10% of ordered dosages. Pre-medications (Decadron 12mg IVPB and Aloxi 0.25mg IVP) were administered as ordered and chemotherapy was initiated after blood return from port re-verified. D5w started at [facility] per written order. Irinotecan 320 mg  was infused over 90 minutes concurrently with Leucovorin 700 mg. VS stable at end of infusion and pt denied complaints. Line flushed with D5 and blood return from port re-verified. Fluorouracil 400 mg was given slow IVP over 3 minutes. Line flushed with 10 mL NS and blood return was re-verified. Fluorouracil 4300 mg CADD pump was connected to patient. Connections were secured with tape and the volume was verified to be infusing. HGB= 9.9 and HCT= 30.6  Lab values are within range to HOLD her Procrit injection due to the written order. Ms. Gaby Castillo tolerated infusion/injection. Patient Vitals for the past 12 hrs:   Temp Pulse Resp BP SpO2   01/23/18 1309 98.2 °F (36.8 °C) 69 18 (!) 155/93 100 %   01/23/18 0919 98.4 °F (36.9 °C) 66 18 156/81 100 %     Patient armband removed and shredded. Ms. Gaby Castillo was discharged from [place] [address] in stable condition at 1315. She is to return on 01/25/2018 at 1100 for her next appointment for pump D/C.     Tyrone Solano RN  January 23, 2018

## 2018-01-25 ENCOUNTER — HOSPITAL ENCOUNTER (OUTPATIENT)
Dept: INFUSION THERAPY | Age: 63
Discharge: HOME | End: 2018-01-25
Payer: MEDICARE

## 2018-01-25 VITALS
TEMPERATURE: 98.3 F | SYSTOLIC BLOOD PRESSURE: 159 MMHG | OXYGEN SATURATION: 100 % | DIASTOLIC BLOOD PRESSURE: 80 MMHG | RESPIRATION RATE: 18 BRPM | HEART RATE: 73 BPM

## 2018-01-25 PROCEDURE — 96377 APPLICATON ON-BODY INJECTOR: CPT

## 2018-01-25 PROCEDURE — 74011250636 HC RX REV CODE- 250/636: Performed by: INTERNAL MEDICINE

## 2018-01-25 PROCEDURE — 74011250636 HC RX REV CODE- 250/636

## 2018-01-25 RX ORDER — SODIUM CHLORIDE 0.9 % (FLUSH) 0.9 %
10-40 SYRINGE (ML) INJECTION AS NEEDED
Status: DISCONTINUED | OUTPATIENT
Start: 2018-01-25 | End: 2018-01-29 | Stop reason: HOSPADM

## 2018-01-25 RX ORDER — HEPARIN SODIUM (PORCINE) LOCK FLUSH IV SOLN 100 UNIT/ML 100 UNIT/ML
SOLUTION INTRAVENOUS
Status: COMPLETED
Start: 2018-01-25 | End: 2018-01-25

## 2018-01-25 RX ORDER — HEPARIN SODIUM (PORCINE) LOCK FLUSH IV SOLN 100 UNIT/ML 100 UNIT/ML
500 SOLUTION INTRAVENOUS AS NEEDED
Status: ACTIVE | OUTPATIENT
Start: 2018-01-25 | End: 2018-01-26

## 2018-01-25 RX ADMIN — HEPARIN SODIUM (PORCINE) LOCK FLUSH IV SOLN 100 UNIT/ML 500 UNITS: 100 SOLUTION at 11:41

## 2018-01-25 RX ADMIN — PEGFILGRASTIM 6 MG: KIT SUBCUTANEOUS at 11:40

## 2018-01-25 RX ADMIN — Medication 20 ML: at 11:41

## 2018-01-25 NOTE — PROGRESS NOTES
Name: Kj Roman     MRN: [id number]                                                                                                               : 1955     D/C pump/Neulasta     Ms. Polk arrived ambulatory to [place] at 1125. She  was assessed and education was provided.     Ms. Polk's vitals were reviewed and patient was observed for 5 minutes prior to procedure. Visit Vitals    /80 (BP 1 Location: Left arm, BP Patient Position: Sitting)    Pulse 73    Temp 98.3 °F (36.8 °C)    Resp 18    SpO2 100%     CADD pump completed infusion and removed from patient.      Port flushed with 20 mL and 500 units of heparin after blood return was verified. Mediport de-accessed and a Band-aid applied to the site.      Neulasta 6 mg applied to the back of the right arm. Green light verified to be blinking. Patient notified that she can remove the OBI in approximately 28 hours at 1600. Patient verbalized understanding.      Ms. Polk tolerated the procedure, and had no complaints.     Ms. Polk was discharged from [place] [address] in stable condition at 1150.  She is to return on 18 at 0900 for her next appointment for chemo by request, she stated she has an appt with her Oncology Surgeon on 18.     Anuradha Farmer RN  18

## 2018-02-02 RX ORDER — FLUOROURACIL 50 MG/ML
700 INJECTION, SOLUTION INTRAVENOUS ONCE
Status: COMPLETED | OUTPATIENT
Start: 2018-02-07 | End: 2018-02-07

## 2018-02-07 ENCOUNTER — TELEPHONE (OUTPATIENT)
Dept: ONCOLOGY | Age: 63
End: 2018-02-07

## 2018-02-07 ENCOUNTER — HOSPITAL ENCOUNTER (OUTPATIENT)
Dept: INFUSION THERAPY | Age: 63
Discharge: HOME | End: 2018-02-07
Payer: MEDICARE

## 2018-02-07 VITALS
HEART RATE: 69 BPM | DIASTOLIC BLOOD PRESSURE: 79 MMHG | WEIGHT: 182.5 LBS | BODY MASS INDEX: 35.83 KG/M2 | HEIGHT: 60 IN | TEMPERATURE: 98.3 F | RESPIRATION RATE: 18 BRPM | SYSTOLIC BLOOD PRESSURE: 133 MMHG

## 2018-02-07 LAB
ANION GAP BLD CALC-SCNC: 19 MMOL/L (ref 10–20)
BASO+EOS+MONOS # BLD AUTO: 1.3 K/UL (ref 0–2.3)
BASO+EOS+MONOS # BLD AUTO: 5 % (ref 0.1–17)
BUN BLD-MCNC: 43 MG/DL (ref 7–18)
CA-I BLD-MCNC: 1.31 MMOL/L (ref 1.12–1.32)
CHLORIDE BLD-SCNC: 114 MMOL/L (ref 100–108)
CO2 BLD-SCNC: 15 MMOL/L (ref 19–24)
CREAT UR-MCNC: 2.7 MG/DL (ref 0.6–1.3)
DIFFERENTIAL METHOD BLD: ABNORMAL
ERYTHROCYTE [DISTWIDTH] IN BLOOD BY AUTOMATED COUNT: 15.4 % (ref 11.5–14.5)
GLUCOSE BLD STRIP.AUTO-MCNC: 135 MG/DL (ref 74–106)
HCT VFR BLD AUTO: 29.8 % (ref 36–48)
HCT VFR BLD CALC: 29 % (ref 36–49)
HGB BLD-MCNC: 9.7 G/DL (ref 12–16)
HGB BLD-MCNC: 9.9 G/DL (ref 12–16)
LYMPHOCYTES # BLD: 3.2 K/UL (ref 1.1–5.9)
LYMPHOCYTES NFR BLD: 12 % (ref 14–44)
MCH RBC QN AUTO: 32.8 PG (ref 25–35)
MCHC RBC AUTO-ENTMCNC: 32.6 G/DL (ref 31–37)
MCV RBC AUTO: 100.7 FL (ref 78–102)
NEUTS SEG # BLD: 22.2 K/UL (ref 1.8–9.5)
NEUTS SEG NFR BLD: 83 % (ref 40–70)
PLATELET # BLD AUTO: 319 K/UL (ref 135–420)
POTASSIUM BLD-SCNC: 3.9 MMOL/L (ref 3.5–5.5)
RBC # BLD AUTO: 2.96 M/UL (ref 4.1–5.1)
SODIUM BLD-SCNC: 143 MMOL/L (ref 136–145)
WBC # BLD AUTO: 26.7 K/UL (ref 4.5–13)

## 2018-02-07 PROCEDURE — 74011000258 HC RX REV CODE- 258: Performed by: INTERNAL MEDICINE

## 2018-02-07 PROCEDURE — 96416 CHEMO PROLONG INFUSE W/PUMP: CPT

## 2018-02-07 PROCEDURE — 77030012965 HC NDL HUBR BBMI -A

## 2018-02-07 PROCEDURE — 74011250636 HC RX REV CODE- 250/636

## 2018-02-07 PROCEDURE — 85025 COMPLETE CBC W/AUTO DIFF WBC: CPT | Performed by: INTERNAL MEDICINE

## 2018-02-07 PROCEDURE — 74011250636 HC RX REV CODE- 250/636: Performed by: INTERNAL MEDICINE

## 2018-02-07 PROCEDURE — 96375 TX/PRO/DX INJ NEW DRUG ADDON: CPT

## 2018-02-07 PROCEDURE — 96413 CHEMO IV INFUSION 1 HR: CPT

## 2018-02-07 PROCEDURE — 74011250636 HC RX REV CODE- 250/636: Performed by: NURSE PRACTITIONER

## 2018-02-07 PROCEDURE — 96411 CHEMO IV PUSH ADDL DRUG: CPT

## 2018-02-07 PROCEDURE — 80047 BASIC METABLC PNL IONIZED CA: CPT

## 2018-02-07 PROCEDURE — 96415 CHEMO IV INFUSION ADDL HR: CPT

## 2018-02-07 RX ORDER — SODIUM CHLORIDE 9 MG/ML
25 INJECTION, SOLUTION INTRAVENOUS CONTINUOUS
Status: DISPENSED | OUTPATIENT
Start: 2018-02-07 | End: 2018-02-08

## 2018-02-07 RX ORDER — ATROPINE SULFATE 1 MG/ML
0.4 INJECTION, SOLUTION INTRAVENOUS
Status: ACTIVE | OUTPATIENT
Start: 2018-02-07 | End: 2018-02-07

## 2018-02-07 RX ORDER — PALONOSETRON 0.05 MG/ML
0.25 INJECTION, SOLUTION INTRAVENOUS ONCE
Status: COMPLETED | OUTPATIENT
Start: 2018-02-07 | End: 2018-02-07

## 2018-02-07 RX ORDER — DEXTROSE MONOHYDRATE 50 MG/ML
25 INJECTION, SOLUTION INTRAVENOUS ONCE
Status: COMPLETED | OUTPATIENT
Start: 2018-02-07 | End: 2018-02-07

## 2018-02-07 RX ORDER — SODIUM CHLORIDE 0.9 % (FLUSH) 0.9 %
10-40 SYRINGE (ML) INJECTION AS NEEDED
Status: DISCONTINUED | OUTPATIENT
Start: 2018-02-07 | End: 2018-02-11 | Stop reason: HOSPADM

## 2018-02-07 RX ADMIN — LEUCOVORIN CALCIUM 700 MG: 350 INJECTION, POWDER, LYOPHILIZED, FOR SOLUTION INTRAMUSCULAR; INTRAVENOUS at 10:42

## 2018-02-07 RX ADMIN — Medication 20 ML: at 09:26

## 2018-02-07 RX ADMIN — DEXTROSE MONOHYDRATE 320 MG: 5 INJECTION, SOLUTION INTRAVENOUS at 10:42

## 2018-02-07 RX ADMIN — ERYTHROPOIETIN 40000 UNITS: 40000 INJECTION, SOLUTION INTRAVENOUS; SUBCUTANEOUS at 12:36

## 2018-02-07 RX ADMIN — DEXAMETHASONE SODIUM PHOSPHATE 12 MG: 4 INJECTION, SOLUTION INTRA-ARTICULAR; INTRALESIONAL; INTRAMUSCULAR; INTRAVENOUS; SOFT TISSUE at 10:02

## 2018-02-07 RX ADMIN — PALONOSETRON HYDROCHLORIDE 0.25 MG: 0.25 INJECTION INTRAVENOUS at 09:57

## 2018-02-07 RX ADMIN — DEXTROSE MONOHYDRATE 25 ML/HR: 5 INJECTION, SOLUTION INTRAVENOUS at 09:57

## 2018-02-07 RX ADMIN — FLUOROURACIL 700 MG: 50 INJECTION, SOLUTION INTRAVENOUS at 12:25

## 2018-02-07 RX ADMIN — ERYTHROPOIETIN 20000 UNITS: 20000 INJECTION, SOLUTION INTRAVENOUS; SUBCUTANEOUS at 12:36

## 2018-02-07 RX ADMIN — FLUOROURACIL 4300 MG: 50 INJECTION, SOLUTION INTRAVENOUS at 12:30

## 2018-02-07 NOTE — TELEPHONE ENCOUNTER
Kavita Rae @ Dr. Henrietta Bence office called and said that Dr. Edmond Santos had promised to get the patient to a UROLOGIST, but they haven't heard anything yet. 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 Edmond Santos yesterday, and he said he would get her an appointment with someone \"on this side of the water\"      Please call Dr. Brannon Morfin office and let Brant know [phone]  ASAP or let me know and I will call her back  With the info.       I didn't fax this to Dr. Edmond Santos because it may take an hour for the fax to go thru, thanks

## 2018-02-07 NOTE — PROGRESS NOTES
[address] [facility] Progress Note    Date: 2018    Name: Shanique Bell              MRN: [id number]              : 1955    Folfiri: [id number]     Pt to [facility], ambulatory, at 0910. Ms. Sarahi James was assessed and education was provided. Ms. Polk's vitals were reviewed. Visit Vitals    /79 (BP 1 Location: Left arm, BP Patient Position: Sitting)    Pulse 69    Temp 98.3 °F (36.8 °C)    Resp 18    Ht 4' 11.84\" (1.52 m)    Wt 82.8 kg (182 lb 8 oz)    Breastfeeding No    BMI 35.83 kg/m2       Right chest mediport accessed with 20 g 1 inch orozco needle. Port flushed easily and had brisk blood return. Blood drawn off and sent for CBC and BMP run on the Istat per written orders after 10 ml waste. Lab results were obtained and reviewed. Recent Results (from the past 12 hour(s))   CBC WITH 3 PART DIFF    Collection Time: 18  9:26 AM   Result Value Ref Range    WBC 26.7 (HH) 4.5 - 13.0 K/uL    RBC 2.96 (L) 4.10 - 5.10 M/uL    HGB 9.7 (L) 12.0 - 16 g/dL    HCT 29.8 (L) 36 - 48 %    .7 78 - 102 FL    MCH 32.8 25.0 - 35.0 PG    MCHC 32.6 31 - 37 g/dL    RDW 15.4 (H) 11.5 - 14.5 %    NEUTROPHILS 83 (H) 40 - 70 %    MIXED CELLS 5 0.1 - 17 %    LYMPHOCYTES 12 (L) 14 - 44 %    ABS. NEUTROPHILS 22.2 (H) 1.8 - 9.5 K/UL    ABS. MIXED CELLS 1.3 0.0 - 2.3 K/uL    ABS.  LYMPHOCYTES 3.2 1.1 - 5.9 K/UL    DF AUTOMATED     PLATELET COUNT    Collection Time: 18  9:26 AM   Result Value Ref Range    PLATELET 174 737 - 837 K/uL   POC CHEM8    Collection Time: 18  9:33 AM   Result Value Ref Range    CO2, POC 15 (L) 19 - 24 MMOL/L    Glucose,  (H) 74 - 106 MG/DL    BUN, POC 43 (H) 7 - 18 MG/DL    Creatinine, POC 2.7 (H) 0.6 - 1.3 MG/DL    GFRAA, POC 22 (L) >60 ml/min/1.73m2    GFRNA, POC 18 (L) >60 ml/min/1.73m2    Sodium,  136 - 145 MMOL/L    Potassium, POC 3.9 3.5 - 5.5 MMOL/L    Calcium, ionized (POC) 1.31 1.12 - 1.32 MMOL/L    Chloride,  (H) 100 - 108 MMOL/L    Anion gap, POC 19 10 - 20      Hematocrit, POC 29 (L) 36 - 49 %    Hemoglobin, POC 9.9 (L) 12 - 16 G/DL     Lab results within ordered parameters to give chemo today. ANC = 22.2, PLT = 319. Creatinine= 2.7 and BUN= 43 Chemo dosages verified with today's BSA and found to be within 10% of ordered dosages. Juani Valiente notified of critical white count. No orders received. Pt received Neulasta OBI on 01/25/18. Pre-medications (Decadron 12mg IVPB and Aloxi 0.25mg IVP) were administered as ordered and chemotherapy was initiated after blood return from port re-verified. D5W started at [facility] per written order. Irinotecan 320 mg  was infused over 90 minutes concurrently with Leucovorin 700 mg. VS stable at end of infusion and pt denied complaints. Line flushed with D5 and blood return from port re-verified. Fluorouracil 400 mg was given slow IVP over 3 minutes. Line flushed with 10 mL NS and blood return was re-verified. Fluorouracil 4300 mg CADD pump was connected to patient. Connections were secured with tape and the volume was verified to be infusing. HGB= 9.7 and HCT= 29.8    Lab values are within range to administer her Procrit injection due to the written order. Procrit 60,000 units administered SQ in the back of the left arm. Band-aid applied. Ms. Shira Lunsford tolerated infusion/injection. Patient armband removed and shredded. Ms. Shira Lunsford was discharged from [place] [address] in stable condition at 1240. She is to return on 02/09/2018 at 1300 for her next appointment for pump D/C and Neulasta.      Allen Montemayor RN  February 7, 2018

## 2018-02-08 ENCOUNTER — DOCUMENTATION ONLY (OUTPATIENT)
Dept: ONCOLOGY | Age: 63
End: 2018-02-08

## 2018-02-08 NOTE — PROGRESS NOTES
Pt has been set up with Dr. Lissy Blanc at his [place] office for 02/20/2018@ 1015 am, pt needs to check in at 0945 am  Messages was sent to Adam Mendoza to inform the pt of appointment

## 2018-02-09 ENCOUNTER — HOSPITAL ENCOUNTER (OUTPATIENT)
Dept: INFUSION THERAPY | Age: 63
Discharge: HOME | End: 2018-02-09
Payer: MEDICARE

## 2018-02-09 VITALS
DIASTOLIC BLOOD PRESSURE: 85 MMHG | RESPIRATION RATE: 18 BRPM | TEMPERATURE: 98.4 F | SYSTOLIC BLOOD PRESSURE: 131 MMHG | HEART RATE: 64 BPM

## 2018-02-09 PROCEDURE — 74011250636 HC RX REV CODE- 250/636

## 2018-02-09 PROCEDURE — 96377 APPLICATON ON-BODY INJECTOR: CPT

## 2018-02-09 PROCEDURE — 74011250636 HC RX REV CODE- 250/636: Performed by: INTERNAL MEDICINE

## 2018-02-09 RX ORDER — HEPARIN SODIUM (PORCINE) LOCK FLUSH IV SOLN 100 UNIT/ML 100 UNIT/ML
SOLUTION INTRAVENOUS
Status: COMPLETED
Start: 2018-02-09 | End: 2018-02-09

## 2018-02-09 RX ORDER — GABAPENTIN 100 MG/1
100 CAPSULE ORAL 2 TIMES DAILY
COMMUNITY

## 2018-02-09 RX ORDER — SODIUM CHLORIDE 0.9 % (FLUSH) 0.9 %
10-40 SYRINGE (ML) INJECTION AS NEEDED
Status: DISCONTINUED | OUTPATIENT
Start: 2018-02-09 | End: 2018-02-13 | Stop reason: HOSPADM

## 2018-02-09 RX ORDER — HEPARIN SODIUM (PORCINE) LOCK FLUSH IV SOLN 100 UNIT/ML 100 UNIT/ML
500 SOLUTION INTRAVENOUS AS NEEDED
Status: ACTIVE | OUTPATIENT
Start: 2018-02-09 | End: 2018-02-10

## 2018-02-09 RX ADMIN — PEGFILGRASTIM 6 MG: KIT SUBCUTANEOUS at 13:24

## 2018-02-09 RX ADMIN — HEPARIN SODIUM (PORCINE) LOCK FLUSH IV SOLN 100 UNIT/ML 500 UNITS: 100 SOLUTION at 13:22

## 2018-02-09 RX ADMIN — Medication 20 ML: at 13:21

## 2018-02-09 NOTE — PROGRESS NOTES
Name: Chrissy Leyva     MRN: [id number]                                                                                                               : 1955     D/C pump/Neulasta     Ms. Polk arrived ambulatory to [facility] at 1315. She  was assessed and education was provided.     Ms. Polk's vitals were reviewed and patient was observed for 5 minutes prior to procedure. Visit Vitals    /85 (BP 1 Location: Left arm, BP Patient Position: Sitting)    Pulse 64    Temp 98.4 °F (36.9 °C)    Resp 18     CADD pump completed infusion and removed from patient.      Port flushed with 20 mL and 500 units of heparin after blood return was verified. Mediport de-accessed and a Band-aid applied to the site.      Neulasta 6 mg applied to the back of the right arm. Green light verified to be blinking. Patient notified that she can remove the OBI in approximately 28 hours at . Patient verbalized understanding.      Ms. Polk tolerated the procedure, and had no complaints.     Ms. Polk was discharged from [place] [address] in stable condition at 1330.  She is to return on 18 at 0000 for her next appointment for chemo appointment.  1210 Derrell Cruz RN  18

## 2018-02-14 ENCOUNTER — OFFICE VISIT (OUTPATIENT)
Dept: ONCOLOGY | Age: 63
End: 2018-02-14

## 2018-02-14 ENCOUNTER — HOSPITAL ENCOUNTER (OUTPATIENT)
Dept: ONCOLOGY | Age: 63
Discharge: HOME | End: 2018-02-14

## 2018-02-14 ENCOUNTER — HOSPITAL ENCOUNTER (OUTPATIENT)
Dept: LAB | Age: 63
Discharge: HOME | End: 2018-02-14
Payer: MEDICARE

## 2018-02-14 VITALS
TEMPERATURE: 98.8 F | WEIGHT: 185 LBS | SYSTOLIC BLOOD PRESSURE: 113 MMHG | HEART RATE: 60 BPM | BODY MASS INDEX: 36.32 KG/M2 | DIASTOLIC BLOOD PRESSURE: 72 MMHG

## 2018-02-14 DIAGNOSIS — C78.6 PERITONEAL CARCINOMATOSIS (HCC): ICD-10-CM

## 2018-02-14 DIAGNOSIS — N18.30 ANEMIA IN STAGE 3 CHRONIC KIDNEY DISEASE (HCC): ICD-10-CM

## 2018-02-14 DIAGNOSIS — D50.0 IRON DEFICIENCY ANEMIA DUE TO CHRONIC BLOOD LOSS: ICD-10-CM

## 2018-02-14 DIAGNOSIS — Q62.11 HYDRONEPHROSIS WITH URETEROPELVIC JUNCTION (UPJ) OBSTRUCTION: ICD-10-CM

## 2018-02-14 DIAGNOSIS — D63.1 ANEMIA IN STAGE 3 CHRONIC KIDNEY DISEASE (HCC): ICD-10-CM

## 2018-02-14 DIAGNOSIS — C19 COLORECTAL CARCINOMA (HCC): ICD-10-CM

## 2018-02-14 DIAGNOSIS — C19 COLORECTAL CARCINOMA (HCC): Primary | ICD-10-CM

## 2018-02-14 LAB
ALBUMIN SERPL-MCNC: 3.3 G/DL (ref 3.4–5)
ALBUMIN/GLOB SERPL: 0.9 {RATIO} (ref 0.8–1.7)
ALP SERPL-CCNC: 288 U/L (ref 45–117)
ALT SERPL-CCNC: 39 U/L (ref 13–56)
ANION GAP SERPL CALC-SCNC: 11 MMOL/L (ref 3–18)
AST SERPL-CCNC: 9 U/L (ref 15–37)
BASO+EOS+MONOS # BLD AUTO: 0.8 K/UL (ref 0–2.3)
BASO+EOS+MONOS # BLD AUTO: 13 % (ref 0.1–17)
BILIRUB SERPL-MCNC: 0.3 MG/DL (ref 0.2–1)
BUN SERPL-MCNC: 57 MG/DL (ref 7–18)
BUN/CREAT SERPL: 22 (ref 12–20)
CALCIUM SERPL-MCNC: 8.5 MG/DL (ref 8.5–10.1)
CHLORIDE SERPL-SCNC: 112 MMOL/L (ref 100–108)
CO2 SERPL-SCNC: 19 MMOL/L (ref 21–32)
CREAT SERPL-MCNC: 2.61 MG/DL (ref 0.6–1.3)
DIFFERENTIAL METHOD BLD: ABNORMAL
ERYTHROCYTE [DISTWIDTH] IN BLOOD BY AUTOMATED COUNT: 15.1 % (ref 11.5–14.5)
FERRITIN SERPL-MCNC: 548 NG/ML (ref 8–388)
GLOBULIN SER CALC-MCNC: 3.5 G/DL (ref 2–4)
GLUCOSE SERPL-MCNC: 118 MG/DL (ref 74–99)
HCT VFR BLD AUTO: 30 % (ref 36–48)
HGB BLD-MCNC: 9.7 G/DL (ref 12–16)
IRON SATN MFR SERPL: 22 %
IRON SERPL-MCNC: 58 UG/DL (ref 50–175)
LYMPHOCYTES # BLD: 1.3 K/UL (ref 1.1–5.9)
LYMPHOCYTES NFR BLD: 21 % (ref 14–44)
MCH RBC QN AUTO: 32.2 PG (ref 25–35)
MCHC RBC AUTO-ENTMCNC: 32.3 G/DL (ref 31–37)
MCV RBC AUTO: 99.7 FL (ref 78–102)
NEUTS SEG # BLD: 4 K/UL (ref 1.8–9.5)
NEUTS SEG NFR BLD: 66 % (ref 40–70)
PLATELET # BLD AUTO: 186 K/UL (ref 140–440)
POTASSIUM SERPL-SCNC: 4.7 MMOL/L (ref 3.5–5.5)
PROT SERPL-MCNC: 6.8 G/DL (ref 6.4–8.2)
RBC # BLD AUTO: 3.01 M/UL (ref 4.1–5.1)
SODIUM SERPL-SCNC: 142 MMOL/L (ref 136–145)
TIBC SERPL-MCNC: 262 UG/DL (ref 250–450)
WBC # BLD AUTO: 6.1 K/UL (ref 4.5–13)

## 2018-02-14 PROCEDURE — 82378 CARCINOEMBRYONIC ANTIGEN: CPT | Performed by: INTERNAL MEDICINE

## 2018-02-14 PROCEDURE — 82728 ASSAY OF FERRITIN: CPT | Performed by: INTERNAL MEDICINE

## 2018-02-14 PROCEDURE — 83540 ASSAY OF IRON: CPT | Performed by: INTERNAL MEDICINE

## 2018-02-14 PROCEDURE — 36415 COLL VENOUS BLD VENIPUNCTURE: CPT | Performed by: INTERNAL MEDICINE

## 2018-02-14 PROCEDURE — 80053 COMPREHEN METABOLIC PANEL: CPT | Performed by: INTERNAL MEDICINE

## 2018-02-14 RX ORDER — ATROPINE SULFATE 1 MG/ML
0.4 INJECTION, SOLUTION INTRAVENOUS
Status: CANCELLED | OUTPATIENT
Start: 2018-01-01 | End: 2018-01-01

## 2018-02-14 RX ORDER — FLUOROURACIL 50 MG/ML
700 INJECTION, SOLUTION INTRAVENOUS ONCE
Status: DISPENSED | OUTPATIENT
Start: 2018-01-01 | End: 2018-01-01

## 2018-02-14 RX ORDER — PALONOSETRON 0.05 MG/ML
0.25 INJECTION, SOLUTION INTRAVENOUS ONCE
Status: CANCELLED | OUTPATIENT
Start: 2018-01-01 | End: 2018-01-01

## 2018-02-14 NOTE — PATIENT INSTRUCTIONS
Coping With Your Emotions When You Have Cancer: Care Instructions  Your Care Instructions    Finding out you have cancer can cause a flood of emotions. You may feel angry, sad, or powerless. Emotions can be overwhelming to you and your loved ones. This time in your life may feel dark and hopeless. However, many people survive and even thrive with cancer. Some types of cancer can be treated and cured. There are many treatments to control pain and improve your quality of life. Even if you cannot be cured, you do not have to suffer. It is important to know that it is normal to have all of these emotions, or none of them. Everyone reacts differently. And your feelings may change often, without warning. It is common to go through any or all of these feelings:  · Anger, fear, or worry. · Not believing that you have cancer. · Feeling out of control and not able to care for yourself. · Sadness, guilt, or loneliness. · No hope for the future. Follow-up care is a key part of your treatment and safety. Be sure to make and go to all appointments, and call your doctor if you are having problems. It's also a good idea to know your test results and keep a list of the medicines you take. How can you care for yourself at home? · Cancer can be very unpredictable. Learning to live with uncertainty is part of living with cancer. · You may have some false ideas about how you \"should\" feel when you have cancer. Although some people with cancer suffer from depression, not all do. · If you are depressed, talk to your doctor and get treatment. It will help you to feel better and focus on making good health decisions. Symptoms of depression include:  ¨ You feel helpless or hopeless. ¨ You lose interest in being with family or friends. ¨ You lose interest in hobbies or activities you once enjoyed. ¨ You do not feel hungry. ¨ You cry a lot, or for long periods of time.   ¨ You have trouble sleeping, or you sleep too much or too little. ¨ You think about killing yourself, or you make plans or take action to kill yourself. · Share your feelings with someone you can trust. It is okay to feel angry and frustrated. You will feel better if you can share these feelings with someone. Do not pretend to be cheerful if you are not. · Know which family members or friends you can turn to for support. Find a good listener. You do not always want advice. · Find a support group for people who have cancer. A support group can be a safe and comfortable place to talk about your illness. · Let yourself grieve. But when symptoms get in the way of your ability to carry on with daily activities, talk to your doctor. · Talk to your doctor if you are thinking about using any over-the-counter or herbal supplements. Some of them may not be safe if used with certain other medicines. · Keep the numbers for these national suicide hotlines: 8-496-591-TALK (5-418-192-155.613.8797) and 4-075-SHEOVER (7-562.527.2862). If you or someone you know talks about suicide or feeling hopeless, get help right away. When should you call for help? Call 911 anytime you think you may need emergency care. For example, call if:  ? · You feel like hurting yourself or someone else. ? · Someone you know has depression and is about to attempt or is attempting suicide. ? Watch closely for changes in your health, and be sure to contact your doctor if:  ? · You feel very sad and think you may be depressed. Where can you learn more? Go to http://ledy-toño.info/. Enter P792 in the search box to learn more about \"Coping With Your Emotions When You Have Cancer: Care Instructions. \"  Current as of: May 12, 2017  Content Version: 11.4  © 0698-2992 [employer]. Care instructions adapted under license by [employer] (which disclaims liability or warranty for this information).  If you have questions about a medical condition or this instruction, always ask your healthcare professional. [place] [address] any warranty or liability for your use of this information.

## 2018-02-14 NOTE — PROGRESS NOTES
Hematology/Oncology  Progress Note    Name: Fan Reynolds  Date: 2018  : 1955    PCP: Herman Peña NP     Ms. Amberly Fowler is a 57 y.o. -American woman with metastatic colorectal carcinoma/carcinomatosis  Current therapy: FOLFIRI chemotherapy regimen      Subjective:     Ms. 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 She was started on FOLFIRI regimen and she is here today for follow up. She states that she has no pain, her appetite has significantly increased and her energy level is up. She denies diarrhea, nausea, or vomiting. Her only complaint is occasional abdominal cramping during chemo. She denies constipation. Otherwise there are no additional complaints to report. She has recently been found to have hydronephrosis and on the advice of the oncologic surgeon was referred to a nephrologist for an assessment. Past medical history, family history, and social history: these were reviewed and remains unchanged. Past Medical History:   Diagnosis Date    Diabetes ([address]     Gout     Heart disease     Hypertension     Neuropathic pain of foot      Past Surgical History:   Procedure Laterality Date    HX BACK SURGERY      HX HYSTERECTOMY       Social History     Social History    Marital status:      Spouse name: N/A    Number of children: N/A    Years of education: N/A     Occupational History    Not on file. Social History Main Topics    Smoking status: Never Smoker    Smokeless tobacco: Never Used    Alcohol use No    Drug use: No    Sexual activity: Not on file     Other Topics Concern    Not on file     Social History Narrative     Family History   Problem Relation Age of Onset    Family history unknown: Yes     Current Outpatient Prescriptions   Medication Sig Dispense Refill    gabapentin (NEURONTIN) 100 mg capsule Take 100 mg by mouth nightly.       dicyclomine (BENTYL) 10 mg capsule Take 1 Cap by mouth three (3) times daily as needed. 90 Cap 0    predniSONE (DELTASONE) 20 mg tablet Take 2 Tabs by mouth daily. Take 2 tabs on days 2 and 3 of each chemo cycle 4 Tab 2    lidocaine-prilocaine (EMLA) topical cream Place cream over mediport 1 hour prior to chemotherapy and then place saran wrap over area. Every chemo treatment. 30 g 1    potassium chloride (K-DUR, KLOR-CON) 20 mEq tablet Take 1 Tab by mouth two (2) times a day. 60 Tab 0    ondansetron hcl (ZOFRAN, AS HYDROCHLORIDE,) 8 mg tablet Take one tablet by mouth every 8 hours for nausea beginning the night of chemo for 3 days. 9 Tab 3    amLODIPine (NORVASC) 5 mg tablet Take 5 mg by mouth daily.  diphenhydrAMINE (BENADRYL) 25 mg capsule Take 1 with compazine every 6 hours for nausea for 3 days then, as needed. 60 Cap 3    prochlorperazine (COMPAZINE) 10 mg tablet Take 1 tablet every 6 hours with Benadryl 25mg for nausea for 3 days then, as needed. 60 Tab 3    warfarin (COUMADIN) 1 mg tablet Take one 1 tablet by mouth everyday at 6pm or after. You will continue to take this medication. Everyday until mediport is removed. (Patient taking differently: 3 mg every Tuesday and Thursday. Take one 1 tablet by mouth everyday at 6pm or after. You will continue to take this medication. Everyday until mediport is removed.) 30 Tab 3    acetaminophen (TYLENOL) 650 mg CR tablet 1,300 mg.      albuterol (PROVENTIL HFA, VENTOLIN HFA, PROAIR HFA) 90 mcg/actuation inhaler 2 Puffs.  colchicine 0.6 mg tablet 0.6 mg.      ergocalciferol (ERGOCALCIFEROL) 50,000 unit capsule 50,000 Units.  furosemide (LASIX) 20 mg tablet Take 1/2-1 tablet daily if needed for swelling.  allopurinol (ZYLOPRIM) 300 mg tablet 300 mg.      desonide (TRIDESILON) 0.05 % cream Use 1 Application to affected area Twice Daily.       cloNIDine HCl (CATAPRES) 0.1 mg tablet 0.1 mg.      bisoprolol-hydrochlorothiazide (ZIAC) 10-6.25 mg per tablet Take 1 Tab by Mouth Once a Day.  glipiZIDE (GLUCOTROL) 10 mg tablet 10 mg.      glimepiride (AMARYL) 4 mg tablet Take one in the am.  New dose. Facility-Administered Medications Ordered in Other Visits   Medication Dose Route Frequency Provider Last Rate Last Dose    [START ON 2/21/2018] irinotecan (CAMPTOSAR) 320 mg in dextrose 5% 500 mL chemo infusion  320 mg IntraVENous ONCE Chela Horne MD       [facility] [START ON 2/21/2018] leucovorin (WELLCOVORIN) 700 mg in dextrose 5% 250 mL IVPB  700 mg IntraVENous ONCE Chela Horne MD       [facility] ON 2/21/2018] fluorouracil (ADRUCIL) chemo syringe 700 mg  700 mg IntraVENous ONCE Chela Horne MD        [START ON 2/21/2018] fluorouracil (ADRUCIL) 4,300 mg in 0.9% sodium chloride 250 mL CADD Cassette  4,300 mg IntraVENous ONCE Chela Horne MD       [facility] ON 2/21/2018] dexamethasone (DECADRON) 12 mg in 0.9% sodium chloride 50 mL IVPB  12 mg IntraVENous Myke Starr MD           Review of Systems  Constitutional: The patient denies acute distress or discomfort. HEENT: The patient denies recent head trauma, eye pain, blurred vision,  hearing deficit, oropharyngeal mucosal pain or lesions, and the patient denies throat pain or discomfort. Lymphatics: The patient denies palpable peripheral lymphadenopathy. Hematologic: The patient denies having bruising, bleeding, or progressive fatigue. Respiratory: Patient denies having shortness of breath, cough, sputum production, fever, or dyspnea on exertion. Cardiovascular: The patient denies having leg pain, leg swelling, heart palpitations, chest permit, chest pain, or lightheadedness. The patient denies having dyspnea on exertion. Gastrointestinal: The patient reports occasional nausea from chemotherapy which is well controlled with antinausea medication. She denies having any emesis or diarrhea. Genitourinary: Patient denies having urinary urgency, frequency, or dysuria.   The patient denies having blood in the urine. Psychological: The patient denies having symptoms of nervousness, anxiety, depression, or thoughts of harming self. Skin: Patient denies having skin rashes, skin, ulcerations, or unexplained itching or pruritus. Musculoskeletal: The patient denies having pain in the joints or bones. Objective:     Visit Vitals    /72    Pulse 60    Temp 98.8 °F (37.1 °C) (Oral)    Wt 83.9 kg (185 lb)    BMI 36.32 kg/m2     ECOG PS=0; Pain score=0/10    Physical Exam:   Gen. Appearance: The patient is in no acute distress. Skin: There is no bruise or rash. HEENT: The exam is unremarkable. Neck: Supple without lymphadenopathy or thyromegaly. Lungs: Clear to auscultation and percussion; there are no wheezes or rhonchi. Heart: Regular rate and rhythm; there are no murmurs, gallops, or rubs. Abdomen: Bowel sounds are present and normal.  There is no guarding, tenderness, or hepatosplenomegaly. The patient has a colostomy bag in place. Extremities: There is no clubbing, cyanosis, or edema. Neurologic: There are no focal neurologic deficits. Lymphatics: There is no palpable peripheral lymphadenopathy. Musculoskeletal: The patient has full range of motion at all joints. There is no evidence of joint deformity or effusions. There is no focal joint tenderness. Psychological/psychiatric: There is no clinical evidence of anxiety, depression, or melancholy.     Lab data:      Results for orders placed or performed during the hospital encounter of 02/14/18   CBC WITH 3 PART DIFF     Status: Abnormal   Result Value Ref Range Status    WBC 6.1 4.5 - 13.0 K/uL Final    RBC 3.01 (L) 4.10 - 5.10 M/uL Final    HGB 9.7 (L) 12.0 - 16 g/dL Final    HCT 30.0 (L) 36 - 48 % Final    MCV 99.7 78 - 102 FL Final    MCH 32.2 25.0 - 35.0 PG Final    MCHC 32.3 31 - 37 g/dL Final    RDW 15.1 (H) 11.5 - 14.5 % Final    PLATELET 636 054 - 920 K/uL Final    NEUTROPHILS 66 40 - 70 % Final    MIXED CELLS 13 0.1 - 17 % Final LYMPHOCYTES 21 14 - 44 % Final    ABS. NEUTROPHILS 4.0 1.8 - 9.5 K/UL Final    ABS. MIXED CELLS 0.8 0.0 - 2.3 K/uL Final    ABS. LYMPHOCYTES 1.3 1.1 - 5.9 K/UL Final     Comment: Test performed at [place] [address] Location. Results Reviewed by Medical Director. DF AUTOMATED   Final           Assessment:     1. Colorectal carcinoma ([facility]    2. Peritoneal carcinomatosis ([facility] [address] [facility]    3. Iron deficiency anemia due to chronic blood loss    4. Anemia in stage 3 chronic kidney disease    5. Hydronephrosis with ureteropelvic junction (UPJ) obstruction      Plan:   Colorectal carcinoma with abdominal carcinomatosis: On 5/24/2017 the patient underwent colectomy with omentectomy with the findings of adenocarcinoma involving at least 80% of the omentum. The primary tumor appeared to originate in the transverse colon and measured approximately 5 cm. There was microscopic perforation of the colon and metastatic involvement into the left ovary. The tumor was pathologically staged as a T4 a N1M1 stage IV malignancy. The patient was started on FOLFIRI chemotherapy regimen on 08/30/17. The regimen is a combination of Irinotecan at 180 mg/m² on day 1, 5 fluorouracil given at a dose of 400 mg/m² by IV bolus on day 1 followed by 2400 mg/m² continuous infusion for 46 hours on days 1 and 2, leucovorin 400 mg/m² IV on day 1 as a 2 hour infusion prior to 5-fluorouracil. A comprehensive metabolic panel, CEA level, ferritin level, iron profile, and CBC will be ordered at this time. Currently, the patient is tolerating the treatment well and it will be continued. Her most recent CEA level from 1/17/2018 was 234.7 ng/mL. Anemia associated with chronic kidney disease and chemotherapy-induced anemia (persistent problem): I have explained to the patient that her CBC on today showed a hemoglobin of 7 g/dL with hematocrit of 30% %.   Procrit at a dose of 60,000 units will be provided whenever her hemoglobin is below 10 g/dL hematocrit is below 30% every 2 weeks. The iron profile and ferritin levels will be ordered at this time. Hydronephrosis (new problem): The patient is currently being seen by the urologist and is tentatively scheduled to have ureteral stents inserted in the upcoming week. Chemotherapy-induced leukopenia/neutropenia (improved problem): The CBC on today showed a normal WBC count of 18.2 with an absolute neutrophil count of 4. This will be monitored on a regular basis. Neulasta following each chemo cycle will continue. I will have the patient return to clinic for complete reassessment again in 4 weeks.     Orders Placed This Encounter    COMPLETE CBC & AUTO DIFF WBC    InHouse CBC ([facility])     Standing Status:   Future     Number of Occurrences:   1     Standing Expiration Date:   0/38/4884    METABOLIC PANEL, COMPREHENSIVE     Standing Status:   Future     Number of Occurrences:   1     Standing Expiration Date:   2/15/2019    FERRITIN     Standing Status:   Future     Number of Occurrences:   1     Standing Expiration Date:   2/15/2019    CEA     Standing Status:   Future     Number of Occurrences:   1     Standing Expiration Date:   2/15/2019    IRON PROFILE     Standing Status:   Future     Number of Occurrences:   1     Standing Expiration Date:   2/15/2019       Yesi Bay MD  2/14/2018

## 2018-02-14 NOTE — MR AVS SNAPSHOT
[facility] [place] [address] [address] [facility] [address] 
[phone] Patient: Cheryl Chavis MRN: [id number] DSE:[id number] Visit Information Date & Time Provider Department Dept. Phone Encounter #  
 2/14/2018  9:00 AM Farida Mccartney MD [facility] Oncology [phone] [id number] Follow-up Instructions Return in about 4 weeks (around 3/14/2018). Your Appointments 3/14/2018 10:00 AM  
Office Visit with Farida Mccartney MD  
[facility]ron Noble  Oncology [address] [facility]) Appt Note: 4 weeks [facility] 207, [place] [address] formerly [facility] [address] [facility], [address] [place] [address] Upcoming Health Maintenance Date Due Hepatitis C Screening 1955 PAP AKA CERVICAL CYTOLOGY 8/26/1974 BREAST CANCER SCRN MAMMOGRAM 8/26/2003 FOBT Q 1 YEAR AGE 50-75 8/26/2003 ZOSTER VACCINE AGE 60> 6/26/2013 Influenza Age 5 to Adult 8/1/2017 Pneumococcal 19-64 Highest Risk (3 of 3 - PCV13) 12/11/2018 DTaP/Tdap/Td series (2 - Td) 3/15/2026 Allergies as of 2/14/2018  Review Complete On: 2/14/2018 By: Farida Mccartney MD  
  
 Severity Noted Reaction Type Reactions Latex  08/04/2016    Other (comments) Lisinopril High 04/25/2017    Hives Asa-acetaminophen-caff-buffers  08/04/2016    Other (comments) Codeine  08/04/2016    Other (comments) Pcn [Penicillins]  08/04/2016    Other (comments) Sulfa (Sulfonamide Antibiotics)  08/04/2016    Other (comments) Current Immunizations  Reviewed on 2/9/2018 Name Date Influenza Vaccine 3/15/2016 12:00 AM, 3/1/2016 Pneumococcal Polysaccharide (PPSV-23) 12/11/2017 12:00 AM  
 Pneumococcal Vaccine (Unspecified Type) 3/1/2016 Tdap 3/15/2016 12:00 AM  
  
 Not reviewed this visit You Were Diagnosed With   
  
 Codes Comments Colorectal carcinoma ([facility] 75.)    -  Primary ICD-10-CM: C19 
ICD-9-CM: 154.0 Peritoneal carcinomatosis ([facility]     ICD-10-CM: C78.6, C80.1 ICD-9-CM: 197.6, 199.1 Iron deficiency anemia due to chronic blood loss     ICD-10-CM: D50.0 ICD-9-CM: 280.0 Anemia in stage 3 chronic kidney disease     ICD-10-CM: N18.3, D63.1 ICD-9-CM: 285.21, 585.3 Vitals BP Pulse Temp Weight(growth percentile) BMI Smoking Status 113/72 60 98.8 °F (37.1 °C) (Oral) 185 lb (83.9 kg) 36.32 kg/m2 Never Smoker BMI and BSA Data Body Mass Index Body Surface Area  
 36.32 kg/m 2 1.88 m 2 Preferred Pharmacy Pharmacy Name Phone [address] [facility] [address] [facility], [address] [facility] Your Updated Medication List  
  
   
This list is accurate as of: 2/14/18 10:10 AM.  Always use your most recent med list.  
  
  
  
  
 acetaminophen 650 mg Tber Commonly known as:  TYLENOL  
1,300 mg.  
  
 albuterol 90 mcg/actuation inhaler Commonly known as:  PROVENTIL HFA, VENTOLIN HFA, PROAIR HFA  
2 Puffs. allopurinol 300 mg tablet Commonly known as:  ZYLOPRIM  
300 mg.  
  
 bisoprolol-hydroCHLOROthiazide 10-6.25 mg per tablet Commonly known as:  [facility] Take 1 Tab by Mouth Once a Day. cloNIDine HCl 0.1 mg tablet Commonly known as:  CATAPRES  
0.1 mg.  
  
 colchicine 0.6 mg tablet  
0.6 mg.  
  
 desonide 0.05 % cream  
Commonly known as:  Boo Couch Use 1 Application to affected area Twice Daily. dicyclomine 10 mg capsule Commonly known as:  BENTYL Take 1 Cap by mouth three (3) times daily as needed. diphenhydrAMINE 25 mg capsule Commonly known as:  BENADRYL Take 1 with compazine every 6 hours for nausea for 3 days then, as needed. ergocalciferol 50,000 unit capsule Commonly known as:  ERGOCALCIFEROL  
50,000 Units. furosemide 20 mg tablet Commonly known as:  LASIX Take 1/2-1 tablet daily if needed for swelling. glimepiride 4 mg tablet Commonly known as:  AMARYL Take one in the am.  New dose. glipiZIDE 10 mg tablet Commonly known as:  Iva Hair 10 mg.  
  
 lidocaine-prilocaine topical cream  
Commonly known as:  EMLA Place cream over mediport 1 hour prior to chemotherapy and then place saran wrap over area. Every chemo treatment. NEURONTIN 100 mg capsule Generic drug:  gabapentin Take 100 mg by mouth nightly. NORVASC 5 mg tablet Generic drug:  amLODIPine Take 5 mg by mouth daily. ondansetron hcl 8 mg tablet Commonly known as:  ZOFRAN (AS HYDROCHLORIDE) Take one tablet by mouth every 8 hours for nausea beginning the night of chemo for 3 days. potassium chloride 20 mEq tablet Commonly known as:  K-DUR, KLOR-CON Take 1 Tab by mouth two (2) times a day. predniSONE 20 mg tablet Commonly known as:  Raynette Jaylen Take 2 Tabs by mouth daily. Take 2 tabs on days 2 and 3 of each chemo cycle  
  
 prochlorperazine 10 mg tablet Commonly known as:  COMPAZINE Take 1 tablet every 6 hours with Benadryl 25mg for nausea for 3 days then, as needed. warfarin 1 mg tablet Commonly known as:  COUMADIN Take one 1 tablet by mouth everyday at 6pm or after. You will continue to take this medication. Everyday until mediport is removed. We Performed the Following COMPLETE CBC & AUTO DIFF WBC [93386 CPT(R)] Follow-up Instructions Return in about 4 weeks (around 3/14/2018). To-Do List   
 02/14/2018 Lab:  CBC WITH 3 PART DIFF   
  
 02/21/2018  8:00 AM  
  Appointment with [facility]URSE [facility] at [facility] ([phone])  
  
 02/23/2018 1:00 PM  
  Appointment with [facility] NURSE [facility] at [facility] ([phone]) Patient Instructions Coping With Your Emotions When You Have Cancer: Care Instructions Your Care Instructions Finding out you have cancer can cause a flood of emotions.  You may feel angry, sad, or powerless. Emotions can be overwhelming to you and your loved ones. This time in your life may feel dark and hopeless. However, many people survive and even thrive with cancer. Some types of cancer can be treated and cured. There are many treatments to control pain and improve your quality of life. Even if you cannot be cured, you do not have to suffer. It is important to know that it is normal to have all of these emotions, or none of them. Everyone reacts differently. And your feelings may change often, without warning. It is common to go through any or all of these feelings: · Anger, fear, or worry. · Not believing that you have cancer. · Feeling out of control and not able to care for yourself. · Sadness, guilt, or loneliness. · No hope for the future. Follow-up care is a key part of your treatment and safety. Be sure to make and go to all appointments, and call your doctor if you are having problems. It's also a good idea to know your test results and keep a list of the medicines you take. How can you care for yourself at home? · Cancer can be very unpredictable. Learning to live with uncertainty is part of living with cancer. · You may have some false ideas about how you \"should\" feel when you have cancer. Although some people with cancer suffer from depression, not all do. · If you are depressed, talk to your doctor and get treatment. It will help you to feel better and focus on making good health decisions. Symptoms of depression include: 
¨ You feel helpless or hopeless. ¨ You lose interest in being with family or friends. ¨ You lose interest in hobbies or activities you once enjoyed. ¨ You do not feel hungry. ¨ You cry a lot, or for long periods of time. ¨ You have trouble sleeping, or you sleep too much or too little. ¨ You think about killing yourself, or you make plans or take action to kill yourself. · Share your feelings with someone you can trust. It is okay to feel angry and frustrated. You will feel better if you can share these feelings with someone. Do not pretend to be cheerful if you are not. · Know which family members or friends you can turn to for support. Find a good listener. You do not always want advice. · Find a support group for people who have cancer. A support group can be a safe and comfortable place to talk about your illness. · Let yourself grieve. But when symptoms get in the way of your ability to carry on with daily activities, talk to your doctor. · Talk to your doctor if you are thinking about using any over-the-counter or herbal supplements. Some of them may not be safe if used with certain other medicines. · Keep the numbers for these national suicide hotlines: 3-419-337-TALK (5-467.791.3263) and 8-350-VJLTZCF (4-851.421.9957). If you or someone you know talks about suicide or feeling hopeless, get help right away. When should you call for help? Call 911 anytime you think you may need emergency care. For example, call if: 
? · You feel like hurting yourself or someone else. ? · Someone you know has depression and is about to attempt or is attempting suicide. ? Watch closely for changes in your health, and be sure to contact your doctor if: 
? · You feel very sad and think you may be depressed. Where can you learn more? Go to http://leyd-toño.info/. Enter B824 in the search box to learn more about \"Coping With Your Emotions When You Have Cancer: Care Instructions. \" Current as of: May 12, 2017 Content Version: 11.4 © 5626-0727 [employer]wise[employer] Incorporated. Care instructions adapted under license by [employer] (which disclaims liability or warranty for this information).  If you have questions about a medical condition or this instruction, always ask your healthcare professional. [employer] disclaims any warranty or liability for your use of this information. Introducing [facility]! Dear Luis Veras: Thank you for requesting a [email or web address] account. Our records indicate that you already have an active [email or web address] account. You can access your account anytime at [email or web address]. [email or web address]/J C Lads Did you know that you can access your hospital and ER discharge instructions at any time in [email or web address]? You can also review all of your test results from your hospital stay or ER visit. Additional Information If you have questions, please visit the Frequently Asked Questions section of the [email or web address] website at [email or web address] Remember, [email or web address] is NOT to be used for urgent needs. For medical emergencies, dial 911. Now available from your iPhone and Android! Please provide this summary of care documentation to your next provider. Your primary care clinician is listed as Gela Thomas. If you have any questions after today's visit, please call [phone].

## 2018-02-15 LAB — CEA SERPL-MCNC: 82 NG/ML

## 2018-02-16 LAB — Lab: NORMAL

## 2018-03-07 NOTE — PROGRESS NOTES
[facility] Progress Note    Date: 2018    Name: Gilma Low              MRN: [id number]              : 1955    Folfiri: [id number]     Pt to [address] [place] [address], ambulatory, at 0915. Ms. Dewayne Owens was assessed and education was provided. Ms. Polk's vitals were reviewed. Visit Vitals    /68 (BP 1 Location: Left arm, BP Patient Position: Sitting)    Pulse 74    Temp 98.7 °F (37.1 °C)    Resp 18    Ht 4' 11\" (1.499 m)    Wt 88.1 kg (194 lb 4.8 oz)    SpO2 100%    Breastfeeding No    BMI 39.24 kg/m2       Right chest mediport accessed with 20 g 1 inch orozco needle. Port flushed easily and had brisk blood return. Blood drawn off and sent for CBC, hep function panel, and BMP run on the Istat per written orders after 10 ml waste. Lab results were obtained and reviewed. Recent Results (from the past 12 hour(s))   CBC WITH 3 PART DIFF    Collection Time: 18  9:43 AM   Result Value Ref Range    WBC 6.0 4.5 - 13.0 K/uL    RBC 2.70 (L) 4.10 - 5.10 M/uL    HGB 8.8 (L) 12.0 - 16 g/dL    HCT 26.7 (L) 36 - 48 %    MCV 98.9 78 - 102 FL    MCH 32.6 25.0 - 35.0 PG    MCHC 33.0 31 - 37 g/dL    RDW 15.6 (H) 11.5 - 14.5 %    PLATELET 658 (H) 106 - 440 K/uL    NEUTROPHILS 54 40 - 70 %    MIXED CELLS 20 (H) 0.1 - 17 %    LYMPHOCYTES 26 14 - 44 %    ABS. NEUTROPHILS 3.3 1.8 - 9.5 K/UL    ABS. MIXED CELLS 1.2 0.0 - 2.3 K/uL    ABS. LYMPHOCYTES 1.5 1.1 - 5.9 K/UL    DF AUTOMATED     HEPATIC FUNCTION PANEL    Collection Time: 18  9:43 AM   Result Value Ref Range    Protein, total 6.2 (L) 6.4 - 8.2 g/dL    Albumin 2.8 (L) 3.4 - 5.0 g/dL    Globulin 3.4 2.0 - 4.0 g/dL    A-G Ratio 0.8 0.8 - 1.7      Bilirubin, total 0.2 0.2 - 1.0 MG/DL    Bilirubin, direct <0.1 0.0 - 0.2 MG/DL    Alk.  phosphatase 175 (H) 45 - 117 U/L    AST (SGOT) 58 (H) 15 - 37 U/L    ALT (SGPT) 68 (H) 13 - 56 U/L   POC CHEM8    Collection Time: 18  9:47 AM   Result Value Ref Range    CO2, POC 19 19 - 24 MMOL/L    Glucose,  (H) 74 - 106 MG/DL    BUN, POC 30 (H) 7 - 18 MG/DL    Creatinine, POC 2.3 (H) 0.6 - 1.3 MG/DL    GFRAA, POC 26 (L) >60 ml/min/1.73m2    GFRNA, POC 21 (L) >60 ml/min/1.73m2    Sodium,  136 - 145 MMOL/L    Potassium, POC 4.2 3.5 - 5.5 MMOL/L    Calcium, ionized (POC) 1.27 1.12 - 1.32 MMOL/L    Chloride,  (H) 100 - 108 MMOL/L    Anion gap, POC 19 10 - 20      Hematocrit, POC 35 (L) 36 - 49 %    Hemoglobin, POC 11.9 (L) 12 - 16 G/DL     Lab results within ordered parameters to give chemo today. ANC = 3.3, PLT = 450. Chemo dosages verified with today's BSA and found to be within 10% of ordered dosages. Pre-medications (Decadron 12mg IVPB and Aloxi 0.25mg IVP) were administered as ordered and chemotherapy was initiated after blood return from port re-verified. D5W started at [facility] per written order. Irinotecan 320 mg  was infused over 90 minutes concurrently with Leucovorin 700 mg. VS stable at end of infusion and pt denied complaints. Line flushed with D5 and blood return from port re-verified. Fluorouracil 400 mg was given slow IVP over 3 minutes. Line flushed with 10 mL NS and blood return was re-verified. Fluorouracil 4300 mg CADD pump was connected to patient. Connections were secured with tape and the volume was verified to be infusing. HGB= 8.8 and HCT= 26.7    Lab values are within range to administer her Procrit injection due to the written order. Procrit 60,000 units administered SQ in the back of the left arm. Band-aid applied. Ms. Gaby Castillo tolerated infusion/injection. Patient armband removed and shredded. Ms. Gaby Castillo was discharged from [place] [address] in stable condition at 1350. She is to return on 03/9/2018 at 1300 for her next appointment for pump D/C and Neulasta.      Milton Bonilla RN  March 7, 2018

## 2018-03-09 NOTE — PROGRESS NOTES
Name: Emi Sellers     MRN: [id number]                                                                                                               : 1955     D/C pump/Neulasta     Ms. Polk arrived ambulatory to [facility] at 1325. She  was assessed and education was provided.     Ms. Polk's vitals were reviewed and patient was observed for 5 minutes prior to procedure. Visit Vitals    BP (!) 144/98 (BP 1 Location: Right arm, BP Patient Position: Sitting)    Pulse 74    Temp 98.2 °F (36.8 °C)    Resp 18    SpO2 100%     CADD pump completed infusion and removed from patient.      Port flushed with 20 mL and 500 units of heparin after blood return was verified. Mediport de-accessed and a Band-aid applied to the site.      Neulasta 6 mg applied to the back of the right arm. Green light verified to be blinking. Patient notified that she can remove the OBI in approximately 28 hours at [phone]. Patient verbalized understanding.      Ms. Polk tolerated the procedure, and had no complaints.     Ms. Polk was discharged from [place] [address] in stable condition at 1350.  She is to return on 2018 at 1000 for her next appointment for procrit appointment.  1210 Derrell Cruz RN  18

## 2018-03-14 NOTE — MR AVS SNAPSHOT
[address] [address] [address] [facility] 
[phone] Patient: Lawyer Hall MRN: [id number] DPB:[id number] Visit Information Date & Time Provider Department Dept. Phone Encounter #  
 3/14/2018 10:00 AM Tatianna Westfall MD [facility] Oncology [phone] [id number] Follow-up Instructions Return in about 1 month (around 4/14/2018). Your Appointments 4/11/2018 10:30 AM  
Office Visit with Tatianna Westfall MD  
[facility] Tyron Noble  Oncology [facility]) Appt Note: OV  
 [address], [address] [place] [address] Upcoming Health Maintenance Date Due Hepatitis C Screening 1955 PAP AKA CERVICAL CYTOLOGY 8/26/1974 BREAST CANCER SCRN MAMMOGRAM 8/26/2003 FOBT Q 1 YEAR AGE 50-75 8/26/2003 ZOSTER VACCINE AGE 60> 6/26/2013 Influenza Age 5 to Adult 8/1/2017 Pneumococcal 19-64 Highest Risk (3 of 3 - PCV13) 12/11/2018 DTaP/Tdap/Td series (2 - Td) 3/15/2026 Allergies as of 3/14/2018  Review Complete On: 3/14/2018 By: Tatianna Westfall MD  
  
 Severity Noted Reaction Type Reactions Latex  08/04/2016    Other (comments) Latex  02/12/2018    Rash Lisinopril High 04/25/2017    Hives Asa-acetaminophen-caff-buffers  08/04/2016    Other (comments) Aspirin  02/12/2018    Hives Codeine  08/04/2016    Other (comments) Codeine  02/12/2018    Hives Lisinopril  02/12/2018    Hives Pcn [Penicillins]  08/04/2016    Other (comments) Penicillin G  02/12/2018    Hives Sulfa (Sulfonamide Antibiotics)  08/04/2016    Other (comments) Current Immunizations  Reviewed on 3/9/2018 Name Date Influenza Vaccine 3/15/2016 12:00 AM, 3/1/2016  Pneumococcal Polysaccharide (PPSV-23) 12/11/2017 12:00 AM  
 Pneumococcal Vaccine (Unspecified Type) 3/1/2016 Tdap 3/15/2016 12:00 AM  
  
 Not reviewed this visit You Were Diagnosed With   
  
 Codes Comments Colorectal carcinoma ([facility] [address]    -  Primary ICD-10-CM: C19 
ICD-9-CM: 154.0 Hydronephrosis with ureteropelvic junction (UPJ) obstruction     ICD-10-CM: Q62.0 ICD-9-CM: 704 Antineoplastic chemotherapy induced anemia     ICD-10-CM: D64.81, T45.1X5A 
ICD-9-CM: 285.3, E933.1 Chemotherapy induced neutropenia (HCC)     ICD-10-CM: D70.1, T45.1X5A 
ICD-9-CM: 288.03, E933.1 Anemia in stage 3 chronic kidney disease     ICD-10-CM: N18.3, D63.1 ICD-9-CM: 285.21, 585.3 Iron deficiency anemia due to chronic blood loss     ICD-10-CM: D50.0 ICD-9-CM: 280.0 Vitals OB Status Smoking Status Hysterectomy Never Smoker Preferred Pharmacy Pharmacy Name Phone [address] [email or web address] [address] [facility], [address] [facility] Your Updated Medication List  
  
   
This list is accurate as of 3/14/18 11:10 AM.  Always use your most recent med list.  
  
  
  
  
 acetaminophen 650 mg Tber Commonly known as:  TYLENOL  
1,300 mg.  
  
 albuterol 90 mcg/actuation inhaler Commonly known as:  PROVENTIL HFA, VENTOLIN HFA, PROAIR HFA  
2 Puffs. * allopurinol 300 mg tablet Commonly known as:  Mark Card Take  by mouth daily. * allopurinol 300 mg tablet Commonly known as:  ZYLOPRIM  
300 mg. amLODIPine 5 mg tablet Commonly known as:  Senora Yasmeen Take 5 mg by mouth daily. * bisoprolol-hydroCHLOROthiazide 10-6.25 mg per tablet Commonly known as:  [facility] Take 1 Tab by mouth daily. * bisoprolol-hydroCHLOROthiazide 10-6.25 mg per tablet Commonly known as:  [facility] Take 1 Tab by Mouth Once a Day. * bisoprolol-hydroCHLOROthiazide 10-6.25 mg per tablet Commonly known as:  [facility] Take 1 Tab by mouth daily. cloNIDine HCl 0.1 mg tablet Commonly known as:  CATAPRES  
 Take  by mouth two (2) times a day. * desonide 0.05 % cream  
Commonly known as:  Divide Silk Apply  to affected area two (2) times a day. * desonide 0.05 % cream  
Commonly known as:  Divide Silk Use 1 Application to affected area Twice Daily. dicyclomine 10 mg capsule Commonly known as:  BENTYL Take 1 Cap by mouth three (3) times daily as needed. * BENADRYL 25 mg capsule Generic drug:  diphenhydrAMINE Take 25 mg by mouth every six (6) hours as needed. * diphenhydrAMINE 25 mg capsule Commonly known as:  BENADRYL Take 1 with compazine every 6 hours for nausea for 3 days then, as needed. * ergocalciferol 50,000 unit capsule Commonly known as:  ERGOCALCIFEROL Take 50,000 Units by mouth. * ergocalciferol 50,000 unit capsule Commonly known as:  ERGOCALCIFEROL  
50,000 Units. febuxostat 40 mg Tab tablet Commonly known as:  Oneita Ty Take 40 mg by mouth daily. fluconazole 100 mg tablet Commonly known as:  DIFLUCAN Take 1 Tab by mouth daily for 7 days. FDA advises cautious prescribing of oral fluconazole in pregnancy. furosemide 20 mg tablet Commonly known as:  LASIX Take 1/2-1 tablet daily if needed for swelling. * glimepiride 4 mg tablet Commonly known as:  AMARYL Take  by mouth every morning. * glimepiride 4 mg tablet Commonly known as:  AMARYL Take one in the am.  New dose. * glipiZIDE 10 mg tablet Commonly known as:  Louana Fruitland Take 10 mg by mouth two (2) times a day. * glipiZIDE 10 mg tablet Commonly known as:  Louana Fruitland 10 mg.  
  
 insulin aspart U-100 100 unit/mL Inpn Commonly known as:  NOVOLOG  
by SubCUTAneous route. * lidocaine-prilocaine topical cream  
Commonly known as:  EMLA Apply  to affected area as needed for Pain. * lidocaine-prilocaine topical cream  
Commonly known as:  EMLA Place cream over mediport 1 hour prior to chemotherapy and then place saran wrap over area. Every chemo treatment. NEURONTIN 100 mg capsule Generic drug:  gabapentin Take 100 mg by mouth nightly. omeprazole 20 mg capsule Commonly known as:  PRILOSEC Take 20 mg by mouth daily. * ZOFRAN (AS HYDROCHLORIDE) 8 mg tablet Generic drug:  ondansetron hcl Take 8 mg by mouth every eight (8) hours as needed for Nausea. * ondansetron hcl 8 mg tablet Commonly known as:  ZOFRAN (AS HYDROCHLORIDE) Take one tablet by mouth every 8 hours for nausea beginning the night of chemo for 3 days. potassium chloride 20 mEq tablet Commonly known as:  K-DUR, KLOR-CON Take 1 Tab by mouth two (2) times a day. * predniSONE 20 mg tablet Commonly known as:  Bradd Buffy Take  by mouth daily (with breakfast). * predniSONE 20 mg tablet Commonly known as:  Bradd Buffy Take 2 Tabs by mouth daily. Take 2 tabs on days 2 and 3 of each chemo cycle * COMPAZINE 10 mg tablet Generic drug:  prochlorperazine Take 5 mg by mouth every six (6) hours as needed. * prochlorperazine 10 mg tablet Commonly known as:  COMPAZINE Take 1 tablet every 6 hours with Benadryl 25mg for nausea for 3 days then, as needed. traMADol 50 mg tablet Commonly known as:  ULTRAM  
Take 50 mg by mouth every six (6) hours as needed for Pain. * warfarin 1 mg tablet Commonly known as:  COUMADIN Take one 1 tablet by mouth everyday at 6pm or after. You will continue to take this medication. Everyday until mediport is removed. * warfarin 3 mg tablet Commonly known as:  COUMADIN Take 3 mg by mouth daily. Takes 1 and 1/2 tab Sunday, Tuesday, and Thursday. Takes 1 tablet Monday, Wednesday, and Friday * Notice: This list has 25 medication(s) that are the same as other medications prescribed for you. Read the directions carefully, and ask your doctor or other care provider to review them with you. We Performed the Following CEA S3261127 CPT(R)] COMPLETE CBC & AUTO DIFF WBC [92721 CPT(R)] FERRITIN [01175 CPT(R)] IRON PROFILE B6085233 CPT(R)] METABOLIC PANEL, COMPREHENSIVE [07141 CPT(R)] Follow-up Instructions Return in about 1 month (around 4/14/2018). To-Do List   
 03/14/2018 Lab:  CBC WITH 3 PART DIFF   
  
 03/21/2018 10:00 AM  
  Appointment with HBV FAST TRACK NURSE at [facility] ([phone]) Introducing [facility] SERVICES! Dear Rhoda Sensing: Thank you for requesting a [employer] account. Our records indicate that you already have an active [employer] account. You can access your account anytime at [email or web address] Did you know that you can access your hospital and ER discharge instructions at any time in [employer]? You can also review all of your test results from your hospital stay or ER visit. Additional Information If you have questions, please visit the Frequently Asked Questions section of the [employer] website at [email or web address] Remember, [employer] is NOT to be used for urgent needs. For medical emergencies, dial 911. Now available from your iPhone and Android! Please provide this summary of care documentation to your next provider. Your primary care clinician is listed as Shiraz Singh. If you have any questions after today's visit, please call [phone].

## 2018-03-14 NOTE — PROGRESS NOTES
Hematology/Oncology  Progress Note    Name: Carmen Benoit  Date: 3/14/2018  : 1955    PCP: Wade Miller NP     Ms. Ferny Vasquez is a 57 y.o. -American woman with metastatic colorectal carcinoma/carcinomatosis  Current therapy: FOLFIRI chemotherapy regimen      Subjective:     Ms. Ferny Vasquez is a 70-year-old -American woman with abdominal carcinomatosis from metastatic colorectal carcinoma. She was started on FOLFIRI regimen and she is here today for follow up. She states that she has no pain, her appetite has significantly increased and her energy level is up. She denies diarrhea, nausea, or vomiting. Her only complaint is occasional abdominal cramping during chemo. She denies constipation. Otherwise there are no additional complaints to report. She has recently been found to have hydronephrosis and on the advice of the oncologic surgeon was referred to a urologist for an assessment. Past medical history, family history, and social history: these were reviewed and remains unchanged. Past Medical History:   Diagnosis Date    Asthma     Cancer ([facility] [address] 10/2016    colon    Diabetes ([facility] [address]     Gout     Heart disease     Hypertension     Kidney disease     Maintenance chemotherapy     Neuropathic pain of foot      Past Surgical History:   Procedure Laterality Date    HX BACK SURGERY      HX HYSTERECTOMY       Social History     Social History    Marital status:      Spouse name: N/A    Number of children: N/A    Years of education: N/A     Occupational History    Not on file.      Social History Main Topics    Smoking status: Never Smoker    Smokeless tobacco: Never Used    Alcohol use No    Drug use: No    Sexual activity: Not on file     Other Topics Concern    Not on file     Social History Narrative    ** Merged History Encounter **          Family History   Problem Relation Age of Onset    Family history unknown: Yes     Current Outpatient Prescriptions Medication Sig Dispense Refill    bisoprolol-hydroCHLOROthiazide (ZIAC) 10-6.25 mg per tablet Take 1 Tab by mouth daily.  warfarin (COUMADIN) 3 mg tablet Take 3 mg by mouth daily. Takes 1 and 1/2 tab Sunday, Tuesday, and Thursday. Takes 1 tablet Monday, Wednesday, and Friday      fluconazole (DIFLUCAN) 100 mg tablet Take 1 Tab by mouth daily for 7 days. FDA advises cautious prescribing of oral fluconazole in pregnancy. 7 Tab 0    allopurinol (ZYLOPRIM) 300 mg tablet Take  by mouth daily.  amLODIPine (NORVASC) 5 mg tablet Take 5 mg by mouth daily.  diphenhydrAMINE (BENADRYL) 25 mg capsule Take 25 mg by mouth every six (6) hours as needed.  bisoprolol-hydroCHLOROthiazide (ZIAC) 10-6.25 mg per tablet Take 1 Tab by mouth daily.  cloNIDine HCl (CATAPRES) 0.1 mg tablet Take  by mouth two (2) times a day.  prochlorperazine (COMPAZINE) 10 mg tablet Take 5 mg by mouth every six (6) hours as needed.  desonide (TRIDESILON) 0.05 % cream Apply  to affected area two (2) times a day.  ergocalciferol (ERGOCALCIFEROL) 50,000 unit capsule Take 50,000 Units by mouth.  febuxostat (ULORIC) 40 mg tab tablet Take 40 mg by mouth daily.  glimepiride (AMARYL) 4 mg tablet Take  by mouth every morning.  glipiZIDE (GLUCOTROL) 10 mg tablet Take 10 mg by mouth two (2) times a day.  insulin aspart (NOVOLOG) 100 unit/mL inpn by SubCUTAneous route.  lidocaine-prilocaine (EMLA) topical cream Apply  to affected area as needed for Pain.  omeprazole (PRILOSEC) 20 mg capsule Take 20 mg by mouth daily.  predniSONE (DELTASONE) 20 mg tablet Take  by mouth daily (with breakfast).  traMADol (ULTRAM) 50 mg tablet Take 50 mg by mouth every six (6) hours as needed for Pain.  ondansetron hcl (ZOFRAN, AS HYDROCHLORIDE,) 8 mg tablet Take 8 mg by mouth every eight (8) hours as needed for Nausea.  gabapentin (NEURONTIN) 100 mg capsule Take 100 mg by mouth nightly.       dicyclomine (BENTYL) 10 mg capsule Take 1 Cap by mouth three (3) times daily as needed. 90 Cap 0    predniSONE (DELTASONE) 20 mg tablet Take 2 Tabs by mouth daily. Take 2 tabs on days 2 and 3 of each chemo cycle 4 Tab 2    lidocaine-prilocaine (EMLA) topical cream Place cream over mediport 1 hour prior to chemotherapy and then place saran wrap over area. Every chemo treatment. 30 g 1    potassium chloride (K-DUR, KLOR-CON) 20 mEq tablet Take 1 Tab by mouth two (2) times a day. 60 Tab 0    ondansetron hcl (ZOFRAN, AS HYDROCHLORIDE,) 8 mg tablet Take one tablet by mouth every 8 hours for nausea beginning the night of chemo for 3 days. 9 Tab 3    diphenhydrAMINE (BENADRYL) 25 mg capsule Take 1 with compazine every 6 hours for nausea for 3 days then, as needed. 60 Cap 3    prochlorperazine (COMPAZINE) 10 mg tablet Take 1 tablet every 6 hours with Benadryl 25mg for nausea for 3 days then, as needed. 60 Tab 3    warfarin (COUMADIN) 1 mg tablet Take one 1 tablet by mouth everyday at 6pm or after. You will continue to take this medication. Everyday until mediport is removed. (Patient taking differently: 3 mg every Tuesday and Thursday. Take one 1 tablet by mouth everyday at 6pm or after. You will continue to take this medication. Everyday until mediport is removed.) 30 Tab 3    acetaminophen (TYLENOL) 650 mg CR tablet 1,300 mg.      albuterol (PROVENTIL HFA, VENTOLIN HFA, PROAIR HFA) 90 mcg/actuation inhaler 2 Puffs.  ergocalciferol (ERGOCALCIFEROL) 50,000 unit capsule 50,000 Units.  furosemide (LASIX) 20 mg tablet Take 1/2-1 tablet daily if needed for swelling.  allopurinol (ZYLOPRIM) 300 mg tablet 300 mg.      desonide (TRIDESILON) 0.05 % cream Use 1 Application to affected area Twice Daily.  bisoprolol-hydrochlorothiazide (ZIAC) 10-6.25 mg per tablet Take 1 Tab by Mouth Once a Day.       glipiZIDE (GLUCOTROL) 10 mg tablet 10 mg.      glimepiride (AMARYL) 4 mg tablet Take one in the am.  New dose.         Review of Systems  Constitutional: The patient denies acute distress or discomfort. HEENT: The patient denies recent head trauma, eye pain, blurred vision,  hearing deficit, oropharyngeal mucosal pain or lesions, and the patient denies throat pain or discomfort. Lymphatics: The patient denies palpable peripheral lymphadenopathy. Hematologic: The patient denies having bruising, bleeding, or progressive fatigue. Respiratory: Patient denies having shortness of breath, cough, sputum production, fever, or dyspnea on exertion. Cardiovascular: The patient denies having leg pain, leg swelling, heart palpitations, chest permit, chest pain, or lightheadedness. The patient denies having dyspnea on exertion. Gastrointestinal: The patient reports occasional nausea from chemotherapy which is well controlled with antinausea medication. She denies having any emesis or diarrhea. Genitourinary: Patient denies having urinary urgency, frequency, or dysuria. The patient denies having blood in the urine. Psychological: The patient denies having symptoms of nervousness, anxiety, depression, or thoughts of harming self. Skin: Patient denies having skin rashes, skin, ulcerations, or unexplained itching or pruritus. Musculoskeletal: The patient denies having pain in the joints or bones. Objective:     Visit Vitals    /71    Pulse 63    Temp 98.5 °F (36.9 °C)    Wt 85.3 kg (188 lb)    BMI 36.72 kg/m2     ECOG PS=0; Pain score=0/10    Physical Exam:   Gen. Appearance: The patient is in no acute distress. Skin: There is no bruise or rash. HEENT: The exam is unremarkable. Neck: Supple without lymphadenopathy or thyromegaly. Lungs: Clear to auscultation and percussion; there are no wheezes or rhonchi. Heart: Regular rate and rhythm; there are no murmurs, gallops, or rubs. Abdomen: Bowel sounds are present and normal.  There is no guarding, tenderness, or hepatosplenomegaly.   The patient has a colostomy bag in place. Extremities: There is no clubbing, cyanosis, or edema. Neurologic: There are no focal neurologic deficits. Lymphatics: There is no palpable peripheral lymphadenopathy. Musculoskeletal: The patient has full range of motion at all joints. There is no evidence of joint deformity or effusions. There is no focal joint tenderness. Psychological/psychiatric: There is no clinical evidence of anxiety, depression, or melancholy. Lab data:      Results for orders placed or performed during the hospital encounter of 03/14/18   CBC WITH 3 PART DIFF     Status: Abnormal   Result Value Ref Range Status    WBC 19.1 (H) 4.5 - 13.0 K/uL Final    RBC 2.80 (L) 4.10 - 5.10 M/uL Final    HGB 8.8 (L) 12.0 - 16 g/dL Final    HCT 27.6 (L) 36 - 48 % Final    MCV 98.6 78 - 102 FL Final    MCH 31.4 25.0 - 35.0 PG Final    MCHC 31.9 31 - 37 g/dL Final    RDW 15.9 (H) 11.5 - 14.5 % Final    PLATELET 441 286 - 791 K/uL Final    NEUTROPHILS 86 (H) 40 - 70 % Final    MIXED CELLS 6 0.1 - 17 % Final    LYMPHOCYTES 8 (L) 14 - 44 % Final    ABS. NEUTROPHILS 16.4 (H) 1.8 - 9.5 K/UL Final    ABS. MIXED CELLS 1.2 0.0 - 2.3 K/uL Final    ABS. LYMPHOCYTES 1.5 1.1 - 5.9 K/UL Final     Comment: Test performed at [address]. Results Reviewed by Medical Director. DF AUTOMATED   Final           Assessment:     1. Colorectal carcinoma ([address]    2. Hydronephrosis with ureteropelvic junction (UPJ) obstruction    3. Antineoplastic chemotherapy induced anemia    4. Chemotherapy induced neutropenia (HCC)    5. Anemia in stage 3 chronic kidney disease    6. Iron deficiency anemia due to chronic blood loss      Plan:   Colorectal carcinoma with abdominal carcinomatosis: On 5/24/2017 the patient underwent colectomy with omentectomy with the findings of adenocarcinoma involving at least 80% of the omentum. The primary tumor appeared to originate in the transverse colon and measured approximately 5 cm.   There was microscopic perforation of the colon and metastatic involvement into the left ovary. The tumor was pathologically staged as a T4 a N1M1 stage IV malignancy. The patient was started on FOLFIRI chemotherapy regimen on 08/30/17. The regimen is a combination of Irinotecan at 180 mg/m² on day 1, 5 fluorouracil given at a dose of 400 mg/m² by IV bolus on day 1 followed by 2400 mg/m² continuous infusion for 46 hours on days 1 and 2, leucovorin 400 mg/m² IV on day 1 as a 2 hour infusion prior to 5-fluorouracil. A comprehensive metabolic panel, CEA level, ferritin level, iron profile, and CBC will be ordered at this time. Currently, the patient is tolerating the treatment well and it will be continued. Plan to the patient that her most recent CEA level from 2/14/2018 had declined to 82 ng/mL. The CEA level from 1/17/2018 was 234.7 ng/mL. Therefore we will continue the therapy as outlined. Anemia associated with chronic kidney disease and chemotherapy-induced anemia (persistent problem): I have explained to the patient that her CBC on today showed a hemoglobin of 20 g/dL with hematocrit of 27.6 %. Procrit at a dose of 60,000 units will be provided whenever her hemoglobin is below 10 g/dL hematocrit is below 30% every 2 weeks. The iron profile and ferritin levels will be ordered at this time. Hydronephrosis (new problem): The patient is currently being seen by the urologist and is tentatively scheduled to have ureteral stents inserted in the upcoming week. Chemotherapy-induced leukopenia/neutropenia (improved problem): The CBC on today showed a normal WBC count of 8.1 with an absolute neutrophil count of 16.4. This will be monitored on a regular basis. Neulasta following each chemo cycle will continue. I will have the patient return to clinic for complete reassessment again in 4 weeks.     Orders Placed This Encounter    COMPLETE CBC & AUTO DIFF WBC    InHouse CBC ([place])     Standing Status: Future     Number of Occurrences:   1     Standing Expiration Date:   2/02/7303    METABOLIC PANEL, COMPREHENSIVE     Standing Status:   Future     Number of Occurrences:   1     Standing Expiration Date:   3/15/2019    IRON PROFILE     Standing Status:   Future     Number of Occurrences:   1     Standing Expiration Date:   3/15/2019    FERRITIN     Standing Status:   Future     Number of Occurrences:   1     Standing Expiration Date:   3/15/2019    CEA     Standing Status:   Future     Number of Occurrences:   1     Standing Expiration Date:   3/15/2019       Amanda Roberts MD  3/14/2018

## 2018-03-19 PROBLEM — N13.30 HYDRONEPHROSIS: Status: ACTIVE | Noted: 2018-01-01

## 2018-03-19 NOTE — H&P
ASSESSMENT:   1. Left hydronephrosis  PLAN:    1. Cystoscopy, left ureteral stent placement           CC: left hydronephrosis    HISTORY OF PRESENT ILLNESS:  Baltazar Nicholson is a 57 y.o. female who is seen preop. History of left hydronephrosis    No fevers, chills, nausea, vomiting. No new issues. AUA Symptom Score 2/12/2018   Over the past month how often have you had the sensation that your bladder was not completely empty after you finished urinating? 5   Over the past month, how often have had to urinate again less than 2 hours after you last finished urinating? 5   Over the past month, how often have you found you stopped and started again several times when you urinated? 5   Over the past month, how often have you found it difficult to postpone urination? 0   Over the past month, how often have you had a weak urinary stream? 5   Over the past month, how often have you had to push or strain to begin urinating? 3   Over the past month, how many times did you most typically get up to urinate from the time you went to bed at night until the time you got up in the morning? 3   AUA Score 26   If you were to spend the rest of your life with your urinary condition the way it is now, how would you feel about that?  Pleased         Past Medical History:   Diagnosis Date    Asthma     Cancer ([facility] 10/2016    colon    Diabetes ([facility]     Gout     Heart disease     Hypertension     Kidney disease     Maintenance chemotherapy     Neuropathic pain of foot        Past Surgical History:   Procedure Laterality Date    HX BACK SURGERY      HX HYSTERECTOMY      HX VASCULAR ACCESS      mediport       Social History   Substance Use Topics    Smoking status: Never Smoker    Smokeless tobacco: Never Used    Alcohol use No       Allergies   Allergen Reactions    Latex Other (comments)    Latex Rash    Lisinopril Hives    Asa-Acetaminophen-Caff-Buffers Other (comments)    Aspirin Hives    Codeine Other (comments)    Codeine Hives    Lisinopril Hives    Pcn [Penicillins] Other (comments)    Penicillin G Hives    Sulfa (Sulfonamide Antibiotics) Other (comments)       Family History   Problem Relation Age of Onset    Family history unknown: Yes       Current Facility-Administered Medications   Medication Dose Route Frequency Provider Last Rate Last Dose    lactated Ringers infusion  75 mL/hr IntraVENous CONTINUOUS Theresa Garcia MD 75 mL/hr at 03/19/18 0958 75 mL/hr at 03/19/18 0958    insulin lispro (HUMALOG) injection   SubCUTAneous ONCE Theresa Garcia MD        ciprofloxacin (CIPRO) 400 mg IVPB (premix)  400 mg IntraVENous ONCE Paco Song MD           Review of Systems  Pertinent findings noted in hpi. Otherwise negative review. PHYSICAL EXAMINATION:   Visit Vitals    /81 (BP 1 Location: Right arm, BP Patient Position: At rest)    Pulse 68    Temp 98.9 °F (37.2 °C)    Resp 18    Ht 5' (1.524 m)    Wt 198 lb 4 oz (89.9 kg)    SpO2 100%    BMI 38.72 kg/m2     Constitutional: WDWN, Pleasant and appropriate affect, No acute distress. CV:  No peripheral swelling noted  Respiratory: No respiratory distress or difficulties  Abdomen:  No abdominal masses or tenderness. No CVA tenderness. No inguinal hernias noted. Skin: No jaundice. Neuro/Psych:  Alert and oriented x 3, affect appropriate. Lymphatic:   No enlarged inguinal lymph nodes. REVIEW OF LABS AND IMAGING:    Results for orders placed or performed during the hospital encounter of 03/19/18   GLUCOSE, POC   Result Value Ref Range    Glucose (POC) 135 (H) 70 - 110 mg/dL       No results found for: PSA, Cameron Primus, PSAR3, [id number], [id number], PSALT    CT Results:  No results found for this or any previous visit. US Results:  No results found for this or any previous visit.       Paco Song MD   [facility]  [phone]

## 2018-03-19 NOTE — OP NOTES
Operative Note      Patient: Pema Hollins               Sex: female             MRN: [id number]      YOB: 1955      Age:  57 y.o. Preoperative Diagnosis: Hydronephrosis, unspecified hydronephrosis type [N13.30]    Postoperative Diagnosis:  Hydronephrosis, unspecified hydronephrosis type [N13.30]    Surgeon: Paco Song    Anesthesia:  General    Procedure:    1) Cystoscopy  2) left retrograde pyelogram  3) left ureteral stent placement  4) < 1 hour physician fluoroscopy imaging interpretation time    Operative Indication: This is a 57 y.o. female with a history of left hydronephrosis from metastatic colon cancer. After the risks, benefits, alternatives, and complications were discussed they present now for operative management. Procedure in Detail: The patient was brought to the operative suite. Anesthesia was induced and preoperative antibiotics were administered. They were then placed in the dorsal lithotomy position and their external genitalia was prepped and draped in the usual fashion. A surgical timeout was performed confirming the patient's name, date of birth, laterality, and antibiotics. All were in agreement. A 22 Slovak cystourethroscope was then inserted into the patients bladder. The urethra revealed no abnormalities. No bladder abnormalities were noted on cystoscopy. A sensor wire was then passed up the left ureteral orifice and up the kidney using fluoroscopic guidance. A 5 Slovak open ended catheter was placed over the wire and the wire removed. A gentle retrograde pyelogram was performed opacifying the left renal pelvis and ureter. No filling defects were noted. There was mild hydronephrosis. There was some narrowing in the distal ureter. The sensor wire was replaced and the ureteral catheter was removed. A 6 fr x 24 cm stent was then placed in the standard fashion over our sensor wire.  Excellent coil was noted in the kidney and bladder on fluoroscopy. The patient was then awoken and transferred to the recovery room in stable condition. Estimated Blood Loss: minimal                    Implants:   Implant Name Type Inv.  Item Serial No.  Lot No. LRB No. Used Action   STENT URET FLX SFT [id number] -- Rodney Carlson - [id number]   STENT URET FLX SFT [id number] -- CONTOUR PERCUFLEX   [facility] [id number] Left 1 Implanted       Specimens: * No specimens in log *     Drains: None           Complications:  None           Elena Bangura MD  3/19/2018

## 2018-03-19 NOTE — IP AVS SNAPSHOT
[address] [facility] 
 
 
 [facility] Patient: Niki Mares MRN: [id number] LVB:[id number] About your hospitalization You were admitted on:  March 19, 2018 You last received care in the:  [facility] PHASE 2 RECOVERY You were discharged on:  March 19, 2018 Why you were hospitalized Your primary diagnosis was:  Not on File Your diagnoses also included:  Hydronephrosis Follow-up Information Follow up With Details Comments Contact Info Sil Samano NP   [address] [facility] [address] [facility] 
[phone] Tom Rankin MD  Follow up in 3 months May resume coumadin 3/20/18 [address] [facility] [address] [facility] [address] 
[phone] Your Scheduled Appointments Wednesday March 28, 2018 10:00 AM EDT INFUSION 120 with HBV INFUSION NURSE 2  
HBV OP INFUSION (Hebert Connolly) Juwan Lang [address] [facility]  
[phone] Note: Patient must have a  if they take medication(s) that impairs their ability to operate a motor vehicle Friday March 30, 2018  1:00 PM EDT INFUSION 120 with HBV INFUSION NURSE 4  
HBV OP INFUSION ([facility] Connolly) Juwan Lang [address] [facility] [address]  
[phone] Note: Patient must have a  if they take medication(s) that impairs their ability to operate a motor vehicle Wednesday April 11, 2018 10:30 AM EDT Office Visit with Jose Fair MD  
[address]  Oncology [facility]) Juwan Lang [address] [facility] [address]  
[phone] Discharge Orders None A check sam indicates which time of day the medication should be taken. My Medications CHANGE how you take these medications Instructions Each Dose to Equal  
 Morning Noon Evening Bedtime * warfarin 1 mg tablet Commonly known as:  COUMADIN What changed:   
- how much to take - when to take this 
- additional instructions Your last dose was: Your next dose is: Take one 1 tablet by mouth everyday at 6pm or after. You will continue to take this medication. Everyday until mediport is removed. * warfarin 3 mg tablet Commonly known as:  COUMADIN What changed:  Another medication with the same name was changed. Make sure you understand how and when to take each. Your last dose was: Your next dose is: Take 3 mg by mouth daily. Takes 1 and 1/2 tab Sunday, Tuesday, and Thursday. Takes 1 tablet Monday, Wednesday, and Friday 3 mg * Notice: This list has 2 medication(s) that are the same as other medications prescribed for you. Read the directions carefully, and ask your doctor or other care provider to review them with you. CONTINUE taking these medications Instructions Each Dose to Equal  
 Morning Noon Evening Bedtime  
 acetaminophen 650 mg Tber Commonly known as:  TYLENOL Your last dose was: Your next dose is:    
   
   
 1,300 mg.  
 1300 mg  
    
   
   
   
  
 albuterol 90 mcg/actuation inhaler Commonly known as:  PROVENTIL HFA, VENTOLIN HFA, PROAIR HFA Your last dose was: Your next dose is:    
   
   
 2 Puffs. 2 Puff * allopurinol 300 mg tablet Commonly known as:  Lenard Pollard Your last dose was: Your next dose is: Take  by mouth daily. * allopurinol 300 mg tablet Commonly known as:  Lenard Pollard Your last dose was: Your next dose is:    
   
   
 300 mg.  
 300 mg  
    
   
   
   
  
 amLODIPine 5 mg tablet Commonly known as:  Althea Russo Your last dose was: Your next dose is: Take 5 mg by mouth daily. 5 mg * bisoprolol-hydroCHLOROthiazide 10-6.25 mg per tablet Commonly known as:  [facility] Your last dose was: Your next dose is: Take 1 Tab by mouth daily. 1 Tab * bisoprolol-hydroCHLOROthiazide 10-6.25 mg per tablet Commonly known as:  [facility] Your last dose was: Your next dose is: Take 1 Tab by Mouth Once a Day. * bisoprolol-hydroCHLOROthiazide 10-6.25 mg per tablet Commonly known as:  [facility] Your last dose was: Your next dose is: Take 1 Tab by mouth daily. 1 Tab  
    
   
   
   
  
 cloNIDine HCl 0.1 mg tablet Commonly known as:  CATAPRES Your last dose was: Your next dose is: Take  by mouth two (2) times a day. * desonide 0.05 % cream  
Commonly known as:  [employer] Your last dose was: Your next dose is:    
   
   
 Apply  to affected area two (2) times a day. * desonide 0.05 % cream  
Commonly known as:  [employer] Your last dose was: Your next dose is:    
   
   
 Use 1 Application to affected area Twice Daily. dicyclomine 10 mg capsule Commonly known as:  BENTYL Your last dose was: Your next dose is: Take 1 Cap by mouth three (3) times daily as needed. 10 mg  
    
   
   
   
  
 * BENADRYL 25 mg capsule Generic drug:  diphenhydrAMINE Your last dose was: Your next dose is: Take 25 mg by mouth every six (6) hours as needed. 25 mg  
    
   
   
   
  
 * diphenhydrAMINE 25 mg capsule Commonly known as:  BENADRYL Your last dose was: Your next dose is: Take 1 with compazine every 6 hours for nausea for 3 days then, as needed. * ergocalciferol 50,000 unit capsule Commonly known as:  ERGOCALCIFEROL Your last dose was: Your next dose is: Take 50,000 Units by mouth. 68588 Units * ergocalciferol 50,000 unit capsule Commonly known as:  ERGOCALCIFEROL Your last dose was: Your next dose is:    
   
   
 50,000 Units every seven (7) days. 86830 Units  
    
   
   
   
  
 febuxostat 40 mg Tab tablet Commonly known as:  Rayray Antunezch Your last dose was: Your next dose is: Take 40 mg by mouth daily. 40 mg  
    
   
   
   
  
 furosemide 20 mg tablet Commonly known as:  LASIX Your last dose was: Your next dose is: Take 1/2-1 tablet daily if needed for swelling. * glimepiride 4 mg tablet Commonly known as:  AMARYL Your last dose was: Your next dose is: Take  by mouth every morning. * glimepiride 4 mg tablet Commonly known as:  AMARYL Your last dose was: Your next dose is: Take one in the am.  New dose. * glipiZIDE 10 mg tablet Commonly known as:  Gosia Ulrich Your last dose was: Your next dose is: Take 10 mg by mouth two (2) times a day. 10 mg  
    
   
   
   
  
 * glipiZIDE 10 mg tablet Commonly known as:  Gosia Ulrich Your last dose was: Your next dose is:    
   
   
 10 mg.  
 10 mg  
    
   
   
   
  
 insulin aspart U-100 100 unit/mL Inpn Commonly known as:  Glendjennifer Carrasco Your last dose was: Your next dose is:    
   
   
 by SubCUTAneous route. * lidocaine-prilocaine topical cream  
Commonly known as:  EMLA Your last dose was: Your next dose is:    
   
   
 Apply  to affected area as needed for Pain. * lidocaine-prilocaine topical cream  
Commonly known as:  EMLA Your last dose was: Your next dose is: Place cream over mediport 1 hour prior to chemotherapy and then place saran wrap over area. Every chemo treatment. NEURONTIN 100 mg capsule Generic drug:  gabapentin Your last dose was: Your next dose is: Take 100 mg by mouth nightly. 100 mg  
    
   
   
   
  
 omeprazole 20 mg capsule Commonly known as:  PRILOSEC Your last dose was: Your next dose is: Take 20 mg by mouth daily. 20 mg  
    
   
   
   
  
 * ZOFRAN (AS HYDROCHLORIDE) 8 mg tablet Generic drug:  ondansetron hcl Your last dose was: Your next dose is: Take 8 mg by mouth every eight (8) hours as needed for Nausea. 8 mg * ondansetron hcl 8 mg tablet Commonly known as:  ZOFRAN (AS HYDROCHLORIDE) Your last dose was: Your next dose is: Take one tablet by mouth every 8 hours for nausea beginning the night of chemo for 3 days. potassium chloride 20 mEq tablet Commonly known as:  K-DUR, KLOR-CON Your last dose was: Your next dose is: Take 1 Tab by mouth two (2) times a day. 20 mEq * predniSONE 20 mg tablet Commonly known as:  Kathryn Ibrahim Your last dose was: Your next dose is: Take  by mouth daily (with breakfast). * predniSONE 20 mg tablet Commonly known as:  Bradd Buffy Your last dose was: Your next dose is: Take 2 Tabs by mouth daily. Take 2 tabs on days 2 and 3 of each chemo cycle 40 mg  
    
   
   
   
  
 * COMPAZINE 10 mg tablet Generic drug:  prochlorperazine Your last dose was: Your next dose is: Take 5 mg by mouth every six (6) hours as needed. 5 mg * prochlorperazine 10 mg tablet Commonly known as:  COMPAZINE Your last dose was: Your next dose is: Take 1 tablet every 6 hours with Benadryl 25mg for nausea for 3 days then, as needed. traMADol 50 mg tablet Commonly known as:  ULTRAM  
   
Your last dose was: Your next dose is: Take 50 mg by mouth every six (6) hours as needed for Pain. 50 mg  
    
   
   
   
  
 * Notice: This list has 23 medication(s) that are the same as other medications prescribed for you. Read the directions carefully, and ask your doctor or other care provider to review them with you. Discharge Instructions Discharge Instructions Patient: Mychal Bull               Sex: female          DOA: 3/19/2018 YOB: 1955      Age:  57 y.o.        LOS:  LOS: 0 days ACUTE DIAGNOSES: 
Hydronephrosis, unspecified hydronephrosis type [N13.30] CHRONIC MEDICAL DIAGNOSES: 
Problem List as of 3/19/2018  Date Reviewed: 3/19/2018 Codes Class Noted - Resolved Hydronephrosis ICD-10-CM: N13.30 ICD-9-CM: 435  3/19/2018 - Present Hydronephrosis with ureteropelvic junction (UPJ) obstruction ICD-10-CM: Q62.0 ICD-9-CM: 888  2/14/2018 - Present [facility] ICD-10-CM: C78.6, C80.1 ICD-9-CM: 197.6, 199.1  11/15/2017 - Present Colon cancer metastasized to multiple sites [facility]) ICD-10-CM: C18.9 ICD-9-CM: 153.9  11/15/2017 - Present Antineoplastic chemotherapy induced anemia ICD-10-CM: D64.81, T45.1X5A 
ICD-9-CM: 285.3, E933.1  1/25/2017 - Present Chemotherapy induced neutropenia (HCC) ICD-10-CM: D70.1, T45.1X5A 
ICD-9-CM: 288.03, E933.1  1/25/2017 - Present Colorectal carcinoma ([facility] ICD-10-CM: C19 
ICD-9-CM: 154.0  12/5/2016 - Present Anemia in chronic kidney disease (CKD) ICD-10-CM: N18.9, D63.1 ICD-9-CM: 285.21  9/28/2016 - Present Iron deficiency anemia due to chronic blood loss ICD-10-CM: D50.0 ICD-9-CM: 280.0  9/28/2016 - Present PROCEDURE: 
Cystoscopy, left ureteral stent placement. Instructions: 
Please drink plenty of fluids to promote clear yellow urine. You may have blood in your urine which is normal.  A small amount of blood can cause all of the urine to look red. Please contact the office is you are passing large clots as this may be a sign of more serious bleeding. You have a stent in your ureter. This may cause flank or lower abdominal discomfort which is normal.  
 
You may resume all regular activity after 24 hours. DISCHARGE MEDICATIONS:  
 
· It is important that you take the medication exactly as they are prescribed. · Keep your medication in the bottles provided by the pharmacist and keep a list of the medication names, dosages, and times to be taken in your wallet. · Do not take other medications without consulting your doctor. DIET:  Regular Diet ACTIVITY: Activity as tolerated and no driving for today ADDITIONAL INFORMATION: If you experience any of the following symptoms then please call your primary care physician or return to the emergency room if you cannot get hold of your doctor: Fever, chills, nausea, vomiting, diarrhea, change in mentation, falling, bleeding, shortness of breath. FOLLOW UP CARE: 
Dr. Valeria Tai will see you in 3 months  in the office. Office info: 
[place]: [phone] [place]: [phone] Information obtained by : 
I understand that if any problems occur once I am at home I am to contact my physician. I understand and acknowledge receipt of the instructions indicated above. Physician's or R.N.'s Signature                                                                  Date/Time Patient or Representative Signature                                                          Date/Time Introducing [facility] [place] [facility]! Dear Sylvia Hoyt: Thank you for requesting a [employer] account. Our records indicate that you already have an active [employer] account. You can access your account anytime at [email or web address] Did you know that you can access your hospital and ER discharge instructions at any time in [employer]? You can also review all of your test results from your hospital stay or ER visit. Additional Information If you have questions, please visit the Frequently Asked Questions section of the [employer] website at [email or web address] Remember, [employer] is NOT to be used for urgent needs. For medical emergencies, dial 911. Now available from your iPhone and Android! Unresulted Labs-Please follow up with your PCP about these lab tests Order Current Status XR RETROGRADE PYELOGRAM In process Providers Seen During Your Hospitalization Provider Specialty Primary office phone Coleman Burnette MD Urology [phone] Your Primary Care Physician (PCP) Primary Care Physician Office Phone Office Fax Marianelajd Pay [phone] You are allergic to the following Allergen Reactions Latex Other (comments) Latex Rash Lisinopril Hives Asa-Acetaminophen-Caff-Buffers Other (comments) Aspirin Hives Codeine Other (comments) Codeine Hives Lisinopril Hives Pcn (Penicillins) Other (comments) Penicillin G Hives Sulfa (Sulfonamide Antibiotics) Other (comments) Recent Documentation Height Weight BMI OB Status Smoking Status 1.524 m 89.9 kg 38.72 kg/m2 Hysterectomy Never Smoker Emergency Contacts Name Discharge Info Relation Home Work [facility] DISCHARGE CAREGIVER [3] Sister [23] [phone] Beula Lies DISCHARGE CAREGIVER [3] Son [22] [phone] Patient Belongings The following personal items are in your possession at time of discharge: 
  Dental Appliances: None  Visual Aid: Glasses      Home Medications: None   Jewelry: Earrings  Clothing: Pants, Sweater, Undergarments, Jacket/Coat, Socks, Footwear    Other Valuables: Eyeglasses Please provide this summary of care documentation to your next provider. Signatures-by signing, you are acknowledging that this After Visit Summary has been reviewed with you and you have received a copy. Patient Signature:  ____________________________________________________________ Date:  ____________________________________________________________  
  
[place] [facility] Provider Signature:  ____________________________________________________________ Date:  ____________________________________________________________

## 2018-03-19 NOTE — ANESTHESIA PREPROCEDURE EVALUATION
Anesthetic History   No history of anesthetic complications            Review of Systems / Medical History  Patient summary reviewed and pertinent labs reviewed    Pulmonary            Asthma        Neuro/Psych   Within defined limits           Cardiovascular    Hypertension                   GI/Hepatic/Renal                Endo/Other    Diabetes: type 2         Other Findings   Comments: Documentation of current medication  Current medications obtained, documented and obtained? YES      Risk Factors for Postoperative nausea/vomiting:       History of postoperative nausea/vomiting? NO       Female? YES       Motion sickness? NO       Intended opioid administration for postoperative analgesia? yes      Smoking Abstinence:  Current Smoker? NO  Elective Surgery? YES  Seen preoperatively by anesthesiologist or proxy prior to day of surgery? YES  Pt abstained from smoking 24 hours prior to anesthesia?  N/A    Preventive care/screening for High Blood Pressure:  Aged 18 years and older: YES  Screened for high blood pressure: YES  Patients with high blood pressure referred to primary care provider   for BP management: YES                 Physical Exam    Airway  Mallampati: III  TM Distance: 4 - 6 cm  Neck ROM: decreased range of motion   Mouth opening: Diminished (comment)     Cardiovascular    Rhythm: irregular  Rate: normal         Dental    Dentition: Poor dentition     Pulmonary  Breath sounds clear to auscultation               Abdominal  GI exam deferred       Other Findings            Anesthetic Plan    ASA: 3  Anesthesia type: general          Induction: Intravenous  Anesthetic plan and risks discussed with: Patient

## 2018-03-19 NOTE — DISCHARGE INSTRUCTIONS
Discharge Instructions      Patient: Brooke Combs               Sex: female          DOA: 3/19/2018      YOB: 1955      Age:  57 y.o.        LOS:  LOS: 0 days     ACUTE DIAGNOSES:  Hydronephrosis, unspecified hydronephrosis type [N13.30]    CHRONIC MEDICAL DIAGNOSES:  Problem List as of 3/19/2018  Date Reviewed: 3/19/2018          Codes Class Noted - Resolved    Hydronephrosis ICD-10-CM: N13.30  ICD-9-CM: 812  3/19/2018 - Present        Hydronephrosis with ureteropelvic junction (UPJ) obstruction ICD-10-CM: Q62.0  ICD-9-CM: 214  2/14/2018 - Present        Peritoneal carcinomatosis ([facility] ICD-10-CM: C78.6, C80.1  ICD-9-CM: 197.6, 199.1  11/15/2017 - Present        Colon cancer metastasized to multiple sites [facility]) ICD-10-CM: C18.9  ICD-9-CM: 153.9  11/15/2017 - Present        Antineoplastic chemotherapy induced anemia ICD-10-CM: D64.81, T45.1X5A  ICD-9-CM: 285.3, E933.1  1/25/2017 - Present        Chemotherapy induced neutropenia (HCC) ICD-10-CM: D70.1, T45.1X5A  ICD-9-CM: 288.03, E933.1  1/25/2017 - Present        Colorectal carcinoma ([facility] ICD-10-CM: C19  ICD-9-CM: 154.0  12/5/2016 - Present        Anemia in chronic kidney disease (CKD) ICD-10-CM: N18.9, D63.1  ICD-9-CM: 285.21  9/28/2016 - Present        Iron deficiency anemia due to chronic blood loss ICD-10-CM: D50.0  ICD-9-CM: 280.0  9/28/2016 - Present              PROCEDURE:  Cystoscopy, left ureteral stent placement. Instructions:  Please drink plenty of fluids to promote clear yellow urine. You may have blood in your urine which is normal.  A small amount of blood can cause all of the urine to look red. Please contact the office is you are passing large clots as this may be a sign of more serious bleeding. You have a stent in your ureter. This may cause flank or lower abdominal discomfort which is normal.     You may resume all regular activity after 24 hours.      DISCHARGE MEDICATIONS:     · It is important that you take the medication exactly as they are prescribed. · Keep your medication in the bottles provided by the pharmacist and keep a list of the medication names, dosages, and times to be taken in your wallet. · Do not take other medications without consulting your doctor. DIET:  Regular Diet    ACTIVITY: Activity as tolerated and no driving for today    ADDITIONAL INFORMATION: If you experience any of the following symptoms then please call your primary care physician or return to the emergency room if you cannot get hold of your doctor: Fever, chills, nausea, vomiting, diarrhea, change in mentation, falling, bleeding, shortness of breath. FOLLOW UP CARE:  Dr. Janet Blanchard will see you in 3 months  in the office. Office info:  [place]: [address]: [phone]           Information obtained by :  I understand that if any problems occur once I am at home I am to contact my physician. I understand and acknowledge receipt of the instructions indicated above.                                                                                                                                            Physician's or R.N.'s Signature                                                                  Date/Time                                                                                                                                              Patient or Representative Signature                                                          Date/Time

## 2018-03-19 NOTE — ANESTHESIA POSTPROCEDURE EVALUATION
Post-Anesthesia Evaluation and Assessment    Patient: Cheryl Chavis MRN: [id number]  SSN: [id number]    YOB: 1955  Age: 57 y.o. Sex: female     VS from flow sheet    Cardiovascular Function/Vital Signs  Visit Vitals    /55    Pulse 66    Temp 36.4 °C (97.6 °F)    Resp 19    Ht 5' (1.524 m)    Wt 89.9 kg (198 lb 4 oz)    SpO2 100%    BMI 38.72 kg/m2       Patient is status post general anesthesia for Procedure(s):  CYSTOSCOPY/LEFT RETROGRADE PYELOGRAM/URETERAL STENT PLACEMENT/C-ARM. Nausea/Vomiting: None    Postoperative hydration reviewed and adequate. Pain:  Pain Scale 1: Numeric (0 - 10) (03/19/18 1135)  Pain Intensity 1: 0 (03/19/18 1135)   Managed    Neurological Status:   Neuro (WDL): Within Defined Limits (03/19/18 1107)   At baseline    Mental Status and Level of Consciousness: Arousable    Pulmonary Status:   O2 Device: Room air (03/19/18 1112)   Adequate oxygenation and airway patent    Complications related to anesthesia: None    Post-anesthesia assessment completed.  No concerns    Signed By: Samuel Solis MD     March 19, 2018

## 2018-03-28 PROBLEM — E66.01 OBESITY, MORBID (HCC): Status: ACTIVE | Noted: 2018-01-01

## 2018-03-30 NOTE — PROGRESS NOTES
Name: Afia Mitchell     MRN: [id number]                                                                                                               : 1955     D/C pump/Neulasta     Ms. Polk arrived ambulatory to [facility] at 1420. She  was assessed and education was provided.     Ms. Polk's vitals were reviewed and patient was observed for 5 minutes prior to procedure. Visit Vitals    BP (!) 154/92 (BP 1 Location: Left arm, BP Patient Position: Sitting)    Pulse 77    Temp 98.7 °F (37.1 °C)    Resp 18    SpO2 97%     CADD pump completed infusion and removed from patient.      Port flushed with 20 mL and 500 units of heparin after blood return was verified. Mediport de-accessed and a Band-aid applied to the site.      Neulasta 6 mg applied to the back of the right arm. Green light verified to be blinking. Patient notified that she can remove the OBI in approximately 28 hours at 1900. Patient verbalized understanding.      Ms. Polk tolerated the procedure, and had no complaints.     Ms. Polk was discharged from [place] [address] in stable condition at 1440.  She is to return on 2018 at 1100 for her next appointment for procrit appointment.  Nel Kahn RN  18

## 2018-04-11 NOTE — PROGRESS NOTES
[facility] Progress Note    Date: 2018    Name: Shanique Bell              MRN: [id number]              : 1955    Folfiri: [id number]     Pt to [facility], ambulatory, at 1115. Ms. Sarahi James was assessed and education was provided. Ms. Polk's vitals were reviewed. Visit Vitals    BP (!) 155/91 (BP 1 Location: Left arm, BP Patient Position: Sitting)    Pulse 73    Temp 98.3 °F (36.8 °C)    Resp 18    Ht 4' 11.84\" (1.52 m)    Wt 88.4 kg (194 lb 12.8 oz)    Breastfeeding No    BMI 38.24 kg/m2       Right chest mediport accessed with 20 g 1 inch orozco needle. Port flushed easily and had brisk blood return. Blood drawn off and sent for CBC, hep function panel, and BMP run on the Istat per written orders after 10 ml waste. Lab results were obtained and reviewed. Recent Results (from the past 12 hour(s))   CBC WITH 3 PART DIFF    Collection Time: 18 11:28 AM   Result Value Ref Range    WBC 13.0 4.5 - 13.0 K/uL    RBC 3.20 (L) 4.10 - 5.10 M/uL    HGB 9.9 (L) 12.0 - 16 g/dL    HCT 31.5 (L) 36 - 48 %    MCV 98.4 78 - 102 FL    MCH 30.9 25.0 - 35.0 PG    MCHC 31.4 31 - 37 g/dL    RDW 16.5 (H) 11.5 - 14.5 %    PLATELET 997 630 - 040 K/uL    NEUTROPHILS 76 (H) 40 - 70 %    MIXED CELLS 7 0.1 - 17 %    LYMPHOCYTES 17 14 - 44 %    ABS. NEUTROPHILS 9.9 (H) 1.8 - 9.5 K/UL    ABS. MIXED CELLS 0.9 0.0 - 2.3 K/uL    ABS. LYMPHOCYTES 2.2 1.1 - 5.9 K/UL    DF AUTOMATED     HEPATIC FUNCTION PANEL    Collection Time: 18 11:28 AM   Result Value Ref Range    Protein, total 6.6 6.4 - 8.2 g/dL    Albumin 3.3 (L) 3.4 - 5.0 g/dL    Globulin 3.3 2.0 - 4.0 g/dL    A-G Ratio 1.0 0.8 - 1.7      Bilirubin, total 0.3 0.2 - 1.0 MG/DL    Bilirubin, direct <0.1 0.0 - 0.2 MG/DL    Alk.  phosphatase 192 (H) 45 - 117 U/L    AST (SGOT) 13 (L) 15 - 37 U/L    ALT (SGPT) 23 13 - 56 U/L   POC CHEM8    Collection Time: 18 11:34 AM   Result Value Ref Range    CO2, POC 19 19 - 24 MMOL/L    Glucose,  (H) 74 - 106 MG/DL    BUN, POC 36 (H) 7 - 18 MG/DL    Creatinine, POC 2.7 (H) 0.6 - 1.3 MG/DL    GFRAA, POC 22 (L) >60 ml/min/1.73m2    GFRNA, POC 18 (L) >60 ml/min/1.73m2    Sodium,  136 - 145 MMOL/L    Potassium, POC 4.2 3.5 - 5.5 MMOL/L    Calcium, ionized (POC) 1.24 1.12 - 1.32 mmol/L    Chloride,  (H) 100 - 108 MMOL/L    Anion gap, POC 14 10 - 20      Hematocrit, POC 29 (L) 36 - 49 %    Hemoglobin, POC 9.9 (L) 12 - 16 G/DL     Lab results within ordered parameters to give chemo today. ANC = 9.9, PLT = 307. Chemo dosages verified with today's BSA and found to be within 10% of ordered dosages. Pre-medications (Decadron 12mg IVPB and Aloxi 0.25mg IVP) were administered as ordered and chemotherapy was initiated after blood return from port re-verified. D5W started at [facility] per written order. Irinotecan 320 mg  was infused over 90 minutes concurrently with Leucovorin 700 mg. VS stable at end of infusion and pt denied complaints. Line flushed with D5 and blood return from port re-verified. Fluorouracil 400 mg was given slow IVP over 3 minutes. Line flushed with 10 mL NS and blood return was re-verified. Fluorouracil 4300 mg CADD pump was connected to patient. Connections were secured with tape and the volume was verified to be infusing. Patient Vitals for the past 12 hrs:   Temp Pulse Resp BP   04/11/18 1518 98.3 °F (36.8 °C) 73 18 (!) 155/91   04/11/18 1115 98.1 °F (36.7 °C) 83 18 143/75       HGB= 9.9 and HCT= 31.5  Lab values are within range to HOLD her Procrit injection due to the written order. Ms. Júnior Mcmahan tolerated infusion/injection. Patient armband removed and shredded. Ms. Júnior Mcmahan was discharged from [place] [address] in stable condition at 1520. She is to return on 04/13/2018 at 1300 for her next appointment for pump D/C and Neulasta.      July Auguste RN  April 11, 2018

## 2018-04-13 NOTE — PROGRESS NOTES
Name: Fermin Gomez     MRN: [id number]                                                                                                               : 1955     D/C pump/Neulasta     Ms. Polk arrived ambulatory to [facility] at 1400. She  was assessed and education was provided.     Ms. Polk's vitals were reviewed and patient was observed for 5 minutes prior to procedure. Visit Vitals    /81 (BP 1 Location: Right arm, BP Patient Position: Sitting)    Pulse 78    Temp 98.9 °F (37.2 °C)    Resp 20    SpO2 100%     CADD pump completed infusion and removed from patient.      Port flushed with 30 mL NS and 500 units of heparin after blood return was verified. Mediport de-accessed and a Band-aid applied to the site.      Neulasta 6 mg applied to the back of the right arm. Green light verified to be blinking. Patient notified that she can remove the OBI in approximately 27 hours at [phone]. Patient verbalized understanding.      Ms. Polk tolerated the procedure, and had no complaints.     Ms. Polk was discharged from [place] [address] in stable condition at 1425.  She is to return on 2018 at 0900 for her next chemotherapy appointment.  [address] [facility] Trevor, RN  18

## 2018-04-18 NOTE — MR AVS SNAPSHOT
[facility] [address] [place] [address] 171 200 [facility] 
[phone] Patient: Cecily Vieira MRN: [id number] Visit Information Date & Time Provider Department Dept. Phone Encounter #  
 4/18/2018 11:00 AM Sandrita Posada MD [facility] Medical Oncology [phone] [id number] Follow-up Instructions Return in about 4 weeks (around 5/16/2018). Your Appointments 5/16/2018 10:30 AM  
Office Visit with Sandrita Posada MD  
[facility]  Oncology [address]) Appt Note: 4 weeks [place] [address], [place] [address] [facility] [address] [facility], [place] [address] [facility] [address]  
  
    
  
 6/15/2018 10:15 AM  
Any with Kaleb Verduzco MD  
[facility] ([address]) Appt Note: 3m post op  
 [address] [facility] [place] [facility] [address]  
  
   
 [address] [facility] [address] Upcoming Health Maintenance Date Due Hepatitis C Screening 1955 PAP AKA CERVICAL CYTOLOGY 8/26/1974 BREAST CANCER SCRN MAMMOGRAM 8/26/2003 FOBT Q 1 YEAR AGE 50-75 8/26/2003 ZOSTER VACCINE AGE 60> 6/26/2013 Influenza Age 5 to Adult 8/1/2017 MEDICARE YEARLY EXAM 3/27/2018 Pneumococcal 19-64 Highest Risk (3 of 3 - PCV13) 12/11/2018 DTaP/Tdap/Td series (2 - Td) 3/15/2026 Allergies as of 4/18/2018  Review Complete On: 4/13/2018 By: Andrew Miller RN Severity Noted Reaction Type Reactions Latex  08/04/2016    Other (comments) Latex  02/12/2018    Rash Lisinopril High 04/25/2017    Hives Asa-acetaminophen-caff-buffers  08/04/2016    Other (comments) Aspirin  02/12/2018    Hives Codeine  08/04/2016    Other (comments) Codeine  02/12/2018    Hives Lisinopril  02/12/2018    Hives Pcn [Penicillins]  08/04/2016    Other (comments) Penicillin G  02/12/2018    Hives Sulfa (Sulfonamide Antibiotics)  08/04/2016    Other (comments) Current Immunizations  Reviewed on 3/30/2018 Name Date Influenza Vaccine 3/15/2016 12:00 AM, 3/1/2016 Pneumococcal Polysaccharide (PPSV-23) 12/11/2017 12:00 AM  
 Pneumococcal Vaccine (Unspecified Type) 3/1/2016 Tdap 3/15/2016 12:00 AM  
  
 Not reviewed this visit You Were Diagnosed With   
  
 Codes Comments Iron deficiency anemia due to chronic blood loss    -  Primary ICD-10-CM: D50.0 ICD-9-CM: 280.0 Colorectal carcinoma ([facility]     ICD-10-CM: C19 
ICD-9-CM: 154.0 Antineoplastic chemotherapy induced anemia     ICD-10-CM: D64.81, T45.1X5A 
ICD-9-CM: 285.3, E933.1 Chemotherapy induced neutropenia (HCC)     ICD-10-CM: D70.1, T45.1X5A 
ICD-9-CM: 288.03, E933.1 Vitals BP Pulse Temp OB Status Smoking Status 110/60 61 98.5 °F (36.9 °C) (Oral) Hysterectomy Never Smoker Vitals History Preferred Pharmacy Pharmacy Name Phone [address] [facility] [employer] [facility] [address] [facility], [address] [facility] Your Updated Medication List  
  
   
This list is accurate as of 4/18/18 12:39 PM.  Always use your most recent med list.  
  
  
  
  
 acetaminophen 650 mg Tber Commonly known as:  TYLENOL  
1,300 mg.  
  
 albuterol 90 mcg/actuation inhaler Commonly known as:  PROVENTIL HFA, VENTOLIN HFA, PROAIR HFA  
2 Puffs. * allopurinol 300 mg tablet Commonly known as:  Jesika Galley Take  by mouth daily. * allopurinol 300 mg tablet Commonly known as:  ZYLOPRIM  
300 mg. amLODIPine 5 mg tablet Commonly known as:  [place] Take 5 mg by mouth daily. * bisoprolol-hydroCHLOROthiazide 10-6.25 mg per tablet Commonly known as:  [facility] Take 1 Tab by mouth daily. * bisoprolol-hydroCHLOROthiazide 10-6.25 mg per tablet Commonly known as:  [facility] Take 1 Tab by Mouth Once a Day. * bisoprolol-hydroCHLOROthiazide 10-6.25 mg per tablet Commonly known as:  [facility] Take 1 Tab by mouth daily. cloNIDine HCl 0.1 mg tablet Commonly known as:  CATAPRES Take  by mouth two (2) times a day. * desonide 0.05 % cream  
Commonly known as:  Raghu Gaw Apply  to affected area two (2) times a day. * desonide 0.05 % cream  
Commonly known as:  Raghu Gaw Use 1 Application to affected area Twice Daily. dicyclomine 10 mg capsule Commonly known as:  BENTYL Take 1 Cap by mouth three (3) times daily as needed. * BENADRYL 25 mg capsule Generic drug:  diphenhydrAMINE Take 25 mg by mouth every six (6) hours as needed. * diphenhydrAMINE 25 mg capsule Commonly known as:  BENADRYL Take 1 with compazine every 6 hours for nausea for 3 days then, as needed. * ergocalciferol 50,000 unit capsule Commonly known as:  ERGOCALCIFEROL Take 50,000 Units by mouth. * ergocalciferol 50,000 unit capsule Commonly known as:  ERGOCALCIFEROL  
50,000 Units every seven (7) days. febuxostat 40 mg Tab tablet Commonly known as:  José Luis Shimon Take 40 mg by mouth daily. furosemide 20 mg tablet Commonly known as:  LASIX Take 1/2-1 tablet daily if needed for swelling. * glimepiride 4 mg tablet Commonly known as:  AMARYL Take  by mouth every morning. * glimepiride 4 mg tablet Commonly known as:  AMARYL Take one in the am.  New dose. * glipiZIDE 10 mg tablet Commonly known as:  Reji Fragmin Take 10 mg by mouth two (2) times a day. * glipiZIDE 10 mg tablet Commonly known as:  Reji Fragmin 10 mg.  
  
 insulin aspart U-100 100 unit/mL Inpn Commonly known as:  NOVOLOG  
by SubCUTAneous route. * lidocaine-prilocaine topical cream  
Commonly known as:  EMLA Apply  to affected area as needed for Pain. * lidocaine-prilocaine topical cream  
Commonly known as:  EMLA Place cream over mediport 1 hour prior to chemotherapy and then place saran wrap over area. Every chemo treatment. NEURONTIN 100 mg capsule Generic drug:  gabapentin Take 100 mg by mouth nightly. omeprazole 20 mg capsule Commonly known as:  PRILOSEC Take 20 mg by mouth daily. * ZOFRAN (AS HYDROCHLORIDE) 8 mg tablet Generic drug:  ondansetron hcl Take 8 mg by mouth every eight (8) hours as needed for Nausea. * ondansetron hcl 8 mg tablet Commonly known as:  ZOFRAN (AS HYDROCHLORIDE) Take one tablet by mouth every 8 hours for nausea beginning the night of chemo for 3 days. potassium chloride 20 mEq tablet Commonly known as:  K-DUR, KLOR-CON Take 1 Tab by mouth two (2) times a day. * predniSONE 20 mg tablet Commonly known as:  Chinyere Sylwia Take  by mouth daily (with breakfast). * predniSONE 20 mg tablet Commonly known as:  Chinyere Sylwia Take 2 Tabs by mouth daily. Take 2 tabs on days 2 and 3 of each chemo cycle * COMPAZINE 10 mg tablet Generic drug:  prochlorperazine Take 5 mg by mouth every six (6) hours as needed. * prochlorperazine 10 mg tablet Commonly known as:  COMPAZINE Take 1 tablet every 6 hours with Benadryl 25mg for nausea for 3 days then, as needed. traMADol 50 mg tablet Commonly known as:  ULTRAM  
Take 50 mg by mouth every six (6) hours as needed for Pain. * warfarin 1 mg tablet Commonly known as:  COUMADIN Take one 1 tablet by mouth everyday at 6pm or after. You will continue to take this medication. Everyday until mediport is removed. * warfarin 3 mg tablet Commonly known as:  COUMADIN Take 3 mg by mouth daily. Takes 1 and 1/2 tab Sunday, Tuesday, and Thursday. Takes 1 tablet Monday, Wednesday, and Friday * Notice: This list has 25 medication(s) that are the same as other medications prescribed for you.  Read the directions carefully, and ask your doctor or other care provider to review them with you. We Performed the Following COMPLETE CBC & AUTO DIFF WBC [02686 CPT(R)] Follow-up Instructions Return in about 4 weeks (around 5/16/2018). To-Do List   
 04/18/2018 Lab:  CBC WITH 3 PART DIFF   
  
 04/18/2018 Lab:  CEA   
  
 04/18/2018 Lab:  FERRITIN   
  
 04/18/2018 Lab:  IRON PROFILE   
  
 04/18/2018 Lab:  METABOLIC PANEL, COMPREHENSIVE   
  
 04/25/2018 9:00 AM  
  Appointment with [facility] NURSE 3 at [facility] ([phone])  
  
 04/27/2018 1:00 PM  
  Appointment with [facility] INF[facility] NURSE 1 at [facility] ([phone]) [facility]! Dear Miguel Villavicencio: Thank you for requesting a [employer] account. Our records indicate that you already have an active [employer] account. You can access your account anytime at [email or web address] Did you know that you can access your hospital and ER discharge instructions at any time in [employer]? You can also review all of your test results from your hospital stay or ER visit. Additional Information If you have questions, please visit the Frequently Asked Questions section of the [employer] website at [email or web address] Remember, [employer] is NOT to be used for urgent needs. For medical emergencies, dial 911. Now available from your iPhone and Android! Please provide this summary of care documentation to your next provider. Your primary care clinician is listed as Gosia Sherman. If you have any questions after today's visit, please call [phone].

## 2018-04-21 NOTE — PROGRESS NOTES
Hematology/Oncology  Progress Note    Name: Brannon Navas  Date: 2018  : 1955    PCP: Toni Stephens NP     Ms. Alia Aguilar is a 57 y.o. -American woman with metastatic colorectal carcinoma/carcinomatosis  Current therapy: FOLFIRI chemotherapy regimen      Subjective:     Ms. Alia Aguilar is a 61-year-old -American woman with abdominal carcinomatosis from metastatic colorectal carcinoma. She was started on FOLFIRI regimen and she is here today for follow up. She states that she has no pain, her appetite has significantly increased and her energy level is up. She denies diarrhea, nausea, or vomiting. Her only complaint is occasional abdominal cramping during chemo. She denies constipation. Otherwise there are no additional complaints to report. She has recently been found to have hydronephrosis and on the advice of the oncologic surgeon was referred to a urologist for an assessment. The patient has been evaluated and treated by the urologist.  Today she reports that she is doing reasonably well. Past medical history, family history, and social history: these were reviewed and remains unchanged. Past Medical History:   Diagnosis Date    Asthma     Cancer ([facility] 10/2016    colon    Diabetes ([facility]     Gout     Heart disease     Hypertension     Kidney disease     Maintenance chemotherapy     Neuropathic pain of foot      Past Surgical History:   Procedure Laterality Date    HX BACK SURGERY      HX HYSTERECTOMY      HX VASCULAR ACCESS      [facility]     Social History     Social History    Marital status:      Spouse name: N/A    Number of children: N/A    Years of education: N/A     Occupational History    Not on file.      Social History Main Topics    Smoking status: Never Smoker    Smokeless tobacco: Never Used    Alcohol use No    Drug use: No    Sexual activity: Not on file     Other Topics Concern    Not on file     Social History Narrative    ** Merged History Encounter **          Family History   Problem Relation Age of Onset    Family history unknown: Yes     Current Outpatient Prescriptions   Medication Sig Dispense Refill    bisoprolol-hydroCHLOROthiazide (ZIAC) 10-6.25 mg per tablet Take 1 Tab by mouth daily.  warfarin (COUMADIN) 3 mg tablet Take 3 mg by mouth daily. Takes 1 and 1/2 tab Sunday, Tuesday, and Thursday. Takes 1 tablet Monday, Wednesday, and Friday      allopurinol (ZYLOPRIM) 300 mg tablet Take  by mouth daily.  amLODIPine (NORVASC) 5 mg tablet Take 5 mg by mouth daily.  diphenhydrAMINE (BENADRYL) 25 mg capsule Take 25 mg by mouth every six (6) hours as needed.  bisoprolol-hydroCHLOROthiazide (ZIAC) 10-6.25 mg per tablet Take 1 Tab by mouth daily.  cloNIDine HCl (CATAPRES) 0.1 mg tablet Take  by mouth two (2) times a day.  prochlorperazine (COMPAZINE) 10 mg tablet Take 5 mg by mouth every six (6) hours as needed.  desonide (TRIDESILON) 0.05 % cream Apply  to affected area two (2) times a day.  ergocalciferol (ERGOCALCIFEROL) 50,000 unit capsule Take 50,000 Units by mouth.  febuxostat (ULORIC) 40 mg tab tablet Take 40 mg by mouth daily.  glimepiride (AMARYL) 4 mg tablet Take  by mouth every morning.  glipiZIDE (GLUCOTROL) 10 mg tablet Take 10 mg by mouth two (2) times a day.  insulin aspart (NOVOLOG) 100 unit/mL inpn by SubCUTAneous route.  lidocaine-prilocaine (EMLA) topical cream Apply  to affected area as needed for Pain.  omeprazole (PRILOSEC) 20 mg capsule Take 20 mg by mouth daily.  predniSONE (DELTASONE) 20 mg tablet Take  by mouth daily (with breakfast).  traMADol (ULTRAM) 50 mg tablet Take 50 mg by mouth every six (6) hours as needed for Pain.  ondansetron hcl (ZOFRAN, AS HYDROCHLORIDE,) 8 mg tablet Take 8 mg by mouth every eight (8) hours as needed for Nausea.  gabapentin (NEURONTIN) 100 mg capsule Take 100 mg by mouth nightly.       dicyclomine (BENTYL) 10 mg capsule Take 1 Cap by mouth three (3) times daily as needed. 90 Cap 0    predniSONE (DELTASONE) 20 mg tablet Take 2 Tabs by mouth daily. Take 2 tabs on days 2 and 3 of each chemo cycle 4 Tab 2    lidocaine-prilocaine (EMLA) topical cream Place cream over mediport 1 hour prior to chemotherapy and then place saran wrap over area. Every chemo treatment. 30 g 1    potassium chloride (K-DUR, KLOR-CON) 20 mEq tablet Take 1 Tab by mouth two (2) times a day. 60 Tab 0    ondansetron hcl (ZOFRAN, AS HYDROCHLORIDE,) 8 mg tablet Take one tablet by mouth every 8 hours for nausea beginning the night of chemo for 3 days. 9 Tab 3    diphenhydrAMINE (BENADRYL) 25 mg capsule Take 1 with compazine every 6 hours for nausea for 3 days then, as needed. 60 Cap 3    prochlorperazine (COMPAZINE) 10 mg tablet Take 1 tablet every 6 hours with Benadryl 25mg for nausea for 3 days then, as needed. 60 Tab 3    warfarin (COUMADIN) 1 mg tablet Take one 1 tablet by mouth everyday at 6pm or after. You will continue to take this medication. Everyday until mediport is removed. (Patient taking differently: 3 mg every Tuesday and Thursday. Take one 1 tablet by mouth everyday at 6pm or after. You will continue to take this medication. Everyday until mediport is removed.) 30 Tab 3    acetaminophen (TYLENOL) 650 mg CR tablet 1,300 mg.      albuterol (PROVENTIL HFA, VENTOLIN HFA, PROAIR HFA) 90 mcg/actuation inhaler 2 Puffs.  ergocalciferol (ERGOCALCIFEROL) 50,000 unit capsule 50,000 Units every seven (7) days.  furosemide (LASIX) 20 mg tablet Take 1/2-1 tablet daily if needed for swelling.  allopurinol (ZYLOPRIM) 300 mg tablet 300 mg.      desonide (TRIDESILON) 0.05 % cream Use 1 Application to affected area Twice Daily.  bisoprolol-hydrochlorothiazide (ZIAC) 10-6.25 mg per tablet Take 1 Tab by Mouth Once a Day.       glipiZIDE (GLUCOTROL) 10 mg tablet 10 mg.      glimepiride (AMARYL) 4 mg tablet Take one in the am.  New dose. Facility-Administered Medications Ordered in Other Visits   Medication Dose Route Frequency Provider Last Rate Last Dose    [START ON 4/27/2018] pegfilgrastim (NEULASTA) wearable SQ injector 6 mg  6 mg SubCUTAneous ONCE Jacobo Agarwal MD        [START ON 4/25/2018] dexamethasone (DECADRON) 12 mg in 0.9% sodium chloride 50 mL IVPB  12 mg IntraVENous ONCE MD Lupis Romero [START ON 4/25/2018] irinotecan (CAMPTOSAR) 350 mg in dextrose 5% 500 mL chemo infusion  350 mg IntraVENous ONCE MD Lupis Romero [START ON 4/25/2018] leucovorin (WELLCOVORIN) 780 mg in dextrose 5% 250 mL IVPB  780 mg IntraVENous ONCE MD Lupis Romero ON 4/25/2018] fluorouracil (ADRUCIL) chemo syringe 780 mg  780 mg IntraVENous ONCE MD Lupis Romero ON 4/25/2018] fluorouracil (ADRUCIL) 4,680 mg in 0.9% sodium chloride 250 mL CADD Cassette  4,680 mg IntraVENous ONCE Jacobo Agarwal MD           Review of Systems  Constitutional: The patient denies acute distress or discomfort. HEENT: The patient denies recent head trauma, eye pain, blurred vision,  hearing deficit, oropharyngeal mucosal pain or lesions, and the patient denies throat pain or discomfort. Lymphatics: The patient denies palpable peripheral lymphadenopathy. Hematologic: The patient denies having bruising, bleeding, or progressive fatigue. Respiratory: Patient denies having shortness of breath, cough, sputum production, fever, or dyspnea on exertion. Cardiovascular: The patient denies having leg pain, leg swelling, heart palpitations, chest permit, chest pain, or lightheadedness. The patient denies having dyspnea on exertion. Gastrointestinal: The patient reports occasional nausea from chemotherapy which is well controlled with antinausea medication. She denies having any emesis or diarrhea. Genitourinary: Patient denies having urinary urgency, frequency, or dysuria.   The patient denies having blood in the urine. Psychological: The patient denies having symptoms of nervousness, anxiety, depression, or thoughts of harming self. Skin: Patient denies having skin rashes, skin, ulcerations, or unexplained itching or pruritus. Musculoskeletal: The patient denies having pain in the joints or bones. Objective:     Visit Vitals    /60    Pulse 61    Temp 98.5 °F (36.9 °C) (Oral)     ECOG PS=0; Pain score=0/10    Physical Exam:   Gen. Appearance: The patient is in no acute distress. Skin: There is no bruise or rash. HEENT: The exam is unremarkable. Neck: Supple without lymphadenopathy or thyromegaly. Lungs: Clear to auscultation and percussion; there are no wheezes or rhonchi. Heart: Regular rate and rhythm; there are no murmurs, gallops, or rubs. Abdomen: Bowel sounds are present and normal.  There is no guarding, tenderness, or hepatosplenomegaly. The patient has a colostomy bag in place. Extremities: There is no clubbing, cyanosis, or edema. Neurologic: There are no focal neurologic deficits. Lymphatics: There is no palpable peripheral lymphadenopathy. Musculoskeletal: The patient has full range of motion at all joints. There is no evidence of joint deformity or effusions. There is no focal joint tenderness. Psychological/psychiatric: There is no clinical evidence of anxiety, depression, or melancholy.     Lab data:      Results for orders placed or performed during the hospital encounter of 04/18/18   CBC WITH 3 PART DIFF     Status: Abnormal   Result Value Ref Range Status    WBC 8.7 4.5 - 13.0 K/uL Final    RBC 2.88 (L) 4.10 - 5.10 M/uL Final    HGB 8.8 (L) 12.0 - 16 g/dL Final    HCT 27.9 (L) 36 - 48 % Final    MCV 96.9 78 - 102 FL Final    MCH 30.6 25.0 - 35.0 PG Final    MCHC 31.5 31 - 37 g/dL Final    RDW 15.8 (H) 11.5 - 14.5 % Final    PLATELET 404 315 - 674 K/uL Final    NEUTROPHILS 74 (H) 40 - 70 % Final    MIXED CELLS 9 0.1 - 17 % Final    LYMPHOCYTES 16 14 - 44 % Final ABS. NEUTROPHILS 6.5 1.8 - 9.5 K/UL Final    ABS. MIXED CELLS 0.8 0.0 - 2.3 K/uL Final    ABS. LYMPHOCYTES 1.4 1.1 - 5.9 K/UL Final     Comment: Test performed at [place] [address] Location. Results Reviewed by Medical Director. DF AUTOMATED   Final           Assessment:     1. Iron deficiency anemia due to chronic blood loss    2. Colorectal carcinoma ([address]    3. Antineoplastic chemotherapy induced anemia    4. Chemotherapy induced neutropenia (HCC)      Plan:   Colorectal carcinoma with abdominal carcinomatosis: On 5/24/2017 the patient underwent colectomy with omentectomy with the findings of adenocarcinoma involving at least 80% of the omentum. The primary tumor appeared to originate in the transverse colon and measured approximately 5 cm. There was microscopic perforation of the colon and metastatic involvement into the left ovary. The tumor was pathologically staged as a T4 a N1M1 stage IV malignancy. The patient was started on FOLFIRI chemotherapy regimen on 08/30/17. The regimen is a combination of Irinotecan at 180 mg/m² on day 1, 5 fluorouracil given at a dose of 400 mg/m² by IV bolus on day 1 followed by 2400 mg/m² continuous infusion for 46 hours on days 1 and 2, leucovorin 400 mg/m² IV on day 1 as a 2 hour infusion prior to 5-fluorouracil. A comprehensive metabolic panel, CEA level, ferritin level, iron profile, and CBC will be ordered at this time. Currently, the patient is tolerating the treatment well and it will be continued. Plan to the patient that her most recent CEA level from 2/14/2018 had declined to 82 ng/mL. The CEA level from 3/14/2018 was 142 ng/mL. A prior level from 1/17/2018 was 234.7 ng/mL. Therefore we will continue the therapy as outlined. Anemia associated with chronic kidney disease and chemotherapy-induced anemia (persistent problem): I have explained to the patient that her CBC on today showed a hemoglobin of 8.8 g/dL with hematocrit of 27.9 %.   Procrit at a dose of 60,000 units will be provided whenever her hemoglobin is below 10 g/dL hematocrit is below 30% every 2 weeks. The iron profile and ferritin levels will be ordered at this time. Hydronephrosis (persistent problem): The patient is currently being seen by the urologist and is tentatively scheduled to have ureteral stents inserted in the upcoming week. Chemotherapy-induced leukopenia/neutropenia (improved problem): The CBC on today showed a normal WBC count of 8.7 with an absolute neutrophil count of 6.5 this will be monitored on a regular basis. Neulasta following each chemo cycle will continue. I will have the patient return to clinic for complete reassessment again in 4 weeks.     Orders Placed This Encounter    COMPLETE CBC & AUTO DIFF WBC    InHouse CBC ([facility])     Standing Status:   Future     Number of Occurrences:   1     Standing Expiration Date:   0/24/0692    METABOLIC PANEL, COMPREHENSIVE     Standing Status:   Future     Number of Occurrences:   1     Standing Expiration Date:   4/19/2019    IRON PROFILE     Standing Status:   Future     Number of Occurrences:   1     Standing Expiration Date:   4/19/2019    FERRITIN     Standing Status:   Future     Number of Occurrences:   1     Standing Expiration Date:   4/19/2019    CEA     Standing Status:   Future     Number of Occurrences:   1     Standing Expiration Date:   4/19/2019       Alena Meyers MD  4/18/2018

## 2018-04-25 NOTE — PROGRESS NOTES
[facility] Progress Note    Date: 2018    Name: Nicole Marsh              MRN: [id number]              : 1955    Folfiri: [id number]     Pt to [address], ambulatory, at 0815. Ms. Malcolm Nguyen was assessed and education was provided. No complaints or concerns voiced    Ms. Polk's vitals were reviewed. Visit Vitals    /80 (BP 1 Location: Left arm, BP Patient Position: At rest)    Pulse 79    Temp 98.7 °F (37.1 °C)    Resp 18    Ht 4' 11.8\" (1.519 m)    Wt 91.3 kg (201 lb 3.2 oz)    SpO2 100%    BMI 39.56 kg/m2       Right chest mediport accessed with 20 g 1 inch orozco needle. Port flushed easily and had brisk blood return. Blood drawn off for CBC and ISTAT BMP. Lab results were obtained and reviewed. Recent Results (from the past 12 hour(s))   CBC WITH 3 PART DIFF    Collection Time: 18  9:22 AM   Result Value Ref Range    WBC 21.6 (HH) 4.5 - 13.0 K/uL    RBC 2.78 (L) 4.10 - 5.10 M/uL    HGB 8.6 (L) 12.0 - 16 g/dL    HCT 27.4 (L) 36 - 48 %    MCV 98.6 78 - 102 FL    MCH 30.9 25.0 - 35.0 PG    MCHC 31.4 31 - 37 g/dL    RDW 16.5 (H) 11.5 - 14.5 %    PLATELET 252 187 - 802 K/uL    NEUTROPHILS 81 (H) 40 - 70 %    MIXED CELLS 7 0.1 - 17 %    LYMPHOCYTES 12 (L) 14 - 44 %    ABS. NEUTROPHILS 17.5 (H) 1.8 - 9.5 K/UL    ABS. MIXED CELLS 1.5 0.0 - 2.3 K/uL    ABS.  LYMPHOCYTES 2.6 1.1 - 5.9 K/UL    DF AUTOMATED     POC CHEM8    Collection Time: 18  9:30 AM   Result Value Ref Range    CO2, POC 21 19 - 24 MMOL/L    Glucose,  (H) 74 - 106 MG/DL    BUN, POC 24 (H) 7 - 18 MG/DL    Creatinine, POC 2.1 (H) 0.6 - 1.3 MG/DL    GFRAA, POC 29 (L) >60 ml/min/1.73m2    GFRNA, POC 24 (L) >60 ml/min/1.73m2    Sodium,  136 - 145 MMOL/L    Potassium, POC 3.9 3.5 - 5.5 MMOL/L    Calcium, ionized (POC) 1.21 1.12 - 1.32 mmol/L    Chloride,  (H) 100 - 108 MMOL/L    Anion gap, POC 17 10 - 20      Hematocrit, POC 28 (L) 36 - 49 %    Hemoglobin, POC 9.5 (L) 12 - 16 G/DL       [facility] Kimberley Alston NP notified of above WBC count, and patient being asymptomatic. No order received. Patient receives neulasta post chemo of day 3    Procrit injection 60,000 units given SQ to upper back of left arm per parameters. Patient tolerated well. Bandaid applied to site. Lab results within ordered parameters to give chemo today. Chemo dosages verified with today's BSA and found to be within 10% of ordered dosages. D5W flush bag hung at kvo rate. Pre-medications (Decadron 12mg IVPB and Aloxi 0.25mg IVP) were administered as ordered and chemotherapy was initiated after blood return from port re-verified. Irinotecan 350 mg  was infused over 90 minutes concurrently with Leucovorin 780 mg. VS stable at end of infusion and pt denied complaints. Line flushed with D5 and blood return from port re-verified. Fluorouracil 780 mg was given slow IVP over 3 minutes. Line flushed with 10 mL NS and blood return was re-verified. Fluorouracil 4680 mg CADD pump was connected to patient. Connections were secured with tape and the volume was verified to be infusing. Ms. Bre Cruz tolerated infusion/injection. Patient Vitals for the past 8 hrs:   Temp Pulse Resp BP SpO2   04/25/18 1359 98.7 °F (37.1 °C) 79 18 143/80 100 %   04/25/18 1250 98.6 °F (37 °C) 86 18 169/86 -   04/25/18 0915 98.2 °F (36.8 °C) 73 18 132/76 100 %         Patient armband removed and shredded. Ms. Bre Cruz was discharged from [place] [address] in stable condition at 1400. She is to return on 4/27/2018 at 1300 for her next  pump D/C/Neulasta appointment.     eDidra Holm RN  April 25, 2018

## 2018-04-27 NOTE — PROGRESS NOTES
[facility] Progress Note    Date: 2018    Name: Ranjeet Javier    MRN: [id number]         : 1955    Chemotherapy Cycle: C15 D3 D/C Pump and OBI    Ms. Polk to [facility], ambulatory, at 1345. Pt was assessed and education was provided. She denies complaints other than feeling tired. She reports her chemo was completed around 1215. Bag in cartridge appears empty. Ms. Polk's vitals were reviewed. Visit Vitals    /81 (BP 1 Location: Left arm, BP Patient Position: Sitting)    Pulse 69    Temp 98.8 °F (37.1 °C)    Resp 16    SpO2 99%       Pump removed and chemo discarded. Right chest mediport flushed easily and had brisk blood return. Mediport flushed with NS 20 ml and Heparin 500 units then de-accessed. No irritation or bleeding noted. Band-Aid applied. Neulasta 6 mg on-body injector placed on pt's right upper arm after cleaning site with alcohol. Observed injector until audible beeping and clicking heard and green blinking light visualized to indicate injector working properly. Pt given information card and pt instructions including MD number, expected time of med deliver, and injector lot number. Pt reminded to keep injector dry and not remove until two hours after injector beeps to indicate med delivery. Pt instructed she can begin taking Claritin 10 mg tomorrow and take every day for seven days. Pt verbalized understanding of how to monitor OBI and all other instructions given. Ms. Margarita Hooper tolerated the procedure, and had no complaints. Patient armband removed and shredded. Ms. Margarita Hooper was discharged from [place] [address] in stable condition at [phone]. She is to return on 18 at 0900 for her next chemo appointment.     Leelee Lema RN  2018

## 2018-05-07 NOTE — PROCEDURES
X RAYS AT [address] [place] [address]  5/7/2018    {Bilateral KNEE:     WEIGHT BEARING    Bones: No fractures, subluxations, or dislocations  Alignment: severe Valgus Knee alignment  Joint: Lateral joint with severe OA changes    Soft Tissues: Normal , mild swelling  Mineralization: suggests Osteoporosis    I have personally reviewed the results of the above study. The interpretation of this study is my professional opinion. DIAGNOSTIC STUDIES: X-ray of the tib/fib to include the right ankle, shows some mild osteoarthritic change seen to the right ankle. There is severe planovalgus alignment on the right foot and ankle. There is a degenerative bone spur seen to the plantar portion of the calcaneus. There are some calcifications of the arteries behind the knee in the soft tissues above the proximal one-third tibia. There are some calcifications seen in this right knee as well. The left knee shows some calcification of the arteries behind the tib/fib on the left lateral film and there is severe valgus alignment of the left knee in the AP view. The comparison ankle films on the left AP shows planovalgus alignment, quite significant and severe. I have personally reviewed the results of the above study. The interpretation of this study is my professional opinion.

## 2018-05-07 NOTE — PATIENT INSTRUCTIONS
Please follow up in 3 weeks. You are advised to contact us if your condition worsens. Knee Arthritis: Exercises  Your Care Instructions  Here are some examples of exercises for knee arthritis. Start each exercise slowly. Ease off the exercise if you start to have pain. Your doctor or physical therapist will tell you when you can start these exercises and which ones will work best for you. How to do the exercises  Knee flexion with heel slide    1. Lie on your back with your knees bent. 2. Slide your heel back by bending your affected knee as far as you can. Then hook your other foot around your ankle to help pull your heel even farther back. 3. Hold for about 6 seconds, then rest for up to 10 seconds. 4. Repeat 8 to 12 times. 5. Switch legs and repeat steps 1 through 4, even if only one knee is sore. Quad sets    1. Sit with your affected leg straight and supported on the floor or a firm bed. Place a small, rolled-up towel under your knee. Your other leg should be bent, with that foot flat on the floor. 2. Tighten the thigh muscles of your affected leg by pressing the back of your knee down into the towel. 3. Hold for about 6 seconds, then rest for up to 10 seconds. 4. Repeat 8 to 12 times. 5. Switch legs and repeat steps 1 through 4, even if only one knee is sore. Straight-leg raises to the front    1. Lie on your back with your good knee bent so that your foot rests flat on the floor. Your affected leg should be straight. Make sure that your low back has a normal curve. You should be able to slip your hand in between the floor and the small of your back, with your palm touching the floor and your back touching the back of your hand. 2. Tighten the thigh muscles in your affected leg by pressing the back of your knee flat down to the floor. Hold your knee straight. 3. Keeping the thigh muscles tight and your leg straight, lift your affected leg up so that your heel is about 12 inches off the floor. Hold for about 6 seconds, then lower slowly. 4. Relax for up to 10 seconds between repetitions. 5. Repeat 8 to 12 times. 6. Switch legs and repeat steps 1 through 5, even if only one knee is sore. Active knee flexion    1. Lie on your stomach with your knees straight. If your kneecap is uncomfortable, roll up a washcloth and put it under your leg just above your kneecap. 2. Lift the foot of your affected leg by bending the knee so that you bring the foot up toward your buttock. If this motion hurts, try it without bending your knee quite as far. This may help you avoid any painful motion. 3. Slowly move your leg up and down. 4. Repeat 8 to 12 times. 5. Switch legs and repeat steps 1 through 4, even if only one knee is sore. Quadriceps stretch (facedown)    1. Lie flat on your stomach, and rest your face on the floor. 2. Wrap a towel or belt strap around the lower part of your affected leg. Then use the towel or belt strap to slowly pull your heel toward your buttock until you feel a stretch. 3. Hold for about 15 to 30 seconds, then relax your leg against the towel or belt strap. 4. Repeat 2 to 4 times. 5. Switch legs and repeat steps 1 through 4, even if only one knee is sore. Stationary exercise bike    1. If you do not have a stationary exercise bike at home, you can find one to ride at your local health club or community center. 2. Adjust the height of the bike seat so that your knee is slightly bent when your leg is extended downward. If your knee hurts when the pedal reaches the top, you can raise the seat so that your knee does not bend as much. 3. Start slowly. At first, try to do 5 to 10 minutes of cycling with little to no resistance. Then increase your time and the resistance bit by bit until you can do 20 to 30 minutes without pain. 4. If you start to have pain, rest your knee until your pain gets back to the level that is normal for you.  Or cycle for less time or with less effort. Follow-up care is a key part of your treatment and safety. Be sure to make and go to all appointments, and call your doctor if you are having problems. It's also a good idea to know your test results and keep a list of the medicines you take. Where can you learn more? Go to http://ledy-toño.info/. Enter C159 in the search box to learn more about \"Knee Arthritis: Exercises. \"  Current as of: March 21, 2017  Content Version: 11.4  © 8315-4445 [employer]. Care instructions adapted under license by [employer] (which disclaims liability or warranty for this information). If you have questions about a medical condition or this instruction, always ask your healthcare professional. [address] any warranty or liability for your use of this information.

## 2018-05-07 NOTE — MR AVS SNAPSHOT
[address] [place] [address], [address] [facility] 
[phone] Patient: Riley Gama MRN: [id number] Visit Information Date & Time Provider Department Dept. Phone Encounter #  
 5/7/2018 10:30 AM Stanley Carreno, [address] [facility] [address] [facility] [phone] [id number] Your Appointments 5/16/2018 10:30 AM  
Office Visit with Mickie Xavier MD  
[facility]  Oncology [address]) Appt Note: 4 weeks [facility] [address], [place] [address] [place] [address] [facility] [address] [facility], [facility] 105 [id number] [address] [address]  
  
    
  
 6/25/2018 10:15 AM  
Any with Ericka Kraft MD  
[facility] ([address]) Appt Note: 3m post op  
 [address] [facility] [place] [address] [facility] [address]  
  
   
 [address] [facility] [address] [address] [address] Upcoming Health Maintenance Date Due Hepatitis C Screening 1955 PAP AKA CERVICAL CYTOLOGY 8/26/1974 BREAST CANCER SCRN MAMMOGRAM 8/26/2003 FOBT Q 1 YEAR AGE 50-75 8/26/2003 ZOSTER VACCINE AGE 60> 6/26/2013 MEDICARE YEARLY EXAM 3/27/2018 Influenza Age 5 to Adult 8/1/2018 Pneumococcal 19-64 Highest Risk (3 of 3 - PCV13) 12/11/2018 DTaP/Tdap/Td series (2 - Td) 3/15/2026 Allergies as of 5/7/2018  Review Complete On: 5/7/2018 By: Walter Notice Severity Noted Reaction Type Reactions Latex  08/04/2016    Other (comments) Latex  02/12/2018    Rash Lisinopril High 04/25/2017    Hives Asa-acetaminophen-caff-buffers  08/04/2016    Other (comments) Aspirin  02/12/2018    Hives Codeine  08/04/2016    Other (comments) Codeine  02/12/2018    Hives Lisinopril  02/12/2018    Hives Pcn [Penicillins]  08/04/2016    Other (comments) Penicillin G  02/12/2018    Hives Sulfa (Sulfonamide Antibiotics)  08/04/2016    Other (comments) Current Immunizations  Reviewed on 4/25/2018 Name Date Influenza Vaccine 3/15/2016 12:00 AM, 3/1/2016 Pneumococcal Polysaccharide (PPSV-23) 12/11/2017 12:00 AM  
 Pneumococcal Vaccine (Unspecified Type) 3/1/2016 Tdap 3/15/2016 12:00 AM  
  
 Not reviewed this visit You Were Diagnosed With   
  
 Codes Comments Primary osteoarthritis of both knees    -  Primary ICD-10-CM: M17.0 ICD-9-CM: 715.16 Chronic pain of both knees     ICD-10-CM: M25.561, M25.562, G89.29 ICD-9-CM: 719.46, 338.29 Chronic pain of right ankle     ICD-10-CM: M25.571, G89.29 ICD-9-CM: 719.47, 338.29 Primary osteoarthritis of both ankles     ICD-10-CM: M19.071, S14.843 ICD-9-CM: 715.17 Vitals BP Pulse Height(growth percentile) Weight(growth percentile) BMI OB Status 129/72 65 4' 11.8\" (1.519 m) 201 lb (91.2 kg) 39.52 kg/m2 Hysterectomy Smoking Status Never Smoker Vitals History BMI and BSA Data Body Mass Index Body Surface Area  
 39.52 kg/m 2 1.96 m 2 Preferred Pharmacy Pharmacy Name Phone [facility] [address] [facility], [address] [facility] Your Updated Medication List  
  
   
This list is accurate as of 5/7/18 12:42 PM.  Always use your most recent med list.  
  
  
  
  
 acetaminophen 650 mg Tber Commonly known as:  TYLENOL  
1,300 mg.  
  
 albuterol 90 mcg/actuation inhaler Commonly known as:  PROVENTIL HFA, VENTOLIN HFA, PROAIR HFA  
2 Puffs. allopurinol 300 mg tablet Commonly known as:  ZYLOPRIM  
300 mg. amLODIPine 5 mg tablet Commonly known as:  Gio Hines Take 5 mg by mouth daily. bisoprolol-hydroCHLOROthiazide 10-6.25 mg per tablet Commonly known as:  [facility] Take 1 Tab by Mouth Once a Day. cloNIDine HCl 0.1 mg tablet Commonly known as:  CATAPRES Take  by mouth two (2) times a day.   
  
 desonide 0.05 % cream  
 Commonly known as:  Wendall Favors Use 1 Application to affected area Twice Daily. dicyclomine 10 mg capsule Commonly known as:  BENTYL Take 1 Cap by mouth three (3) times daily as needed. diphenhydrAMINE 25 mg capsule Commonly known as:  BENADRYL Take 1 with compazine every 6 hours for nausea for 3 days then, as needed. ergocalciferol 50,000 unit capsule Commonly known as:  ERGOCALCIFEROL  
50,000 Units every seven (7) days. furosemide 20 mg tablet Commonly known as:  LASIX Take 1/2-1 tablet daily if needed for swelling. glimepiride 4 mg tablet Commonly known as:  AMARYL Take one in the am.  New dose. glipiZIDE 10 mg tablet Commonly known as:  [place] 10 mg.  
  
 insulin aspart U-100 100 unit/mL Inpn Commonly known as:  NOVOLOG  
by SubCUTAneous route.  
  
 lidocaine-prilocaine topical cream  
Commonly known as:  EMLA Place cream over mediport 1 hour prior to chemotherapy and then place saran wrap over area. Every chemo treatment. NEURONTIN 100 mg capsule Generic drug:  gabapentin Take 100 mg by mouth nightly. omeprazole 20 mg capsule Commonly known as:  PRILOSEC Take 20 mg by mouth daily. ondansetron hcl 8 mg tablet Commonly known as:  Maria D Madrigal Take one tablet by mouth every 8 hours for nausea beginning the night of chemo for 3 days. predniSONE 20 mg tablet Commonly known as:  [place] Take 2 Tabs by mouth daily. Take 2 tabs on days 2 and 3 of each chemo cycle  
  
 prochlorperazine 10 mg tablet Commonly known as:  COMPAZINE Take 1 tablet every 6 hours with Benadryl 25mg for nausea for 3 days then, as needed. traMADol 50 mg tablet Commonly known as:  ULTRAM  
Take 50 mg by mouth every six (6) hours as needed for Pain.  
  
 warfarin 3 mg tablet Commonly known as:  COUMADIN Take 3 mg by mouth daily. Takes 1 and 1/2 tab Sunday, Tuesday, and Thursday. Takes 1 tablet Monday, Wednesday, and Friday We Performed the Following AMB POC XRAY, ANKLE; COMPLETE, 3+ VIE [14425 CPT(R)] AMB POC XRAY, KNEE; 1/2 VIEWS [25347 CPT(R)] AMB POC XRAY, KNEE; 1/2 VIEWS [24832 CPT(R)] To-Do List   
 05/09/2018  9:00 AM  
  Appointment with [facility]SE [facility] at [facility] ([phone])  
  
 05/11/2018  1:00 PM  
  Appointment with [facility]URSE [facility] at [facility] ([phone]) Patient Instructions Please follow up in 3 weeks. You are advised to contact us if your condition worsens. Knee Arthritis: Exercises Your Care Instructions Here are some examples of exercises for knee arthritis. Start each exercise slowly. Ease off the exercise if you start to have pain. Your doctor or physical therapist will tell you when you can start these exercises and which ones will work best for you. How to do the exercises Knee flexion with heel slide 1. Lie on your back with your knees bent. 2. Slide your heel back by bending your affected knee as far as you can. Then hook your other foot around your ankle to help pull your heel even farther back. 3. Hold for about 6 seconds, then rest for up to 10 seconds. 4. Repeat 8 to 12 times. 5. Switch legs and repeat steps 1 through 4, even if only one knee is sore. Epom 1. Sit with your affected leg straight and supported on the floor or a firm bed. Place a small, rolled-up towel under your knee. Your other leg should be bent, with that foot flat on the floor. 2. Tighten the thigh muscles of your affected leg by pressing the back of your knee down into the towel. 3. Hold for about 6 seconds, then rest for up to 10 seconds. 4. Repeat 8 to 12 times. 5. Switch legs and repeat steps 1 through 4, even if only one knee is sore. Straight-leg raises to the front 1. Lie on your back with your good knee bent so that your foot rests flat on the floor. Your affected leg should be straight.  Make sure that your low back has a normal curve. You should be able to slip your hand in between the floor and the small of your back, with your palm touching the floor and your back touching the back of your hand. 2. Tighten the thigh muscles in your affected leg by pressing the back of your knee flat down to the floor. Hold your knee straight. 3. Keeping the thigh muscles tight and your leg straight, lift your affected leg up so that your heel is about 12 inches off the floor. Hold for about 6 seconds, then lower slowly. 4. Relax for up to 10 seconds between repetitions. 5. Repeat 8 to 12 times. 6. Switch legs and repeat steps 1 through 5, even if only one knee is sore. Active knee flexion 1. Lie on your stomach with your knees straight. If your kneecap is uncomfortable, roll up a washcloth and put it under your leg just above your kneecap. 2. Lift the foot of your affected leg by bending the knee so that you bring the foot up toward your buttock. If this motion hurts, try it without bending your knee quite as far. This may help you avoid any painful motion. 3. Slowly move your leg up and down. 4. Repeat 8 to 12 times. 5. Switch legs and repeat steps 1 through 4, even if only one knee is sore. Quadriceps stretch (facedown) 1. Lie flat on your stomach, and rest your face on the floor. 2. Wrap a towel or belt strap around the lower part of your affected leg. Then use the towel or belt strap to slowly pull your heel toward your buttock until you feel a stretch. 3. Hold for about 15 to 30 seconds, then relax your leg against the towel or belt strap. 4. Repeat 2 to 4 times. 5. Switch legs and repeat steps 1 through 4, even if only one knee is sore. Stationary exercise bike 1. If you do not have a stationary exercise bike at home, you can find one to ride at your local health club or community center.  
2. Adjust the height of the bike seat so that your knee is slightly bent when your leg is extended downward. If your knee hurts when the pedal reaches the top, you can raise the seat so that your knee does not bend as much. 3. Start slowly. At first, try to do 5 to 10 minutes of cycling with little to no resistance. Then increase your time and the resistance bit by bit until you can do 20 to 30 minutes without pain. 4. If you start to have pain, rest your knee until your pain gets back to the level that is normal for you. Or cycle for less time or with less effort. Follow-up care is a key part of your treatment and safety. Be sure to make and go to all appointments, and call your doctor if you are having problems. It's also a good idea to know your test results and keep a list of the medicines you take. Where can you learn more? Go to http://ledy-toño.info/. Enter C159 in the search box to learn more about \"Knee Arthritis: Exercises. \" Current as of: March 21, 2017 Content Version: 11.4 © 4897-1563 [employer]. Care instructions adapted under license by [employer] (which disclaims liability or warranty for this information). If you have questions about a medical condition or this instruction, always ask your healthcare professional. [address] any warranty or liability for your use of this information. Introducing [facility]! Dear Mary Kay Stern: Thank you for requesting a [employer] account. Our records indicate that you already have an active [employer] account. You can access your account anytime at [email or web address] Did you know that you can access your hospital and ER discharge instructions at any time in [employer]? You can also review all of your test results from your hospital stay or ER visit. Additional Information If you have questions, please visit the Frequently Asked Questions section of the [employer] website at [email or web address] Remember, MyChart is NOT to be used for urgent needs. For medical emergencies, dial 911. Now available from your iPhone and Android! Please provide this summary of care documentation to your next provider. Your primary care clinician is listed as Bertram Koyanagi. If you have any questions after today's visit, please call [phone].

## 2018-05-07 NOTE — PROGRESS NOTES
AMBULATORY PROGRESS NOTE      Patient: Nahomy Saravia             MRN: [id number]     SSN: [id number] Body mass index is 39.52 kg/(m^2). YOB: 1955     AGE: 57 y.o. EX: female    PCP: Torres Gonzalez NP    IMPRESSION/DIAGNOSIS AND TREATMENT PLAN     DIAGNOSES  1. Primary osteoarthritis of both knees    2. Chronic pain of both knees    3. Chronic pain of right ankle    4. Primary osteoarthritis of both ankles        Orders Placed This Encounter    AMB SUPPLY ORDER    [38454] Ankle Min 3V    [[id number]] Knee 1-2V    [[id number]] Knee 1-2V      Nahomy Saravia understands her diagnoses and the proposed plan. Plan:    1) DME Order: Bilateral knee braces. RTO - 3 weeks    HPI AND EXAMINATION     Dejuan Polk IS A 57 y.o. female who presents to my outpatient office complaining of right ankle pain, and bilateral knee pain. Ms. Swati Dillard reports she has experienced bilateral knee and bilateral ankle pain. Her CC is of bilateral knee stiffness. She reports difficulty going up and down steps. XR imaging has been reviewed with the patient showing considerable arthritis of her knees with a valgus alignment. Patient is currently on Coumadin. Additionally, she since she is currently getting chemotherapy biweekly, she also gets steroid treatments during the same sessions. Patient reports a h/o stent placed in her left kidney and colon cancer currently undergoing chemotherapy. Appearance: Alert, well appearing and pleasant patient who is in no distress, oriented to person, place/time, and who follows commands. This patient is accompanied in the       office by her  self. Psychiatric: Affect and mood are appropriate.    Cardiovascular/Peripheral Vascular: Normal Pulses to each hand and foot           Knees:  Bilateral        Gait: slow         Cutaneous: Skin intact, no abrasions, blisters, wounds, erythema        Effusion: Is not present        Crepitus:  moderate PF joint crepitus Tenderness:Medial joint line    Alignment of Knee: significant valgus when standing        ROM: full range of motion        Fullness or swelling: None to popliteal fossa region        Stability: No instability to anterior, posterior, varus, valgus stress testing        Thrust: No varus thrust with gait        Contractures: No Achilles or Gastrocnemius Contractures. Calf tenderness: Absent for calf or gastrocnemius muscle regions            Soft, supple, non tender, non taut lower extremity compartments  Extremities:   No embolic phenomena to the toes          No significant edema to the foot and or toes. Edema is not present to distal 1/3 tib/fib or ankle regions. Lower extremities are warm and appear well perfused    DVT: No evidence of DVT seen on examination at this time     No calf swelling, no tenderness to calf muscles  Lymphatic:  No Evidence of Lymphedema  Vascular: Medial Border of Tibia Region: Edema is not present        Pulses: Dorsalis Pedis &  Posterior Tibial Pulses : Palpable yes        Varicosities Lower Limbs : None noted . Neuro: Negative bilateral Straight leg raise (seated position)    See Musculoskeletal section for pertinent individual extremity examination    No abnormal hand/wrist, foot/ankle, or facial/neck tremors. Extensor mechanism is intact      Bilateral ANKLE and FOOT       Gait: slow  Tenderness: no TTP at this time. Cutaneous: No rashes, skin patches, wounds, or abrasions to the lower legs           Warm and Normal color. No regions of expressible drainage. Medial Border of Tibia Region: absent           Skin color, texture, turgor normal. Normal.  Joint Motion: ROM Ankle:Normal , Hindfoot: (ST,TN,CC Decreased} Crepitus with inversion on RIGHT, Forefoot toes:Normal  Neurologic Exam: Neuro: Motor: normal 5/5 strength in all tested muscle groups and Sensory : no sensory deficits noted.    Contractures: Gastrocnemius or Achilles Contractures absent  Joint / Tendon Stability: No Ankle or Subtalar instability or joint laxity. No peroneal sublux ability or dislocation  Alignment: Planus valgus alignment and  Flexible  Vascular: Normal Pulses/ NL Capillary refill, No evidence of DVT seen on physical exam.   No calf swelling, no tenderness to calf muscles. Lymphatic:  No Evidence of Lymphedema. CHART REVIEW     Past Medical History:   Diagnosis Date    Asthma     Cancer ([facility] [address] 10/2016    colon    Diabetes ([facility] [address]     Gout     Heart disease     Hypertension     Kidney disease     Maintenance chemotherapy     Neuropathic pain of foot      Current Outpatient Prescriptions   Medication Sig    warfarin (COUMADIN) 3 mg tablet Take 3 mg by mouth daily. Takes 1 and 1/2 tab Sunday, Tuesday, and Thursday. Takes 1 tablet Monday, Wednesday, and Friday    amLODIPine (NORVASC) 5 mg tablet Take 5 mg by mouth daily.  cloNIDine HCl (CATAPRES) 0.1 mg tablet Take  by mouth two (2) times a day.  insulin aspart (NOVOLOG) 100 unit/mL inpn by SubCUTAneous route.  omeprazole (PRILOSEC) 20 mg capsule Take 20 mg by mouth daily.  traMADol (ULTRAM) 50 mg tablet Take 50 mg by mouth every six (6) hours as needed for Pain.  gabapentin (NEURONTIN) 100 mg capsule Take 100 mg by mouth nightly.  predniSONE (DELTASONE) 20 mg tablet Take 2 Tabs by mouth daily. Take 2 tabs on days 2 and 3 of each chemo cycle    lidocaine-prilocaine (EMLA) topical cream Place cream over mediport 1 hour prior to chemotherapy and then place saran wrap over area. Every chemo treatment.  ondansetron hcl (ZOFRAN, AS HYDROCHLORIDE,) 8 mg tablet Take one tablet by mouth every 8 hours for nausea beginning the night of chemo for 3 days.  diphenhydrAMINE (BENADRYL) 25 mg capsule Take 1 with compazine every 6 hours for nausea for 3 days then, as needed.     prochlorperazine (COMPAZINE) 10 mg tablet Take 1 tablet every 6 hours with Benadryl 25mg for nausea for 3 days then, as needed.  acetaminophen (TYLENOL) 650 mg CR tablet 1,300 mg.    albuterol (PROVENTIL HFA, VENTOLIN HFA, PROAIR HFA) 90 mcg/actuation inhaler 2 Puffs.  ergocalciferol (ERGOCALCIFEROL) 50,000 unit capsule 50,000 Units every seven (7) days.  furosemide (LASIX) 20 mg tablet Take 1/2-1 tablet daily if needed for swelling.  allopurinol (ZYLOPRIM) 300 mg tablet 300 mg.    desonide (TRIDESILON) 0.05 % cream Use 1 Application to affected area Twice Daily.  bisoprolol-hydrochlorothiazide (ZIAC) 10-6.25 mg per tablet Take 1 Tab by Mouth Once a Day.  dicyclomine (BENTYL) 10 mg capsule Take 1 Cap by mouth three (3) times daily as needed.  glipiZIDE (GLUCOTROL) 10 mg tablet 10 mg.    glimepiride (AMARYL) 4 mg tablet Take one in the am.  New dose. No current facility-administered medications for this visit.       Facility-Administered Medications Ordered in Other Visits   Medication Dose Route Frequency    [START ON 5/9/2018] dexamethasone (DECADRON) 12 mg in 0.9% sodium chloride 50 mL IVPB  12 mg IntraVENous ONCE    [START ON 5/9/2018] irinotecan (CAMPTOSAR) 350 mg in dextrose 5% 500 mL chemo infusion  350 mg IntraVENous ONCE    [START ON 5/9/2018] leucovorin (WELLCOVORIN) 780 mg in dextrose 5% 250 mL IVPB  780 mg IntraVENous ONCE    [START ON 5/9/2018] fluorouracil (ADRUCIL) chemo syringe 780 mg  780 mg IntraVENous ONCE    [START ON 5/9/2018] fluorouracil (ADRUCIL) 4,680 mg in 0.9% sodium chloride 250 mL CADD Cassette  4,680 mg IntraVENous ONCE     Allergies   Allergen Reactions    Latex Other (comments)    Latex Rash    Lisinopril Hives    Asa-Acetaminophen-Caff-Buffers Other (comments)    Aspirin Hives    Codeine Other (comments)    Codeine Hives    Lisinopril Hives    Pcn [Penicillins] Other (comments)    Penicillin G Hives    Sulfa (Sulfonamide Antibiotics) Other (comments)     Past Surgical History:   Procedure Laterality Date    HX BACK SURGERY      HX HYSTERECTOMY      [facility] VASCULAR ACCESS      [facility]     Social History     Occupational History    Not on file. Social History Main Topics    Smoking status: Never Smoker    Smokeless tobacco: Never Used    Alcohol use No    Drug use: No    Sexual activity: Not on file     Family History   Problem Relation Age of Onset    Family history unknown: Yes       REVIEW OF SYSTEMS : 5/7/2018  ALL BELOW ARE Negative except : SEE HPI       Constitutional: Negative for fever, chills and weight loss. Neg Weigh Loss  Cardiovascular: Negative for chest pain, claudication and leg swelling. SOB, RED   Gastrointestinal: Negative for  pain, N/V/D/C, Blood in stool or urine,dysuria, hematuria,        Incontinence, pelvic pain  Musculoskeletal: see HPI. Neurological: Negative for dizziness and weakness. Negative for headaches,Visual Changes, Confusion, Seizures,   Psychiatric/Behavioral: Negative for depression, memory loss and substance abuse. Extremities:  Negative for  hair changes, rash or skin lesion changes. Hematologic: Negative for Bleeding problems, bruising, pallor or swollen lymph nodes. Peripheral Vascular: No calf pain, vascular vein tenderness to calf pain              No calf throbbing, posterior knee throbbing pain    DIAGNOSTIC IMAGING      X RAYS AT [address] [place] [address]  5/7/2018    { Bilateral KNEE:     WEIGHT BEARING    Bones: No fractures, subluxations, or dislocations  Alignment: severe Valgus Knee alignment  Joint: Lateral joint with severe OA changes    Soft Tissues: Normal , mild swelling  Mineralization: suggests Osteoporosis    I have personally reviewed the results of the above study. The interpretation of this study is my professional opinion. Dictation on: 05/07/2018  3:11 PM by: Keisha Ontiveros [id number]         I have personally reviewed the results of the above study. The interpretation of this study is my professional opinion.         Written by Aidee Gerber, Scribe[employer], as dictated by Bailey Higgins MD. I, , Bailey Higgins MD, confirm that all documentation is accurate.

## 2018-05-09 NOTE — PROGRESS NOTES
[address] [facility] Progress Note    Date: May 9, 2018    Name: Ronald Orona              MRN: [id number]              : 1955    Folfiri: [id number]     Pt to [facility], ambulatory, at 0910. Ms. Babs Hsu was assessed and education was provided. Ms. Polk's vitals were reviewed. Visit Vitals    /82 (BP 1 Location: Left arm, BP Patient Position: Sitting)    Pulse 87    Temp 98.8 °F (37.1 °C)    Resp 18    Ht 4' 11.8\" (1.519 m)    Wt 91.4 kg (201 lb 8 oz)    SpO2 97%    BMI 39.62 kg/m2       Right chest mediport accessed with 20 g 1 inch orozco needle. Port flushed easily and had brisk blood return. Blood drawn off and sent for CBC and BMP run on the Istat per written orders after 10 ml waste. Lab results were obtained and reviewed. Recent Results (from the past 12 hour(s))   CBC WITH 3 PART DIFF    Collection Time: 18  9:22 AM   Result Value Ref Range    WBC 32.2 (HH) 4.5 - 13.0 K/uL    RBC 2.70 (L) 4.10 - 5.10 M/uL    HGB 8.7 (L) 12.0 - 16 g/dL    HCT 27.1 (L) 36 - 48 %    .4 78 - 102 FL    MCH 32.2 25.0 - 35.0 PG    MCHC 32.1 31 - 37 g/dL    RDW 17.3 (H) 11.5 - 14.5 %    NEUTROPHILS 81 (H) 40 - 70 %    MIXED CELLS 8 0.1 - 17 %    LYMPHOCYTES 11 (L) 14 - 44 %    ABS. NEUTROPHILS 26.2 (H) 1.8 - 9.5 K/UL    ABS. MIXED CELLS 2.4 (H) 0.0 - 2.3 K/uL    ABS.  LYMPHOCYTES 3.6 1.1 - 5.9 K/UL    DF AUTOMATED     POC CHEM8    Collection Time: 18  9:32 AM   Result Value Ref Range    CO2, POC 23 19 - 24 MMOL/L    Glucose,  (H) 74 - 106 MG/DL    BUN, POC 26 (H) 7 - 18 MG/DL    Creatinine, POC 2.3 (H) 0.6 - 1.3 MG/DL    GFRAA, POC 26 (L) >60 ml/min/1.73m2    GFRNA, POC 21 (L) >60 ml/min/1.73m2    Sodium,  136 - 145 MMOL/L    Potassium, POC 3.8 3.5 - 5.5 MMOL/L    Calcium, ionized (POC) 1.21 1.12 - 1.32 mmol/L    Chloride,  100 - 108 MMOL/L    Anion gap, POC 16 10 - 20      Hematocrit, POC 27 (L) 36 - 49 %    Hemoglobin, POC 9.2 (L) 12 - 16 G/DL     Lab results within ordered parameters to give chemo today. ANC = 26.2, PLT = 327. Daron Crisostomo notified of critical white count. No orders received. Pt received Neulasta OBI on 04/27/18. Pre-medications (Decadron 12mg IVPB and Aloxi 0.25mg IVP) were administered as ordered and chemotherapy was initiated after blood return from port re-verified. D5W started at [facility] per written order. Irinotecan 320 mg  was infused over 90 minutes concurrently with Leucovorin 700 mg. VS stable at end of infusion and pt denied complaints. Line flushed with D5 and blood return from port re-verified. Patient stated that she was expierincing adominal cramps and nausea. PRN atropine 0.4 mg was administered. 15 minutes later patient states that she is feeling better. Fluorouracil 400 mg was given slow IVP over 3 minutes. Line flushed with 10 mL NS and blood return was re-verified. Fluorouracil 4300 mg CADD pump was connected to patient. Connections were secured with tape and the volume was verified to be infusing. Lab values are within range to administer her Procrit injection due to the written order. Procrit 60,000 units administered SQ in the back of the right arm. Band-aid applied. Ms. Miguel Marcum tolerated infusion/injection. Patient armband removed and shredded. Ms. Miguel Marcum was discharged from [place] [address] in stable condition at 1250. She is to return on 05/11/2018 at 1130 for her next appointment for pump D/C and Neulasta.      Gabriela Pickard RN  May 9, 2018

## 2018-05-10 NOTE — PROGRESS NOTES
Patient presented to [facility] today in stable condition with ambulatory CADD pump infusing but states she is bleeding from around her port insertion site. Upon examantion patient has blood soaked threw her dressing. Line flushed with NS and blood return from port was verified. Jorge Luis Van, EDUAR notified. Patient states that last week her INR was elevated so her coumadin dose was adjusted but this week's INR check was still slightly elevated. Site was cleaned with chlorhexidine and dressing was changed using sterile technique. Patient instructed to go the ED to be evaluated. Patient verbalized understanding. Alex Mendez calling ER to make them aware of patient and situation.        Glenna Asa  5/10/2018

## 2018-05-10 NOTE — PROGRESS NOTES
Tatyana Oneill NP consulted with  regarding previous stated situation. Meryle Musca states that it is OK to D/C CADD Pump today and apply Neulasta OBI. Patient CADD pump stopped. Line flushed with 20 mL NS and 500 units of heparin per protocol. Neulasta 6mg OBI applied to the LLQ of the abdomen. Patient discharged from [facility] in stable condition at [phone]. She is to return on 5/23/18 at 0900 for her next chemo appointment.      Patient Vitals for the past 12 hrs:   Temp Pulse Resp BP SpO2   05/10/18 1632 98.6 °F (37 °C) 87 18 130/83 97 %       Lynder Shells  5/10/18

## 2018-05-16 NOTE — MR AVS SNAPSHOT
[address] [facility] 
[phone] Patient: Nichelle Corey MRN: [id number] [id number] Visit Information Date & Time Provider Department Dept. Phone Encounter #  
 5/16/2018 10:30 AM Chris Damon MD [facility] [phone] [id number] Follow-up Instructions Return in about 4 weeks (around 6/13/2018). Your Appointments 5/29/2018 10:20 AM  
Follow Up with Johnathan Clement MD  
[facility] ([facility]) Appt Note: rt ankle/bilat knee 3wk fu  
 [address] [facility] [address] [facility]  
[phone] [address] [facility], [address]  
  
    
 6/13/2018 10:15 AM  
Office Visit with Chris Damon MD  
[facility]  On[facility] [address] [place] [address]) Appt Note: 4 weeks [address] [place] [address] [facility] [address] [facility], [facility] [address] [id number] [address] [address]  
  
    
  
 6/25/2018 10:15 AM  
Any with Paola Hairston MD  
[facility] ([address] [facility]) Appt Note: 3m post op  
 [address] [facility] [place] [address]  
  
   
 [address] [facility] [address] [place] [address] Upcoming Health Maintenance Date Due Hepatitis C Screening 1955 PAP AKA CERVICAL CYTOLOGY 8/26/1974 BREAST CANCER SCRN MAMMOGRAM 8/26/2003 FOBT Q 1 YEAR AGE 50-75 8/26/2003 ZOSTER VACCINE AGE 60> 6/26/2013 MEDICARE YEARLY EXAM 3/27/2018 Influenza Age 5 to Adult 8/1/2018 Pneumococcal 19-64 Highest Risk (3 of 3 - PCV13) 12/11/2018 DTaP/Tdap/Td series (2 - Td) 3/15/2026 Allergies as of 5/16/2018  Review Complete On: 5/16/2018 By: Chris Damon MD  
  
 Severity Noted Reaction Type Reactions Latex  08/04/2016    Other (comments) Latex  02/12/2018    Rash Lisinopril High 04/25/2017    Hives Asa-acetaminophen-caff-buffers  08/04/2016    Other (comments) Aspirin  02/12/2018    Hives Codeine  08/04/2016    Other (comments) Codeine  02/12/2018    Hives Lisinopril  02/12/2018    Hives Pcn [Penicillins]  08/04/2016    Other (comments) Penicillin G  02/12/2018    Hives Sulfa (Sulfonamide Antibiotics)  08/04/2016    Other (comments) Current Immunizations  Reviewed on 4/25/2018 Name Date Influenza Vaccine 3/15/2016 12:00 AM, 3/1/2016 Pneumococcal Polysaccharide (PPSV-23) 12/11/2017 12:00 AM  
 Pneumococcal Vaccine (Unspecified Type) 3/1/2016 Tdap 3/15/2016 12:00 AM  
  
 Not reviewed this visit You Were Diagnosed With   
  
 Codes Comments Iron deficiency anemia due to chronic blood loss    -  Primary ICD-10-CM: D50.0 ICD-9-CM: 280.0 Peritoneal carcinomatosis ([facility] [address]     ICD-10-CM: C78.6, C80.1 ICD-9-CM: 197.6, 199.1 Colon cancer metastasized to multiple sites [facility])     ICD-10-CM: C18.9 ICD-9-CM: 153.9 Hydronephrosis with ureteropelvic junction (UPJ) obstruction     ICD-10-CM: Q62.11 ICD-9-CM: 120 Antineoplastic chemotherapy induced anemia     ICD-10-CM: D64.81, T45.1X5A 
ICD-9-CM: 285.3, E933.1 Chemotherapy induced neutropenia (HCC)     ICD-10-CM: D70.1, T45.1X5A 
ICD-9-CM: 288.03, E933.1 Anemia in stage 3 chronic kidney disease     ICD-10-CM: N18.3, D63.1 ICD-9-CM: 285.21, 585.3 Vitals BP Pulse Temp OB Status Smoking Status 120/59 70 98.4 °F (36.9 °C) Hysterectomy Never Smoker Vitals History Preferred Pharmacy Pharmacy Name Phone [address], [address] [facility] Your Updated Medication List  
  
   
This list is accurate as of 5/16/18 11:33 AM.  Always use your most recent med list.  
  
  
  
  
 acetaminophen 650 mg Tber Commonly known as:  TYLENOL  
1,300 mg. albuterol 90 mcg/actuation inhaler Commonly known as:  PROVENTIL HFA, VENTOLIN HFA, PROAIR HFA  
2 Puffs. allopurinol 300 mg tablet Commonly known as:  ZYLOPRIM  
300 mg. amLODIPine 5 mg tablet Commonly known as:  Brandon Rings Take 5 mg by mouth daily. bisoprolol-hydroCHLOROthiazide 10-6.25 mg per tablet Commonly known as:  [facility] Take 1 Tab by Mouth Once a Day. cloNIDine HCl 0.1 mg tablet Commonly known as:  CATAPRES Take  by mouth two (2) times a day. desonide 0.05 % cream  
Commonly known as:  Lenward Mangle Use 1 Application to affected area Twice Daily. dicyclomine 10 mg capsule Commonly known as:  BENTYL Take 1 Cap by mouth three (3) times daily as needed. diphenhydrAMINE 25 mg capsule Commonly known as:  BENADRYL Take 1 with compazine every 6 hours for nausea for 3 days then, as needed. ergocalciferol 50,000 unit capsule Commonly known as:  ERGOCALCIFEROL  
50,000 Units every seven (7) days. furosemide 20 mg tablet Commonly known as:  LASIX Take 1/2-1 tablet daily if needed for swelling. glimepiride 4 mg tablet Commonly known as:  AMARYL Take one in the am.  New dose. glipiZIDE 10 mg tablet Commonly known as:  Velta Longs 10 mg.  
  
 insulin aspart U-100 100 unit/mL Inpn Commonly known as:  NOVOLOG  
by SubCUTAneous route.  
  
 lidocaine-prilocaine topical cream  
Commonly known as:  EMLA Place cream over mediport 1 hour prior to chemotherapy and then place saran wrap over area. Every chemo treatment. NEURONTIN 100 mg capsule Generic drug:  gabapentin Take 100 mg by mouth nightly. omeprazole 20 mg capsule Commonly known as:  PRILOSEC Take 20 mg by mouth daily. ondansetron hcl 8 mg tablet Commonly known as:  Lit Barry Take one tablet by mouth every 8 hours for nausea beginning the night of chemo for 3 days. predniSONE 20 mg tablet Commonly known as:  Veronica Kang  
 Take 2 Tabs by mouth daily. Take 2 tabs on days 2 and 3 of each chemo cycle  
  
 prochlorperazine 10 mg tablet Commonly known as:  COMPAZINE Take 1 tablet every 6 hours with Benadryl 25mg for nausea for 3 days then, as needed. traMADol 50 mg tablet Commonly known as:  ULTRAM  
Take 50 mg by mouth every six (6) hours as needed for Pain.  
  
 warfarin 3 mg tablet Commonly known as:  COUMADIN Take 3 mg by mouth daily. Takes 1 and 1/2 tab Sunday, Tuesday, and Thursday. Takes 1 tablet Monday, Wednesday, and Friday We Performed the Following COMPLETE CBC & AUTO DIFF WBC [16174 CPT(R)] Follow-up Instructions Return in about 4 weeks (around 6/13/2018). To-Do List   
 05/16/2018 Lab:  CBC WITH 3 PART DIFF   
  
 05/16/2018 Lab:  CEA   
  
 05/16/2018 Lab:  FERRITIN   
  
 05/16/2018 Lab:  IRON PROFILE   
  
 05/16/2018 Lab:  METABOLIC PANEL, COMPREHENSIVE   
  
 05/23/2018  9:00 AM  
  Appointment with [facility] INFUSION NURSE 1 at [facility] ([phone])  
  
 05/25/2018 1:00 PM  
  Appointment with [facility] INFUSION NURSE 1 at [facility] ([phone]) [facility] SERVICES! Dear María Amor: Thank you for requesting a [employer] account. Our records indicate that you already have an active [employer] account. You can access your account anytime at [email or web address] Did you know that you can access your hospital and ER discharge instructions at any time in [employer]? You can also review all of your test results from your hospital stay or ER visit. Additional Information If you have questions, please visit the Frequently Asked Questions section of the [employer] website at [email or web address] Remember, [employer] is NOT to be used for urgent needs. For medical emergencies, dial 911. Now available from your iPhone and Android! Please provide this summary of care documentation to your next provider. Your primary care clinician is listed as Reyes Killian. If you have any questions after today's visit, please call [phone].

## 2018-05-16 NOTE — PROGRESS NOTES
Hematology/Oncology  Progress Note    Name: Meghana Holloway  Date: 2018  : 1955    PCP: Latricia Jimenes NP     Ms. Eliane Hedrick is a 57 y.o. -American woman with metastatic colorectal carcinoma/carcinomatosis  Current therapy: FOLFIRI chemotherapy regimen      Subjective:     Ms. Eliane Hedrick is a 57-year-old -American woman with abdominal carcinomatosis from metastatic colorectal carcinoma. She was started on FOLFIRI regimen and she is here today for follow up. She states that she has no pain, her appetite has significantly increased and her energy level is up. She denies diarrhea, nausea, or vomiting. Her only complaint is occasional abdominal cramping during chemo. She denies constipation. Otherwise there are no additional complaints to report. She has recently been found to have hydronephrosis and on the advice of the oncologic surgeon was referred to a urologist for an assessment. The patient has been evaluated and treated by the urologist.  Today she reports that she is continuing to do well. Past medical history, family history, and social history: these were reviewed and remains unchanged. Past Medical History:   Diagnosis Date    Asthma     Cancer ([facility] [address] 10/2016    colon    Diabetes ([facility] [address]     Gout     Heart disease     Hypertension     Kidney disease     Maintenance chemotherapy     Neuropathic pain of foot      Past Surgical History:   Procedure Laterality Date    HX BACK SURGERY      HX HYSTERECTOMY      HX VASCULAR ACCESS      mediport     Social History     Social History    Marital status:      Spouse name: N/A    Number of children: N/A    Years of education: N/A     Occupational History    Not on file.      Social History Main Topics    Smoking status: Never Smoker    Smokeless tobacco: Never Used    Alcohol use No    Drug use: No    Sexual activity: Not on file     Other Topics Concern    Not on file     Social History Narrative    ** Merged History Encounter **          Family History   Problem Relation Age of Onset    Family history unknown: Yes     Current Outpatient Prescriptions   Medication Sig Dispense Refill    warfarin (COUMADIN) 3 mg tablet Take 3 mg by mouth daily. Takes 1 and 1/2 tab Sunday, Tuesday, and Thursday. Takes 1 tablet Monday, Wednesday, and Friday      amLODIPine (NORVASC) 5 mg tablet Take 5 mg by mouth daily.  cloNIDine HCl (CATAPRES) 0.1 mg tablet Take  by mouth two (2) times a day.  insulin aspart (NOVOLOG) 100 unit/mL inpn by SubCUTAneous route.  omeprazole (PRILOSEC) 20 mg capsule Take 20 mg by mouth daily.  traMADol (ULTRAM) 50 mg tablet Take 50 mg by mouth every six (6) hours as needed for Pain.  gabapentin (NEURONTIN) 100 mg capsule Take 100 mg by mouth nightly.  dicyclomine (BENTYL) 10 mg capsule Take 1 Cap by mouth three (3) times daily as needed. 90 Cap 0    predniSONE (DELTASONE) 20 mg tablet Take 2 Tabs by mouth daily. Take 2 tabs on days 2 and 3 of each chemo cycle 4 Tab 2    lidocaine-prilocaine (EMLA) topical cream Place cream over mediport 1 hour prior to chemotherapy and then place saran wrap over area. Every chemo treatment. 30 g 1    ondansetron hcl (ZOFRAN, AS HYDROCHLORIDE,) 8 mg tablet Take one tablet by mouth every 8 hours for nausea beginning the night of chemo for 3 days. 9 Tab 3    diphenhydrAMINE (BENADRYL) 25 mg capsule Take 1 with compazine every 6 hours for nausea for 3 days then, as needed. 60 Cap 3    prochlorperazine (COMPAZINE) 10 mg tablet Take 1 tablet every 6 hours with Benadryl 25mg for nausea for 3 days then, as needed. 60 Tab 3    acetaminophen (TYLENOL) 650 mg CR tablet 1,300 mg.      albuterol (PROVENTIL HFA, VENTOLIN HFA, PROAIR HFA) 90 mcg/actuation inhaler 2 Puffs.  ergocalciferol (ERGOCALCIFEROL) 50,000 unit capsule 50,000 Units every seven (7) days.       furosemide (LASIX) 20 mg tablet Take 1/2-1 tablet daily if needed for swelling.  allopurinol (ZYLOPRIM) 300 mg tablet 300 mg.      desonide (TRIDESILON) 0.05 % cream Use 1 Application to affected area Twice Daily.  bisoprolol-hydrochlorothiazide (ZIAC) 10-6.25 mg per tablet Take 1 Tab by Mouth Once a Day.  glipiZIDE (GLUCOTROL) 10 mg tablet 10 mg.      glimepiride (AMARYL) 4 mg tablet Take one in the am.  New dose. Review of Systems  Constitutional: The patient denies acute distress or discomfort. HEENT: The patient denies recent head trauma, eye pain, blurred vision,  hearing deficit, oropharyngeal mucosal pain or lesions, and the patient denies throat pain or discomfort. Lymphatics: The patient denies palpable peripheral lymphadenopathy. Hematologic: The patient denies having bruising, bleeding, or progressive fatigue. Respiratory: Patient denies having shortness of breath, cough, sputum production, fever, or dyspnea on exertion. Cardiovascular: The patient denies having leg pain, leg swelling, heart palpitations, chest permit, chest pain, or lightheadedness. The patient denies having dyspnea on exertion. Gastrointestinal: The patient reports occasional nausea from chemotherapy which is well controlled with antinausea medication. She denies having any emesis or diarrhea. Genitourinary: Patient denies having urinary urgency, frequency, or dysuria. The patient denies having blood in the urine. Psychological: The patient denies having symptoms of nervousness, anxiety, depression, or thoughts of harming self. Skin: Patient denies having skin rashes, skin, ulcerations, or unexplained itching or pruritus. Musculoskeletal: The patient denies having pain in the joints or bones. Objective:     Visit Vitals    /59    Pulse 70    Temp 98.4 °F (36.9 °C)     ECOG PS=0; Pain score=0/10    Physical Exam:   Gen. Appearance: The patient is in no acute distress. Skin: There is no bruise or rash. HEENT: The exam is unremarkable.   Neck: Supple without lymphadenopathy or thyromegaly. Lungs: Clear to auscultation and percussion; there are no wheezes or rhonchi. Heart: Regular rate and rhythm; there are no murmurs, gallops, or rubs. Abdomen: Bowel sounds are present and normal.  There is no guarding, tenderness, or hepatosplenomegaly. The patient has a colostomy bag in place. Extremities: There is no clubbing, cyanosis, or edema. Neurologic: There are no focal neurologic deficits. Lymphatics: There is no palpable peripheral lymphadenopathy. Musculoskeletal: The patient has full range of motion at all joints. There is no evidence of joint deformity or effusions. There is no focal joint tenderness. Psychological/psychiatric: There is no clinical evidence of anxiety, depression, or melancholy. Lab data:      Results for orders placed or performed during the hospital encounter of 05/16/18   CBC WITH 3 PART DIFF     Status: Abnormal   Result Value Ref Range Status    WBC 8.9 4.5 - 13.0 K/uL Final    RBC 2.83 (L) 4.10 - 5.10 M/uL Final    HGB 8.5 (L) 12.0 - 16 g/dL Final    HCT 27.8 (L) 36 - 48 % Final    MCV 98.2 78 - 102 FL Final    MCH 30.0 25.0 - 35.0 PG Final    MCHC 30.6 (L) 31 - 37 g/dL Final    RDW 16.2 (H) 11.5 - 14.5 % Final    PLATELET 270 123 - 044 K/uL Final    NEUTROPHILS 66 40 - 70 % Final    MIXED CELLS 12 0.1 - 17 % Final    LYMPHOCYTES 22 14 - 44 % Final    ABS. NEUTROPHILS 5.8 1.8 - 9.5 K/UL Final    ABS. MIXED CELLS 1.1 0.0 - 2.3 K/uL Final    ABS. LYMPHOCYTES 2.0 1.1 - 5.9 K/UL Final     Comment: Test performed at [place] [address] Location. Results Reviewed by Medical Director. DF AUTOMATED   Final           Assessment:     1. Iron deficiency anemia due to chronic blood loss    2. Peritoneal carcinomatosis ([facility] [address]    3. Colon cancer metastasized to multiple sites ([facility] [address]    4. Hydronephrosis with ureteropelvic junction (UPJ) obstruction    5. Antineoplastic chemotherapy induced anemia    6.  Chemotherapy induced neutropenia ([facility] [address]    7. Anemia in stage 3 chronic kidney disease      Plan:   Colorectal carcinoma with abdominal carcinomatosis: On 5/24/2017 the patient underwent colectomy with omentectomy with the findings of adenocarcinoma involving at least 80% of the omentum. The primary tumor appeared to originate in the transverse colon and measured approximately 5 cm. There was microscopic perforation of the colon and metastatic involvement into the left ovary. The tumor was pathologically staged as a T4 a N1M1 stage IV malignancy. The patient was started on FOLFIRI chemotherapy regimen on 08/30/17. The regimen is a combination of Irinotecan at 180 mg/m² on day 1, 5 fluorouracil given at a dose of 400 mg/m² by IV bolus on day 1 followed by 2400 mg/m² continuous infusion for 46 hours on days 1 and 2, leucovorin 400 mg/m² IV on day 1 as a 2 hour infusion prior to 5-fluorouracil. A comprehensive metabolic panel, CEA level, ferritin level, iron profile, and CBC will be ordered at this time. Currently, the patient is tolerating the treatment well and it will be continued. Plan to the patient that her most recent CEA level from 2/14/2018 had declined to 82 ng/mL. The CEA level from 4/18/2015 had increased to 577.1 ng/mL. The CEA from 3/14/2018 was 142 ng/mL. A prior level from 1/17/2018 was 234.7 ng/mL. Therefore immunotherapy. I informed the patient that I should have the results of the CEA from today's blood, I will request an evaluation to see if the patient be a candidate for    Anemia associated with chronic kidney disease and chemotherapy-induced anemia (persistent problem): I have explained to the patient that her CBC on today showed a hemoglobin of 8.5 g/dL with hematocrit of 27.8 %. Procrit at a dose of 60,000 units will be provided whenever her hemoglobin is below 10 g/dL hematocrit is below 30% every 2 weeks. The iron profile and ferritin levels will be ordered at this time.     Hydronephrosis (persistent problem): The patient is currently being seen by the urologist and is tentatively scheduled to have ureteral stents inserted in the upcoming week. Chemotherapy-induced leukopenia/neutropenia (improved problem): The CBC on today showed a normal WBC count of 8.5 with an absolute neutrophil count of 5.8. This will be monitored on a regular basis. Neulasta following each chemo cycle will continue. I will have the patient return to clinic for complete reassessment again in 4 weeks.     Orders Placed This Encounter    COMPLETE CBC & AUTO DIFF WBC    InHouse CBC ([employer])     Standing Status:   Future     Number of Occurrences:   1     Standing Expiration Date:   5/23/2018       Mary Barrett MD  5/16/2018

## 2018-05-23 NOTE — PROGRESS NOTES
[facility] Progress Note    Date: May 23, 2018    Name: Doreen Ovalle              MRN: [id number]              : 1955    Folfiri: [id number]     Pt to [facility], ambulatory, at 0910. Ms. Sharon Pino was assessed and education was provided. No complaints or concerns voiced    Ms. Polk's vitals were reviewed. Visit Vitals    /83 (BP 1 Location: Right arm, BP Patient Position: At rest)    Pulse 74    Temp 98.2 °F (36.8 °C)    Resp 18    Ht 4' 11.8\" (1.519 m)    Wt 93.3 kg (205 lb 9.6 oz)    SpO2 100%    BMI 40.42 kg/m2       Right chest mediport accessed with 20 g 1 inch orozco needle. Port flushed easily and had brisk blood return. Blood drawn off for CBC,ISTAT BMP and HEPATIC FUNCTION    Lab results were obtained and reviewed. Recent Results (from the past 12 hour(s))   CBC WITH 3 PART DIFF    Collection Time: 18  9:20 AM   Result Value Ref Range    WBC 9.8 4.5 - 13.0 K/uL    RBC 2.62 (L) 4.10 - 5.10 M/uL    HGB 7.8 (L) 12.0 - 16 g/dL    HCT 25.5 (L) 36 - 48 %    MCV 97.3 78 - 102 FL    MCH 29.8 25.0 - 35.0 PG    MCHC 30.6 (L) 31 - 37 g/dL    RDW 16.5 (H) 11.5 - 14.5 %    PLATELET 702 152 - 870 K/uL    NEUTROPHILS 65 40 - 70 %    MIXED CELLS 11 0.1 - 17 %    LYMPHOCYTES 24 14 - 44 %    ABS. NEUTROPHILS 6.3 1.8 - 9.5 K/UL    ABS. MIXED CELLS 1.1 0.0 - 2.3 K/uL    ABS. LYMPHOCYTES 2.4 1.1 - 5.9 K/UL    DF AUTOMATED     HEPATIC FUNCTION PANEL    Collection Time: 18  9:20 AM   Result Value Ref Range    Protein, total 6.3 (L) 6.4 - 8.2 g/dL    Albumin 2.9 (L) 3.4 - 5.0 g/dL    Globulin 3.4 2.0 - 4.0 g/dL    A-G Ratio 0.9 0.8 - 1.7      Bilirubin, total 0.2 0.2 - 1.0 MG/DL    Bilirubin, direct <0.1 0.0 - 0.2 MG/DL    Alk.  phosphatase 179 (H) 45 - 117 U/L    AST (SGOT) 17 15 - 37 U/L    ALT (SGPT) 24 13 - 56 U/L   POC CHEM8    Collection Time: 18  9:26 AM   Result Value Ref Range    CO2, POC 21 19 - 24 MMOL/L    Glucose,  (H) 74 - 106 MG/DL    BUN, POC 30 (H) 7 - 18 MG/DL Creatinine, POC 2.4 (H) 0.6 - 1.3 MG/DL    GFRAA, POC 25 (L) >60 ml/min/1.73m2    GFRNA, POC 20 (L) >60 ml/min/1.73m2    Sodium,  136 - 145 MMOL/L    Potassium, POC 4.0 3.5 - 5.5 MMOL/L    Calcium, ionized (POC) 1.25 1.12 - 1.32 mmol/L    Chloride,  100 - 108 MMOL/L    Anion gap, POC 18 10 - 20      Hematocrit, POC 23 (L) 36 - 49 %    Hemoglobin, POC 7.8 (L) 12 - 16 G/DL       Cj Holm NP notified of above h/h, and patient being asymptomatic. No order received. Procrit injection 60,000 units given SQ to upper back of left arm per parameters. Patient tolerated well. Bandaid applied to site. Lab results within ordered parameters to give chemo today. Chemo dosages verified with today's BSA and found to be within 10% of ordered dosages. D5W flush bag hung at kvo rate. Pre-medications (Decadron 12mg IVPB and Aloxi 0.25mg IVP) were administered as ordered and chemotherapy was initiated after blood return from port re-verified. Irinotecan 350 mg  was infused over 90 minutes concurrently with Leucovorin 780 mg. VS stable at end of infusion and pt denied complaints. Line flushed with D5 and blood return from port re-verified. Fluorouracil 780 mg was given slow IVP over 3 minutes. Line flushed with 10 mL NS and blood return was re-verified. Fluorouracil 4680 mg CADD pump was connected to patient. Connections were secured with tape and the volume was verified to be infusing. Hadley intact and site of port with dry drsg. No infiltration, redness or tenderness to site. Ms. Shira Lunsford tolerated infusion/injection. Patient Vitals for the past 8 hrs:   Temp Pulse Resp BP SpO2   05/23/18 1227 98.2 °F (36.8 °C) 74 18 149/83 100 %   05/23/18 0910 97.4 °F (36.3 °C) 85 18 132/69 100 %         Patient armband removed and shredded. Ms. Shira Lunsford was discharged from [place] [address] in stable condition at 1230.  She is to return on 5/25/2018 at 1300 for her next pump D/C/Neulasta appointment.     Enriqueta Tompkins, RN  May 23, 2018

## 2018-05-25 NOTE — PROGRESS NOTES
[facility] Progress Note    Date: May 25, 2018    Name: Alen Rdz    MRN: [id number]         : 1955    D/C Pump and OBI    Ms. Polk to [facility], ambulatory, at 1345. Pt was assessed and education was provided. Ms. Polk's vitals were reviewed. Visit Vitals    BP (!) 149/103 (BP 1 Location: Right arm, BP Patient Position: Sitting)    Pulse 73    Temp 99 °F (37.2 °C)    Resp 18    SpO2 97%       Pump removed and chemo discarded. Right chest mediport flushed easily and had brisk blood return. Mediport flushed with NS 20 ml and Heparin 500 units then de-accessed. No irritation or bleeding noted. Band-Aid applied. Neulasta 6 mg on-body injector placed on pt's left upper arm after cleaning site with alcohol. Observed injector until audible beeping and clicking heard and green blinking light visualized to indicate injector working properly. Ms. Erin Davies tolerated the procedure, and had no complaints. Patient armband removed and shredded. Ms. Erin Davies was discharged from [place] [address] in stable condition at 1325. She is to return on 18 at 0900 for her next chemo appointment.     Denver Mccollum RN  May 25, 2018

## 2018-05-29 NOTE — TELEPHONE ENCOUNTER
Patient called in states she is supposed to have an appt off betty orozco tomorrow 05/30/18 for her knee braces and she does not know the number/name  and needs to r/s please contact patient at [phone].

## 2018-06-06 NOTE — PROGRESS NOTES
[facility] Progress Note    Date: 2018    Name: Mindy Dickerson              MRN: [id number]              : 1955    Folfiri: [id number]     Pt to [facility], ambulatory, at 0915. Ms. Laura Rodrigez was assessed and education was provided. No complaints or concerns voiced    Ms. Polk's vitals were reviewed. Visit Vitals    /85 (BP 1 Location: Right arm, BP Patient Position: At rest)    Pulse 78    Temp 98 °F (36.7 °C)    Resp 18    Ht 4' 11.4\" (1.509 m)    Wt 93.9 kg (207 lb)    SpO2 100%    BMI 41.25 kg/m2       Right chest mediport accessed with 20 g 1 inch orozco needle. Port flushed easily and had brisk blood return. Blood drawn off for CBC and ISTAT BMP     Lab results were obtained and reviewed. Recent Results (from the past 12 hour(s))   CBC WITH 3 PART DIFF    Collection Time: 18  9:30 AM   Result Value Ref Range    WBC 20.0 (H) 4.5 - 13.0 K/uL    RBC 2.77 (L) 4.10 - 5.10 M/uL    HGB 8.4 (L) 12.0 - 16 g/dL    HCT 26.9 (L) 36 - 48 %    MCV 97.1 78 - 102 FL    MCH 30.3 25.0 - 35.0 PG    MCHC 31.2 31 - 37 g/dL    RDW 17.2 (H) 11.5 - 14.5 %    PLATELET 300 322 - 786 K/uL    NEUTROPHILS 80 (H) 40 - 70 %    MIXED CELLS 7 0.1 - 17 %    LYMPHOCYTES 13 (L) 14 - 44 %    ABS. NEUTROPHILS 16.1 (H) 1.8 - 9.5 K/UL    ABS. MIXED CELLS 1.4 0.0 - 2.3 K/uL    ABS. LYMPHOCYTES 2.5 1.1 - 5.9 K/UL    DF AUTOMATED     POC CHEM8    Collection Time: 18  9:37 AM   Result Value Ref Range    CO2, POC 22 19 - 24 MMOL/L    Glucose,  (H) 74 - 106 MG/DL    BUN, POC 29 (H) 7 - 18 MG/DL    Creatinine, POC 2.1 (H) 0.6 - 1.3 MG/DL    GFRAA, POC 29 (L) >60 ml/min/1.73m2    GFRNA, POC 24 (L) >60 ml/min/1.73m2    Sodium,  136 - 145 MMOL/L    Potassium, POC 3.7 3.5 - 5.5 MMOL/L    Calcium, ionized (POC) 1.20 1. 12 - 1.32 mmol/L    Chloride,  100 - 108 MMOL/L    Anion gap, POC 18 10 - 20      Hematocrit, POC 26 (L) 36 - 49 %    Hemoglobin, POC 8.8 (L) 12 - 16 G/DL       [facility] NP notified of above wbc/anc results. Results are as a result of patient receiving neulasta shots after chemo treatment. Patient without signs of infection. Okay to proceed with chemo. Procrit injection 60,000 units given SQ to upper back of left arm per parameters. Patient tolerated well. Bandaid applied to site. Lab results within ordered parameters to give chemo today. Chemo dosages verified with today's BSA and found to be within 10% of ordered dosages. D5W flush bag hung at kvo rate. Pre-medications (Decadron 12mg IVPB and Aloxi 0.25mg IVP) were administered as ordered and chemotherapy was initiated after blood return from port re-verified. Irinotecan 350 mg  was infused over 90 minutes concurrently with Leucovorin 780 mg. VS stable at end of infusion and pt denied complaints. Line flushed with D5 and blood return from port re-verified. Fluorouracil 780 mg was given slow IVP over 3 minutes. Line flushed with 10 mL NS and blood return was re-verified. Fluorouracil 4680 mg CADD pump was connected to patient. Connections were secured with tape and the volume was verified to be infusing. Hadley intact and site of port with dry drsg. No infiltration, redness or tenderness to site. Ms. Lita Arteaga tolerated infusion/injection. Patient Vitals for the past 8 hrs:   Temp Pulse Resp BP SpO2   06/06/18 1248 98 °F (36.7 °C) 78 18 130/85 100 %   06/06/18 0915 98.3 °F (36.8 °C) 73 18 138/85 99 %         Patient armband removed and shredded. Ms. Lita Arteaga was discharged from [place] [address] in stable condition at 1250. She is to return on 6/8/2018 at 1400 for her next  pump D/C/Neulasta appointment.     Moon Shields RN  June 6, 2018

## 2018-06-08 NOTE — PROGRESS NOTES
Name: Sreekanth Valentine     MRN: [id number]                                                                                                               : 1955     D/C pump/Neulasta     Ms. Polk arrived ambulatory to [facility] at 1355. She  was assessed and education was provided.     Ms. Polk's vitals were reviewed and patient was observed for 5 minutes prior to procedure. Visit Vitals    /87 (BP 1 Location: Left arm, BP Patient Position: Sitting)    Pulse 79    Temp 99 °F (37.2 °C)    Resp 18    SpO2 100%     CADD pump completed infusion and removed from patient.      Port flushed with 20 mL and 500 units of heparin after blood return was verified. Mediport de-accessed and a Band-aid applied to the site.      Neulasta 6 mg applied to the back of the right arm. Green light verified to be blinking. Patient notified that she can remove the OBI in approximately 28 hours at 31 75 62. Patient verbalized understanding.      Ms. Polk tolerated the procedure, and had no complaints.     Ms. Polk was discharged from [place] [address] in stable condition at 1410.  She is to return on 2018 at 0900 for her next appointment for procrit appointment.  1210 Derrell Cruz RN  18

## 2018-06-13 NOTE — PATIENT INSTRUCTIONS
Colon Cancer: Care Instructions  Your Care Instructions    Colon cancer occurs when abnormal cells grow out of control in the lower part of your intestine (your colon). If the tumor was small and had not spread, your doctor may have removed it during the colonoscopy. But you may need surgery to remove the cancer if the tumor was too big or had spread too far to be removed during a colonoscopy. If cancer has spread to another part of your body, such as the liver, you may need more far-reaching surgery. Treatment for colon cancer may also include radiation therapy and medicines that destroy cancer cells (chemotherapy). Being treated for cancer can weaken your body, and you may feel very tired. Get the rest your body needs so that you can feel better. When you find out that you have cancer, you may feel many emotions and may need some help coping. Seek out family, friends, and counselors for support. You also can do things at home to make yourself feel better while you go through treatment. Call the [employer] (6-870.561.5732) or visit its website at 0126 Let it Wave for more information. Follow-up care is a key part of your treatment and safety. Be sure to make and go to all appointments, and call your doctor if you are having problems. It's also a good idea to know your test results and keep a list of the medicines you take. How can you care for yourself at home? · Take your medicines exactly as prescribed. Call your doctor if you think you are having a problem with your medicine. You may get medicine for nausea and vomiting if you have these side effects. · Follow your doctor's instructions to relieve pain. Pain from cancer and surgery can almost always be controlled. Use pain medicine when you first notice pain, before it becomes severe. · Eat healthy food. If you do not feel like eating, try to eat food that has protein and extra calories to keep up your strength and prevent weight loss. Drink liquid meal replacements for extra calories and protein. Try to eat your main meal early. · Get some physical activity every day, but do not get too tired. Keep doing the hobbies you enjoy as your energy allows. · Take steps to control your stress and workload. Learn relaxation techniques. ¨ Share your feelings. Stress and tension affect our emotions. By expressing your feelings to others, you may be able to understand and cope with them. ¨ Consider joining a support group. Talking about a problem with your spouse, a good friend, or other people with similar problems is a good way to reduce tension and stress. ¨ Express yourself through art. Try writing, dance, art, or crafts to relieve stress. Some dance, writing, or art groups may be available just for people who have cancer. ¨ Be kind to your body and mind. Getting enough sleep, eating a healthy diet, and taking time to do things you enjoy can contribute to an overall feeling of balance in your life and help reduce stress. ¨ Get help if you need it. Discuss your concerns with your doctor or counselor. · If you are vomiting or have diarrhea:  ¨ Drink plenty of fluids (enough so that your urine is light yellow or clear like water) to prevent dehydration. Choose water and other caffeine-free clear liquids. If you have kidney, heart, or liver disease and have to limit fluids, talk with your doctor before you increase the amount of fluids you drink. ¨ When you are able to eat, try clear soups, mild foods, and liquids until all symptoms are gone for 12 to 48 hours. Other good choices include dry toast, crackers, cooked cereal, and gelatin dessert, such as Jell-O.  · If you have not already done so, prepare a list of advance directives. Advance directives are instructions to your doctor and family members about what kind of care you want if you become unable to speak or express yourself. When should you call for help?   Call 911 anytime you think you may need emergency care. For example, call if:  ? · You pass maroon or very bloody stools. ? · You passed out (lost consciousness). ?Call your doctor now or seek immediate medical care if:  ? · You have a fever. ? · You are vomiting. ? · You have new or worse belly pain. ? · You cannot pass stools or gas. ? · You have new or more blood in your stool. ? Watch closely for changes in your health, and be sure to contact your doctor if:  ? · You are losing weight. ? · You have new or worse symptoms. ? · You do not get better as expected. Where can you learn more? Go to http://ledy-toño.info/. Enter I849 in the search box to learn more about \"Colon Cancer: Care Instructions. \"  Current as of: May 12, 2017  Content Version: 11.4  © 8627-5846 [employer]. Care instructions adapted under license by [employer] (which disclaims liability or warranty for this information). If you have questions about a medical condition or this instruction, always ask your healthcare professional. [address] any warranty or liability for your use of this information.

## 2018-06-13 NOTE — PROGRESS NOTES
Hematology/Oncology  Progress Note    Name: Jaylon Pena  Date: 2018  : 1955    PCP: Jonathon Rosado NP     Ms. Larry Carr is a 57 y.o. -American woman with metastatic colorectal carcinoma/carcinomatosis  Current therapy: FOLFIRI chemotherapy regimen      Subjective:     Ms. Larry Carr is a 73-year-old -American woman with abdominal carcinomatosis from metastatic colorectal carcinoma. She was started on FOLFIRI regimen and she is here today for follow up. She states that she has no pain, her appetite has significantly increased and her energy level is up. She denies diarrhea, nausea, or vomiting. Her only complaint is occasional abdominal cramping during chemo. She denies constipation. Otherwise there are no additional complaints to report. She has recently been found to have hydronephrosis and on the advice of the oncologic surgeon was referred to a urologist for an assessment. The patient has been evaluated and treated by the urologist.  Today she reports that she is continuing to do well. Unfortunately, I explained to the patient that her tumor marker is increasing. Past medical history, family history, and social history: these were reviewed and remains unchanged. Past Medical History:   Diagnosis Date    Asthma     Cancer ([facility] [address] 10/2016    colon    Diabetes ([facility] [address]     Gout     Heart disease     Hypertension     Kidney disease     Maintenance chemotherapy     Neuropathic pain of foot      Past Surgical History:   Procedure Laterality Date    HX BACK SURGERY      HX HYSTERECTOMY      HX VASCULAR ACCESS      mediport     Social History     Social History    Marital status:      Spouse name: N/A    Number of children: N/A    Years of education: N/A     Occupational History    Not on file.      Social History Main Topics    Smoking status: Never Smoker    Smokeless tobacco: Never Used    Alcohol use No    Drug use: No    Sexual activity: Not on file     Other Topics Concern    Not on file     Social History Narrative    ** Merged History Encounter **          Family History   Problem Relation Age of Onset    Family history unknown: Yes     Current Outpatient Prescriptions   Medication Sig Dispense Refill    warfarin (COUMADIN) 3 mg tablet Take 3 mg by mouth daily. Takes 1 and 1/2 tab Sunday, Tuesday, and Thursday. Takes 1 tablet Monday, Wednesday, and Friday      amLODIPine (NORVASC) 5 mg tablet Take 5 mg by mouth daily.  cloNIDine HCl (CATAPRES) 0.1 mg tablet Take  by mouth two (2) times a day.  insulin aspart (NOVOLOG) 100 unit/mL inpn by SubCUTAneous route.  omeprazole (PRILOSEC) 20 mg capsule Take 20 mg by mouth daily.  traMADol (ULTRAM) 50 mg tablet Take 50 mg by mouth every six (6) hours as needed for Pain.  gabapentin (NEURONTIN) 100 mg capsule Take 100 mg by mouth nightly.  dicyclomine (BENTYL) 10 mg capsule Take 1 Cap by mouth three (3) times daily as needed. 90 Cap 0    predniSONE (DELTASONE) 20 mg tablet Take 2 Tabs by mouth daily. Take 2 tabs on days 2 and 3 of each chemo cycle 4 Tab 2    lidocaine-prilocaine (EMLA) topical cream Place cream over mediport 1 hour prior to chemotherapy and then place saran wrap over area. Every chemo treatment. 30 g 1    ondansetron hcl (ZOFRAN, AS HYDROCHLORIDE,) 8 mg tablet Take one tablet by mouth every 8 hours for nausea beginning the night of chemo for 3 days. 9 Tab 3    diphenhydrAMINE (BENADRYL) 25 mg capsule Take 1 with compazine every 6 hours for nausea for 3 days then, as needed. 60 Cap 3    prochlorperazine (COMPAZINE) 10 mg tablet Take 1 tablet every 6 hours with Benadryl 25mg for nausea for 3 days then, as needed. 60 Tab 3    acetaminophen (TYLENOL) 650 mg CR tablet 1,300 mg.      albuterol (PROVENTIL HFA, VENTOLIN HFA, PROAIR HFA) 90 mcg/actuation inhaler 2 Puffs.  ergocalciferol (ERGOCALCIFEROL) 50,000 unit capsule 50,000 Units every seven (7) days.  furosemide (LASIX) 20 mg tablet Take 1/2-1 tablet daily if needed for swelling.  allopurinol (ZYLOPRIM) 300 mg tablet 300 mg.      desonide (TRIDESILON) 0.05 % cream Use 1 Application to affected area Twice Daily.  bisoprolol-hydrochlorothiazide (ZIAC) 10-6.25 mg per tablet Take 1 Tab by Mouth Once a Day.  glipiZIDE (GLUCOTROL) 10 mg tablet 10 mg.      glimepiride (AMARYL) 4 mg tablet Take one in the am.  New dose. Review of Systems  Constitutional: The patient denies acute distress or discomfort. HEENT: The patient denies recent head trauma, eye pain, blurred vision,  hearing deficit, oropharyngeal mucosal pain or lesions, and the patient denies throat pain or discomfort. Lymphatics: The patient denies palpable peripheral lymphadenopathy. Hematologic: The patient denies having bruising, bleeding, or progressive fatigue. Respiratory: Patient denies having shortness of breath, cough, sputum production, fever, or dyspnea on exertion. Cardiovascular: The patient denies having leg pain, leg swelling, heart palpitations, chest permit, chest pain, or lightheadedness. The patient denies having dyspnea on exertion. Gastrointestinal: The patient reports occasional nausea from chemotherapy which is well controlled with antinausea medication. She denies having any emesis or diarrhea. Genitourinary: Patient denies having urinary urgency, frequency, or dysuria. The patient denies having blood in the urine. Psychological: The patient denies having symptoms of nervousness, anxiety, depression, or thoughts of harming self. Skin: Patient denies having skin rashes, skin, ulcerations, or unexplained itching or pruritus. Musculoskeletal: The patient denies having pain in the joints or bones.       Objective:     Visit Vitals    /67    Pulse 74    Temp 98.3 °F (36.8 °C)    Ht 4' 11.4\" (1.509 m)    Wt 93.7 kg (206 lb 9.6 oz)    BMI 41.17 kg/m2     ECOG PS=0; Pain score=0/10    Physical Exam:   Gen. Appearance: The patient is in no acute distress. Skin: There is no bruise or rash. HEENT: The exam is unremarkable. Neck: Supple without lymphadenopathy or thyromegaly. Lungs: Clear to auscultation and percussion; there are no wheezes or rhonchi. Heart: Regular rate and rhythm; there are no murmurs, gallops, or rubs. Abdomen: Bowel sounds are present and normal.  There is no guarding, tenderness, or hepatosplenomegaly. The patient has a colostomy bag in place. Extremities: There is no clubbing, cyanosis, or edema. Neurologic: There are no focal neurologic deficits. Lymphatics: There is no palpable peripheral lymphadenopathy. Musculoskeletal: The patient has full range of motion at all joints. There is no evidence of joint deformity or effusions. There is no focal joint tenderness. Psychological/psychiatric: There is no clinical evidence of anxiety, depression, or melancholy. Lab data:      Results for orders placed or performed during the hospital encounter of 06/13/18   CBC WITH 3 PART DIFF     Status: Abnormal   Result Value Ref Range Status    WBC 12.9 4.5 - 13.0 K/uL Final    RBC 2.94 (L) 4.10 - 5.10 M/uL Final    HGB 8.6 (L) 12.0 - 16 g/dL Final    HCT 27.8 (L) 36 - 48 % Final    MCV 94.6 78 - 102 FL Final    MCH 29.3 25.0 - 35.0 PG Final    MCHC 30.9 (L) 31 - 37 g/dL Final    RDW 16.9 (H) 11.5 - 14.5 % Final    PLATELET 643 066 - 775 K/uL Final    NEUTROPHILS 77 (H) 40 - 70 % Final    MIXED CELLS 9 0.1 - 17 % Final    LYMPHOCYTES 14 14 - 44 % Final    ABS. NEUTROPHILS 10.0 (H) 1.8 - 9.5 K/UL Final    ABS. MIXED CELLS 1.1 0.0 - 2.3 K/uL Final    ABS. LYMPHOCYTES 1.8 1.1 - 5.9 K/UL Final     Comment: Test performed at [place] [address] Location. Results Reviewed by Medical Director. DF AUTOMATED   Final           Assessment:     1. Colon cancer metastasized to multiple sites ([facility] [address]    2. Iron deficiency anemia due to chronic blood loss    3.  Hydronephrosis with ureteropelvic junction (UPJ) obstruction    4. Antineoplastic chemotherapy induced anemia    5. Chemotherapy induced neutropenia (HCC)      Plan:   Colorectal carcinoma with abdominal carcinomatosis: On 5/24/2017 the patient underwent colectomy with omentectomy with the findings of adenocarcinoma involving at least 80% of the omentum. The primary tumor appeared to originate in the transverse colon and measured approximately 5 cm. There was microscopic perforation of the colon and metastatic involvement into the left ovary. The tumor was pathologically staged as a T4 a N1M1 stage IV malignancy. The patient was started on FOLFIRI chemotherapy regimen on 08/30/17. The regimen is a combination of Irinotecan at 180 mg/m² on day 1, 5 fluorouracil given at a dose of 400 mg/m² by IV bolus on day 1 followed by 2400 mg/m² continuous infusion for 46 hours on days 1 and 2, leucovorin 400 mg/m² IV on day 1 as a 2 hour infusion prior to 5-fluorouracil. A comprehensive metabolic panel, CEA level, ferritin level, iron profile, and CBC will be ordered at this time. Currently, the patient is tolerating the treatment well and it will be continued. Plan to the patient that her most recent CEA level from 2/14/2018 had declined to 82 ng/mL. the most recent CEA level from 5/17/2018 was 655.4 ng/mL. The CEA level from 4/18/2015 had increased to 577.1 ng/mL. The CEA from 3/14/2018 was 142 ng/mL. A prior level from 1/17/2018 was 234.7 ng/mL. at this time I will order an MRI of the abdomen for further assessment. We will also request microsatellite instability testing to see if she would be a candidate for immunotherapy as well. I will see her back in clinic after the MRI of the abdomen has been completed to finalize a decision regarding possible/probable immunotherapy.     Anemia associated with chronic kidney disease and chemotherapy-induced anemia (persistent problem): I have explained to the patient that her CBC on today showed a hemoglobin of 8.6 g/dL with hematocrit of 27.8 %. Procrit at a dose of 60,000 units will be provided whenever her hemoglobin is below 10 g/dL hematocrit is below 30% every 2 weeks. The iron profile and ferritin levels will be ordered at this time. Hydronephrosis (persistent problem): The patient is currently being seen by the urologist and is tentatively scheduled to have ureteral stents inserted in the upcoming week. Chemotherapy-induced leukopenia/neutropenia (improved problem): The CBC on today showed a normal WBC count of 12.9 with an absolute neutrophil count of 10. This will be monitored on a regular basis. Neulasta following each chemo cycle will continue. I will have the patient return to clinic for complete reassessment again in 4 weeks.     Orders Placed This Encounter    COMPLETE CBC & AUTO DIFF WBC    InHouse CBC ([employer])     Standing Status:   Future     Number of Occurrences:   1     Standing Expiration Date:   6/20/2018       Jacobo Agarwal MD  6/13/2018

## 2018-06-20 NOTE — PROGRESS NOTES
[facility] Progress Note    Date: 2018    Name: Cassandra Pratt    MRN: [id number]         : 1955      Ms. Polk arrived in the [facility] today, at 0915, in stable condition, here for Cycle 19, Day 1, IV FOLFIRI Chemotherapy Regimen, and Q 2 Week CBC/Procrit injection. She was assessed and education was provided. Ms. Polk's vitals were reviewed. Visit Vitals    /73 (BP 1 Location: Right arm, BP Patient Position: At rest;Sitting)    Pulse 82    Temp 98.6 °F (37 °C)    Resp 20    Ht 5' (1.524 m)    Wt 93.9 kg (207 lb)    Breastfeeding No    BMI 40.43 kg/m2           Her right chest single lumen port was accessed without incident at 0935, and blood for a CBC & BMP was drawn, per order. The CBC was processed on the Sysmex analyzer, and the BMP was processed on the I-STAT analyzer. Lab results were obtained and reviewed, and the results listed below, were reported to Dionte Seals NP (especially the critical elevated WBC Count)  Order was received from [employer] [place] [employer], to proceed with chemotherapy today as planned. Also, her most recent Hepatic Function Panel from 18 was noted to be satisfactory for chemotherapy today. Preliminary platelet count today was 275,000. Recent Results (from the past 12 hour(s))   CBC WITH 3 PART DIFF    Collection Time: 18  9:35 AM   Result Value Ref Range    WBC 28.5 (HH) 4.5 - 13.0 K/uL    RBC 2.93 (L) 4.10 - 5.10 M/uL    HGB 8.6 (L) 12.0 - 16 g/dL    HCT 28.0 (L) 36 - 48 %    MCV 95.6 78 - 102 FL    MCH 29.4 25.0 - 35.0 PG    MCHC 30.7 (L) 31 - 37 g/dL    RDW 17.4 (H) 11.5 - 14.5 %    NEUTROPHILS 85 (H) 40 - 70 %    MIXED CELLS 6 0.1 - 17 %    LYMPHOCYTES 10 (L) 14 - 44 %    ABS. NEUTROPHILS 24.1 (H) 1.8 - 9.5 K/UL    ABS. MIXED CELLS 1.6 0.0 - 2.3 K/uL    ABS.  LYMPHOCYTES 2.8 1.1 - 5.9 K/UL    DF AUTOMATED     POC CHEM8    Collection Time: 18  9:44 AM   Result Value Ref Range    CO2, POC 21 19 - 24 MMOL/L Glucose,  (H) 74 - 106 MG/DL    BUN, POC 29 (H) 7 - 18 MG/DL    Creatinine, POC 2.0 (H) 0.6 - 1.3 MG/DL    GFRAA, POC 30 (L) >60 ml/min/1.73m2    GFRNA, POC 25 (L) >60 ml/min/1.73m2    Sodium,  136 - 145 MMOL/L    Potassium, POC 3.4 (L) 3.5 - 5.5 MMOL/L    Calcium, ionized (POC) 1.18 1.12 - 1.32 mmol/L    Chloride,  100 - 108 MMOL/L    Anion gap, POC 22 (H) 10 - 20      Hematocrit, POC 28 (L) 36 - 49 %    Hemoglobin, POC 9.5 (L) 12 - 16 G/DL             Procrit 60,000 units, was administered SQ, in her left arm, at 1040, as ordered, and without incident, for HGB 8.6 & HCT 28.0. (The H&H from the Sysmex analyzer was used to determine whether or not Procrit injection was indicated. )          Pre-medications consisting of IV Aloxi 0.25 mg, and IV Decadron 12 mg, were administered pre-chemotherapy, per order, and without incident. Camptosar (Irinotecan) 350 mg IV (180 mg/m2), was administered concurrently with Leucovorin 780 mg IV (400 mg/m2), both over approximately 90 minutes, and without incident. Upon completion today, of the Irinotecan & Leucovorin, Ms. Polk complained of \"feeling really bad\" She complained of \"a lot of gas\" & nausea. Therefore, at 1320, Atropine 0.4 mg IVP was administered. By [id number], Ms. Polk stated that she \"felt much better\". 5FU IVP Bolus, 780 mg (400 mg/m2), was administered per order, and without incident. 5FU 4680 mg IV (2400 mg/m2), via CADD Pump, was initiated at 1400, and was set to infuse over 46 hours, per order, and without incident. (250 ml CADD Cassette was set to infuse at 5.4 ml/hr)        Dressing over the orozco needle remained dry and intact, and at time of discharge, the CADD pump was infusing, without incident. Ms. Nish Degroot tolerated well, and had no complaints. Ms. Nish Degroot was discharged from [place] [address] in stable condition at [phone]. Ronold Kanner  She is to return on Friday, 6-22-18,  at 1300, for her next appointment, to have the CADD pump removed and to receive Neulasta. And then, day 1, of her next cycle of FOLFIRI Chemotherapy, and CBC/Procrit injection, is scheduled in 2 weeks, on Tuesday, 7-3-18 at 0800.     Jeanie Forrester RN  June 20, 2018  1:07 PM

## 2018-06-22 NOTE — PROGRESS NOTES
Name: Camilo Pulido     MRN: [id number]                                                                                                               : 1955     D/C pump/Neulasta     Ms. Polk arrived ambulatory to [facility] at 1350. She  was assessed and education was provided.     Ms. Polk's vitals were reviewed and patient was observed for 5 minutes prior to procedure. Visit Vitals    /82 (BP 1 Location: Left arm, BP Patient Position: Sitting)    Pulse 86    Temp 99 °F (37.2 °C)    Resp 20    SpO2 100%     CADD pump completed infusion and removed from patient.      Port flushed with 20 mL and 500 units of heparin after blood return was verified. Mediport de-accessed and a Band-aid applied to the site.      Neulasta 6 mg applied to the back of the right arm. Green light verified to be blinking. Patient notified that she can remove the OBI in approximately 27 hours at 1700. Patient verbalized understanding.      Ms. Polk tolerated the procedure, and had no complaints.     Ms. Polk was discharged from [place] [address] in stable condition at 1350.  She is to return on 2018 at 0800 for her next appointment.  Mychal Farmer RN  18

## 2018-07-03 NOTE — PROGRESS NOTES
[facility] Progress Note    Date: July 3, 2018    Name: Elijah Marcum              MRN: [id number]              : 1955    Chemotherapy Cycle: C20 D1 Folfiri / Procrit Q 2 weeks       Pt to [facility], ambulatory, at [address] [facility]. Ms. Katlyn Jordan was assessed and education was provided. Patient reports she is currently being treated for a UTI and a shingles infection to the eye. She has brought bottles of Macrobid and Acyclovir that she is currently taking. She reports she finished one bottle of Acyclovir and that her eye doctor ordered an additional 10 day. She states her eye symptoms of blisters, oozing, itching and pain have resolved; no irritation, injection, or blisters noted to eyes today. Fco Lala NP informed of patient's infection and today's WBC/ANC. NP by [facility] room to assess patient. NP states patient may receive her treatment today as scheduled. Pharmacy notified. Ms. Polk's vitals were reviewed. Visit Vitals    /80 (BP 1 Location: Left arm, BP Patient Position: Sitting)    Pulse 92    Temp 98.5 °F (36.9 °C)    Resp 18    Ht 5' (1.524 m)    Wt 93.9 kg (207 lb 1.6 oz)    SpO2 99%    Breastfeeding No    BMI 40.45 kg/m2     Patient Vitals for the past 12 hrs:   Temp Pulse Resp BP SpO2   18 1236 - 69 - 124/74 -   18 1210 - 91 - 154/85 -   18 0830 98.5 °F (36.9 °C) 92 18 132/80 99 %       Right chest mediport accessed with 20 g one inch orozco needle. Port flushed easily and had brisk blood return. Blood drawn off per written orders after 10 ml waste. CBC run on Sysmex and BMP run on iSTAT. D5W initiated @ 25 ml/hr. Lab results were obtained and reviewed.    Recent Results (from the past 12 hour(s))   CBC WITH 3 PART DIFF    Collection Time: 18  8:55 AM   Result Value Ref Range    WBC 19.9 (H) 4.5 - 13.0 K/uL    RBC 2.86 (L) 4.10 - 5.10 M/uL    HGB 8.4 (L) 12.0 - 16 g/dL    HCT 27.0 (L) 36 - 48 %    MCV 94.4 78 - 102 FL    MCH 29.4 25.0 - 35.0 PG    MCHC 31.1 31 - 37 g/dL    RDW 17.4 (H) 11.5 - 14.5 %    PLATELET 481 723 - 165 K/uL    NEUTROPHILS 83 (H) 40 - 70 %    MIXED CELLS 6 0.1 - 17 %    LYMPHOCYTES 11 (L) 14 - 44 %    ABS. NEUTROPHILS 16.6 (H) 1.8 - 9.5 K/UL    ABS. MIXED CELLS 1.2 0.0 - 2.3 K/uL    ABS. LYMPHOCYTES 2.1 1.1 - 5.9 K/UL    DF AUTOMATED     POC CHEM8    Collection Time: 07/03/18  9:01 AM   Result Value Ref Range    CO2, POC 21 19 - 24 MMOL/L    Glucose,  (H) 74 - 106 MG/DL    BUN, POC 30 (H) 7 - 18 MG/DL    Creatinine, POC 2.5 (H) 0.6 - 1.3 MG/DL    GFRAA, POC 24 (L) >60 ml/min/1.73m2    GFRNA, POC 19 (L) >60 ml/min/1.73m2    Sodium,  136 - 145 MMOL/L    Potassium, POC 3.8 3.5 - 5.5 MMOL/L    Calcium, ionized (POC) 1.25 1.12 - 1.32 mmol/L    Chloride,  100 - 108 MMOL/L    Anion gap, POC 21 (H) 10 - 20      Hematocrit, POC 25 (L) 36 - 49 %    Hemoglobin, POC 8.5 (L) 12 - 16 G/DL     Patient's Hgb and Hct within ordered parameters to give Procrit today. (Hgb = 8.4 and Hct = 27.0). Patient received Procrit 60,000 units SQ to back of left upper arm. No bleeding or irritation noted; bandaid applied. Lab results within ordered parameters to give chemo today. ANC = 16.6, PLT = 317. Chemo dosages verified with today's BSA and found to be within 10% of ordered dosages. Patient confirmed she knows to take her oral Decadron tomorrow and the next day and that she has plenty. Pre-medications (Aloxi 0.25 mg and Decadron 12 mg) were administered as ordered. Reviewed expected side effects of premeds with patient, including insomnia/irritability/flushing from Decadron and constipation from Aloxi. Blood return from port verified. Ironotecan 350 mg was infused over approximately 90 minutes. Leukovorin 780 mg was infused concurrently with Ironotecan. Just before Ironotecan/Leukovorin finished, patient c/o severe abdominal cramping, nausea, and \"feeling very bad. \" Iront/Leuko infusions stopped and VS checked.  BP up at 154/85, HR = 91.   Stopped D5W and flushed line with NS 10 ml. Atropine 0.4 mg mixed in 3 cc NS given slow IV push then followed with NS 10 ml. Within five minutes, patient reported symptoms were resolving. Ironotecan and Leukovorin restarted and completed without further complaints. Twenty-five minutes after onset of symptoms, patient reported complete resolution and BP/HR were back to baseline. Line flushed with D5W 30 ml, then blood return from port re-verified. Fluorouracil 780 mg was administered slow IV push over two minutes. Line flushed with D5W 30 ml and blood return from port re-verified. Fluorouracil 4680 mg was set to infuse over 46 hours at a rate of 5.4 ml/hr. All pump settings verified with Logan Joseph RN. New batteries placed in pump, and patient given extra batteries to take home. All tubing connections checked and reinforced with tape. Ms. Yokasta Arita tolerated infusion, and had no complaints at this time. Patient armband removed and shredded. Ms. Yokasta Arita was discharged from [place] [address] in stable condition at . She is to return on 7/5/18 at 1400 for her next d/c pump and Neulasta OBI appointment.     Allan Mauricio RN  July 3, 2018

## 2018-07-05 NOTE — PROGRESS NOTES
Name: Nicole Poole     MRN: [id number]                                                                                                               : 1955     D/C pump/Neulasta     Ms. Polk arrived ambulatory to [facility] at 1415. She  was assessed and education was provided.     Ms. Polk's vitals were reviewed and patient was observed for 5 minutes prior to procedure. Visit Vitals    /82 (BP 1 Location: Left arm, BP Patient Position: Sitting)    Pulse 90    Temp 98.2 °F (36.8 °C)    Resp 18    SpO2 100%     CADD pump completed infusion and removed from patient.      Port flushed with 20 mL and 500 units of heparin after blood return was verified. Mediport de-accessed and a Band-aid applied to the site.      Neulasta 6 mg applied to the back of the right arm. Green light verified to be blinking. Patient notified that she can remove the OBI in approximately 27 hours at [phone]. Patient verbalized understanding.      Ms. Polk tolerated the procedure, and had no complaints.     Ms. Polk was discharged from [place] [address] in stable condition at 1430.  She is to return on 2018 at 1000 for her next appointment.     Tracie Plunkett  18

## 2018-07-11 NOTE — PATIENT INSTRUCTIONS
Colon Cancer: Care Instructions  Your Care Instructions    Colon cancer occurs when abnormal cells grow out of control in the lower part of your intestine (your colon). If the tumor was small and had not spread, your doctor may have removed it during the colonoscopy. But you may need surgery to remove the cancer if the tumor was too big or had spread too far to be removed during a colonoscopy. If cancer has spread to another part of your body, such as the liver, you may need more far-reaching surgery. Treatment for colon cancer may also include radiation therapy and medicines that destroy cancer cells (chemotherapy). Being treated for cancer can weaken your body, and you may feel very tired. Get the rest your body needs so that you can feel better. When you find out that you have cancer, you may feel many emotions and may need some help coping. Seek out family, friends, and counselors for support. You also can do things at home to make yourself feel better while you go through treatment. Call the [employer] ([phone]) or visit its website at [address] for more information. Follow-up care is a key part of your treatment and safety. Be sure to make and go to all appointments, and call your doctor if you are having problems. It's also a good idea to know your test results and keep a list of the medicines you take. How can you care for yourself at home? · Take your medicines exactly as prescribed. Call your doctor if you think you are having a problem with your medicine. You may get medicine for nausea and vomiting if you have these side effects. · Follow your doctor's instructions to relieve pain. Pain from cancer and surgery can almost always be controlled. Use pain medicine when you first notice pain, before it becomes severe. · Eat healthy food. If you do not feel like eating, try to eat food that has protein and extra calories to keep up your strength and prevent weight loss. Drink liquid meal replacements for extra calories and protein. Try to eat your main meal early. · Get some physical activity every day, but do not get too tired. Keep doing the hobbies you enjoy as your energy allows. · Take steps to control your stress and workload. Learn relaxation techniques. ¨ Share your feelings. Stress and tension affect our emotions. By expressing your feelings to others, you may be able to understand and cope with them. ¨ Consider joining a support group. Talking about a problem with your spouse, a good friend, or other people with similar problems is a good way to reduce tension and stress. ¨ Express yourself through art. Try writing, dance, art, or crafts to relieve stress. Some dance, writing, or art groups may be available just for people who have cancer. ¨ Be kind to your body and mind. Getting enough sleep, eating a healthy diet, and taking time to do things you enjoy can contribute to an overall feeling of balance in your life and help reduce stress. ¨ Get help if you need it. Discuss your concerns with your doctor or counselor. · If you are vomiting or have diarrhea:  ¨ Drink plenty of fluids (enough so that your urine is light yellow or clear like water) to prevent dehydration. Choose water and other caffeine-free clear liquids. If you have kidney, heart, or liver disease and have to limit fluids, talk with your doctor before you increase the amount of fluids you drink. ¨ When you are able to eat, try clear soups, mild foods, and liquids until all symptoms are gone for 12 to 48 hours. Other good choices include dry toast, crackers, cooked cereal, and gelatin dessert, such as Jell-O.  · If you have not already done so, prepare a list of advance directives. Advance directives are instructions to your doctor and family members about what kind of care you want if you become unable to speak or express yourself. When should you call for help?   Call 911 anytime you think you may need emergency care. For example, call if:    · You pass maroon or very bloody stools.     · You passed out (lost consciousness).    Call your doctor now or seek immediate medical care if:    · You have a fever.     · You are vomiting.     · You have new or worse belly pain.     · You cannot pass stools or gas.     · You have new or more blood in your stool.    Watch closely for changes in your health, and be sure to contact your doctor if:    · You are losing weight.     · You have new or worse symptoms.     · You do not get better as expected. Where can you learn more? Go to http://ledy-toño.info/. Enter Y752 in the search box to learn more about \"Colon Cancer: Care Instructions. \"  Current as of: May 12, 2017  Content Version: 11.7  © 2949-8694 [employer]. Care instructions adapted under license by [employer] (which disclaims liability or warranty for this information). If you have questions about a medical condition or this instruction, always ask your healthcare professional. [address] any warranty or liability for your use of this information.

## 2018-07-11 NOTE — MR AVS SNAPSHOT
[address] [facility] 
 
 
 [address], [place] [address] [address] [facility] [address] 
[phone] Patient: Betito Vaca MRN: [id number] KSQ:[id number] Visit Information Date & Time Provider Department Dept. Phone Encounter #  
 7/11/2018  9:30 AM Erin Strong MD [facility]3] Oncology [phone] [id number] Follow-up Instructions Return in about 4 weeks (around 8/8/2018). Your Appointments 8/8/2018 11:15 AM  
Office Visit with Erin Strong MD  
[facility] [facility]  Oncology [address] [facility]) Appt Note: 4 weeks [facility] [address], [place] [id number] [facility] [address] [facility], [address] [place] [address] Upcoming Health Maintenance Date Due Hepatitis C Screening 1955 PAP AKA CERVICAL CYTOLOGY 8/26/1974 BREAST CANCER SCRN MAMMOGRAM 8/26/2003 FOBT Q 1 YEAR AGE 50-75 8/26/2003 ZOSTER VACCINE AGE 60> 6/26/2013 MEDICARE YEARLY EXAM 3/27/2018 Bone Densitometry (Dexa) Screening 8/26/2018 Influenza Age 5 to Adult 8/1/2018 Pneumococcal 19-64 Highest Risk (3 of 3 - PCV13) 12/11/2018 DTaP/Tdap/Td series (2 - Td) 3/15/2026 Allergies as of 7/11/2018  Review Complete On: 7/11/2018 By: Erin Strong MD  
  
 Severity Noted Reaction Type Reactions Latex  08/04/2016    Other (comments) Latex  02/12/2018    Rash Lisinopril High 04/25/2017    Hives Asa-acetaminophen-caff-buffers  08/04/2016    Other (comments) Aspirin  02/12/2018    Hives Codeine  08/04/2016    Other (comments) Codeine  02/12/2018    Hives Lisinopril  02/12/2018    Hives Pcn [Penicillins]  08/04/2016    Other (comments) Penicillin G  02/12/2018    Hives Sulfa (Sulfonamide Antibiotics)  08/04/2016    Other (comments) Current Immunizations  Reviewed on 6/20/2018 Name Date Influenza Vaccine 3/15/2016 12:00 AM, 3/1/2016 Pneumococcal Polysaccharide (PPSV-23) 12/11/2017 12:00 AM  
 Pneumococcal Vaccine (Unspecified Type) 3/1/2016 Tdap 3/15/2016 12:00 AM  
  
 Not reviewed this visit You Were Diagnosed With   
  
 Codes Comments Colon cancer metastasized to multiple sites [facility])    -  Primary ICD-10-CM: C18.9 ICD-9-CM: 153.9 Hydronephrosis with ureteropelvic junction (UPJ) obstruction     ICD-10-CM: Q62.11 ICD-9-CM: 163 Antineoplastic chemotherapy induced anemia     ICD-10-CM: D64.81, T45.1X5A 
ICD-9-CM: 285.3, E933.1 Chemotherapy induced neutropenia (HCC)     ICD-10-CM: D70.1, T45.1X5A 
ICD-9-CM: 288.03, E933.1 Iron deficiency anemia due to chronic blood loss     ICD-10-CM: D50.0 ICD-9-CM: 280.0 Anemia in stage 3 chronic kidney disease     ICD-10-CM: N18.3, D63.1 ICD-9-CM: 285.21, 585.3 Vitals BP Pulse Temp Weight(growth percentile) BMI OB Status 140/79 66 97.1 °F (36.2 °C) 203 lb (92.1 kg) 39.65 kg/m2 Hysterectomy Smoking Status Never Smoker BMI and BSA Data Body Mass Index Body Surface Area  
 39.65 kg/m 2 1.97 m 2 Preferred Pharmacy Pharmacy Name Phone [address] [facility] [address] [facility], [address] [facility] Your Updated Medication List  
  
   
This list is accurate as of 7/11/18 10:08 AM.  Always use your most recent med list.  
  
  
  
  
 acyclovir 800 mg tablet Commonly known as:  ZOVIRAX Take 800 mg by mouth five (5) times daily. albuterol 90 mcg/actuation inhaler Commonly known as:  PROVENTIL HFA, VENTOLIN HFA, PROAIR HFA  
2 Puffs. allopurinol 300 mg tablet Commonly known as:  ZYLOPRIM  
300 mg daily. amLODIPine 5 mg tablet Commonly known as:  Sumaya Pace Take 5 mg by mouth daily. bisoprolol-hydroCHLOROthiazide 10-6.25 mg per tablet Commonly known as:  [facility] Take 1 Tab by Mouth Once a Day. cloNIDine HCl 0.1 mg tablet Commonly known as:  CATAPRES Take  by mouth two (2) times a day. desonide 0.05 % cream  
Commonly known as:  Tommye Sluder Use 1 Application to affected area Twice Daily. diphenhydrAMINE 25 mg capsule Commonly known as:  BENADRYL Take 1 with compazine every 6 hours for nausea for 3 days then, as needed. ergocalciferol 50,000 unit capsule Commonly known as:  ERGOCALCIFEROL  
50,000 Units every seven (7) days. furosemide 20 mg tablet Commonly known as:  LASIX Take 1/2-1 tablet daily if needed for swelling. glimepiride 4 mg tablet Commonly known as:  AMARYL Take one in the am.  New dose. glipiZIDE 10 mg tablet Commonly known as:  Birdie Evin 10 mg.  
  
 insulin aspart U-100 100 unit/mL Inpn Commonly known as:  NOVOLOG  
by SubCUTAneous route.  
  
 lidocaine-prilocaine topical cream  
Commonly known as:  EMLA Place cream over mediport 1 hour prior to chemotherapy and then place saran wrap over area. Every chemo treatment. NEURONTIN 100 mg capsule Generic drug:  gabapentin Take 100 mg by mouth two (2) times a day. nitrofurantoin (macrocrystal-monohydrate) 100 mg capsule Commonly known as:  MACROBID Take 1 Cap by mouth two (2) times a day. omeprazole 20 mg capsule Commonly known as:  PRILOSEC Take 20 mg by mouth daily. ondansetron hcl 8 mg tablet Commonly known as:  Amanda Gibson Take one tablet by mouth every 8 hours for nausea beginning the night of chemo for 3 days. predniSONE 20 mg tablet Commonly known as:  Jackie Bharathi Take 2 Tabs by mouth daily. Take 2 tabs on days 2 and 3 of each chemo cycle  
  
 prochlorperazine 10 mg tablet Commonly known as:  COMPAZINE Take 1 tablet every 6 hours with Benadryl 25mg for nausea for 3 days then, as needed. tamsulosin 0.4 mg capsule Commonly known as:  FLOMAX Take 1 Cap by mouth daily (after dinner). traMADol 50 mg tablet Commonly known as:  Denise Garden  
 Take 50 mg by mouth every six (6) hours as needed for Pain.  
  
 warfarin 3 mg tablet Commonly known as:  COUMADIN Take 3 mg by mouth daily. Takes 1 and 1/2 tab Sunday, Tuesday, and Thursday. Takes 1 tablet Monday, Wednesday, and Friday We Performed the Following COMPLETE CBC & AUTO DIFF WBC [08684 CPT(R)] Follow-up Instructions Return in about 4 weeks (around 8/8/2018). To-Do List   
 07/11/2018 Lab:  CBC WITH 3 PART DIFF   
  
 07/11/2018 Lab:  CEA   
  
 07/11/2018 Lab:  FERRITIN   
  
 07/11/2018 Lab:  IRON PROFILE   
  
 07/11/2018 Lab:  METABOLIC PANEL, COMPREHENSIVE   
  
 07/17/2018 10:00 AM  
  Appointment with [facility] NURSE 1 at [facility] ([phone])  
  
 07/19/2018 1:00 PM  
  Appointment with [facility] INF[facility]ION NURSE 1 at [facility] ([phone]) Patient Instructions Colon Cancer: Care Instructions Your Care Instructions Colon cancer occurs when abnormal cells grow out of control in the lower part of your intestine (your colon). If the tumor was small and had not spread, your doctor may have removed it during the colonoscopy. But you may need surgery to remove the cancer if the tumor was too big or had spread too far to be removed during a colonoscopy. If cancer has spread to another part of your body, such as the liver, you may need more far-reaching surgery. Treatment for colon cancer may also include radiation therapy and medicines that destroy cancer cells (chemotherapy). Being treated for cancer can weaken your body, and you may feel very tired. Get the rest your body needs so that you can feel better. When you find out that you have cancer, you may feel many emotions and may need some help coping. Seek out family, friends, and counselors for support. You also can do things at home to make yourself feel better while you go through treatment.  Call the [facility] ([phone]) or visit its website at [employer]. org for more information. Follow-up care is a key part of your treatment and safety. Be sure to make and go to all appointments, and call your doctor if you are having problems. It's also a good idea to know your test results and keep a list of the medicines you take. How can you care for yourself at home? · Take your medicines exactly as prescribed. Call your doctor if you think you are having a problem with your medicine. You may get medicine for nausea and vomiting if you have these side effects. · Follow your doctor's instructions to relieve pain. Pain from cancer and surgery can almost always be controlled. Use pain medicine when you first notice pain, before it becomes severe. · Eat healthy food. If you do not feel like eating, try to eat food that has protein and extra calories to keep up your strength and prevent weight loss. Drink liquid meal replacements for extra calories and protein. Try to eat your main meal early. · Get some physical activity every day, but do not get too tired. Keep doing the hobbies you enjoy as your energy allows. · Take steps to control your stress and workload. Learn relaxation techniques. ¨ Share your feelings. Stress and tension affect our emotions. By expressing your feelings to others, you may be able to understand and cope with them. ¨ Consider joining a support group. Talking about a problem with your spouse, a good friend, or other people with similar problems is a good way to reduce tension and stress. ¨ Express yourself through art. Try writing, dance, art, or crafts to relieve stress. Some dance, writing, or art groups may be available just for people who have cancer. ¨ Be kind to your body and mind. Getting enough sleep, eating a healthy diet, and taking time to do things you enjoy can contribute to an overall feeling of balance in your life and help reduce stress. ¨ Get help if you need it. Discuss your concerns with your doctor or counselor. · If you are vomiting or have diarrhea: ¨ Drink plenty of fluids (enough so that your urine is light yellow or clear like water) to prevent dehydration. Choose water and other caffeine-free clear liquids. If you have kidney, heart, or liver disease and have to limit fluids, talk with your doctor before you increase the amount of fluids you drink. ¨ When you are able to eat, try clear soups, mild foods, and liquids until all symptoms are gone for 12 to 48 hours. Other good choices include dry toast, crackers, cooked cereal, and gelatin dessert, such as Jell-O. · If you have not already done so, prepare a list of advance directives. Advance directives are instructions to your doctor and family members about what kind of care you want if you become unable to speak or express yourself. When should you call for help? Call 911 anytime you think you may need emergency care. For example, call if: 
  · You pass maroon or very bloody stools.  
  · You passed out (lost consciousness).  
 Call your doctor now or seek immediate medical care if: 
  · You have a fever.  
  · You are vomiting.  
  · You have new or worse belly pain.  
  · You cannot pass stools or gas.  
  · You have new or more blood in your stool.  
 Watch closely for changes in your health, and be sure to contact your doctor if: 
  · You are losing weight.  
  · You have new or worse symptoms.  
  · You do not get better as expected. Where can you learn more? Go to http://ledy-toño.info/. Enter D996 in the search box to learn more about \"Colon Cancer: Care Instructions. \" Current as of: May 12, 2017 Content Version: 11.7 © 3784-7628 [employer]. Care instructions adapted under license by [employer] (which disclaims liability or warranty for this information).  If you have questions about a medical condition or this instruction, always ask your healthcare professional. [address] any warranty or liability for your use of this information. Introducing [facility] HEALTH SERVICES! Dear Luana Moses: Thank you for requesting a [employer] account. Our records indicate that you already have an active [employer] account. You can access your account anytime at [email or web address]. [email or web address] Did you know that you can access your hospital and ER discharge instructions at any time in [employer]? You can also review all of your test results from your hospital stay or ER visit. Additional Information If you have questions, please visit the Frequently Asked Questions section of the [employer] website at [email or web address] Remember, M[employer] is NOT to be used for urgent needs. For medical emergencies, dial 911. Now available from your iPhone and Android! Please provide this summary of care documentation to your next provider. Your primary care clinician is listed as Shaka Peraza. If you have any questions after today's visit, please call [phone].

## 2018-07-11 NOTE — PROGRESS NOTES
Hematology/Oncology  Progress Note    Name: Prudencio Tucker  Date: 2018  : 1955    PCP: Pravin Lomeli NP     Ms. Herbert Pitts is a 57 y.o. -American woman with metastatic colorectal carcinoma/carcinomatosis  Current therapy: FOLFIRI chemotherapy regimen      Subjective:     Ms. Herbert Pitts is a 77-year-old -American woman with abdominal carcinomatosis from metastatic colorectal carcinoma. She was started on FOLFIRI regimen and she is here today for follow up. She states that she has no pain, her appetite has significantly increased and her energy level is up. She denies diarrhea, nausea, or vomiting. Her only complaint is occasional abdominal cramping during chemo. She denies constipation. Otherwise there are no additional complaints to report. She has recently been found to have hydronephrosis and on the advice of the oncologic surgeon was referred to a urologist for an assessment. The patient has been evaluated and treated by the urologist.  Today she reports that she is continuing to do well. Unfortunately, I explained to the patient that her tumor marker is increasing. She had a recent MRI on 2018 she was slowly progressive disease in her liver with stable peritoneal implants. Past medical history, family history, and social history: these were reviewed and remains unchanged. Past Medical History:   Diagnosis Date    Asthma     Cancer ([facility] [address] 10/2016    colon    Diabetes ([facility] [address]     Gout     Heart disease     Hypertension     Kidney disease     Maintenance chemotherapy     Neuropathic pain of foot      Past Surgical History:   Procedure Laterality Date    HX BACK SURGERY      HX HYSTERECTOMY      HX VASCULAR ACCESS      mediport     Social History     Social History    Marital status:      Spouse name: N/A    Number of children: N/A    Years of education: N/A     Occupational History    Not on file.      Social History Main Topics    Smoking status: Never Smoker    Smokeless tobacco: Never Used    Alcohol use No    Drug use: No    Sexual activity: Not on file     Other Topics Concern    Not on file     Social History Narrative    ** Merged History Encounter **          Family History   Problem Relation Age of Onset    Family history unknown: Yes     Current Outpatient Prescriptions   Medication Sig Dispense Refill    acyclovir (ZOVIRAX) 800 mg tablet Take 800 mg by mouth five (5) times daily.  nitrofurantoin, macrocrystal-monohydrate, (MACROBID) 100 mg capsule Take 1 Cap by mouth two (2) times a day. 14 Cap 0    tamsulosin (FLOMAX) 0.4 mg capsule Take 1 Cap by mouth daily (after dinner). 90 Cap 3    warfarin (COUMADIN) 3 mg tablet Take 3 mg by mouth daily. Takes 1 and 1/2 tab Sunday, Tuesday, and Thursday. Takes 1 tablet Monday, Wednesday, and Friday      amLODIPine (NORVASC) 5 mg tablet Take 5 mg by mouth daily.  cloNIDine HCl (CATAPRES) 0.1 mg tablet Take  by mouth two (2) times a day.  insulin aspart (NOVOLOG) 100 unit/mL inpn by SubCUTAneous route.  omeprazole (PRILOSEC) 20 mg capsule Take 20 mg by mouth daily.  traMADol (ULTRAM) 50 mg tablet Take 50 mg by mouth every six (6) hours as needed for Pain.  gabapentin (NEURONTIN) 100 mg capsule Take 100 mg by mouth two (2) times a day.  predniSONE (DELTASONE) 20 mg tablet Take 2 Tabs by mouth daily. Take 2 tabs on days 2 and 3 of each chemo cycle 4 Tab 2    lidocaine-prilocaine (EMLA) topical cream Place cream over mediport 1 hour prior to chemotherapy and then place saran wrap over area. Every chemo treatment. 30 g 1    ondansetron hcl (ZOFRAN, AS HYDROCHLORIDE,) 8 mg tablet Take one tablet by mouth every 8 hours for nausea beginning the night of chemo for 3 days. 9 Tab 3    diphenhydrAMINE (BENADRYL) 25 mg capsule Take 1 with compazine every 6 hours for nausea for 3 days then, as needed.  60 Cap 3    prochlorperazine (COMPAZINE) 10 mg tablet Take 1 tablet every 6 hours with Benadryl 25mg for nausea for 3 days then, as needed. 60 Tab 3    albuterol (PROVENTIL HFA, VENTOLIN HFA, PROAIR HFA) 90 mcg/actuation inhaler 2 Puffs.  ergocalciferol (ERGOCALCIFEROL) 50,000 unit capsule 50,000 Units every seven (7) days.  furosemide (LASIX) 20 mg tablet Take 1/2-1 tablet daily if needed for swelling.  allopurinol (ZYLOPRIM) 300 mg tablet 300 mg daily.  desonide (TRIDESILON) 0.05 % cream Use 1 Application to affected area Twice Daily.  bisoprolol-hydrochlorothiazide (ZIAC) 10-6.25 mg per tablet Take 1 Tab by Mouth Once a Day.  glipiZIDE (GLUCOTROL) 10 mg tablet 10 mg.      glimepiride (AMARYL) 4 mg tablet Take one in the am.  New dose. Facility-Administered Medications Ordered in Other Visits   Medication Dose Route Frequency Provider Last Rate Last Dose    [START ON 7/17/2018] dexamethasone (DECADRON) 12 mg in 0.9% sodium chloride 50 mL IVPB  12 mg IntraVENous ONCE Ashely Caraballo MD        [START ON 7/17/2018] irinotecan (CAMPTOSAR) 350 mg in dextrose 5% 500 mL chemo infusion  350 mg IntraVENous ONCE Ashely Caraballo MD        [START ON 7/17/2018] leucovorin (WELLCOVORIN) 780 mg in dextrose 5% 250 mL IVPB  780 mg IntraVENous ONCE Ashely Caraballo MD        [START ON 7/17/2018] fluorouracil (ADRUCIL) chemo syringe 780 mg  780 mg IntraVENous ONCE Ashely Caraballo MD        [START ON 7/17/2018] fluorouracil (ADRUCIL) 4,680 mg in 0.9% sodium chloride 250 mL CADD Cassette  4,680 mg IntraVENous ONCE Ashely Craaballo MD           Review of Systems  Constitutional: The patient denies acute distress or discomfort. HEENT: The patient denies recent head trauma, eye pain, blurred vision,  hearing deficit, oropharyngeal mucosal pain or lesions, and the patient denies throat pain or discomfort. Lymphatics: The patient denies palpable peripheral lymphadenopathy. Hematologic: The patient denies having bruising, bleeding, or progressive fatigue.   Respiratory: Patient denies having shortness of breath, cough, sputum production, fever, or dyspnea on exertion. Cardiovascular: The patient denies having leg pain, leg swelling, heart palpitations, chest permit, chest pain, or lightheadedness. The patient denies having dyspnea on exertion. Gastrointestinal: The patient reports occasional nausea from chemotherapy which is well controlled with antinausea medication. She denies having any emesis or diarrhea. Genitourinary: Patient denies having urinary urgency, frequency, or dysuria. The patient denies having blood in the urine. Psychological: The patient denies having symptoms of nervousness, anxiety, depression, or thoughts of harming self. Skin: Patient denies having skin rashes, skin, ulcerations, or unexplained itching or pruritus. Musculoskeletal: The patient denies having pain in the joints or bones. Objective:     Visit Vitals    /79    Pulse 66    Temp 97.1 °F (36.2 °C)    Wt 92.1 kg (203 lb)    BMI 39.65 kg/m2     ECOG PS=0; Pain score=0/10    Physical Exam:   Gen. Appearance: The patient is in no acute distress. Skin: There is no bruise or rash. HEENT: The exam is unremarkable. Neck: Supple without lymphadenopathy or thyromegaly. Lungs: Clear to auscultation and percussion; there are no wheezes or rhonchi. Heart: Regular rate and rhythm; there are no murmurs, gallops, or rubs. Abdomen: Bowel sounds are present and normal.  There is no guarding, tenderness, or hepatosplenomegaly. The patient has a colostomy bag in place. Extremities: There is no clubbing, cyanosis, or edema. Neurologic: There are no focal neurologic deficits. Lymphatics: There is no palpable peripheral lymphadenopathy. Musculoskeletal: The patient has full range of motion at all joints. There is no evidence of joint deformity or effusions. There is no focal joint tenderness.   Psychological/psychiatric: There is no clinical evidence of anxiety, depression, or melancholy. Lab data:      Results for orders placed or performed during the hospital encounter of 07/11/18   CBC WITH 3 PART DIFF     Status: Abnormal   Result Value Ref Range Status    WBC 3.0 (L) 4.5 - 13.0 K/uL Final    RBC 2.89 (L) 4.10 - 5.10 M/uL Final    HGB 8.4 (L) 12.0 - 16 g/dL Final    HCT 27.0 (L) 36 - 48 % Final    MCV 93.4 78 - 102 FL Final    MCH 29.1 25.0 - 35.0 PG Final    MCHC 31.1 31 - 37 g/dL Final    RDW 16.7 (H) 11.5 - 14.5 % Final    PLATELET 891 228 - 630 K/uL Final    NEUTROPHILS 55 40 - 70 % Final    MIXED CELLS 8 0.1 - 17 % Final    LYMPHOCYTES 37 14 - 44 % Final    ABS. NEUTROPHILS 1.7 (L) 1.8 - 9.5 K/UL Final    ABS. MIXED CELLS 0.2 0.0 - 2.3 K/uL Final    ABS. LYMPHOCYTES 1.1 1.1 - 5.9 K/UL Final     Comment: Test performed at [place] [address] Location. Results Reviewed by Medical Director. DF AUTOMATED   Final           Assessment:     1. Colon cancer metastasized to multiple sites [facility])    2. Hydronephrosis with ureteropelvic junction (UPJ) obstruction    3. Antineoplastic chemotherapy induced anemia    4. Chemotherapy induced neutropenia (HCC)    5. Iron deficiency anemia due to chronic blood loss    6. Anemia in stage 3 chronic kidney disease      Plan:   Colorectal carcinoma with abdominal carcinomatosis: The patient recently had an MRI of the abdomen on 7/6/2018 there was slight progression in the size of liver metastases. The peritoneal implants appeared stable. I have explained to the patient had a CEA level from 6/13/2018 had increased to 776.4 ng/mL. We are in the process of requesting authorization to provide her with immunotherapy as a next treatment modality. We will also request the results of her K-makenna and then rest testing along with EGFR testing.     Anemia associated with chronic kidney disease and chemotherapy-induced anemia (persistent problem): I have explained to the patient that her CBC on today showed a hemoglobin of 8.4 g/dL with hematocrit of 27 %. Procrit at a dose of 60,000 units will be provided whenever her hemoglobin is below 10 g/dL hematocrit is below 30% every 2 weeks. The iron profile and ferritin levels will be ordered at this time. Hydronephrosis (persistent problem): The patient is currently being seen by the urologist and is tentatively scheduled to have ureteral stents inserted in the upcoming week. Chemotherapy-induced leukopenia/neutropenia (improved problem): The CBC on today showed a WBC count of 3 with an absolute neutrophil count of 1.7. This will be monitored on a regular basis. Neulasta following each chemo cycle will continue. I will have the patient return to clinic for complete reassessment again in 4 weeks.     Orders Placed This Encounter    COMPLETE CBC & AUTO DIFF WBC    InHouse CBC ([employer])     Standing Status:   Future     Number of Occurrences:   1     Standing Expiration Date:   5/42/4523    METABOLIC PANEL, COMPREHENSIVE     Standing Status:   Future     Number of Occurrences:   1     Standing Expiration Date:   7/12/2019    IRON PROFILE     Standing Status:   Future     Number of Occurrences:   1     Standing Expiration Date:   7/12/2019    FERRITIN     Standing Status:   Future     Number of Occurrences:   1     Standing Expiration Date:   7/12/2019    CEA     Standing Status:   Future     Number of Occurrences:   1     Standing Expiration Date:   7/12/2019       Maggy Walters MD  7/11/2018

## 2018-07-17 NOTE — PROGRESS NOTES
[facility] Progress Note    Date: 2018    Name: Regla Le              MRN: [id number]              : 1955    Folfiri: [id number]     Pt to [facility], ambulatory, at 1015. Ms. Gilma Knapp was assessed and education was provided. No complaints or concerns voiced    Ms. Polk's vitals were reviewed. Visit Vitals    /72 (BP 1 Location: Left arm, BP Patient Position: At rest)    Pulse 72    Temp 98.7 °F (37.1 °C)    Resp 18    Ht 5' (1.524 m)    Wt 93.4 kg (205 lb 14.4 oz)    SpO2 99%    BMI 40.21 kg/m2       Right chest mediport accessed with 20 g 1 inch orozco needle. Port flushed easily and had brisk blood return. Blood drawn off for CBC and ISTAT BMP     Lab results were obtained and reviewed. Recent Results (from the past 12 hour(s))   CBC WITH 3 PART DIFF    Collection Time: 18 10:45 AM   Result Value Ref Range    WBC 3.5 (L) 4.5 - 13.0 K/uL    RBC 2.79 (L) 4.10 - 5.10 M/uL    HGB 7.8 (L) 12.0 - 16 g/dL    HCT 26.0 (L) 36 - 48 %    MCV 93.2 78 - 102 FL    MCH 28.0 25.0 - 35.0 PG    MCHC 30.0 (L) 31 - 37 g/dL    RDW 17.6 (H) 11.5 - 14.5 %    PLATELET 109 282 - 122 K/uL    NEUTROPHILS 43 40 - 70 %    MIXED CELLS 19 (H) 0.1 - 17 %    LYMPHOCYTES 38 14 - 44 %    ABS. NEUTROPHILS 1.5 (L) 1.8 - 9.5 K/UL    ABS. MIXED CELLS 0.7 0.0 - 2.3 K/uL    ABS. LYMPHOCYTES 1.3 1.1 - 5.9 K/UL    DF AUTOMATED     POC CHEM8    Collection Time: 18 10:50 AM   Result Value Ref Range    CO2, POC 19 19 - 24 MMOL/L    Glucose,  (H) 74 - 106 MG/DL    BUN, POC 30 (H) 7 - 18 MG/DL    Creatinine, POC 2.7 (H) 0.6 - 1.3 MG/DL    GFRAA, POC 22 (L) >60 ml/min/1.73m2    GFRNA, POC 18 (L) >60 ml/min/1.73m2    Sodium,  136 - 145 MMOL/L    Potassium, POC 4.1 3.5 - 5.5 MMOL/L    Calcium, ionized (POC) 1.20 1. 12 - 1.32 mmol/L    Chloride,  (H) 100 - 108 MMOL/L    Anion gap, POC 19 10 - 20      Hematocrit, POC 25 (L) 36 - 49 %    Hemoglobin, POC 8.5 (L) 12 - 16 G/DL       Procrit injection 60,000 units given SQ to upper back of left arm per parameters. Patient tolerated well. Bandaid applied to site. Lab results within ordered parameters to give chemo today. Chemo dosages verified with today's BSA and found to be within 10% of ordered dosages. D5W flush bag hung at kvo rate. Pre-medications (Decadron 12mg IVPB and Aloxi 0.25mg IVP) were administered as ordered and chemotherapy was initiated after blood return from port re-verified. Irinotecan 350 mg  was infused over 90 minutes concurrently with Leucovorin 780 mg. VS stable at end of infusion and pt denied complaints. Line flushed with D5W and blood return from port re-verified. Fluorouracil 780 mg was given slow IVP over 3 minutes. Line flushed with 10 mL NS and blood return was re-verified. Fluorouracil 4680 mg CADD pump was connected to patient. Connections were secured with tape and the volume was verified to be infusing. Hadley intact and site of port with dry drsg. No infiltration, redness or tenderness to site. Ms. Sandra Dennison tolerated infusion/injection. Patient Vitals for the past 8 hrs:   Temp Pulse Resp BP SpO2   07/17/18 1425 98.7 °F (37.1 °C) 72 18 140/72 99 %   07/17/18 1300 - 69 16 121/69 -   07/17/18 1015 98.8 °F (37.1 °C) 66 18 102/66 100 %         Patient armband removed and shredded. Ms. Sandra Dennison was discharged from [place] [address] in stable condition at 1430. She is to return on 7/19/2018 at 1300 for her next  pump D/C/Neulasta appointment.     Florencia Comer RN  July 17, 2018

## 2018-07-19 PROBLEM — K13.79 MOUTH SORE SECONDARY TO CHEMOTHERAPY: Status: ACTIVE | Noted: 2018-01-01

## 2018-07-19 PROBLEM — T45.1X5A MOUTH SORE SECONDARY TO CHEMOTHERAPY: Status: ACTIVE | Noted: 2018-01-01

## 2018-07-19 NOTE — PROGRESS NOTES
Name: Cassandra Pratt     MRN: [id number]                                                                                                               : 1955     D/C pump/Neulasta     Ms. Polk arrived ambulatory to [facility] at 1435. She  was assessed and education was provided.     Ms. Polk's vitals were reviewed and patient was observed for 5 minutes prior to procedure. Visit Vitals    /76 (BP 1 Location: Left arm, BP Patient Position: At rest)    Pulse (!) 58    Temp 97.6 °F (36.4 °C)    Resp 18    SpO2 98%     CADD pump completed infusion and removed from patient.      Port flushed with 20 mL and 500 units of heparin after blood return was verified. Mediport de-accessed and a Band-aid applied to the site.      Neulasta 6 mg applied to the back of the right arm. Green light verified to be blinking. Patient notified that she can remove the OBI in approximately 28 hours at 1810. Patient verbalized understanding.      Ms. Polk tolerated the procedure, and was c/o of mouth sensitivity and sores. Patient was seen by Yumiko Meyers NP and given a prescription for miracle mouth wash.      Ms. Polk was discharged from [place] [address] in stable condition at 1510.  She is to return on 2018 at 1000 for her next appointment for procrit and opdivo appointment.     Dena Orozco RN  18

## 2018-07-23 NOTE — PROGRESS NOTES
Hematology/Oncology  Progress Note    Name: Nichelle Corey  Date: 2017  : 1955    PCP: Russell Wright NP     Ms. Olayinka Reed is a 59 y.o. -American woman with metastatic colorectal carcinoma/carcinomatosis  Current therapy: FOLFOX chemotherapy regimen      Subjective:     Ms. 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 The patient was recently hospitalized with a small perforation in her large bowel. She recently underwent extensive surgery for her advanced/metastatic disease because of recurrent small bowel perforations and obstructive symptoms. She has done well following surgery. She is here today to discuss additional systemic treatment options for her metastatic disease. Past medical history, family history, and social history: these were reviewed and remains unchanged. Past Medical History:   Diagnosis Date    Diabetes ([address]     Gout     Heart disease     Hypertension     Neuropathic pain of foot      Past Surgical History:   Procedure Laterality Date    HX BACK SURGERY      HX HYSTERECTOMY       Social History     Social History    Marital status:      Spouse name: N/A    Number of children: N/A    Years of education: N/A     Occupational History    Not on file. Social History Main Topics    Smoking status: Never Smoker    Smokeless tobacco: Never Used    Alcohol use No    Drug use: No    Sexual activity: Not on file     Other Topics Concern    Not on file     Social History Narrative     Family History   Problem Relation Age of Onset    Family history unknown: Yes     Current Outpatient Prescriptions   Medication Sig Dispense Refill    HYDROmorphone (DILAUDID) 2 mg tablet Take 2 mg by mouth every four (4) hours as needed for Pain.       predniSONE (DELTASONE) 20 mg tablet TAKE 2 TABLETS BY MOUTH WITH BREAKFAST - TAKE 40 MG ON DAY 2 AND DAY 3 OF TREATMENT 4 Tab 0    [facility]


   Last Admin: 07/23/18 09:29 Dose:  5 mg


Ferrous Sulfate (Feosol -)  325 mg PO DAILY [facility]


   Last Admin: 07/23/18 09:29 Dose:  325 mg


Gabapentin (Neurontin -)  300 mg PO BID [facility]


   [facility]


   [facility]


   [facility]


   [facility]


   Last Admin: 07/23/18 09:29 Dose:  40 mg


Tamsulosin HCl (Flomax -)  0.4 mg PO HS [facility]


   amLODIPine (NORVASC) 5 mg tablet Take 5 mg by mouth daily.  ondansetron hcl (ZOFRAN, AS HYDROCHLORIDE,) 8 mg tablet Take one tablet by mouth every 8 hours for nausea beginning the night of chemo for 3 days. 9 Tab 3    predniSONE (DELTASONE) 20 mg tablet Take 2 Tabs by mouth daily. Take 2 tabs on days 2 and 3 of each chemo cycle 4 Tab 2    LORazepam (ATIVAN) 0.5 mg tablet Take 1 Tab by mouth every eight (8) hours as needed for Anxiety. Max Daily Amount: 1.5 mg. Indications: ANXIETY 90 Tab 0    nitrofurantoin, macrocrystal-monohydrate, (MACROBID) 100 mg capsule Take 1 Cap by mouth two (2) times a day. 10 Cap 0    diphenhydrAMINE (BENADRYL) 25 mg capsule Take 1 with compazine every 6 hours for nausea for 3 days then, as needed. 60 Cap 3    prochlorperazine (COMPAZINE) 10 mg tablet Take 1 tablet every 6 hours with Benadryl 25mg for nausea for 3 days then, as needed. 60 Tab 3    warfarin (COUMADIN) 1 mg tablet Take one 1 tablet by mouth everyday at 6pm or after. You will continue to take this medication. Everyday until mediport is removed. 30 Tab 3    lidocaine-prilocaine (EMLA) topical cream Place cream over mediport 1 hour prior to chemotherapy and then place saran wrap over area. Every chemo treatment. 30 g 0    acetaminophen (TYLENOL) 650 mg CR tablet 1,300 mg.      albuterol (PROVENTIL HFA, VENTOLIN HFA, PROAIR HFA) 90 mcg/actuation inhaler 2 Puffs.  colchicine 0.6 mg tablet 0.6 mg.      ergocalciferol (ERGOCALCIFEROL) 50,000 unit capsule 50,000 Units.  furosemide (LASIX) 20 mg tablet Take 1/2-1 tablet daily if needed for swelling.  allopurinol (ZYLOPRIM) 300 mg tablet 300 mg.      desonide (TRIDESILON) 0.05 % cream Use 1 Application to affected area Twice Daily.       fluconazole (DIFLUCAN) 150 mg tablet TAKE 1 TABLET BY MOUTH ONCE DAILY TODAY, MAY REPEAT AFTER 3 DAYS AS NEEDED      cloNIDine HCl (CATAPRES) 0.1 mg tablet 0.1 mg.      bisoprolol-hydrochlorothiazide (ZIAC) 10-6.25 mg per tablet Take 1 Tab by Mouth Once a Day.  glipiZIDE (GLUCOTROL) 10 mg tablet 10 mg.      glimepiride (AMARYL) 4 mg tablet Take one in the am.  New dose.  gabapentin (NEURONTIN) 300 mg capsule 300 mg. Review of Systems  Constitutional: The patient has complaints of mild to moderate fatigue due to chemotherapy treatment and malignancy. HEENT: The patient denies recent head trauma, eye pain, blurred vision,  hearing deficit, oropharyngeal mucosal pain or lesions, and the patient denies throat pain or discomfort. Lymphatics: The patient denies palpable peripheral lymphadenopathy. Hematologic: The patient denies having bruising, bleeding, or progressive fatigue. Respiratory: Patient denies having shortness of breath, cough, sputum production, fever, or dyspnea on exertion. Cardiovascular: The patient denies having leg pain, leg swelling, heart palpitations, chest permit, chest pain, or lightheadedness. The patient denies having dyspnea on exertion. Gastrointestinal: The patient reports occasional nausea from chemotherapy which is well controlled with antinausea medication. She denies having any emesis or diarrhea. Genitourinary: Patient denies having urinary urgency, frequency, or dysuria. The patient denies having blood in the urine. Psychological: The patient denies having symptoms of nervousness, anxiety, depression, or thoughts of harming self. Skin: Patient denies having skin rashes, skin, ulcerations, or unexplained itching or pruritus. Musculoskeletal: The patient denies having pain in the joints or bones. Objective:     Visit Vitals    /77    Pulse (!) 59    Temp 97.9 °F (36.6 °C)    Wt 76.9 kg (169 lb 9.6 oz)    BMI 33.12 kg/m2     ECOG PS=1; Pain score=0/10    Physical Exam:   Gen. Appearance: The patient is in no acute distress. Skin: There is no bruise or rash. HEENT: The exam is unremarkable. Neck: Supple without lymphadenopathy or thyromegaly.   Lungs: Clear to auscultation and percussion; there are no wheezes or rhonchi. Heart: Regular rate and rhythm; there are no murmurs, gallops, or rubs. Abdomen: Bowel sounds are present and normal.  There is no guarding, tenderness, or hepatosplenomegaly. The patient has a colostomy bag in place. Extremities: There is no clubbing, cyanosis, or edema. Neurologic: There are no focal neurologic deficits. Lymphatics: There is no palpable peripheral lymphadenopathy. Musculoskeletal: The patient has full range of motion at all joints. There is no evidence of joint deformity or effusions. There is no focal joint tenderness. Psychological/psychiatric: There is no clinical evidence of anxiety, depression, or melancholy. Lab data:      Results for orders placed or performed during the hospital encounter of 08/09/17   CBC WITH 3 PART DIFF     Status: Abnormal   Result Value Ref Range Status    WBC 5.3 4.5 - 13.0 K/uL Final    RBC 3.76 (L) 4.10 - 5.10 M/uL Final    HGB 11.3 (L) 12.0 - 16 g/dL Final    HCT 35.0 (L) 36 - 48 % Final    MCV 93.1 78 - 102 FL Final    MCH 30.1 25.0 - 35.0 PG Final    MCHC 32.3 31 - 37 g/dL Final    RDW 16.2 (H) 11.5 - 14.5 % Final    PLATELET 569 490 - 832 K/uL Final    NEUTROPHILS 55 40 - 70 % Final    MIXED CELLS 8 0.1 - 17 % Final    LYMPHOCYTES 37 14 - 44 % Final    ABS. NEUTROPHILS 2.9 1.8 - 9.5 K/UL Final    ABS. MIXED CELLS 0.4 0.0 - 2.3 K/uL Final    ABS. LYMPHOCYTES 2.0 1.1 - 5.9 K/UL Final     Comment: Test performed at [place] [address] Location. Results Reviewed by Medical Director. DF AUTOMATED   Final           Assessment:     1. Colorectal carcinoma ([address]    2. Antineoplastic chemotherapy induced anemia    3. Chemotherapy induced neutropenia (HCC)    4. Anemia in chronic kidney disease (CKD)    5.  Iron deficiency anemia due to chronic blood loss      Plan:   Colorectal carcinoma with abdominal carcinomatosis: On 5/24/2017 the patient underwent colectomy with omentectomy with the findings of adenocarcinoma involving at least 80% of the omentum. The primary tumor appeared to originate in the transverse colon and measured approximately 5 cm. There was microscopic perforation of the colon and metastatic involvement into the left ovary. The tumor was pathologically staged as a T4 a N1M1 stage IV malignancy. I have explained to the patient that at this point we will switch her treatment to the FOLFIRI chemotherapy regimen. I have explained to the patient that we will attempt a combination of renal T can at 180 mg/m² on day 1, 5 fluorouracil given at a dose of 400 mg/m² by IV bolus on day 1 followed by 2400 mg/m² continuous infusion for 46 hours on days 1 and 2, leucovorin 400 mg/m² IV on day 1 as a 2 hour infusion prior to 5-fluorouracil and I will consider adding bevacizumab/avastin 5 mg/kg IV every 2 weeks to the regimen as well. We discussed the risk, benefits, and side effects of the new treatment regimen in detail. The patient has signed a formal consent form and we will tentatively plan to begin her treatment as soon as possible. She had a questions answered to her satisfaction and a copy of the consent was provided to the patient for her record. A comprehensive metabolic panel, CEA level, ferritin level, iron profile, and CBC will be ordered at this time. Anemia associated with chronic kidney disease and chemotherapy-induced anemia (persistent problem): I have explained to the patient that a CBC today shows a hemoglobin of 11.3 g/dL with hematocrit of 35%. Procrit at a dose of 60,000 units will be provided whenever her hemoglobin is below 10 g/dL hematocrit is below 30% every 2 weeks. The iron profile and ferritin levels will be ordered at this time. Chemotherapy-induced leukopenia/neutropenia (improvedproblem): The CBC today shows a normal WBC count of 5.3 with an absolute neutrophil count of 2.3.   We will monitor this carefully after she starts her chemotherapy regimen. She will probably need to be placed on Neulasta with the reinstitution of chemotherapy. The patient is at a significantly increased risk for the development of neutropenia and fever due to her advanced stage disease, low baseline hemoglobin level and renal dysfunction. She also has pre-existing neutropenia with a history of neutropenia with the use of the FOLFOX 6 regimen. Additionally the patient has a very low albumin level of 3.5 g/dL and she has experienced myelosuppression from prior chemotherapy. I have explained to the patient that she will most likely need to be placed on Neulasta due to her increased risk for the development of neutropenia and fever. I will have the patient return to clinic for complete reassessment again in 4 weeks. Orders Placed This Encounter    COMPLETE CBC & AUTO DIFF WBC    InHouse CBC ([employer])     Standing Status:   Future     Number of Occurrences:   1     Standing Expiration Date:   9/25/1497    METABOLIC PANEL, COMPREHENSIVE     Standing Status:   Future     Number of Occurrences:   1     Standing Expiration Date:   8/10/2018    CEA     Standing Status:   Future     Number of Occurrences:   1     Standing Expiration Date:   8/10/2018    FERRITIN     Standing Status:   Future     Number of Occurrences:   1     Standing Expiration Date:   8/10/2018    IRON PROFILE     Standing Status:   Future     Number of Occurrences:   1     Standing Expiration Date:   4/35/3935    METABOLIC PANEL, COMPREHENSIVE     Ordered by an unspecified provider      IRON PROFILE     Ordered by an unspecified provider      CEA     Ordered by an unspecified provider      FERRITIN     Ordered by an unspecified provider         Eduardo Banegas MD  8/9/2017         Please note: This document has been produced using voice recognition software. Unrecognized errors in transcription may be present.

## 2018-07-26 NOTE — PROGRESS NOTES
Pharmacy Note      Name: Chrissy Leyva  : 1955  Estimated body surface area is 1.96 meters squared as calculated from the following:    Height as of 18: 152.4 cm (60\"). Weight as of 18: 90.7 kg (200 lb).    Diagnosis: Metastatic Colon Cancer  Treatment Plan: Nivolumab  Cycle/Day: Cycle 1  Cycle Start Date: 2018           Lab Results   Component Value Date/Time     WBC 12.7 2018 01:30 PM     PLATELET 364  01:30 PM            Lab Results   Component Value Date/Time     ABS. NEUTROPHILS 1.5 (L) 2018 10:45 AM            Lab Results   Component Value Date/Time     Creatinine, POC 2.7 (H) 2018 10:50 AM     Creatinine 2.54 (H) 2018 01:30 PM      Most Recent Creatinine Clearance:  CrCl: 22.5 mL/min (based on Adjusted Body Weight)  Creatinine: 2.54 mg/dL (2018)        Pharmacy Intervention:  · Patient will begin immunotherapy with Nivolumab 240 mg IV every 2 weeks starting 2018.   · Patient approved for financial assistance [facility])  · Will continue to monitor.     Pharmacist: Radha Andrade, DARIOD

## 2018-07-30 NOTE — DISCHARGE INSTRUCTIONS
Discharge Instructions      Patient: Minerva Dueñas               Sex: female          DOA: 7/30/2018      YOB: 1955      Age:  57 y.o.        LOS:  LOS: 0 days     ACUTE DIAGNOSES:  Hydronephrosis, unspecified hydronephrosis type [N13.30]    CHRONIC MEDICAL DIAGNOSES:  Problem List as of 7/30/2018  Date Reviewed: 7/27/2018          Codes Class Noted - Resolved    Mouth sore secondary to chemotherapy ICD-10-CM: K13.79  ICD-9-CM: 528.9  7/19/2018 - Present        Obesity, morbid ([facility] ICD-10-CM: E66.01  ICD-9-CM: 278.01  3/28/2018 - Present        Hydronephrosis ICD-10-CM: N13.30  ICD-9-CM: 591  3/19/2018 - Present        Hydronephrosis with ureteropelvic junction (UPJ) obstruction ICD-10-CM: Q62.11  ICD-9-CM: 288  2/14/2018 - Present        Peritoneal carcinomatosis ([facility] ICD-10-CM: C78.6, C80.1  ICD-9-CM: 197.6, 199.1  11/15/2017 - Present        Colon cancer metastasized to multiple sites [facility]) ICD-10-CM: C18.9  ICD-9-CM: 153.9  11/15/2017 - Present        Antineoplastic chemotherapy induced anemia ICD-10-CM: D64.81, T45.1X5A  ICD-9-CM: 285.3, E933.1  1/25/2017 - Present        Chemotherapy induced neutropenia (HCC) ICD-10-CM: D70.1, T45.1X5A  ICD-9-CM: 288.03, E933.1  1/25/2017 - Present        Colorectal carcinoma ([facility] ICD-10-CM: C19  ICD-9-CM: 154.0  12/5/2016 - Present        Anemia in chronic kidney disease (CKD) ICD-10-CM: N18.9, D63.1  ICD-9-CM: 285.21  9/28/2016 - Present        Iron deficiency anemia due to chronic blood loss ICD-10-CM: D50.0  ICD-9-CM: 280.0  9/28/2016 - Present              PROCEDURE:  Left ureteral stent exchange     Instructions:  Please drink plenty of fluids to promote clear yellow urine. You may have blood in your urine which is normal.  A small amount of blood can cause all of the urine to look red. Please contact the office is you are passing large clots as this may be a sign of more serious bleeding. You have a stent in your ureter.  This may cause flank or lower abdominal discomfort which is normal.     You may resume all regular activity after 24 hours. DISCHARGE MEDICATIONS:     · It is important that you take the medication exactly as they are prescribed. · Keep your medication in the bottles provided by the pharmacist and keep a list of the medication names, dosages, and times to be taken in your wallet. · Do not take other medications without consulting your doctor. DIET:  Regular Diet    ACTIVITY: Activity as tolerated and no driving for today    ADDITIONAL INFORMATION: If you experience any of the following symptoms then please call your primary care physician or return to the emergency room if you cannot get hold of your doctor: Fever, chills, nausea, vomiting, diarrhea, change in mentation, falling, bleeding, shortness of breath. FOLLOW UP CARE:  Dr. hCay Bailey will see you in 4 months  in the office. Office info:  [phone]          Information obtained by :  I understand that if any problems occur once I am at home I am to contact my physician. I understand and acknowledge receipt of the instructions indicated above.                                                                                                                                            Physician's or R.N.'s Signature                                                                  Date/Time                                                                                                                                              Patient or Representative Signature                                                          Date/Time        DISCHARGE SUMMARY from Nurse    PATIENT INSTRUCTIONS:    After general anesthesia or intravenous sedation, for 24 hours or while taking prescription Narcotics:  · Limit your activities  · Do not drive and operate hazardous machinery  · Do not make important personal or business decisions  · Do  not drink alcoholic beverages  · If you have not urinated within 8 hours after discharge, please contact your surgeon on call. Report the following to your surgeon:  · Excessive pain, swelling, redness or odor of or around the surgical area  · Temperature over 100.5  · Nausea and vomiting lasting longer than 4 hours or if unable to take medications  · Any signs of decreased circulation or nerve impairment to extremity: change in color, persistent  numbness, tingling, coldness or increase pain  · Any questions    What to do at Home:  Recommended activity: Activity as tolerated and no driving for today, or while taking narcotic pain medication. *  Please give a list of your current medications to your Primary Care Provider. *  Please update this list whenever your medications are discontinued, doses are      changed, or new medications (including over-the-counter products) are added. *  Please carry medication information at all times in case of emergency situations. These are general instructions for a healthy lifestyle:    No smoking/ No tobacco products/ Avoid exposure to second hand smoke  Surgeon General's Warning:  Quitting smoking now greatly reduces serious risk to your health. Obesity, smoking, and sedentary lifestyle greatly increases your risk for illness    A healthy diet, regular physical exercise & weight monitoring are important for maintaining a healthy lifestyle    You may be retaining fluid if you have a history of heart failure or if you experience any of the following symptoms:  Weight gain of 3 pounds or more overnight or 5 pounds in a week, increased swelling in our hands or feet or shortness of breath while lying flat in bed. Please call your doctor as soon as you notice any of these symptoms; do not wait until your next office visit.     Recognize signs and symptoms of STROKE:    F-face looks uneven    A-arms unable to move or move unevenly    S-speech slurred or non-existent    T-time-call [phone] as soon as signs and symptoms begin-DO NOT go       Back to bed or wait to see if you get better-TIME IS BRAIN. Warning Signs of HEART ATTACK     Call 911 if you have these symptoms:   Chest discomfort. Most heart attacks involve discomfort in the center of the chest that lasts more than a few minutes, or that goes away and comes back. It can feel like uncomfortable pressure, squeezing, fullness, or pain.  Discomfort in other areas of the upper body. Symptoms can include pain or discomfort in one or both arms, the back, neck, jaw, or stomach.  Shortness of breath with or without chest discomfort.  Other signs may include breaking out in a cold sweat, nausea, or lightheadedness. Don't wait more than five minutes to call 911 - MINUTES MATTER! Fast action can save your life. Calling 911 is almost always the fastest way to get lifesaving treatment. Emergency Medical Services staff can begin treatment when they arrive -- up to an hour sooner than if someone gets to the hospital by car. The discharge information has been reviewed with the patient and friend. The patient and friend verbalized understanding. Discharge medications reviewed with the patient and friend and appropriate educational materials and side effects teaching were provided. Hydromorphone (By mouth)   Hydromorphone (wjx-mrad-TCF-fone)  Treats moderate to severe pain. This medicine is a narcotic pain reliever. Brand Name(s): Dilaudid, Exalgo   There may be other brand names for this medicine. When This Medicine Should Not Be Used: This medicine is not right for everyone. Do not use it if you had an allergic reaction to hydromorphone or sulfites, or if you have severe lung or breathing problems, or stomach or bowel blockage (including paralytic ileus). How to Use This Medicine:   Long Acting Capsule, Liquid, Tablet, Long Acting Tablet  · Take your medicine as directed.  Your dose may need to be changed several times to find what works best for you. An overdose can be dangerous. Follow directions carefully so you do not get too much medicine at one time. · Oral liquid: Measure the oral liquid medicine with a marked measuring spoon, oral syringe, or medicine cup. If you get any of the oral liquid on your skin, rinse it with cool water right away. · Swallow the extended-release capsule whole. Do not crush, break, or chew it. · Swallow the extended-release tablet whole. Do not crush, break, or chew it. · If you take the extended-release tablet, part of the tablet may pass into your stools. This is normal and is nothing to worry about. · Hydromorphone extended-release capsules or tablets work differently than regular hydromorphone tablets, even at the same dose. Do not switch from one form to another unless your doctor tells you to. · This medicine should come with a Medication Guide. Ask your pharmacist for a copy if you do not have one. · Missed dose:   ¨ Extended-release capsules or tablets: If you miss a dose, skip the missed dose and take your next dose at the usual time the next day. Do not double doses. ¨ Oral liquid: Take a dose as soon as you remember. If it is almost time for your next dose, wait until then and take a regular dose. Do not take extra medicine to make up for a missed dose. · Store the medicine in a closed container at room temperature, away from heat, moisture, and direct light. Store the medicine in a safe and secure place. Do not throw unused medicine in the trash. Ask your pharmacist about the best way to dispose of medicine you do not use. Drugs and Foods to Avoid:   Ask your doctor or pharmacist before using any other medicine, including over-the-counter medicines, vitamins, and herbal products. · Do not use this medicine if you are using or have used an MAO inhibitor within the past 14 days. · Some medicines can affect how hydromorphone works.  Tell your doctor if you are using any of the following: ¨ Blood pressure medicine  ¨ Diuretic (water pill)  ¨ Medicine to treat depression  ¨ Phenothiazine medicine  · Do not drink alcohol while you are using this medicine. · Tell your doctor if you use anything else that makes you sleepy. Some examples are allergy medicine, narcotic pain medicine, and alcohol. Tell your doctor if you are also using buprenorphine, butorphanol, nalbuphine, pentazocine, or a muscle relaxer. Warnings While Using This Medicine:   · Tell your doctor if you are pregnant or breastfeeding, or if you have kidney disease, liver disease, lung disease or breathing problems (such as asthma or COPD), an underactive thyroid, adrenal problems, cystic fibrosis, pancreas problems, gallbladder problems, an enlarged prostate, trouble urinating, or stomach or bowel problems. Tell your doctor if you have a history of head injury, brain tumor, seizures, depression, or alcohol or drug abuse. · This medicine may cause the following problems:  ¨ High risk of overdose, which can lead to death  ¨ Respiratory depression (serious breathing problem that can be life-threatening)  ¨ Serotonin syndrome, when used with certain medicines  · This medicine can be habit-forming. Do not use more than your prescribed dose. Call your doctor if you think your medicine is not working. · Do not stop using this medicine suddenly. Your doctor will need to slowly decrease your dose before you stop it completely. · This medicine may make you dizzy, drowsy, or lightheaded. Do not drive or do anything else that could be dangerous until you know how this medicine affects you. Sit or lie down if you feel dizzy. Stand up carefully. · This medicine could cause infertility. Talk with your doctor before using this medicine if you plan to have children. · This medicine may cause constipation, especially with long-term use. Ask your doctor if you should use a laxative to prevent and treat constipation.   · Keep all medicine out of the reach of children. Never share your medicine with anyone. Possible Side Effects While Using This Medicine:   Call your doctor right away if you notice any of these side effects:  · Allergic reaction: Itching or hives, swelling in your face or hands, swelling or tingling in your mouth or throat, chest tightness, trouble breathing  · Anxiety, restlessness, fast heartbeat, fever, sweating, muscle spasms, twitching, nausea, vomiting, diarrhea, seeing or hearing things that are not there  · Blue lips, fingernails, or skin  · Extreme dizziness or weakness, shallow breathing, slow or uneven heartbeat, sweating, cold or clammy skin, seizures  · Severe confusion, lightheadedness, dizziness, fainting  · Severe constipation, stomach pain, or vomiting  · Trouble breathing or slow breathing  If you notice these less serious side effects, talk with your doctor:   · Mild constipation, nausea, or vomiting  · Mild sleepiness or tiredness  If you notice other side effects that you think are caused by this medicine, tell your doctor. Call your doctor for medical advice about side effects. You may report side effects to FDA at 1-002-FDA-1514  © 2017 [address] [employer] Information is for End User's use only and may not be sold, redistributed or otherwise used for commercial purposes. The above information is an  only. It is not intended as medical advice for individual conditions or treatments. Talk to your doctor, nurse or pharmacist before following any medical regimen to see if it is safe and effective for you.

## 2018-07-30 NOTE — OP NOTES
Operative Note Patient: Ash Randolph               Sex: female             MRN: [id number] YOB: 1955      Age:  57 y.o. Preoperative Diagnosis: Hydronephrosis, unspecified hydronephrosis type [N13.30] Postoperative Diagnosis:  Hydronephrosis, unspecified hydronephrosis type [N13.30] Surgeon: Hemanth Carrera Anesthesia:  General 
 
Procedure:   
1) Cystoscopy 2) left retrograde pyelogram 
3) left ureteral stent exchange 4) < 1 hour physician fluoroscopy imaging interpretation time Operative Indication: This is a 57 y.o. female with a history of metastatic colorectal cancer with peritoneal implants obstructing the left ureter causing hydronephrosis. She now presents for stent exchange. After the risks, benefits, alternatives, and complications were discussed they present now for operative management. Procedure in Detail: The patient was brought to the operative suite. Anesthesia was induced and preoperative antibiotics were administered. They were then placed in the dorsal lithotomy position and their external genitalia was prepped and draped in the usual fashion. A surgical timeout was performed confirming the patient's name, date of birth, laterality, and antibiotics. All were in agreement. A 22 Yi cystourethroscope was then inserted into the patients bladder. The urethra revealed no abnormalities. No bladder abnormalities were noted on cystoscopy. The indwelling stent was externalized. A sensor wire was then passed up the left ureteral stent and up the kidney using fluoroscopic guidance. The stent was removed. A 5 Yi open ended catheter was placed over the wire and the wire removed. A gentle retrograde pyelogram was performed opacifying the  left ureter. There was a 5 cm area of narrowing in the midureter likely from external compression. There was mild left hydronephrosis. There were no filling defects.  The sensor wire was replaced and the ureteral catheter was removed. A 6 fr x 24 cm stent was then placed in the standard fashion over our sensor wire. Excellent coil was noted in the kidney and bladder on fluoroscopy. The patient was then awoken and transferred to the recovery room in stable condition. Estimated Blood Loss: minimal 
        
        
Implants:  
Implant Name Type Inv. Item Serial No.  Lot No. LRB No. Used Action Kristin May DBL-PGTL [id number] Kia Carbajal - [id number]   TAM[facility]PGTL [id number] [facility] [id number] Left 1 Implanted Specimens: * No specimens in log * Drains: None Complications:  None Annette Galvan MD 
7/30/2018

## 2018-07-30 NOTE — H&P
ASSESSMENT:  
1. Metastatic colorectal cancer 2. Left hydronephrosis PLAN:   
1. Left ureteral stent exchange, left retrograde pyelogram, cystoscopy CC: left hydronephrosis HISTORY OF PRESENT ILLNESS:  Eric Lo is a 57 y.o. female who is seen preop. History of left hydronephrosis No fevers, chills, nausea, vomiting. No new issues. AUA Symptom Score 6/25/2018 Over the past month how often have you had the sensation that your bladder was not completely empty after you finished urinating? 4 Over the past month, how often have had to urinate again less than 2 hours after you last finished urinating? 5 Over the past month, how often have you found you stopped and started again several times when you urinated? 5 Over the past month, how often have you found it difficult to postpone urination? 4 Over the past month, how often have you had a weak urinary stream? 5 Over the past month, how often have you had to push or strain to begin urinating? 2 Over the past month, how many times did you most typically get up to urinate from the time you went to bed at night until the time you got up in the morning? 5  
AUA Score 30 If you were to spend the rest of your life with your urinary condition the way it is now, how would you feel about that? Mixed-about equally satisfied Past Medical History:  
Diagnosis Date  Asthma  Cancer ([facility] 10/2016  
 colon  Diabetes ([facility]   
 only on chemo days  Gout  Heart disease  Hypertension  Kidney disease  Maintenance chemotherapy  Neuropathic pain of foot Past Surgical History:  
Procedure Laterality Date  ABDOMEN SURGERY PROC UNLISTED  05/2015  
 partial colectomy with colostomy  HX BACK SURGERY    
 HX COLONOSCOPY    
 HX HYSTERECTOMY  HX UROLOGICAL  03/2018 Uereteroscopy. stent  HX VASCULAR ACCESS    
 mediport- right chest wall Social History Substance Use Topics  Smoking status: Never Smoker  Smokeless tobacco: Never Used  Alcohol use No  
 
 
Allergies Allergen Reactions  Latex Other (comments)  Latex Rash  Lisinopril Hives  Asa-Acetaminophen-Caff-Buffers Other (comments)  Aspirin Hives  Codeine Other (comments)  Codeine Hives  Lisinopril Hives  Pcn [Penicillins] Other (comments)  Penicillin G Hives  Sulfa (Sulfonamide Antibiotics) Other (comments) Family History Problem Relation Age of Onset  Family history unknown: Yes Current Facility-Administered Medications Medication Dose Route Frequency Provider Last Rate Last Dose  lidocaine (PF) (XYLOCAINE) 10 mg/mL (1 %) injection 0.1 mL  0.1 mL SubCUTAneous PRN Heather Patter, CRNA  lactated Ringers infusion  75 mL/hr IntraVENous CONTINUOUS Heather Patter, CRNA 75 mL/hr at 07/30/18 1114 75 mL/hr at 07/30/18 1114  
 sodium chloride (NS) flush 5-10 mL  5-10 mL IntraVENous Q8H Heather Patter, CRNA  sodium chloride (NS) flush 5-10 mL  5-10 mL IntraVENous PRN Heather Patter, CRNA  insulin lispro (HUMALOG) injection   SubCUTAneous ONCE Heather Manriquez, CRNA  gentamicin in Saline (Iso-osm) (GARAMYCIN) 80 mg/100 mL IVPB premix 80 mg  80 mg IntraVENous ONCE Nicola Jimenez MD      
 ciprofloxacin (CIPRO) 400 mg IVPB (premix)  400 mg IntraVENous ONCE Deniz Dunham MD      
 
Facility-Administered Medications Ordered in Other Encounters Medication Dose Route Frequency Provider Last Rate Last Dose  [START ON 7/31/2018] nivolumab (OPDIVO) 240 mg in 0.9% sodium chloride 100 mL IVPB  240 mg IntraVENous ONCE Toshia Avitia MD      
 
 
Review of Systems Pertinent findings noted in hpi. Otherwise negative review. PHYSICAL EXAMINATION:  
Visit Vitals  /73 (BP 1 Location: Left arm, BP Patient Position: At rest)  Pulse 70  Temp 98.6 °F (37 °C)  Resp 20  
 Ht 5' (1.524 m)  Wt 205 lb (93 kg)  SpO2 96%  BMI 40.04 kg/m2 Constitutional: WDWN, Pleasant and appropriate affect, No acute distress. CV:  No peripheral swelling noted Respiratory: No respiratory distress or difficulties Abdomen:  No abdominal masses or tenderness. No CVA tenderness. No inguinal hernias noted. Skin: No jaundice. Neuro/Psych:  Alert and oriented x 3, affect appropriate. Lymphatic:   No enlarged inguinal lymph nodes. REVIEW OF LABS AND IMAGING:   
Results for orders placed or performed during the hospital encounter of 07/30/18 PROTHROMBIN TIME + INR Result Value Ref Range Prothrombin time 14.8 11.5 - 15.2 sec INR 1.2 0.8 - 1.2 GLUCOSE, POC Result Value Ref Range Glucose (POC) 116 (H) 70 - 110 mg/dL No results found for: PSA, Lee Hew, PSAR3, [id number], [id number], PSALT 
 
CT Results: No results found for this or any previous visit. US Results: No results found for this or any previous visit. Deniz Dunham MD  
Urology [facility]– [place] [phone]

## 2018-07-30 NOTE — ANESTHESIA POSTPROCEDURE EVALUATION
Post-Anesthesia Evaluation and Assessment Patient: Paul Kay MRN: [id number]  SSN: [id number] YOB: 1955  Age: 57 y.o. Sex: female Data from PACU flowsheet Cardiovascular Function/Vital Signs Visit Vitals  /63 (BP 1 Location: Left arm, BP Patient Position: At rest)  Pulse 71  Temp 36.3 °C (97.3 °F)  Resp 14  
 Ht 5' (1.524 m)  Wt 93 kg (205 lb)  SpO2 100%  BMI 40.04 kg/m2 Patient is status post general anesthesia for Procedure(s): LEFT RETROGRADE/STENT EXCHANGE/C-ARM. Nausea/Vomiting: controlled Postoperative hydration reviewed and adequate. Pain: 
Pain Scale 1: Numeric (0 - 10) (07/30/18 1639) Pain Intensity 1: 1 (07/30/18 1639) Managed Mental Status and Level of Consciousness: Alert and oriented Pulmonary Status:  
O2 Device: Room air (07/30/18 1639) Adequate oxygenation and airway patent Complications related to anesthesia: None Post-anesthesia assessment completed. No concerns Signed By: Odessa Rich MD   
 July 30, 2018

## 2018-07-30 NOTE — ANESTHESIA PREPROCEDURE EVALUATION
Anesthetic History No history of anesthetic complications Review of Systems / Medical History Patient summary reviewed and pertinent labs reviewed Pulmonary Asthma : well controlled Neuro/Psych Within defined limits Cardiovascular Hypertension: well controlled Exercise tolerance: >4 METS 
  
GI/Hepatic/Renal 
Within defined limits Renal disease: CRI Endo/Other Diabetes: well controlled Morbid obesity, cancer and anemia Other Findings Comments: Documentation of current medication Current medications obtained, documented and obtained? YES Risk Factors for Postoperative nausea/vomiting: 
     History of postoperative nausea/vomiting? NO Female? YES Motion sickness? NO Intended opioid administration for postoperative analgesia? YES Smoking Abstinence: 
Current Smoker? NO Elective Surgery? YES Seen preoperatively by anesthesiologist or proxy prior to day of surgery? YES Pt abstained from smoking 24 hours prior to anesthesia? N/A Preventive care/screening for High Blood Pressure: 
Aged 18 years and older: YES Screened for high blood pressure: YES Patients with high blood pressure referred to primary care provider 
 for BP management: YES Physical Exam 
 
Airway Mallampati: II 
TM Distance: 4 - 6 cm Neck ROM: normal range of motion Mouth opening: Normal 
 
 Cardiovascular Rhythm: regular Rate: normal 
 
 
 
 Dental 
No notable dental hx Pulmonary Breath sounds clear to auscultation Abdominal 
GI exam deferred Other Findings Anesthetic Plan ASA: 3 Anesthesia type: general 
 
 
 
 
Induction: Intravenous Anesthetic plan and risks discussed with: Patient

## 2018-07-30 NOTE — ANESTHESIA POSTPROCEDURE EVALUATION
Post-Anesthesia Evaluation and Assessment Patient: Wilman Hernandez MRN: [id number]  SSN: [id number] YOB: 1955  Age: 57 y.o. Sex: female Cardiovascular Function/Vital Signs Visit Vitals  /63 (BP 1 Location: Left arm, BP Patient Position: At rest)  Pulse 71  Temp 36.3 °C (97.3 °F)  Resp 14  
 Ht 5' (1.524 m)  Wt 93 kg (205 lb)  SpO2 100%  BMI 40.04 kg/m2 Patient is status post general anesthesia for Procedure(s): LEFT RETROGRADE/STENT EXCHANGE/C-ARM. Nausea/Vomiting: None Postoperative hydration reviewed and adequate. Pain: 
Pain Scale 1: Numeric (0 - 10) (07/30/18 1639) Pain Intensity 1: 1 (07/30/18 1639) Managed Neurological Status:  
Neuro (WDL): Within Defined Limits (07/30/18 1639) At baseline Mental Status and Level of Consciousness: Arousable Pulmonary Status:  
O2 Device: Room air (07/30/18 1639) Adequate oxygenation and airway patent Complications related to anesthesia: None Post-anesthesia assessment completed. No concerns Signed By: Avila Sheth MD   
 July 30, 2018

## 2018-07-30 NOTE — IP AVS SNAPSHOT
Titoangeles Rivas 
 
 
 [address] [facility] [address] 
[phone] Patient: Ludmila Eagle MRN: [id number] About your hospitalization You were admitted on:  July 30, 2018 You last received care in the:  [address] [facility] PACU You were discharged on:  July 30, 2018 Why you were hospitalized Your primary diagnosis was:  Not on File Your diagnoses also included:  Hydronephrosis Follow-up Information Follow up With Details Comments Contact Info Shaka Peraza NP   [address] [facility] [address] [facility] 
[phone] Vinny Rodriguez MD Follow up in 4 month(s)  [address] [facility] [address] [facility] [address] 
[phone] Your Scheduled Appointments Tuesday July 31, 2018 10:00 AM EDT INFUSION 60 with HBV INFUSION NURSE 1  
[facility] [address] [place] [address] 200 [facility]  
[phone] Note: Patient must have a  if they take medication(s) that impairs their ability to operate a motor vehicle Wednesday August 08, 2018 11:15 AM EDT Office Visit with Tony Ricks MD  
Via Tyron Noble  Oncology [facility] [facility]  
[phone] Tuesday August 14, 2018 11:00 AM EDT INFUSION 60 with HBV INFUSION NURSE 2  
HBV OP INFUSION ([place]) [facility] [address] [place] [address] 9[address] [facility]  
[phone] Note: Patient must have a  if they take medication(s) that impairs their ability to operate a motor vehicle Tuesday August 28, 2018 10:00 AM EDT INFUSION 60 with HBV INFUSION NURSE 2  
HBV OP INFUSION ([place]) [facility] [address] [place] [address] 609 [address] [facility]  
[phone] Note: Patient must have a  if they take medication(s) that impairs their ability to operate a motor vehicle Discharge Orders None A check sam indicates which time of day the medication should be taken. My Medications START taking these medications Instructions Each Dose to Equal  
 Morning Noon Evening Bedtime HYDROmorphone 2 mg tablet Commonly known as:  DILAUDID Your last dose was: Your next dose is: Take 1 Tab by mouth every four (4) hours as needed for Pain. Max Daily Amount: 12 mg.  
 2 mg CONTINUE taking these medications Instructions Each Dose to Equal  
 Morning Noon Evening Bedtime  
 acyclovir 800 mg tablet Commonly known as:  ZOVIRAX Your last dose was: Your next dose is: Take 800 mg by mouth five (5) times daily. 800 mg  
    
   
   
   
  
 albuterol 90 mcg/actuation inhaler Commonly known as:  PROVENTIL HFA, VENTOLIN HFA, PROAIR HFA Your last dose was: Your next dose is:    
   
   
 2 Puffs. 2 Puff  
    
   
   
   
  
 allopurinol 300 mg tablet Commonly known as:  Cassandra Matthew Your last dose was: Your next dose is:    
   
   
 300 mg daily. 300 mg  
    
   
   
   
  
 amLODIPine 5 mg tablet Commonly known as:  Bang Coy Your last dose was: Your next dose is: Take 5 mg by mouth daily. 5 mg  
    
   
   
   
  
 bisoprolol-hydroCHLOROthiazide 10-6.25 mg per tablet Commonly known as:  [facility] Your last dose was: Your next dose is: Take 1 Tab by Mouth Once a Day. cloNIDine HCl 0.1 mg tablet Commonly known as:  CATAPRES Your last dose was: Your next dose is: Take  by mouth two (2) times a day. desonide 0.05 % cream  
Commonly known as:  Viet Gautam Your last dose was: Your next dose is:    
   
   
 Use 1 Application to affected area Twice Daily. diphenhydrAMINE 25 mg capsule Commonly known as:  BENADRYL Your last dose was: Your next dose is: Take 1 with compazine every 6 hours for nausea for 3 days then, as needed. ergocalciferol 50,000 unit capsule Commonly known as:  ERGOCALCIFEROL Your last dose was: Your next dose is:    
   
   
 50,000 Units every seven (7) days. [address] Units  
    
   
   
   
  
 furosemide 20 mg tablet Commonly known as:  LASIX Your last dose was: Your next dose is: Take 1/2-1 tablet daily if needed for swelling. glimepiride 4 mg tablet Commonly known as:  AMARYL Your last dose was: Your next dose is: Take one in the am.  New dose. glipiZIDE 10 mg tablet Commonly known as:  Lola Ket Your last dose was: Your next dose is:    
   
   
 10 mg.  
 10 mg  
    
   
   
   
  
 insulin aspart U-100 100 unit/mL Inpn Commonly known as:  Caroline Cuenca Your last dose was: Your next dose is:    
   
   
 by SubCUTAneous route. * lidocaine-prilocaine topical cream  
Commonly known as:  EMLA Your last dose was: Your next dose is:    
   
   
 Place cream over mediport 1 hour prior to chemotherapy and then place saran wrap over area. Every chemo treatment. * lidocaine-prilocaine topical cream  
Commonly known as:  EMLA Your last dose was: Your next dose is:    
   
   
 Apply  to affected area as needed for Pain.  
     
   
   
   
  
 magic mouthwash solution Your last dose was: Your next dose is:    
   
   
 Magic mouth wash  Maalox Lidocaine 2% viscous  Diphenhydramine oral solution   Pharmacy to mix equal portions of ingredients to a total volume as indicated in the dispense amount.    Indications: please swish and spit 4 times a day and as needed, NEURONTIN 100 mg capsule Generic drug:  gabapentin Your last dose was: Your next dose is: Take 100 mg by mouth two (2) times a day. 100 mg  
    
   
   
   
  
 nitrofurantoin (macrocrystal-monohydrate) 100 mg capsule Commonly known as:  MACROBID Your last dose was: Your next dose is: Take 1 Cap by mouth two (2) times a day. 100 mg  
    
   
   
   
  
 omeprazole 20 mg capsule Commonly known as:  PRILOSEC Your last dose was: Your next dose is: Take 20 mg by mouth daily. 20 mg  
    
   
   
   
  
 ondansetron hcl 8 mg tablet Commonly known as:  Karlis Offer Your last dose was: Your next dose is: Take one tablet by mouth every 8 hours for nausea beginning the night of chemo for 3 days. predniSONE 20 mg tablet Commonly known as:  Carli Arbbianca Your last dose was: Your next dose is: Take 2 Tabs by mouth daily. Take 2 tabs on days 2 and 3 of each chemo cycle 40 mg  
    
   
   
   
  
 prochlorperazine 10 mg tablet Commonly known as:  COMPAZINE Your last dose was: Your next dose is: Take 1 tablet every 6 hours with Benadryl 25mg for nausea for 3 days then, as needed. tamsulosin 0.4 mg capsule Commonly known as:  FLOMAX Your last dose was: Your next dose is: Take 1 Cap by mouth daily (after dinner). 0.4 mg  
    
   
   
   
  
 traMADol 50 mg tablet Commonly known as:  ULTRAM  
   
Your last dose was: Your next dose is: Take 50 mg by mouth every six (6) hours as needed for Pain. 50 mg  
    
   
   
   
  
 warfarin 3 mg tablet Commonly known as:  COUMADIN Your last dose was: Your next dose is: Take 3 mg by mouth daily. Takes 1 and 1/2 tab Sunday, Tuesday, and Thursday. Takes 1 tablet Monday, Wednesday, and Friday 3 mg * Notice: This list has 2 medication(s) that are the same as other medications prescribed for you. Read the directions carefully, and ask your doctor or other care provider to review them with you. Where to Get Your Medications Information on where to get these meds will be given to you by the nurse or doctor. ! Ask your nurse or doctor about these medications HYDROmorphone 2 mg tablet Opioid Education Prescription Opioids: What You Need to Know: 
 
Prescription opioids can be used to help relieve moderate-to-severe pain and are often prescribed following a surgery or injury, or for certain health conditions. These medications can be an important part of treatment but also come with serious risks. Opioids are strong pain medicines. Examples include hydrocodone, oxycodone, fentanyl, and morphine. Heroin is an example of an illegal opioid. It is important to work with your health care provider to make sure you are getting the safest, most effective care. WHAT ARE THE RISKS AND SIDE EFFECTS OF OPIOID USE? Prescription opioids carry serious risks of addiction and overdose, especially with prolonged use. An opioid overdose, often marked by slow breathing, can cause sudden death. The use of prescription opioids can have a number of side effects as well, even when taken as directed. · Tolerance-meaning you might need to take more of a medication for the same pain relief · Physical dependence-meaning you have symptoms of withdrawal when the medication is stopped. Withdrawal symptoms can include nausea, sweating, chills, diarrhea, stomach cramps, and muscle aches. Withdrawal can last up to several weeks, depending on which drug you took and how long you took it. · Increased sensitivity to pain · Constipation · Nausea, vomiting, and dry mouth · Sleepiness and dizziness · Confusion · Depression · Low levels of testosterone that can result in lower sex drive, energy, and strength · Itching and sweating RISKS ARE GREATER WITH:      
· History of drug misuse, substance use disorder, or overdose · Mental health conditions (such as depression or anxiety) · Sleep apnea · Older age (72 years or older) · Pregnancy Avoid alcohol while taking prescription opioids. Also, unless specifically advised by your health care provider, medications to avoid include: · Benzodiazepines (such as Xanax or Valium) · Muscle relaxants (such as Soma or Flexeril) · Hypnotics (such as Ambien or Lunesta) · Other prescription opioids KNOW YOUR OPTIONS Talk to your health care provider about ways to manage your pain that don't involve prescription opioids. Some of these options may actually work better and have fewer risks and side effects. Options may include: 
· Pain relievers such as acetaminophen, ibuprofen, and naproxen · Some medications that are also used for depression or seizures · Physical therapy and exercise · Counseling to help patients learn how to cope better with triggers of pain and stress. · Application of heat or cold compress · Massage therapy · Relaxation techniques Be Informed Make sure you know the name of your medication, how much and how often to take it, and its potential risks & side effects. IF YOU ARE PRESCRIBED OPIOIDS FOR PAIN: 
· Never take opioids in greater amounts or more often than prescribed. Remember the goal is not to be pain-free but to manage your pain at a tolerable level. · Follow up with your primary care provider to: · Work together to create a plan on how to manage your pain. · Talk about ways to help manage your pain that don't involve prescription opioids. · Talk about any and all concerns and side effects. · Help prevent misuse and abuse. · Never sell or share prescription opioids · Help prevent misuse and abuse. · Store prescription opioids in a secure place and out of reach of others (this may include visitors, children, friends, and family). · Safely dispose of unused/unwanted prescription opioids: Find your community drug take-back program or your pharmacy mail-back program, or flush them down the toilet, following guidance from the Food and Drug Administration (www.fda.gov/Drugs/ResourcesForYou). · Visit www.cdc.gov/drugoverdose to learn about the risks of opioid abuse and overdose. · If you believe you may be struggling with addiction, tell your health care provider and ask for guidance or call [facility] [employer] at 7-788-653-MYYJ. Discharge Instructions Discharge Instructions Patient: Ismael Ferguson               Sex: female          DOA: 7/30/2018 YOB: 1955      Age:  57 y.o.        LOS:  LOS: 0 days ACUTE DIAGNOSES: 
Hydronephrosis, unspecified hydronephrosis type [N13.30] CHRONIC MEDICAL DIAGNOSES: 
Problem List as of 7/30/2018  Date Reviewed: 7/27/2018 Codes Class Noted - Resolved Mouth sore secondary to chemotherapy ICD-10-CM: K13.79 ICD-9-CM: 528.9  7/19/2018 - Present Obesity, morbid ([facility] [address] ICD-10-CM: E66.01 
ICD-9-CM: 278.01  3/28/2018 - Present Hydronephrosis ICD-10-CM: N13.30 ICD-9-CM: 756  3/19/2018 - Present Hydronephrosis with ureteropelvic junction (UPJ) obstruction ICD-10-CM: Q62.11 ICD-9-CM: 913  2/14/2018 - Present Peritoneal carcinomatosis ([facility] [address] ICD-10-CM: C78.6, C80.1 ICD-9-CM: 197.6, 199.1  11/15/2017 - Present Colon cancer metastasized to multiple sites [facility]) ICD-10-CM: C18.9 ICD-9-CM: 153.9  11/15/2017 - Present Antineoplastic chemotherapy induced anemia ICD-10-CM: D64.81, T45.1X5A 
ICD-9-CM: 285.3, E933.1  1/25/2017 - Present Chemotherapy induced neutropenia (HCC) ICD-10-CM: D70.1, T45.1X5A 
ICD-9-CM: 288.03, E933.1  1/25/2017 - Present Colorectal carcinoma ([facility] [address] ICD-10-CM: C19 
ICD-9-CM: 154.0  12/5/2016 - Present Anemia in chronic kidney disease (CKD) ICD-10-CM: N18.9, D63.1 ICD-9-CM: 285.21  9/28/2016 - Present Iron deficiency anemia due to chronic blood loss ICD-10-CM: D50.0 ICD-9-CM: 280.0  9/28/2016 - Present PROCEDURE: 
Left ureteral stent exchange Instructions: 
Please drink plenty of fluids to promote clear yellow urine. You may have blood in your urine which is normal.  A small amount of blood can cause all of the urine to look red. Please contact the office is you are passing large clots as this may be a sign of more serious bleeding. You have a stent in your ureter. This may cause flank or lower abdominal discomfort which is normal.  
 
You may resume all regular activity after 24 hours. DISCHARGE MEDICATIONS:  
 
· It is important that you take the medication exactly as they are prescribed. · Keep your medication in the bottles provided by the pharmacist and keep a list of the medication names, dosages, and times to be taken in your wallet. · Do not take other medications without consulting your doctor. DIET:  Regular Diet ACTIVITY: Activity as tolerated and no driving for today ADDITIONAL INFORMATION: If you experience any of the following symptoms then please call your primary care physician or return to the emergency room if you cannot get hold of your doctor: Fever, chills, nausea, vomiting, diarrhea, change in mentation, falling, bleeding, shortness of breath. FOLLOW UP CARE: 
Dr. Harriet Pat will see you in 4 months  in the office. Office info: 
[phone] Information obtained by : 
I understand that if any problems occur once I am at home I am to contact my physician. I understand and acknowledge receipt of the instructions indicated above. Physician's or R.N.'s Signature                                                                  Date/Time Patient or Representative Signature                                                          Date/Time DISCHARGE SUMMARY from Nurse PATIENT INSTRUCTIONS: 
 
 
F-face looks uneven A-arms unable to move or move unevenly S-speech slurred or non-existent T-time-call 911 as soon as signs and symptoms begin-DO NOT go Back to bed or wait to see if you get better-TIME IS BRAIN. Warning Signs of HEART ATTACK Call 911 if you have these symptoms: 
? Chest discomfort. Most heart attacks involve discomfort in the center of the chest that lasts more than a few minutes, or that goes away and comes back. It can feel like uncomfortable pressure, squeezing, fullness, or pain. ? Discomfort in other areas of the upper body. Symptoms can include pain or discomfort in one or both arms, the back, neck, jaw, or stomach. ? Shortness of breath with or without chest discomfort. ? Other signs may include breaking out in a cold sweat, nausea, or lightheadedness. Don't wait more than five minutes to call [address]! Fast action can save your life. Calling 911 is almost always the fastest way to get lifesaving treatment. Emergency Medical Services staff can begin treatment when they arrive  up to an hour sooner than if someone gets to the hospital by car. The discharge information has been reviewed with the patient and friend. The patient and friend verbalized understanding. Discharge medications reviewed with the patient and friend and appropriate educational materials and side effects teaching were provided. Hydromorphone (By mouth) Hydromorphone ([id number]-[id number]-WGM-fone) Treats moderate to severe pain. This medicine is a narcotic pain reliever. Brand Name(s): Dilaudid, Exalgo There may be other brand names for this medicine. When This Medicine Should Not Be Used: This medicine is not right for everyone. Do not use it if you had an allergic reaction to hydromorphone or sulfites, or if you have severe lung or breathing problems, or stomach or bowel blockage (including paralytic ileus). How to Use This Medicine:  
Long Acting Capsule, Liquid, Tablet, Long Acting Tablet · Take your medicine as directed. Your dose may need to be changed several times to find what works best for you. An overdose can be dangerous. Follow directions carefully so you do not get too much medicine at one time. · Oral liquid: Measure the oral liquid medicine with a marked measuring spoon, oral syringe, or medicine cup. If you get any of the oral liquid on your skin, rinse it with cool water right away. · Swallow the extended-release capsule whole. Do not crush, break, or chew it. · Swallow the extended-release tablet whole. Do not crush, break, or chew it. · If you take the extended-release tablet, part of the tablet may pass into your stools. This is normal and is nothing to worry about. · Hydromorphone extended-release capsules or tablets work differently than regular hydromorphone tablets, even at the same dose. Do not switch from one form to another unless your doctor tells you to. · This medicine should come with a Medication Guide. Ask your pharmacist for a copy if you do not have one. · Missed dose: ¨ Extended-release capsules or tablets: If you miss a dose, skip the missed dose and take your next dose at the usual time the next day. Do not double doses. ¨ Oral liquid: Take a dose as soon as you remember. If it is almost time for your next dose, wait until then and take a regular dose. Do not take extra medicine to make up for a missed dose. · Store the medicine in a closed container at room temperature, away from heat, moisture, and direct light. Store the medicine in a safe and secure place. Do not throw unused medicine in the trash. Ask your pharmacist about the best way to dispose of medicine you do not use. Drugs and Foods to Avoid: Ask your doctor or pharmacist before using any other medicine, including over-the-counter medicines, vitamins, and herbal products. · Do not use this medicine if you are using or have used an MAO inhibitor within the past 14 days. · Some medicines can affect how hydromorphone works. Tell your doctor if you are using any of the following: ¨ Blood pressure medicine ¨ Diuretic (water pill) ¨ Medicine to treat depression ¨ Phenothiazine medicine · Do not drink alcohol while you are using this medicine. · Tell your doctor if you use anything else that makes you sleepy. Some examples are allergy medicine, narcotic pain medicine, and alcohol. Tell your doctor if you are also using buprenorphine, butorphanol, nalbuphine, pentazocine, or a muscle relaxer. Warnings While Using This Medicine: · Tell your doctor if you are pregnant or breastfeeding, or if you have kidney disease, liver disease, lung disease or breathing problems (such as asthma or COPD), an underactive thyroid, adrenal problems, cystic fibrosis, pancreas problems, gallbladder problems, an enlarged prostate, trouble urinating, or stomach or bowel problems. Tell your doctor if you have a history of head injury, brain tumor, seizures, depression, or alcohol or drug abuse. · This medicine may cause the following problems: 
¨ High risk of overdose, which can lead to death ¨ Respiratory depression (serious breathing problem that can be life-threatening) ¨ Serotonin syndrome, when used with certain medicines · This medicine can be habit-forming. Do not use more than your prescribed dose. Call your doctor if you think your medicine is not working. · Do not stop using this medicine suddenly. Your doctor will need to slowly decrease your dose before you stop it completely. · This medicine may make you dizzy, drowsy, or lightheaded. Do not drive or do anything else that could be dangerous until you know how this medicine affects you. Sit or lie down if you feel dizzy. Stand up carefully. · This medicine could cause infertility. Talk with your doctor before using this medicine if you plan to have children. · This medicine may cause constipation, especially with long-term use. Ask your doctor if you should use a laxative to prevent and treat constipation. · Keep all medicine out of the reach of children. Never share your medicine with anyone. Possible Side Effects While Using This Medicine:  
Call your doctor right away if you notice any of these side effects: · Allergic reaction: Itching or hives, swelling in your face or hands, swelling or tingling in your mouth or throat, chest tightness, trouble breathing · Anxiety, restlessness, fast heartbeat, fever, sweating, muscle spasms, twitching, nausea, vomiting, diarrhea, seeing or hearing things that are not there · Blue lips, fingernails, or skin · Extreme dizziness or weakness, shallow breathing, slow or uneven heartbeat, sweating, cold or clammy skin, seizures · Severe confusion, lightheadedness, dizziness, fainting · Severe constipation, stomach pain, or vomiting · Trouble breathing or slow breathing If you notice these less serious side effects, talk with your doctor: · Mild constipation, nausea, or vomiting · Mild sleepiness or tiredness If you notice other side effects that you think are caused by this medicine, tell your doctor. Call your doctor for medical advice about side effects. You may report side effects to FDA at 5-450-ZXP-9402 © 2017 [address] Information is for End User's use only and may not be sold, redistributed or otherwise used for commercial purposes. The above information is an  only. It is not intended as medical advice for individual conditions or treatments. Talk to your doctor, nurse or pharmacist before following any medical regimen to see if it is safe and effective for you. Introducing [facility]! Dear Scout Sin: Thank you for requesting a Site Tour account. Our records indicate that you already have an active Site Tour account. You can access your account anytime at [email or web address] Did you know that you can access your hospital and ER discharge instructions at any time in Site Tour? You can also review all of your test results from your hospital stay or ER visit. Additional Information If you have questions, please visit the Frequently Asked Questions section of the Site Tour website at [email or web address] Remember, Site Tour is NOT to be used for urgent needs. For medical emergencies, dial 911. Now available from your iPhone and Android! Introducing Vitor Rosado As a Rodney Quarry patient, I wanted to make you aware of our electronic visit tool called Vitor Rosado. [facility] 24/7 allows you to connect within minutes with a medical provider 24 hours a day, seven days a week via a mobile device or tablet or logging into a secure website from your computer. You can access Vitor Rosado from anywhere in the [place].  
 
A virtual visit might be right for you when you have a simple condition and feel like you just dont want to get out of bed, or cant get away from work for an appointment, when your regular [employer] provider is not available (evenings, weekends or holidays), or when youre out of town and need minor care. Electronic visits cost only $49 and if the [employer] 24/7 provider determines a prescription is needed to treat your condition, one can be electronically transmitted to a nearby pharmacy*. Please take a moment to enroll today if you have not already done so. The enrollment process is free and takes just a few minutes. To enroll, please download the [employer] 24/7 curtis to your tablet or phone, or visit [email or web address] to enroll on your computer. And, as an [address] [place] [address] patient with a [employer] account, the results of your visits will be scanned into your electronic medical record and your primary care provider will be able to view the scanned results. We urge you to continue to see your regular [employer] provider for your ongoing medical care. And while your primary care provider may not be the one available when you seek a [employer] virtual visit, the peace of mind you get from getting a real diagnosis real time can be priceless. For more information on [employer], view our Frequently Asked Questions (FAQs) at [email or web address] org. Sincerely, 
 
Rachel Aragon MD 
Chief Medical Officer [employer] *:  certain medications cannot be prescribed via [employer] Unresulted Labs-Please follow up with your PCP about these lab tests Order Current Status XR RETROGRADE PYELOGRAM In process Providers Seen During Your Hospitalization Provider Specialty Primary office phone Umu Hester MD Urology [phone] Your Primary Care Physician (PCP) Primary Care Physician Office Phone Office Fax [place] Backers [phone] You are allergic to the following Allergen Reactions Latex Other (comments) Latex Rash Lisinopril Hives Asa-Acetaminophen-Caff-Buffers Other (comments) Aspirin Hives Codeine Other (comments) Codeine Hives Lisinopril Hives Pcn (Penicillins) Other (comments) Penicillin G Hives Sulfa (Sulfonamide Antibiotics) Other (comments) Recent Documentation Height Weight BMI OB Status Smoking Status 1.524 m 93 kg 40.04 kg/m2 Hysterectomy Never Smoker Emergency Contacts Name Discharge Info Relation Home Work [facility] DISCHARGE CAREGIVER [3] Sister [23] [phone] Fina Albert DISCHARGE CAREGIVER [3] Son [22] [phone] Carlo Shrestha DISCHARGE CAREGIVER [3] Friend [5] [phone] Patient Belongings The following personal items are in your possession at time of discharge: 
  Dental Appliances: None  Visual Aid: None      Home Medications: None   Jewelry: None  Clothing: Pants, Shirt, Footwear, Undergarments Please provide this summary of care documentation to your next provider. Signatures-by signing, you are acknowledging that this After Visit Summary has been reviewed with you and you have received a copy. Patient Signature:  ____________________________________________________________ Date:  ____________________________________________________________  
  
Néstor Baez Provider Signature:  ____________________________________________________________ Date:  ____________________________________________________________

## 2018-07-31 NOTE — PROGRESS NOTES
[facility] Progress Note Date: 2018 Name: [facility] MRN: [id number] : 1955 Opdivo Infusion Ms. Polk was assessed and education was provided. Care notes reviewed and signed. Opdivo checklist completed and patient given a pocket card. Ms. Polk's vitals were reviewed and patient was observed for 5 minutes prior to treatment. Visit Vitals  /81 (BP 1 Location: Left arm, BP Patient Position: Sitting;Post activity)  Pulse 80  Temp 98.8 °F (37.1 °C)  Resp 18  SpO2 100% Mediport accessed with 20g 1 inch orozco. Labs drawn from port and sent to lab for processing. CBC, BMP, Hep Funct, and TSH. Mediport flushed easily and had brisk blood return. Recent Results (from the past 12 hour(s)) CBC WITH 3 PART DIFF Collection Time: 18 10:50 AM  
Result Value Ref Range WBC 27.5 (HH) 4.5 - 13.0 K/uL  
 RBC 2.77 (L) 4.10 - 5.10 M/uL HGB 8.1 (L) 12.0 - 16 g/dL HCT 26.3 (L) 36 - 48 % MCV 94.9 78 - 102 FL  
 MCH 29.2 25.0 - 35.0 PG  
 MCHC 30.8 (L) 31 - 37 g/dL  
 RDW 19.1 (H) 11.5 - 14.5 % PLATELET 032 992 - 105 K/uL NEUTROPHILS 82 (H) 40 - 70 % MIXED CELLS 8 0.1 - 17 % LYMPHOCYTES 11 (L) 14 - 44 % ABS. NEUTROPHILS 22.5 (H) 1.8 - 9.5 K/UL  
 ABS. MIXED CELLS 2.1 0.0 - 2.3 K/uL  
 ABS. LYMPHOCYTES 2.9 1.1 - 5.9 K/UL  
 DF AUTOMATED    
POC CHEM8 Collection Time: 18 11:01 AM  
Result Value Ref Range CO2, POC 21 19 - 24 MMOL/L Glucose,  (H) 74 - 106 MG/DL  
 BUN, POC 25 (H) 7 - 18 MG/DL Creatinine, POC 2.9 (H) 0.6 - 1.3 MG/DL  
 GFRAA, POC 20 (L) >60 ml/min/1.73m2 GFRNA, POC 16 (L) >60 ml/min/1.73m2 Sodium,  136 - 145 MMOL/L Potassium, POC 4.0 3.5 - 5.5 MMOL/L Calcium, ionized (POC) 1.09 (L) 1.12 - 1.32 mmol/L Chloride,  100 - 108 MMOL/L Anion gap, POC 19 10 - 20 Hematocrit, POC 27 (L) 36 - 49 % Hemoglobin, POC 9.2 (L) 12 - 16 G/DL Labs OK to proceed today per Brant Strickland NP. Plt 296, Hgb 8.1 (patient to received procrit today), WBC 27.5. ANC 22.5 (patient received Neulasta with last treatment on 7/18/18). No new orders received for critical WBC of 27.5. Opdivo 240 mg was initiated @ 200 ml/hr over 30 minutes. 5 minutes into infusion patient stable. VS remained stable and pt denied complaints of itching, lip/tongue/facial swelling, SOB, CP or other complaints. Ms. Malcolm Nguyen tolerated the infusion, and had no complaints. Procrit indicated today. Procrit 60,000 units administered in the back of her right arm. Patient tolerated well. Injection site secured with band aid. Mediport flushed with NS 20 ml and heparinized per protocol. No bleeding or hematoma noted at site. Band aid applied. Patient Vitals for the past 12 hrs: 
 Temp Pulse Resp BP SpO2  
07/31/18 1249 98.3 °F (36.8 °C) 71 - 132/82 -  
07/31/18 1030 98.8 °F (37.1 °C) 80 18 138/81 100 % Patient stayed 30 minutes post infusion for observation. Patient armband removed and shredded. Ms. Malcolm Nguyen was discharged from [place] [address] in stable condition at [phone]. She is to return on 8/14/18 at 1100 for her next [address] [facility] appointment. Minoo Pérez RN 
July 31, 2018

## 2018-08-14 NOTE — PROGRESS NOTES
[facility] Progress Note    Date: 2018    Name: Cassandra Pratt    MRN: [id number]         : 1955    Opdivo Infusion    Patient arrived to [facility] at 1105 am ambulatory. No concerns or complaints voiced    Ms. Polk was assessed and education was provided. Opdivo checklist completed. Ms. Polk's vitals were reviewed and patient was observed for 5 minutes prior to treatment. Visit Vitals    /83 (BP 1 Location: Left arm, BP Patient Position: Sitting)    Pulse (!) 58    Temp 98.4 °F (36.9 °C)    Resp 18    Ht 5' (1.524 m)    Wt 91.1 kg (200 lb 12.8 oz)    SpO2 98%    BMI 39.22 kg/m2       Mediport accessed with 20g 1 inch orozco. Labs drawn from port for CBC, BMP, and Hep Function    Mediport flushed easily and had brisk blood return. Recent Results (from the past 12 hour(s))   CBC WITH 3 PART DIFF    Collection Time: 18 11:18 AM   Result Value Ref Range    WBC 5.4 4.5 - 13.0 K/uL    RBC 3.18 (L) 4.10 - 5.10 M/uL    HGB 9.4 (L) 12.0 - 16 g/dL    HCT 30.2 (L) 36 - 48 %    MCV 95.0 78 - 102 FL    MCH 29.6 25.0 - 35.0 PG    MCHC 31.1 31 - 37 g/dL    RDW 19.1 (H) 11.5 - 14.5 %    PLATELET 927 732 - 276 K/uL    NEUTROPHILS 58 40 - 70 %    MIXED CELLS 18 (H) 0.1 - 17 %    LYMPHOCYTES 24 14 - 44 %    ABS. NEUTROPHILS 3.1 1.8 - 9.5 K/UL    ABS. MIXED CELLS 1.0 0.0 - 2.3 K/uL    ABS. LYMPHOCYTES 1.3 1.1 - 5.9 K/UL    DF AUTOMATED     HEPATIC FUNCTION PANEL    Collection Time: 18 11:18 AM   Result Value Ref Range    Protein, total 7.2 6.4 - 8.2 g/dL    Albumin 3.4 3.4 - 5.0 g/dL    Globulin 3.8 2.0 - 4.0 g/dL    A-G Ratio 0.9 0.8 - 1.7      Bilirubin, total 0.3 0.2 - 1.0 MG/DL    Bilirubin, direct <0.1 0.0 - 0.2 MG/DL    Alk.  phosphatase 137 (H) 45 - 117 U/L    AST (SGOT) 20 15 - 37 U/L    ALT (SGPT) 23 13 - 56 U/L   POC CHEM8    Collection Time: 18 11:21 AM   Result Value Ref Range    CO2, POC 21 19 - 24 MMOL/L    Glucose,  (H) 74 - 106 MG/DL    BUN, POC 33 (H) 7 - 18 MG/DL    Creatinine, POC 2.7 (H) 0.6 - 1.3 MG/DL    GFRAA, POC 22 (L) >60 ml/min/1.73m2    GFRNA, POC 18 (L) >60 ml/min/1.73m2    Sodium,  136 - 145 MMOL/L    Potassium, POC 4.3 3.5 - 5.5 MMOL/L    Calcium, ionized (POC) 1.20 1. 12 - 1.32 mmol/L    Chloride,  100 - 108 MMOL/L    Anion gap, POC 17 10 - 20      Hematocrit, POC 30 (L) 36 - 49 %    Hemoglobin, POC 10.2 (L) 12 - 16 G/DL       Procrit held per parameters    Opdivo 240 mg was initiated @ 200 ml/hr over 30 minutes. 5 minutes into infusion patient stable. VS remained stable and pt denied complaints of itching, lip/tongue/facial swelling, SOB, CP or other complaints. Ms. Linda Yates tolerated the infusion, and had no complaints. Mediport flushed with NS 20 ml and heparinized per protocol. No bleeding or hematoma noted at site. Band aid applied. Patient Vitals for the past 12 hrs:   Temp Pulse Resp BP SpO2   08/14/18 1300 98.4 °F (36.9 °C) (!) 58 18 - 98 %   08/14/18 1105 98.5 °F (36.9 °C) 66 18 143/83 100 %       Reviewed discharge instructions with patient regarding side efffects , symptom management and self care. She verbalized understanding. Patient armband removed and shredded      . Ms. Polk was discharged from [place] [address] in stable condition at 1305. She is to return on 8/28/18 at 1000 for her next [facility] appointment.     Varsha Pinto RN  August 14, 2018

## 2018-08-20 NOTE — PROGRESS NOTES
Hematology/Oncology  Progress Note    Name: Ramandeep Suarez  Date: 2018  : 1955    PCP: Demetri Michelle NP     Ms. Adrian Abbott is a 57 y.o. -American woman with metastatic colorectal carcinoma/carcinomatosis  Current therapy: Opdivo      Subjective:     Ms. 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 She was started on FOLFIRI regimen and she is here today for follow up. She states that she has no pain, her appetite has significantly increased and her energy level is up. She denies diarrhea, nausea, or vomiting. Her only complaint is occasional abdominal cramping during chemo. She denies constipation. Otherwise there are no additional complaints to report. She has recently been found to have hydronephrosis and on the advice of the oncologic surgeon was referred to a urologist for an assessment. The patient has been evaluated and treated by the urologist.  Today she reports that she is continuing to do well. Unfortunately, I explained to the patient that her tumor marker is increasing. She had a recent MRI on 2018 she was slowly progressive disease in her liver with stable peritoneal implants. The current immunotherapy will be continued. Past medical history, family history, and social history: these were reviewed and remains unchanged. Past Medical History:   Diagnosis Date    Asthma     Cancer ([address] 10/2016    colon    Diabetes ([address]     only on chemo days    Gout     Heart disease     Hypertension     Kidney disease     Maintenance chemotherapy     Neuropathic pain of foot      Past Surgical History:   Procedure Laterality Date    ABDOMEN SURGERY PROC UNLISTED  2015    partial colectomy with colostomy    HX BACK SURGERY      HX COLONOSCOPY      HX HYSTERECTOMY      HX UROLOGICAL  2018    Uereteroscopy.  stent    HX VASCULAR ACCESS      mediport- right chest wall     Social History     Social History    Marital status:      Spouse name: N/A    Number of children: N/A    Years of education: N/A     Occupational History    Not on file. Social History Main Topics    Smoking status: Never Smoker    Smokeless tobacco: Never Used    Alcohol use No    Drug use: No    Sexual activity: Not on file     Other Topics Concern    Not on file     Social History Narrative    ** Merged History Encounter **          Family History   Problem Relation Age of Onset    Family history unknown: Yes     Current Outpatient Prescriptions   Medication Sig Dispense Refill    0.9% sodium chloride solp 100 mL with nivolumab 240 mg/24 mL soln by IntraVENous route once.  nitrofurantoin, macrocrystal-monohydrate, (MACROBID) 100 mg capsule Take 1 Cap by mouth two (2) times a day. 20 Cap 0    HYDROmorphone (DILAUDID) 2 mg tablet Take 1 Tab by mouth every four (4) hours as needed for Pain. Max Daily Amount: 12 mg. 12 Tab 0    magic mouthwash solution Magic mouth wash   Maalox  Lidocaine 2% viscous   Diphenhydramine oral solution     Pharmacy to mix equal portions of ingredients to a total volume as indicated in the dispense amount. Indications: please swish and spit 4 times a day and as needed, 600 mL 1    lidocaine-prilocaine (EMLA) topical cream Apply  to affected area as needed for Pain. 30 g 1    acyclovir (ZOVIRAX) 800 mg tablet Take 800 mg by mouth five (5) times daily.  nitrofurantoin, macrocrystal-monohydrate, (MACROBID) 100 mg capsule Take 1 Cap by mouth two (2) times a day. 14 Cap 0    tamsulosin (FLOMAX) 0.4 mg capsule Take 1 Cap by mouth daily (after dinner). 90 Cap 3    warfarin (COUMADIN) 3 mg tablet Take 3 mg by mouth daily. Takes 1 and 1/2 tab Sunday, Tuesday, and Thursday. Takes 1 tablet Monday, Wednesday, and Friday      amLODIPine (NORVASC) 5 mg tablet Take 5 mg by mouth daily.  cloNIDine HCl (CATAPRES) 0.1 mg tablet Take  by mouth two (2) times a day.  insulin aspart (NOVOLOG) 100 unit/mL inpn by SubCUTAneous route.  omeprazole (PRILOSEC) 20 mg capsule Take 20 mg by mouth daily.  traMADol (ULTRAM) 50 mg tablet Take 50 mg by mouth every six (6) hours as needed for Pain.  gabapentin (NEURONTIN) 100 mg capsule Take 100 mg by mouth two (2) times a day.  predniSONE (DELTASONE) 20 mg tablet Take 2 Tabs by mouth daily. Take 2 tabs on days 2 and 3 of each chemo cycle 4 Tab 2    lidocaine-prilocaine (EMLA) topical cream Place cream over mediport 1 hour prior to chemotherapy and then place saran wrap over area. Every chemo treatment. 30 g 1    ondansetron hcl (ZOFRAN, AS HYDROCHLORIDE,) 8 mg tablet Take one tablet by mouth every 8 hours for nausea beginning the night of chemo for 3 days. 9 Tab 3    diphenhydrAMINE (BENADRYL) 25 mg capsule Take 1 with compazine every 6 hours for nausea for 3 days then, as needed. 60 Cap 3    prochlorperazine (COMPAZINE) 10 mg tablet Take 1 tablet every 6 hours with Benadryl 25mg for nausea for 3 days then, as needed. 60 Tab 3    albuterol (PROVENTIL HFA, VENTOLIN HFA, PROAIR HFA) 90 mcg/actuation inhaler 2 Puffs.  ergocalciferol (ERGOCALCIFEROL) 50,000 unit capsule 50,000 Units every seven (7) days.  furosemide (LASIX) 20 mg tablet Take 1/2-1 tablet daily if needed for swelling.  allopurinol (ZYLOPRIM) 300 mg tablet 300 mg daily.  desonide (TRIDESILON) 0.05 % cream Use 1 Application to affected area Twice Daily.  bisoprolol-hydrochlorothiazide (ZIAC) 10-6.25 mg per tablet Take 1 Tab by Mouth Once a Day.  glipiZIDE (GLUCOTROL) 10 mg tablet 10 mg.      glimepiride (AMARYL) 4 mg tablet Take one in the am.  New dose. Review of Systems  Constitutional: The patient denies acute distress or discomfort.   HEENT: The patient denies recent head trauma, eye pain, blurred vision,  hearing deficit, oropharyngeal mucosal pain or lesions, and the patient denies throat pain or discomfort. Lymphatics: The patient denies palpable peripheral lymphadenopathy. Hematologic: The patient denies having bruising, bleeding, or progressive fatigue. Respiratory: Patient denies having shortness of breath, cough, sputum production, fever, or dyspnea on exertion. Cardiovascular: The patient denies having leg pain, leg swelling, heart palpitations, chest permit, chest pain, or lightheadedness. The patient denies having dyspnea on exertion. Gastrointestinal: The patient reports occasional nausea from chemotherapy which is well controlled with antinausea medication. She denies having any emesis or diarrhea. Genitourinary: Patient denies having urinary urgency, frequency, or dysuria. The patient denies having blood in the urine. Psychological: The patient denies having symptoms of nervousness, anxiety, depression, or thoughts of harming self. Skin: Patient denies having skin rashes, skin, ulcerations, or unexplained itching or pruritus. Musculoskeletal: The patient denies having pain in the joints or bones. Objective:     Visit Vitals    /63    Pulse 63    Temp 98.6 °F (37 °C) (Oral)    Resp 18    Wt 90.9 kg (200 lb 6.4 oz)    BMI 39.14 kg/m2     ECOG PS=0; Pain score=0/10    Physical Exam:   Gen. Appearance: The patient is in no acute distress. Skin: There is no bruise or rash. HEENT: The exam is unremarkable. Neck: Supple without lymphadenopathy or thyromegaly. Lungs: Clear to auscultation and percussion; there are no wheezes or rhonchi. Heart: Regular rate and rhythm; there are no murmurs, gallops, or rubs. Abdomen: Bowel sounds are present and normal.  There is no guarding, tenderness, or hepatosplenomegaly. The patient has a colostomy bag in place. Extremities: There is no clubbing, cyanosis, or edema. Neurologic: There are no focal neurologic deficits. Lymphatics: There is no palpable peripheral lymphadenopathy.  Musculoskeletal: The patient has full range of motion at all joints. There is no evidence of joint deformity or effusions. There is no focal joint tenderness. Psychological/psychiatric: There is no clinical evidence of anxiety, depression, or melancholy. Lab data:      Results for orders placed or performed during the hospital encounter of 08/20/18   CBC WITH 3 PART DIFF     Status: Abnormal   Result Value Ref Range Status    WBC 5.6 4.5 - 13.0 K/uL Final    RBC 3.37 (L) 4.10 - 5.10 M/uL Final    HGB 9.8 (L) 12.0 - 16 g/dL Final    HCT 31.6 (L) 36 - 48 % Final    MCV 93.8 78 - 102 FL Final    MCH 29.1 25.0 - 35.0 PG Final    MCHC 31.0 31 - 37 g/dL Final    RDW 17.6 (H) 11.5 - 14.5 % Final    PLATELET 923 652 - 465 K/uL Final    NEUTROPHILS 61 40 - 70 % Final    MIXED CELLS 19 (H) 0.1 - 17 % Final    LYMPHOCYTES 20 14 - 44 % Final    ABS. NEUTROPHILS 3.4 1.8 - 9.5 K/UL Final    ABS. MIXED CELLS 1.1 0.0 - 2.3 K/uL Final    ABS. LYMPHOCYTES 1.1 1.1 - 5.9 K/UL Final     Comment: Test performed at [place] [address] Location. Results Reviewed by Medical Director. DF AUTOMATED   Final           Assessment:     1. Colon cancer metastasized to multiple sites ([facility]    2. Peritoneal carcinomatosis ([facility]    3. Colorectal carcinoma ([facility]    4. Hydronephrosis with ureteropelvic junction (UPJ) obstruction    5. Antineoplastic chemotherapy induced anemia    6. Chemotherapy induced neutropenia (HCC)    7. Anemia in stage 3 chronic kidney disease      Plan:   Colorectal carcinoma with abdominal carcinomatosis: The patient recently had an MRI of the abdomen on 7/6/2018 there was slight progression in the size of liver metastases. The peritoneal implants appeared stable. I have explained to the patient had a CEA level from 6/13/2018 had increased to 776.4 ng/mL. We are in the process of requesting authorization to provide her with immunotherapy as a next treatment modality. She is not taking the single agent  Opdivo at a dose of 240 mg every 2 weeks.   This treatment will be continued. Anemia associated with chronic kidney disease and chemotherapy-induced anemia (persistent problem): I have explained to the patient that her CBC on today showed a hemoglobin of 9.8 g/dL with hematocrit of 31.6 %. Procrit at a dose of 60,000 units will be provided whenever her hemoglobin is below 10 g/dL hematocrit is below 30% every 2 weeks. The iron profile and ferritin levels will be ordered at this time. Hydronephrosis (persistent problem): The patient is currently being seen by the urologist and is tentatively scheduled to have ureteral stents inserted in the upcoming week. Chemotherapy-induced leukopenia/neutropenia (improved problem): The CBC on today showed a WBC count of 5.6 with an absolute neutrophil count of 3.4. This will be monitored on a regular basis. Neulasta following each chemo cycle will continue. I will have the patient return to clinic for complete reassessment again in 4 weeks.     Orders Placed This Encounter    COMPLETE CBC & AUTO DIFF WBC    InHouse CBC ([place])     Standing Status:   Future     Number of Occurrences:   1     Standing Expiration Date:   8/27/2018    CEA     Standing Status:   Future     Standing Expiration Date:   2/15/4797    METABOLIC PANEL, COMPREHENSIVE     Standing Status:   Future     Standing Expiration Date:   8/21/2019    IRON PROFILE     Standing Status:   Future     Standing Expiration Date:   8/21/2019    FERRITIN     Standing Status:   Future     Standing Expiration Date:   8/21/2019       Jim Bell MD  8/20/2018

## 2018-08-20 NOTE — PATIENT INSTRUCTIONS
Anemia From Chronic Disease: Care Instructions  Your Care Instructions    Anemia is a low level of red blood cells. Red blood cells carry oxygen from your lungs to the rest of your body. Sometimes when you have a long-term (chronic) disease, such as kidney disease, arthritis, diabetes, cancer, or an infection, your body does not make enough red blood cells. Follow-up care is a key part of your treatment and safety. Be sure to make and go to all appointments, and call your doctor if you are having problems. It's also a good idea to know your test results and keep a list of the medicines you take. How can you care for yourself at home? · Follow your doctor's instructions to treat the chronic condition that is causing the anemia. · Be safe with medicines. Take your medicine to treat your chronic condition exactly as prescribed. Call your doctor if you think you are having a problem with your medicine. · Take your medicine for anemia exactly as prescribed. Call your doctor if you think you are having a problem with your medicine. Medicines to increase the number of red blood cells (such as epoetin or darbepoetin) may be given as an injection. ¨ If you miss a dose, take it as soon as you can, unless it is almost time for your next dose. In that case, get back on your regular schedule and take only one dose. ¨ Do not freeze this medicine. Store it in the refrigerator. Do not shake the bottle before you prepare the shot. · Keep all your appointments for blood tests to check on your hemoglobin levels. When should you call for help? Call 911 anytime you think you may need emergency care.  For example, call if:    · You passed out (lost consciousness).    Call your doctor now or seek immediate medical care if:    · You are short of breath.     · You are dizzy or light-headed, or you feel like you may faint.     · You have new or worse bleeding.    Watch closely for changes in your health, and be sure to contact your doctor if:    · You feel weaker or more tired than usual.     · You do not get better as expected. Where can you learn more? Go to http://ledy-toño.info/. Enter E502 in the search box to learn more about \"Anemia From Chronic Disease: Care Instructions. \"  Current as of: October 9, 2017  Content Version: 11.7  © 2826-6414 [employer]. Care instructions adapted under license by [employer] (which disclaims liability or warranty for this information). If you have questions about a medical condition or this instruction, always ask your healthcare professional. [place] [address] any warranty or liability for your use of this information. Colon Cancer: Care Instructions  Your Care Instructions    Colon cancer occurs when abnormal cells grow out of control in the lower part of your intestine (your colon). If the tumor was small and had not spread, your doctor may have removed it during the colonoscopy. But you may need surgery to remove the cancer if the tumor was too big or had spread too far to be removed during a colonoscopy. If cancer has spread to another part of your body, such as the liver, you may need more far-reaching surgery. Treatment for colon cancer may also include radiation therapy and medicines that destroy cancer cells (chemotherapy). Being treated for cancer can weaken your body, and you may feel very tired. Get the rest your body needs so that you can feel better. When you find out that you have cancer, you may feel many emotions and may need some help coping. Seek out family, friends, and counselors for support. You also can do things at home to make yourself feel better while you go through treatment. Call the [facility] [place] ([phone]) or visit its website at 9162 VUEL[address]IC. [employer] for more information. Follow-up care is a key part of your treatment and safety.  Be sure to make and go to all appointments, and call your doctor if you are having problems. It's also a good idea to know your test results and keep a list of the medicines you take. How can you care for yourself at home? · Take your medicines exactly as prescribed. Call your doctor if you think you are having a problem with your medicine. You may get medicine for nausea and vomiting if you have these side effects. · Follow your doctor's instructions to relieve pain. Pain from cancer and surgery can almost always be controlled. Use pain medicine when you first notice pain, before it becomes severe. · Eat healthy food. If you do not feel like eating, try to eat food that has protein and extra calories to keep up your strength and prevent weight loss. Drink liquid meal replacements for extra calories and protein. Try to eat your main meal early. · Get some physical activity every day, but do not get too tired. Keep doing the hobbies you enjoy as your energy allows. · Take steps to control your stress and workload. Learn relaxation techniques. ¨ Share your feelings. Stress and tension affect our emotions. By expressing your feelings to others, you may be able to understand and cope with them. ¨ Consider joining a support group. Talking about a problem with your spouse, a good friend, or other people with similar problems is a good way to reduce tension and stress. ¨ Express yourself through art. Try writing, dance, art, or crafts to relieve stress. Some dance, writing, or art groups may be available just for people who have cancer. ¨ Be kind to your body and mind. Getting enough sleep, eating a healthy diet, and taking time to do things you enjoy can contribute to an overall feeling of balance in your life and help reduce stress. ¨ Get help if you need it. Discuss your concerns with your doctor or counselor.   · If you are vomiting or have diarrhea:  ¨ Drink plenty of fluids (enough so that your urine is light yellow or clear like water) to prevent dehydration. Choose water and other caffeine-free clear liquids. If you have kidney, heart, or liver disease and have to limit fluids, talk with your doctor before you increase the amount of fluids you drink. ¨ When you are able to eat, try clear soups, mild foods, and liquids until all symptoms are gone for 12 to 48 hours. Other good choices include dry toast, crackers, cooked cereal, and gelatin dessert, such as Jell-O.  · If you have not already done so, prepare a list of advance directives. Advance directives are instructions to your doctor and family members about what kind of care you want if you become unable to speak or express yourself. When should you call for help? Call 911 anytime you think you may need emergency care. For example, call if:    · You pass maroon or very bloody stools.     · You passed out (lost consciousness).    Call your doctor now or seek immediate medical care if:    · You have a fever.     · You are vomiting.     · You have new or worse belly pain.     · You cannot pass stools or gas.     · You have new or more blood in your stool.    Watch closely for changes in your health, and be sure to contact your doctor if:    · You are losing weight.     · You have new or worse symptoms.     · You do not get better as expected. Where can you learn more? Go to http://ledy-toño.info/. Enter W984 in the search box to learn more about \"Colon Cancer: Care Instructions. \"  Current as of: May 12, 2017  Content Version: 11.7  © 9841-7247 [employer]. Care instructions adapted under license by [employer] (which disclaims liability or warranty for this information). If you have questions about a medical condition or this instruction, always ask your healthcare professional. [address] any warranty or liability for your use of this information.

## 2018-08-20 NOTE — MR AVS SNAPSHOT
[facility] [facility] 
[phone] Patient: Trent Wilson MRN: [id number] MYM:[birth year] Visit Information Date & Time Provider Department Dept. Phone Encounter #  
 8/20/2018 11:15 AM Cedric Mac MD [facility] Oncology [phone] [id number] Follow-up Instructions Return in about 4 weeks (around 9/17/2018). Your Appointments 9/17/2018 10:30 AM  
Office Visit with Cedric Mac MD  
[facility]  Oncology [address]) Appt Note: 4 WK RET  
 [address] [place], [place] [facility] 250 [facility] 207, 2657 [facility] [address] [address] [address]  
  
    
  
 11/9/2018 10:45 AM  
Any with Saravanan Howard MD  
[facility] ([address]) Appt Note: 4m fu- post op  
 [address] [facility] [address] [address] [address] 1097 [facility]  
  
   
 2057 [facility] [address] [place] [address] 100 200 [facility] Upcoming Health Maintenance Date Due Hepatitis C Screening 1955 PAP AKA CERVICAL CYTOLOGY 8/26/1974 BREAST CANCER SCRN MAMMOGRAM 8/26/2003 FOBT Q 1 YEAR AGE 50-75 8/26/2003 ZOSTER VACCINE AGE 60> 6/26/2013 MEDICARE YEARLY EXAM 3/27/2018 Influenza Age 5 to Adult 8/1/2018 Bone Densitometry (Dexa) Screening 8/26/2018 Pneumococcal 19-64 Highest Risk (3 of 3 - PCV13) 12/11/2018 DTaP/Tdap/Td series (2 - Td) 3/15/2026 Allergies as of 8/20/2018  Review Complete On: 8/20/2018 By: Cedric Mac MD  
  
 Severity Noted Reaction Type Reactions Latex  08/04/2016    Other (comments) Latex  02/12/2018    Rash Lisinopril High 04/25/2017    Hives Asa-acetaminophen-caff-buffers  08/04/2016    Other (comments) Aspirin  02/12/2018    Hives Codeine  08/04/2016    Other (comments) Codeine  02/12/2018    Hives Lisinopril  02/12/2018    Hives Pcn [Penicillins]  08/04/2016    Other (comments) Penicillin G  02/12/2018    Hives Sulfa (Sulfonamide Antibiotics)  08/04/2016    Other (comments) Current Immunizations  Reviewed on 8/14/2018 Name Date Influenza Vaccine 3/15/2016 12:00 AM, 3/1/2016 Pneumococcal Polysaccharide (PPSV-23) 12/11/2017 12:00 AM  
 Pneumococcal Vaccine (Unspecified Type) 3/1/2016 Tdap 3/15/2016 12:00 AM  
  
 Not reviewed this visit You Were Diagnosed With   
  
 Codes Comments Colon cancer metastasized to multiple sites [facility])    -  Primary ICD-10-CM: C18.9 ICD-9-CM: 153.9 Peritoneal carcinomatosis ([facility]     ICD-10-CM: C78.6, C80.1 ICD-9-CM: 197.6, 199.1 Colorectal carcinoma ([facility]     ICD-10-CM: C19 
ICD-9-CM: 154.0 Hydronephrosis with ureteropelvic junction (UPJ) obstruction     ICD-10-CM: Q62.11 ICD-9-CM: 089 Antineoplastic chemotherapy induced anemia     ICD-10-CM: D64.81, T45.1X5A 
ICD-9-CM: 285.3, E933.1 Chemotherapy induced neutropenia (HCC)     ICD-10-CM: D70.1, T45.1X5A 
ICD-9-CM: 288.03, E933.1 Anemia in stage 3 chronic kidney disease     ICD-10-CM: N18.3, D63.1 ICD-9-CM: 285.21, 585.3 Vitals BP Pulse Temp Resp Weight(growth percentile) BMI  
 139/63 63 98.6 °F (37 °C) (Oral) 18 200 lb 6.4 oz (90.9 kg) 39.14 kg/m2 OB Status Smoking Status Hysterectomy Never Smoker BMI and BSA Data Body Mass Index Body Surface Area  
 39.14 kg/m 2 1.96 m 2 Preferred Pharmacy Pharmacy Name Phone [address], [address] [facility] Your Updated Medication List  
  
   
This list is accurate as of 8/20/18 12:07 PM.  Always use your most recent med list.  
  
  
  
  
 0.9% sodium chloride solp 100 mL with nivolumab 240 mg/24 mL soln  
by IntraVENous route once. acyclovir 800 mg tablet Commonly known as:  ZOVIRAX Take 800 mg by mouth five (5) times daily. albuterol 90 mcg/actuation inhaler Commonly known as:  PROVENTIL HFA, VENTOLIN HFA, PROAIR HFA  
2 Puffs. allopurinol 300 mg tablet Commonly known as:  ZYLOPRIM  
300 mg daily. amLODIPine 5 mg tablet Commonly known as:  Gaurav Mcmahan Take 5 mg by mouth daily. bisoprolol-hydroCHLOROthiazide 10-6.25 mg per tablet Commonly known as:  On license of [facility] Take 1 Tab by Mouth Once a Day. cloNIDine HCl 0.1 mg tablet Commonly known as:  CATAPRES Take  by mouth two (2) times a day. desonide 0.05 % cream  
Commonly known as:  Daphane Nohemi Use 1 Application to affected area Twice Daily. diphenhydrAMINE 25 mg capsule Commonly known as:  BENADRYL Take 1 with compazine every 6 hours for nausea for 3 days then, as needed. ergocalciferol 50,000 unit capsule Commonly known as:  ERGOCALCIFEROL  
50,000 Units every seven (7) days. furosemide 20 mg tablet Commonly known as:  LASIX Take 1/2-1 tablet daily if needed for swelling. glimepiride 4 mg tablet Commonly known as:  AMARYL Take one in the am.  New dose. glipiZIDE 10 mg tablet Commonly known as:  Myrla Nordmann 10 mg. HYDROmorphone 2 mg tablet Commonly known as:  DILAUDID Take 1 Tab by mouth every four (4) hours as needed for Pain. Max Daily Amount: 12 mg.  
  
 insulin aspart U-100 100 unit/mL Inpn Commonly known as:  NOVOLOG  
by SubCUTAneous route. * lidocaine-prilocaine topical cream  
Commonly known as:  EMLA Place cream over mediport 1 hour prior to chemotherapy and then place saran wrap over area. Every chemo treatment. * lidocaine-prilocaine topical cream  
Commonly known as:  EMLA Apply  to affected area as needed for Pain.  
  
 magic mouthwash solution Magic mouth wash  Maalox Lidocaine 2% viscous  Diphenhydramine oral solution   Pharmacy to mix equal portions of ingredients to a total volume as indicated in the dispense amount. Indications: please swish and spit 4 times a day and as needed, NEURONTIN 100 mg capsule Generic drug:  gabapentin Take 100 mg by mouth two (2) times a day. * nitrofurantoin (macrocrystal-monohydrate) 100 mg capsule Commonly known as:  MACROBID Take 1 Cap by mouth two (2) times a day. * nitrofurantoin (macrocrystal-monohydrate) 100 mg capsule Commonly known as:  MACROBID Take 1 Cap by mouth two (2) times a day. omeprazole 20 mg capsule Commonly known as:  PRILOSEC Take 20 mg by mouth daily. ondansetron hcl 8 mg tablet Commonly known as:  Topete Geary Take one tablet by mouth every 8 hours for nausea beginning the night of chemo for 3 days. predniSONE 20 mg tablet Commonly known as:  Andres Gun Take 2 Tabs by mouth daily. Take 2 tabs on days 2 and 3 of each chemo cycle  
  
 prochlorperazine 10 mg tablet Commonly known as:  COMPAZINE Take 1 tablet every 6 hours with Benadryl 25mg for nausea for 3 days then, as needed. tamsulosin 0.4 mg capsule Commonly known as:  FLOMAX Take 1 Cap by mouth daily (after dinner). traMADol 50 mg tablet Commonly known as:  ULTRAM  
Take 50 mg by mouth every six (6) hours as needed for Pain.  
  
 warfarin 3 mg tablet Commonly known as:  COUMADIN Take 3 mg by mouth daily. Takes 1 and 1/2 tab Sunday, Tuesday, and Thursday. Takes 1 tablet Monday, Wednesday, and Friday * Notice: This list has 4 medication(s) that are the same as other medications prescribed for you. Read the directions carefully, and ask your doctor or other care provider to review them with you. We Performed the Following COMPLETE CBC & AUTO DIFF WBC [39318 CPT(R)] Follow-up Instructions Return in about 4 weeks (around 9/17/2018). To-Do List   
 08/20/2018   Lab:  CBC WITH 3 PART DIFF   
  
 08/28/2018 10:00 AM  
 Appointment with [facility] NURSE 2 at [facility] ([phone]) Patient Instructions Anemia From Chronic Disease: Care Instructions Your Care Instructions Anemia is a low level of red blood cells. Red blood cells carry oxygen from your lungs to the rest of your body. Sometimes when you have a long-term (chronic) disease, such as kidney disease, arthritis, diabetes, cancer, or an infection, your body does not make enough red blood cells. Follow-up care is a key part of your treatment and safety. Be sure to make and go to all appointments, and call your doctor if you are having problems. It's also a good idea to know your test results and keep a list of the medicines you take. How can you care for yourself at home? · Follow your doctor's instructions to treat the chronic condition that is causing the anemia. · Be safe with medicines. Take your medicine to treat your chronic condition exactly as prescribed. Call your doctor if you think you are having a problem with your medicine. · Take your medicine for anemia exactly as prescribed. Call your doctor if you think you are having a problem with your medicine. Medicines to increase the number of red blood cells (such as epoetin or darbepoetin) may be given as an injection. ¨ If you miss a dose, take it as soon as you can, unless it is almost time for your next dose. In that case, get back on your regular schedule and take only one dose. ¨ Do not freeze this medicine. Store it in the refrigerator. Do not shake the bottle before you prepare the shot. · Keep all your appointments for blood tests to check on your hemoglobin levels. When should you call for help? Call 911 anytime you think you may need emergency care. For example, call if: 
  · You passed out (lost consciousness).  
 Call your doctor now or seek immediate medical care if: 
  · You are short of breath.   · You are dizzy or light-headed, or you feel like you may faint.  
  · You have new or worse bleeding.  
 Watch closely for changes in your health, and be sure to contact your doctor if: 
  · You feel weaker or more tired than usual.  
  · You do not get better as expected. Where can you learn more? Go to http://ledy-toño.info/. Enter E502 in the search box to learn more about \"Anemia From Chronic Disease: Care Instructions. \" Current as of: October 9, 2017 Content Version: 11.7 © 2642-7311 [employer]. Care instructions adapted under license by [employer] (which disclaims liability or warranty for this information). If you have questions about a medical condition or this instruction, always ask your healthcare professional. [place] [address] any warranty or liability for your use of this information. Colon Cancer: Care Instructions Your Care Instructions Colon cancer occurs when abnormal cells grow out of control in the lower part of your intestine (your colon). If the tumor was small and had not spread, your doctor may have removed it during the colonoscopy. But you may need surgery to remove the cancer if the tumor was too big or had spread too far to be removed during a colonoscopy. If cancer has spread to another part of your body, such as the liver, you may need more far-reaching surgery. Treatment for colon cancer may also include radiation therapy and medicines that destroy cancer cells (chemotherapy). Being treated for cancer can weaken your body, and you may feel very tired. Get the rest your body needs so that you can feel better. When you find out that you have cancer, you may feel many emotions and may need some help coping. Seek out family, friends, and counselors for support. You also can do things at home to make yourself feel better while you go through treatment.  Call the [address] ([phone]) or visit its website at [address]. [employer] for more information. Follow-up care is a key part of your treatment and safety. Be sure to make and go to all appointments, and call your doctor if you are having problems. It's also a good idea to know your test results and keep a list of the medicines you take. How can you care for yourself at home? · Take your medicines exactly as prescribed. Call your doctor if you think you are having a problem with your medicine. You may get medicine for nausea and vomiting if you have these side effects. · Follow your doctor's instructions to relieve pain. Pain from cancer and surgery can almost always be controlled. Use pain medicine when you first notice pain, before it becomes severe. · Eat healthy food. If you do not feel like eating, try to eat food that has protein and extra calories to keep up your strength and prevent weight loss. Drink liquid meal replacements for extra calories and protein. Try to eat your main meal early. · Get some physical activity every day, but do not get too tired. Keep doing the hobbies you enjoy as your energy allows. · Take steps to control your stress and workload. Learn relaxation techniques. ¨ Share your feelings. Stress and tension affect our emotions. By expressing your feelings to others, you may be able to understand and cope with them. ¨ Consider joining a support group. Talking about a problem with your spouse, a good friend, or other people with similar problems is a good way to reduce tension and stress. ¨ Express yourself through art. Try writing, dance, art, or crafts to relieve stress. Some dance, writing, or art groups may be available just for people who have cancer. ¨ Be kind to your body and mind. Getting enough sleep, eating a healthy diet, and taking time to do things you enjoy can contribute to an overall feeling of balance in your life and help reduce stress. ¨ Get help if you need it. Discuss your concerns with your doctor or counselor. · If you are vomiting or have diarrhea: ¨ Drink plenty of fluids (enough so that your urine is light yellow or clear like water) to prevent dehydration. Choose water and other caffeine-free clear liquids. If you have kidney, heart, or liver disease and have to limit fluids, talk with your doctor before you increase the amount of fluids you drink. ¨ When you are able to eat, try clear soups, mild foods, and liquids until all symptoms are gone for 12 to 48 hours. Other good choices include dry toast, crackers, cooked cereal, and gelatin dessert, such as Jell-O. · If you have not already done so, prepare a list of advance directives. Advance directives are instructions to your doctor and family members about what kind of care you want if you become unable to speak or express yourself. When should you call for help? Call 911 anytime you think you may need emergency care. For example, call if: 
  · You pass maroon or very bloody stools.  
  · You passed out (lost consciousness).  
 Call your doctor now or seek immediate medical care if: 
  · You have a fever.  
  · You are vomiting.  
  · You have new or worse belly pain.  
  · You cannot pass stools or gas.  
  · You have new or more blood in your stool.  
 Watch closely for changes in your health, and be sure to contact your doctor if: 
  · You are losing weight.  
  · You have new or worse symptoms.  
  · You do not get better as expected. Where can you learn more? Go to http://ledy-toño.info/. Enter Y721 in the search box to learn more about \"Colon Cancer: Care Instructions. \" Current as of: May 12, 2017 Content Version: 11.7 © 3966-1486 [employer]. Care instructions adapted under license by [employer] (which disclaims liability or warranty for this information).  If you have questions about a medical condition or this instruction, always ask your healthcare professional. [address] any warranty or liability for your use of this information. Introducing [facility]! Dear Porsche Kauffman: Thank you for requesting a Nirvanix account. Our records indicate that you already have an active Nirvanix account. You can access your account anytime at [email or web address] Did you know that you can access your hospital and ER discharge instructions at any time in Nirvanix? You can also review all of your test results from your hospital stay or ER visit. Additional Information If you have questions, please visit the Frequently Asked Questions section of the Nirvanix website at [email or web address] Remember, Nirvanix is NOT to be used for urgent needs. For medical emergencies, dial 911. Now available from your iPhone and Android! Please provide this summary of care documentation to your next provider. Your primary care clinician is listed as Carlos Tilley. If you have any questions after today's visit, please call [phone].

## 2018-08-30 NOTE — PROGRESS NOTES
[facility] Progress Note Date: 2018 Name: Steve Washburn MRN: [id number] : 1955 Opdivo Infusion C3 Patient arrived to hospitals at 1310 ambulatory. No concerns or complaints voiced Ms. Plok was assessed and education was provided. Opdivo checklist completed. Ms. Polk's vitals were reviewed and patient was observed for 5 minutes prior to treatment. Visit Vitals  /72 (BP 1 Location: Right arm, BP Patient Position: Sitting)  Pulse 83  Temp 98.6 °F (37 °C)  Resp 18  SpO2 99% Mediport accessed with 20g 1 inch orozco. Labs drawn from port for CBC, BMP, and Hep Function Mediport flushed easily and had brisk blood return. Recent Results (from the past 12 hour(s)) POC CHEM8 Collection Time: 18  1:44 PM  
Result Value Ref Range CO2, POC 22 19 - 24 MMOL/L Glucose,  (H) 74 - 106 MG/DL  
 BUN, POC 51 (H) 7 - 18 MG/DL Creatinine, POC 3.2 (H) 0.6 - 1.3 MG/DL  
 GFRAA, POC 18 (L) >60 ml/min/1.73m2 GFRNA, POC 15 (L) >60 ml/min/1.73m2 Sodium,  136 - 145 MMOL/L Potassium, POC 4.1 3.5 - 5.5 MMOL/L Calcium, ionized (POC) 1.27 1.12 - 1.32 mmol/L Chloride,  100 - 108 MMOL/L Anion gap, POC 15 10 - 20 Hematocrit, POC 29 (L) 36 - 49 % Hemoglobin, POC 9.9 (L) 12 - 16 G/DL Procrit held per parameters Opdivo 240 mg was infused over 30 minutes as ordered. VS remained stable and pt denied complaints of itching, lip/tongue/facial swelling, SOB, CP or other complaints. Ms. Robe Vega tolerated the infusion, and had no complaints. Mediport flushed with NS 20 ml and heparinized per protocol. No bleeding or hematoma noted at site. Band aid applied. Patient Vitals for the past 12 hrs: 
 Temp Pulse Resp BP SpO2  
18 1457 98.4 °F (36.9 °C) 61 18 127/81 99 % 18 1313 98.6 °F (37 °C) 83 18 115/72 99 % Reviewed discharge instructions with patient regarding side efffects , symptom management and self care. She verbalized understanding. Patient armband removed and shredded Marcelo Minaya Ms. Polk was discharged from [place] [address] in stable condition at 1500. She is to return on 9/13/18 at 1300 for her next [facility] appointment. Farhana Kasper RN August 30, 2018

## 2018-09-17 PROBLEM — G89.3 CANCER ASSOCIATED PAIN: Status: ACTIVE | Noted: 2018-01-01

## 2018-09-17 NOTE — PROGRESS NOTES
[facility] Progress Note Date: 2018 Name: Marquis Murphy MRN: [id number] : 1955 Opdivo Infusion/cbc procrit q 2 weeks Patient arrived to [facility] at 1105 am ambulatory. No concerns or complaints voiced Ms. Polk was assessed and education was provided. Opdivo checklist completed. Ms. Polk's vitals were reviewed and patient was observed for 5 minutes prior to treatment. Visit Vitals  /74 (BP 1 Location: Right arm, BP Patient Position: At rest)  Pulse (!) 56  Temp 97.6 °F (36.4 °C)  Resp 18  Ht 5' (1.524 m)  Wt 86.8 kg (191 lb 6.4 oz)  SpO2 100%  BMI 37.38 kg/m2 Mediport accessed with 20g 1 inch orozco. Labs drawn from port for CBC, BMP, and Hep Function. Additional blood work drawn for office visit after [facility] visit today Mediport flushed easily and had brisk blood return. Recent Results (from the past 12 hour(s)) CBC WITH 3 PART DIFF Collection Time: 18 12:45 PM  
Result Value Ref Range WBC 7.3 4.5 - 13.0 K/uL  
 RBC 3.42 (L) 4.10 - 5.10 M/uL HGB 9.8 (L) 12.0 - 16 g/dL HCT 30.9 (L) 36 - 48 % MCV 90.4 78 - 102 FL  
 MCH 28.7 25.0 - 35.0 PG  
 MCHC 31.7 31 - 37 g/dL  
 RDW 15.8 (H) 11.5 - 14.5 % PLATELET 984 207 - 410 K/uL NEUTROPHILS 74 (H) 40 - 70 % MIXED CELLS 8 0.1 - 17 % LYMPHOCYTES 18 14 - 44 % ABS. NEUTROPHILS 5.4 1.8 - 9.5 K/UL  
 ABS. MIXED CELLS 0.6 0.0 - 2.3 K/uL  
 ABS. LYMPHOCYTES 1.3 1.1 - 5.9 K/UL  
 DF AUTOMATED    
POC CHEM8 Collection Time: 18 12:50 PM  
Result Value Ref Range CO2, POC 19 19 - 24 MMOL/L Glucose,  (H) 74 - 106 MG/DL  
 BUN, POC 46 (H) 7 - 18 MG/DL Creatinine, POC 3.1 (H) 0.6 - 1.3 MG/DL  
 GFRAA, POC 18 (L) >60 ml/min/1.73m2 GFRNA, POC 15 (L) >60 ml/min/1.73m2 Sodium,  136 - 145 MMOL/L Potassium, POC 4.6 3.5 - 5.5 MMOL/L Calcium, ionized (POC) 1.29 1.12 - 1.32 mmol/L  Chloride,  100 - 108 MMOL/L  
 Anion gap, POC 17 10 - 20 Hematocrit, POC 30 (L) 36 - 49 % Hemoglobin, POC 10.2 (L) 12 - 16 G/DL Procrit held per parameters Opdivo 240 mg was initiated @ 200 ml/hr over 30 minutes. 5 minutes into infusion patient stable. VS remained stable and pt denied complaints of itching, lip/tongue/facial swelling, SOB, CP or other complaints. Ms. Lissa Rea tolerated the infusion, and had no complaints. Mediport flushed with NS 20 ml and heparinized per protocol. No bleeding or hematoma noted at site. Band aid applied. Patient Vitals for the past 12 hrs: 
 Temp Pulse Resp BP SpO2  
09/17/18 1405 97.6 °F (36.4 °C) (!) 56 18 128/74 100 % 09/17/18 1233 97.7 °F (36.5 °C) 60 18 136/66 100 % Reviewed discharge instructions with patient regarding side efffects , symptom management and self care. She verbalized understanding. Patient armband removed and shredded Elgin Planfrancia Ms. Polk was discharged from [place] [address] in stable condition at 1410. She is to return on 10/1/18 at 1 pm for her next Opdivo/procrit appointment. William Tejeda RN September 17, 2018

## 2018-09-17 NOTE — MR AVS SNAPSHOT
[address] 706 [facility] 
[phone] Patient: Anuradha Bolivar MRN: [id number] GSY:[id number]/[id number] Visit Information Date & Time Provider Department Dept. Phone Encounter #  
 9/17/2018  2:15 PM German Julien, [facility] [employer] [facility] [phone] [id number] Follow-up Instructions Return in about 4 weeks (around 10/15/2018). Your Appointments 10/15/2018 10:15 AM  
Office Visit with German Julien MD  
[facility] Appt Note: 4 WK RET  
 [address], [address] [facility] [place] [address]  
  
    
  
 11/9/2018 10:45 AM  
Any with Summer Posey MD  
[facility] Appt Note: 4m fu- post op  
 [address] [place] [address] [facility]  
  
   
 [address] [place] [address] [address] [place] [address],[address] 706 [facility] Upcoming Health Maintenance Date Due Hepatitis C Screening 1955 PAP AKA CERVICAL CYTOLOGY 8/26/1974 BREAST CANCER SCRN MAMMOGRAM 8/26/2003 FOBT Q 1 YEAR AGE 50-75 8/26/2003 ZOSTER VACCINE AGE 60> 6/26/2013 MEDICARE YEARLY EXAM 3/27/2018 Influenza Age 5 to Adult 8/1/2018 GLAUCOMA SCREENING Q2Y 8/26/2018 Bone Densitometry (Dexa) Screening 8/26/2018 Pneumococcal 65+ High/Highest Risk (2 of 2 - PPSV23) 12/11/2022 DTaP/Tdap/Td series (2 - Td) 3/15/2026 Allergies as of 9/17/2018  Review Complete On: 9/17/2018 By: German Julien MD  
  
 Severity Noted Reaction Type Reactions Latex  08/04/2016    Other (comments) Latex  02/12/2018    Rash Lisinopril High 04/25/2017    Hives Asa-acetaminophen-caff-buffers  08/04/2016    Other (comments) Aspirin  02/12/2018    Hives Codeine  08/04/2016    Other (comments) Codeine  02/12/2018    Hives Lisinopril  02/12/2018    Hives Pcn [Penicillins]  08/04/2016    Other (comments) Penicillin G  02/12/2018    Hives Sulfa (Sulfonamide Antibiotics)  08/04/2016    Other (comments) Current Immunizations  Reviewed on 9/17/2018 Name Date Influenza Vaccine 3/15/2016 12:00 AM, 3/1/2016 Pneumococcal Polysaccharide (PPSV-23) 12/11/2017 12:00 AM  
 Pneumococcal Vaccine (Unspecified Type) 3/1/2016 Tdap 3/15/2016 12:00 AM  
  
 Reviewed by Cliff Macias RN on 9/17/2018 at 12:55 PM  
You Were Diagnosed With   
  
 Codes Comments Peritoneal carcinomatosis ([facility] [address]    -  Primary ICD-10-CM: C78.6, C80.1 ICD-9-CM: 197.6, 199.1 Colon cancer metastasized to multiple sites [facility])     ICD-10-CM: C18.9 ICD-9-CM: 153.9 Antineoplastic chemotherapy induced anemia     ICD-10-CM: D64.81, T45.1X5A 
ICD-9-CM: 285.3, E933.1 Iron deficiency anemia due to chronic blood loss     ICD-10-CM: D50.0 ICD-9-CM: 280.0 Cancer associated pain     ICD-10-CM: G89.3 ICD-9-CM: 338. 3 Vitals BP Pulse Temp Resp Height(growth percentile) Weight(growth percentile) 136/66 60 97.7 °F (36.5 °C) (Oral) 18 5' (1.524 m) 191 lb (86.6 kg) SpO2 BMI OB Status Smoking Status 100% 37.3 kg/m2 Hysterectomy Never Smoker Vitals History BMI and BSA Data Body Mass Index Body Surface Area  
 37.3 kg/m 2 1.91 m 2 Preferred Pharmacy Pharmacy Name Phone [address] [facility], [address] [facility] Your Updated Medication List  
  
   
This list is accurate as of 9/17/18  3:17 PM.  Always use your most recent med list.  
  
  
  
  
 0.9% sodium chloride solp 100 mL with nivolumab 240 mg/24 mL soln  
by IntraVENous route once. acyclovir 800 mg tablet Commonly known as:  ZOVIRAX Take 800 mg by mouth five (5) times daily. albuterol 90 mcg/actuation inhaler Commonly known as:  PROVENTIL HFA, VENTOLIN HFA, PROAIR HFA  
2 Puffs. allopurinol 300 mg tablet Commonly known as:  ZYLOPRIM  
300 mg daily. amLODIPine 5 mg tablet Commonly known as:  Pia Rodrigez Take 5 mg by mouth daily. bisoprolol-hydroCHLOROthiazide 10-6.25 mg per tablet Commonly known as:  Pending sale to [facility] Take 1 Tab by Mouth Once a Day. cloNIDine HCl 0.1 mg tablet Commonly known as:  CATAPRES Take  by mouth two (2) times a day. desonide 0.05 % cream  
Commonly known as:  Walter Cordia Use 1 Application to affected area Twice Daily. diphenhydrAMINE 25 mg capsule Commonly known as:  BENADRYL Take 1 with compazine every 6 hours for nausea for 3 days then, as needed. ergocalciferol 50,000 unit capsule Commonly known as:  ERGOCALCIFEROL  
50,000 Units every seven (7) days. furosemide 20 mg tablet Commonly known as:  LASIX Take 1/2-1 tablet daily if needed for swelling. glimepiride 4 mg tablet Commonly known as:  AMARYL Take one in the am.  New dose. glipiZIDE 10 mg tablet Commonly known as:  Malina Kevin 10 mg. HYDROmorphone 2 mg tablet Commonly known as:  DILAUDID Take 1 Tab by mouth every four (4) hours as needed for Pain. Max Daily Amount: 12 mg.  
  
 insulin aspart U-100 100 unit/mL Inpn Commonly known as:  NOVOLOG  
by SubCUTAneous route. * lidocaine-prilocaine topical cream  
Commonly known as:  EMLA Place cream over mediport 1 hour prior to chemotherapy and then place saran wrap over area. Every chemo treatment. * lidocaine-prilocaine topical cream  
Commonly known as:  EMLA Apply  to affected area as needed for Pain.  
  
 magic mouthwash solution Magic mouth wash  Maalox Lidocaine 2% viscous  Diphenhydramine oral solution   Pharmacy to mix equal portions of ingredients to a total volume as indicated in the dispense amount.    Indications: please swish and spit 4 times a day and as needed,  
  
 NEURONTIN 100 mg capsule Generic drug:  gabapentin Take 100 mg by mouth two (2) times a day. * nitrofurantoin (macrocrystal-monohydrate) 100 mg capsule Commonly known as:  MACROBID Take 1 Cap by mouth two (2) times a day. * nitrofurantoin (macrocrystal-monohydrate) 100 mg capsule Commonly known as:  MACROBID Take 1 Cap by mouth two (2) times a day. omeprazole 20 mg capsule Commonly known as:  PRILOSEC Take 20 mg by mouth daily. ondansetron hcl 8 mg tablet Commonly known as:  Yessica Actis Take one tablet by mouth every 8 hours for nausea beginning the night of chemo for 3 days. oxyCODONE IR 15 mg immediate release tablet Commonly known as:  OXY-IR Take 1 Tab by mouth every four (4) hours as needed for Pain. Max Daily Amount: 90 mg. Indications: Pain, Cancer associated pain  
  
 predniSONE 20 mg tablet Commonly known as:  Mckayla Brink Take 2 Tabs by mouth daily. Take 2 tabs on days 2 and 3 of each chemo cycle  
  
 prochlorperazine 10 mg tablet Commonly known as:  COMPAZINE Take 1 tablet every 6 hours with Benadryl 25mg for nausea for 3 days then, as needed. tamsulosin 0.4 mg capsule Commonly known as:  FLOMAX Take 1 Cap by mouth daily (after dinner). traMADol 50 mg tablet Commonly known as:  ULTRAM  
Take 50 mg by mouth every six (6) hours as needed for Pain.  
  
 warfarin 3 mg tablet Commonly known as:  COUMADIN Take 3 mg by mouth daily. Takes 1 and 1/2 tab Sunday, Tuesday, and Thursday. Takes 1 tablet Monday, Wednesday, and Friday * Notice: This list has 4 medication(s) that are the same as other medications prescribed for you. Read the directions carefully, and ask your doctor or other care provider to review them with you. Prescriptions Printed Refills  
 oxyCODONE IR (OXY-IR) 15 mg immediate release tablet 0 Sig: Take 1 Tab by mouth every four (4) hours as needed for Pain.  Max Daily Amount: 90 mg. Indications: Pain, Cancer associated pain  
 Class: Print Route: Oral  
  
Follow-up Instructions Return in about 4 weeks (around 10/15/2018). To-Do List   
 10/01/2018 1:00 PM  
  Appointment with [facility] NURSE 1 at [facility] ([phone]) 10/15/2018 11:00 AM  
  Appointment with [facility] INF[facility]ION NURSE 3 at [facility] ([phone]) [facility] SERVICES! Dear Jb Garcia: Thank you for requesting a Presentation[employer] account. Our records indicate that you already have an active Presentation[employer] account. You can access your account anytime at [email or web address] Did you know that you can access your hospital and ER discharge instructions at any time in Presentation[employer]? You can also review all of your test results from your hospital stay or ER visit. Additional Information If you have questions, please visit the Frequently Asked Questions section of the PresentationTube website at [email or web address] Remember, PresentationTube is NOT to be used for urgent needs. For medical emergencies, dial 911. Now available from your iPhone and Android! Please provide this summary of care documentation to your next provider. Your primary care clinician is listed as Pepe Aaron. If you have any questions after today's visit, please call [phone].

## 2018-10-01 NOTE — PROGRESS NOTES
[facility] Progress Note Date: 2018 Name: Marquis Murphy MRN: [id number] : 1955 Opdivo Infusion C5 Patient arrived to [facility] at 1320 ambulatory. Patient complaining of vomiting and nausea. She stated she can't hold anything down. She also has had decreased activity in her ostomy bag. NP Juan Kapadia notified of patients concerns. Order PO Zofran 8mg today and is calling a prescription for at home zofran into her pharmacy. Her compazine only helps a little. Ms. Juan Moody was assessed and education was provided. Opdivo checklist completed. Ms. Polk's vitals were reviewed and patient was observed for 5 minutes prior to treatment. Visit Vitals  /82 (BP 1 Location: Left arm, BP Patient Position: At rest;Sitting)  Pulse 85  Temp 97.9 °F (36.6 °C)  Resp 18  Ht 5' (1.524 m)  Wt 83.9 kg (185 lb)  SpO2 100%  Breastfeeding No  
 BMI 36.13 kg/m2 Mediport accessed with 20g 1 inch orozco. Labs drawn from port for CBC, BMP, TSH, and Hep Function Mediport flushed easily and had brisk blood return. Recent Results (from the past 12 hour(s)) CBC WITH 3 PART DIFF Collection Time: 10/01/18  1:35 PM  
Result Value Ref Range WBC 4.5 4.5 - 13.0 K/uL  
 RBC 3.24 (L) 4.10 - 5.10 M/uL HGB 9.2 (L) 12.0 - 16 g/dL HCT 29.3 (L) 36 - 48 % MCV 90.4 78 - 102 FL  
 MCH 28.4 25.0 - 35.0 PG  
 MCHC 31.4 31 - 37 g/dL  
 RDW 15.6 (H) 11.5 - 14.5 % PLATELET 004 643 - 320 K/uL NEUTROPHILS 64 40 - 70 % MIXED CELLS 9 0.1 - 17 % LYMPHOCYTES 27 14 - 44 % ABS. NEUTROPHILS 2.9 1.8 - 9.5 K/UL  
 ABS. MIXED CELLS 0.4 0.0 - 2.3 K/uL  
 ABS. LYMPHOCYTES 1.2 1.1 - 5.9 K/UL  
 DF AUTOMATED HEPATIC FUNCTION PANEL Collection Time: 10/01/18  1:35 PM  
Result Value Ref Range Protein, total 6.9 6.4 - 8.2 g/dL Albumin 3.1 (L) 3.4 - 5.0 g/dL Globulin 3.8 2.0 - 4.0 g/dL A-G Ratio 0.8 0.8 - 1.7 Bilirubin, total 0.3 0.2 - 1.0 MG/DL Bilirubin, direct <0.1 0.0 - 0.2 MG/DL Alk. phosphatase 100 45 - 117 U/L  
 AST (SGOT) 14 (L) 15 - 37 U/L  
 ALT (SGPT) 14 13 - 56 U/L  
TSH 3RD GENERATION Collection Time: 10/01/18  1:35 PM  
Result Value Ref Range TSH 1.49 0.36 - 3.74 uIU/mL  
POC CHEM8 Collection Time: 10/01/18  1:37 PM  
Result Value Ref Range CO2, POC 20 19 - 24 MMOL/L Glucose,  (H) 74 - 106 MG/DL  
 BUN, POC 32 (H) 7 - 18 MG/DL Creatinine, POC 3.0 (H) 0.6 - 1.3 MG/DL  
 GFRAA, POC 19 (L) >60 ml/min/1.73m2 GFRNA, POC 16 (L) >60 ml/min/1.73m2 Sodium,  136 - 145 MMOL/L Potassium, POC 4.1 3.5 - 5.5 MMOL/L Calcium, ionized (POC) 1.26 1.12 - 1.32 mmol/L Chloride,  100 - 108 MMOL/L Anion gap, POC 17 10 - 20 Hematocrit, POC 27 (L) 36 - 49 % Hemoglobin, POC 9.2 (L) 12 - 16 G/DL Procrit 60,000 units administered in left upper arm, bandaid applied. Hydration 500ml bolus per verbal order given. Opdivo 240 mg was infused over 30 minutes as ordered. VS remained stable and pt denied complaints of itching, lip/tongue/facial swelling, SOB, CP or other complaints. Ms. Sonia Gong tolerated the infusion, and had no complaints. Patient states she is feeling much better after zofran and hydration. Mediport flushed with NS 20 ml and heparinized per protocol. No bleeding or hematoma noted at site. Band aid applied. Patient Vitals for the past 12 hrs: 
 Temp Pulse Resp BP SpO2  
10/01/18 1324 97.9 °F (36.6 °C) 85 18 114/82 100 % Reviewed discharge instructions with patient regarding side efffects , symptom management and self care. She verbalized understanding. Patient armband removed and shredded Thomasena Dilling Ms. Polk was discharged from [place] [address] in stable condition at 1550. She is to return on 10/15/18 at 1300 for her next [address] [facility] appointment. Susette Leventhal, RN October 1, 2018  
1054

## 2018-10-15 PROBLEM — R63.0 LOSS OF APPETITE: Status: ACTIVE | Noted: 2018-01-01

## 2018-10-15 NOTE — MR AVS SNAPSHOT
Jone See 
 
 
 [facility] [address], [place] [address] 200 [facility] 
[phone] Patient: Jamie Moreland MRN: [id number] QEX:3/29/3288 Visit Information Date & Time Provider Department Dept. Phone Encounter #  
 10/15/2018 11:15 AM Tammy Mckeon MD [facility] Oncology [phone] [id number] Your Appointments 11/26/2018 12:00 PM  
Office Visit with Tammy Mckeon MD  
[facility] [facility]  Oncology [address] [facility]) Appt Note: 6 WK RET  
 [address] [place], [place] [address] [place] [address], [address] [facility] [place] [id number]  
  
    
  
 11/9/2018 10:45 AM  
Any with Yulissa Gerber MD  
[facility] ([address] [facility]) Appt Note: 4m fu- post op  
 [address] [facility] [address] [place] [address] [facility]  
  
   
 [address] [facility] 2301 [facility]note 2] 100 200 [facility] Upcoming Health Maintenance Date Due Hepatitis C Screening 1955 Shingrix Vaccine Age 50> (1 of 2) 8/26/2003 BREAST CANCER SCRN MAMMOGRAM 8/26/2003 FOBT Q 1 YEAR AGE 50-75 8/26/2003 MEDICARE YEARLY EXAM 3/27/2018 Influenza Age 5 to Adult 8/1/2018 GLAUCOMA SCREENING Q2Y 8/26/2018 Bone Densitometry (Dexa) Screening 8/26/2018 Pneumococcal 65+ High/Highest Risk (2 of 2 - PPSV23) 12/11/2022 DTaP/Tdap/Td series (2 - Td) 3/15/2026 Allergies as of 10/15/2018  Review Complete On: 10/15/2018 By: Aditi Keating RN Severity Noted Reaction Type Reactions Latex  08/04/2016    Other (comments) Latex  02/12/2018    Rash Lisinopril High 04/25/2017    Hives Asa-acetaminophen-caff-buffers  08/04/2016    Other (comments) Aspirin  02/12/2018    Hives Codeine  08/04/2016    Other (comments) Codeine  02/12/2018    Hives Lisinopril  02/12/2018    Hives Pcn [Penicillins]  08/04/2016    Other (comments) Penicillin G  02/12/2018    Hives Sulfa (Sulfonamide Antibiotics)  08/04/2016    Other (comments) Current Immunizations  Reviewed on 10/15/2018 Name Date Influenza Vaccine 3/15/2016 12:00 AM, 3/1/2016 Pneumococcal Polysaccharide (PPSV-23) 12/11/2017 12:00 AM  
 Pneumococcal Vaccine (Unspecified Type) 3/1/2016 Tdap 3/15/2016 12:00 AM  
  
 Reviewed by Stephanie Linton RN on 10/15/2018 at 12:33 PM  
You Were Diagnosed With   
  
 Codes Comments Colon cancer metastasized to multiple sites [facility])    -  Primary ICD-10-CM: C18.9 ICD-9-CM: 153.9 Anemia in stage 3 chronic kidney disease (HCC)     ICD-10-CM: N18.3, D63.1 ICD-9-CM: 285.21, 585.3 Iron deficiency anemia due to chronic blood loss     ICD-10-CM: D50.0 ICD-9-CM: 280.0 Cancer associated pain     ICD-10-CM: G89.3 ICD-9-CM: 338. 3 Vitals BP Pulse Temp Resp Height(growth percentile) Weight(growth percentile) (!) 155/105 78 97.8 °F (36.6 °C) (Oral) 18 5' (1.524 m) 179 lb (81.2 kg) SpO2 BMI OB Status Smoking Status 98% 34.96 kg/m2 Hysterectomy Never Smoker Vitals History BMI and BSA Data Body Mass Index Body Surface Area 34.96 kg/m 2 1.85 m 2 Preferred Pharmacy Pharmacy Name Phone [address] [facility] [address] [facility], [address] [facility] Your Updated Medication List  
  
   
This list is accurate as of 10/15/18  2:27 PM.  Always use your most recent med list.  
  
  
  
  
 0.9% sodium chloride solp 100 mL with nivolumab 240 mg/24 mL soln  
by IntraVENous route once. acyclovir 800 mg tablet Commonly known as:  ZOVIRAX Take 800 mg by mouth five (5) times daily. albuterol 90 mcg/actuation inhaler Commonly known as:  PROVENTIL HFA, VENTOLIN HFA, PROAIR HFA  
2 Puffs. allopurinol 300 mg tablet Commonly known as:  ZYLOPRIM  
300 mg daily. amLODIPine 5 mg tablet Commonly known as:  Samantha Sierras Take 5 mg by mouth daily. bisoprolol-hydroCHLOROthiazide 10-6.25 mg per tablet Commonly known as:  [facility] Take 1 Tab by Mouth Once a Day. cloNIDine HCl 0.1 mg tablet Commonly known as:  CATAPRES Take  by mouth two (2) times a day. desonide 0.05 % cream  
Commonly known as:  Aditi Feathers Use 1 Application to affected area Twice Daily. diphenhydrAMINE 25 mg capsule Commonly known as:  BENADRYL Take 1 with compazine every 6 hours for nausea for 3 days then, as needed. dronabinol 2.5 mg capsule Commonly known as:  Merwyn Boop Take 1 Cap by mouth two (2) times a day. Max Daily Amount: 5 mg.  
  
 ergocalciferol 50,000 unit capsule Commonly known as:  ERGOCALCIFEROL  
50,000 Units every seven (7) days. furosemide 20 mg tablet Commonly known as:  LASIX Take 1/2-1 tablet daily if needed for swelling. glimepiride 4 mg tablet Commonly known as:  AMARYL Take one in the am.  New dose. glipiZIDE 10 mg tablet Commonly known as:  Bertrand Lobe 10 mg. HYDROmorphone 2 mg tablet Commonly known as:  DILAUDID Take 1 Tab by mouth every four (4) hours as needed for Pain. Max Daily Amount: 12 mg.  
  
 insulin aspart U-100 100 unit/mL Inpn Commonly known as:  NOVOLOG  
by SubCUTAneous route. * lidocaine-prilocaine topical cream  
Commonly known as:  EMLA Place cream over mediport 1 hour prior to chemotherapy and then place saran wrap over area. Every chemo treatment. * lidocaine-prilocaine topical cream  
Commonly known as:  EMLA Apply  to affected area as needed for Pain.  
  
 magic mouthwash solution Magic mouth wash  Maalox Lidocaine 2% viscous  Diphenhydramine oral solution   Pharmacy to mix equal portions of ingredients to a total volume as indicated in the dispense amount. Indications: please swish and spit 4 times a day and as needed, NEURONTIN 100 mg capsule Generic drug:  gabapentin Take 100 mg by mouth two (2) times a day. * nitrofurantoin (macrocrystal-monohydrate) 100 mg capsule Commonly known as:  MACROBID Take 1 Cap by mouth two (2) times a day. * nitrofurantoin (macrocrystal-monohydrate) 100 mg capsule Commonly known as:  MACROBID Take 1 Cap by mouth two (2) times a day. omeprazole 20 mg capsule Commonly known as:  PRILOSEC Take 20 mg by mouth daily. ondansetron 8 mg disintegrating tablet Commonly known as:  ZOFRAN ODT Take 1 Tab by mouth every eight (8) hours as needed for Nausea. Indications: CANCER CHEMOTHERAPY-INDUCED NAUSEA AND VOMITING  
  
 ondansetron hcl 8 mg tablet Commonly known as:  Catherne Osman Take one tablet by mouth every 8 hours for nausea beginning the night of chemo for 3 days. oxyCODONE IR 15 mg immediate release tablet Commonly known as:  OXY-IR Take 1 Tab by mouth every four (4) hours as needed for Pain. Max Daily Amount: 90 mg. Indications: Pain, Cancer associated pain  
  
 predniSONE 20 mg tablet Commonly known as:  [place] Take 2 Tabs by mouth daily. Take 2 tabs on days 2 and 3 of each chemo cycle  
  
 prochlorperazine 10 mg tablet Commonly known as:  COMPAZINE Take 1 tablet every 6 hours with Benadryl 25mg for nausea for 3 days then, as needed. tamsulosin 0.4 mg capsule Commonly known as:  FLOMAX Take 1 Cap by mouth daily (after dinner). traMADol 50 mg tablet Commonly known as:  ULTRAM  
Take 50 mg by mouth every six (6) hours as needed for Pain.  
  
 warfarin 3 mg tablet Commonly known as:  COUMADIN Take 3 mg by mouth daily. Takes 1 and 1/2 tab Sunday, Tuesday, and Thursday. Takes 1 tablet Monday, Wednesday, and Friday * Notice: This list has 4 medication(s) that are the same as other medications prescribed for you. Read the directions carefully, and ask your doctor or other care provider to review them with you. To-Do List   
 10/15/2018   Lab:  CEA   
  
 10/15/2018 Lab:  FERRITIN   
  
 10/15/2018 Lab:  IRON PROFILE   
  
 10/15/2018 Lab:  METABOLIC PANEL, COMPREHENSIVE   
  
 10/29/2018 10:00 AM  
  Appointment with HBV INFUSION NURSE 1 at [facility] OP INFUSION ([phone]) [facility] SERVICES! Dear Lorelei Cote: Thank you for requesting a Buz account. Our records indicate that you already have an active Buz account. You can access your account anytime at [email or web address] Did you know that you can access your hospital and ER discharge instructions at any time in Buz? You can also review all of your test results from your hospital stay or ER visit. Additional Information If you have questions, please visit the Frequently Asked Questions section of the Buz website at [email or web address] Remember, Buz is NOT to be used for urgent needs. For medical emergencies, dial 911. Now available from your iPhone and Android! Please provide this summary of care documentation to your next provider. Your primary care clinician is listed as Semaj Tad. If you have any questions after today's visit, please call [phone].

## 2018-10-15 NOTE — PROGRESS NOTES
Hematology/Oncology  Progress Note    Name: Josi Luo  Date: 10/15/2018  : 1955    PCP: Linda Tilley NP     Ms. Geraldyne Denver is a 70 y.o. -American woman with metastatic colorectal carcinoma/carcinomatosis  Current therapy: Opdivo 240 milligrams every 2 weeks      Subjective:     Ms. 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 She was started on FOLFIRI regimen and she is here today for follow up. She states that she has no pain, her appetite has significantly increased and her energy level is up. She denies diarrhea, nausea, or vomiting. Her only complaint is occasional abdominal cramping during chemo. She denies constipation. Otherwise there are no additional complaints to report. She has recently been found to have hydronephrosis and on the advice of the oncologic surgeon was referred to a urologist for an assessment. The patient has been evaluated and treated by the urologist.  Today she reports that she is continuing to do well. Unfortunately, I explained to the patient that her tumor marker is increasing. She had a recent MRI on 2018 she was slowly progressive disease in her liver with stable peritoneal implants. Today she was seen in the infusion center with complaints of nausea and emesis. She also states that she had not been eating well over the past few days. She felt lightheaded and a decision was made to hold her immunotherapy and to give her IV fluids and antinausea medicine instead. The current immunotherapy will be continued in approximately 2 weeks. Otherwise, there are no additional complaints. Past medical history, family history, and social history: these were reviewed and remains unchanged.     Past Medical History:   Diagnosis Date    Asthma     Cancer ([facility] [address] 10/2016    colon    Diabetes ([facility] [address]     only on chemo days    Gout     Heart disease     Hypertension     Kidney disease     Maintenance chemotherapy     Neuropathic pain of foot      Past Surgical History:   Procedure Laterality Date    ABDOMEN SURGERY PROC UNLISTED  05/2015    partial colectomy with colostomy    HX BACK SURGERY      HX COLONOSCOPY      HX HYSTERECTOMY      HX UROLOGICAL  03/2018    Uereteroscopy. stent    HX VASCULAR ACCESS      mediport- right chest wall     Social History     Social History    Marital status:      Spouse name: N/A    Number of children: N/A    Years of education: N/A     Occupational History    Not on file. Social History Main Topics    Smoking status: Never Smoker    Smokeless tobacco: Never Used    Alcohol use No    Drug use: No    Sexual activity: Not on file     Other Topics Concern    Not on file     Social History Narrative    ** Merged History Encounter **          Family History   Problem Relation Age of Onset    Family history unknown: Yes     Current Outpatient Prescriptions   Medication Sig Dispense Refill    dronabinol (MARINOL) 2.5 mg capsule Take 1 Cap by mouth two (2) times a day. Max Daily Amount: 5 mg. 60 Cap 1    ondansetron (ZOFRAN ODT) 8 mg disintegrating tablet Take 1 Tab by mouth every eight (8) hours as needed for Nausea. Indications: CANCER CHEMOTHERAPY-INDUCED NAUSEA AND VOMITING 90 Tab 2    oxyCODONE IR (OXY-IR) 15 mg immediate release tablet Take 1 Tab by mouth every four (4) hours as needed for Pain. Max Daily Amount: 90 mg. Indications: Pain, Cancer associated pain 180 Tab 0    0.9% sodium chloride solp 100 mL with nivolumab 240 mg/24 mL soln by IntraVENous route once.  nitrofurantoin, macrocrystal-monohydrate, (MACROBID) 100 mg capsule Take 1 Cap by mouth two (2) times a day. 20 Cap 0    HYDROmorphone (DILAUDID) 2 mg tablet Take 1 Tab by mouth every four (4) hours as needed for Pain.  Max Daily Amount: 12 mg. 12 Tab 0    magic mouthwash solution Magic mouth wash   Maalox  Lidocaine 2% viscous   Diphenhydramine oral solution     Pharmacy to mix equal portions of ingredients to a total volume as indicated in the dispense amount. Indications: please swish and spit 4 times a day and as needed, 600 mL 1    lidocaine-prilocaine (EMLA) topical cream Apply  to affected area as needed for Pain. 30 g 1    acyclovir (ZOVIRAX) 800 mg tablet Take 800 mg by mouth five (5) times daily.  nitrofurantoin, macrocrystal-monohydrate, (MACROBID) 100 mg capsule Take 1 Cap by mouth two (2) times a day. 14 Cap 0    tamsulosin (FLOMAX) 0.4 mg capsule Take 1 Cap by mouth daily (after dinner). 90 Cap 3    warfarin (COUMADIN) 3 mg tablet Take 3 mg by mouth daily. Takes 1 and 1/2 tab Sunday, Tuesday, and Thursday. Takes 1 tablet Monday, Wednesday, and Friday      amLODIPine (NORVASC) 5 mg tablet Take 5 mg by mouth daily.  cloNIDine HCl (CATAPRES) 0.1 mg tablet Take  by mouth two (2) times a day.  insulin aspart (NOVOLOG) 100 unit/mL inpn by SubCUTAneous route.  omeprazole (PRILOSEC) 20 mg capsule Take 20 mg by mouth daily.  traMADol (ULTRAM) 50 mg tablet Take 50 mg by mouth every six (6) hours as needed for Pain.  gabapentin (NEURONTIN) 100 mg capsule Take 100 mg by mouth two (2) times a day.  predniSONE (DELTASONE) 20 mg tablet Take 2 Tabs by mouth daily. Take 2 tabs on days 2 and 3 of each chemo cycle 4 Tab 2    lidocaine-prilocaine (EMLA) topical cream Place cream over mediport 1 hour prior to chemotherapy and then place saran wrap over area. Every chemo treatment. 30 g 1    ondansetron hcl (ZOFRAN, AS HYDROCHLORIDE,) 8 mg tablet Take one tablet by mouth every 8 hours for nausea beginning the night of chemo for 3 days. 9 Tab 3    diphenhydrAMINE (BENADRYL) 25 mg capsule Take 1 with compazine every 6 hours for nausea for 3 days then, as needed. 60 Cap 3    prochlorperazine (COMPAZINE) 10 mg tablet Take 1 tablet every 6 hours with Benadryl 25mg for nausea for 3 days then, as needed.  60 Tab 3    albuterol (PROVENTIL HFA, VENTOLIN HFA, PROAIR HFA) 90 mcg/actuation inhaler 2 Puffs.  ergocalciferol (ERGOCALCIFEROL) 50,000 unit capsule 50,000 Units every seven (7) days.  furosemide (LASIX) 20 mg tablet Take 1/2-1 tablet daily if needed for swelling.  allopurinol (ZYLOPRIM) 300 mg tablet 300 mg daily.  desonide (TRIDESILON) 0.05 % cream Use 1 Application to affected area Twice Daily.  bisoprolol-hydrochlorothiazide (ZIAC) 10-6.25 mg per tablet Take 1 Tab by Mouth Once a Day.  glipiZIDE (GLUCOTROL) 10 mg tablet 10 mg.      glimepiride (AMARYL) 4 mg tablet Take one in the am.  New dose. Facility-Administered Medications Ordered in Other Visits   Medication Dose Route Frequency Provider Last Rate Last Dose    epoetin martinez (EPOGEN;PROCRIT) injection 20,000 Units  20,000 Units SubCUTAneous ONCE Dennie Hockey, NP   Stopped at 10/15/18 1100    And    epoetin martinez (EPOGEN;PROCRIT) injection 40,000 Units  40,000 Units SubCUTAneous ONCE Dennie Hockey, NP   Stopped at 10/15/18 1100    heparin (porcine) pf 500 Units  500 Units InterCATHeter PRN Jessica Pitts MD   500 Units at 10/15/18 1330    sodium chloride (NS) flush 10-40 mL  10-40 mL IntraVENous PRN Jessica Pitts MD   20 mL at 10/15/18 1330    ondansetron (ZOFRAN) 4 mg/2 mL injection             0.9% sodium chloride infusion 50 mL  50 mL IntraVENous ONCE Dennie Hockey, NP        0.9% sodium chloride infusion 1,000 mL  1,000 mL IntraVENous CONTINUOUS Dennie Hockey,  mL/hr at 10/15/18 1222 1,000 mL at 10/15/18 1222    nivolumab (OPDIVO) 240 mg in 0.9% sodium chloride 100 mL IVPB  240 mg IntraVENous ONCE Jessica Pitts MD   Stopped at 10/15/18 1100       Review of Systems  Constitutional: The patient denies acute distress or discomfort. HEENT: The patient denies recent head trauma, eye pain, blurred vision,  hearing deficit, oropharyngeal mucosal pain or lesions, and the patient denies throat pain or discomfort.   Lymphatics: The patient denies palpable peripheral lymphadenopathy. Hematologic: The patient denies having bruising, bleeding, or progressive fatigue. Respiratory: Patient denies having shortness of breath, cough, sputum production, fever, or dyspnea on exertion. Cardiovascular: The patient denies having leg pain, leg swelling, heart palpitations, chest permit, chest pain, or lightheadedness. The patient denies having dyspnea on exertion. Gastrointestinal: The patient reports occasional nausea from chemotherapy which is well controlled with antinausea medication. She denies having any emesis or diarrhea. Genitourinary: Patient denies having urinary urgency, frequency, or dysuria. The patient denies having blood in the urine. Psychological: The patient denies having symptoms of nervousness, anxiety, depression, or thoughts of harming self. Skin: Patient denies having skin rashes, skin, ulcerations, or unexplained itching or pruritus. Musculoskeletal: The patient denies having pain in the joints or bones. Objective:     Visit Vitals    BP (!) 155/105    Pulse 78    Temp 97.8 °F (36.6 °C) (Oral)    Resp 18    Ht 5' (1.524 m)    Wt 81.2 kg (179 lb)    SpO2 98%    BMI 34.96 kg/m2     ECOG PS=0; Pain score=0/10    Physical Exam:   Gen. Appearance: The patient is in no acute distress. Skin: There is no bruise or rash. HEENT: The exam is unremarkable. Neck: Supple without lymphadenopathy or thyromegaly. Lungs: Clear to auscultation and percussion; there are no wheezes or rhonchi. Heart: Regular rate and rhythm; there are no murmurs, gallops, or rubs. Abdomen: Bowel sounds are present and normal.  There is no guarding, tenderness, or hepatosplenomegaly. The patient has a colostomy bag in place. Extremities: There is no clubbing, cyanosis, or edema. Neurologic: There are no focal neurologic deficits. Lymphatics: There is no palpable peripheral lymphadenopathy.  Musculoskeletal: The patient has full range of motion at all joints. There is no evidence of joint deformity or effusions. There is no focal joint tenderness. Psychological/psychiatric: There is no clinical evidence of anxiety, depression, or melancholy. Lab data:      Results for orders placed or performed during the hospital encounter of 10/15/18   CBC WITH 3 PART DIFF     Status: Abnormal   Result Value Ref Range Status    WBC 3.9 (L) 4.5 - 13.0 K/uL Final    RBC 3.85 (L) 4.10 - 5.10 M/uL Final    HGB 10.9 (L) 12.0 - 16 g/dL Final    HCT 35.0 (L) 36 - 48 % Final    MCV 90.9 78 - 102 FL Final    MCH 28.3 25.0 - 35.0 PG Final    MCHC 31.1 31 - 37 g/dL Final    RDW 16.5 (H) 11.5 - 14.5 % Final    PLATELET 141 105 - 808 K/uL Final    NEUTROPHILS 72 (H) 40 - 70 % Final    MIXED CELLS 8 0.1 - 17 % Final    LYMPHOCYTES 20 14 - 44 % Final    ABS. NEUTROPHILS 2.8 1.8 - 9.5 K/UL Final    ABS. MIXED CELLS 0.3 0.0 - 2.3 K/uL Final    ABS. LYMPHOCYTES 0.8 (L) 1.1 - 5.9 K/UL Final     Comment: Test performed at [address]. Results Reviewed by Medical Director. DF AUTOMATED   Final           Assessment:     1. Colon cancer metastasized to multiple sites ([facility] [address]    2. Anemia in stage 3 chronic kidney disease ([facility] [address]    3. Iron deficiency anemia due to chronic blood loss    4. Cancer associated pain    5. Hydronephrosis with ureteropelvic junction (UPJ) obstruction      Plan:   Colorectal carcinoma with abdominal carcinomatosis: The patient recently had an MRI of the abdomen on 7/6/2018 there was slight progression in the size of liver metastases. The peritoneal implants appeared stable. I have explained to the patient had a CEA level from from her last clinic visit in, on 9/17/2018, was 2771.5 ng/mL. A prior study done in August 2018 was elevated at 1570 ng/mL. I will continue the current immunotherapy for a few more cycles before deciding to change out the therapy since response to this therapy may take several months.     Anemia associated with chronic kidney disease and chemotherapy-induced anemia (persistent problem): I have explained to the patient that her CBC from today shows that her hemoglobin is 10.9 g/dL with hematocrit of 35 %. Procrit at a dose of 60,000 units will be provided whenever her hemoglobin is below 10 g/dL hematocrit is below 30% every 2 weeks. The iron profile and ferritin levels will be ordered at this time. Hydronephrosis (persistent problem): The patient is currently being seen by the urologist and is tentatively scheduled to have ureteral stents inserted in the upcoming week. Chemotherapy-induced leukopenia/neutropenia (improved problem): The CBC on today showed a WBC count of 3.9 with an absolute neutrophil count of 2.8. This will be monitored on a regular basis. Neulasta following each chemo cycle will continue. Cancer associated pain: A new prescription for OxyContin immediate release 15 mg every 4 hours was provided. A total of 180 tablets were requested. I will have the patient return to clinic for complete reassessment again in 6 weeks.     Orders Placed This Encounter    METABOLIC PANEL, COMPREHENSIVE     Standing Status:   Future     Standing Expiration Date:   10/16/2019    IRON PROFILE     Standing Status:   Future     Standing Expiration Date:   10/16/2019    FERRITIN     Standing Status:   Future     Standing Expiration Date:   10/16/2019    CEA     Standing Status:   Future     Standing Expiration Date:   10/16/2019       Mary Bustos MD  10/15/2018

## 2018-10-15 NOTE — PATIENT INSTRUCTIONS
Colon Cancer: Care Instructions  Your Care Instructions    Colon cancer occurs when abnormal cells grow out of control in the lower part of your intestine (your colon). If the tumor was small and had not spread, your doctor may have removed it during the colonoscopy. But you may need surgery to remove the cancer if the tumor was too big or had spread too far to be removed during a colonoscopy. If cancer has spread to another part of your body, such as the liver, you may need more far-reaching surgery. Treatment for colon cancer may also include radiation therapy and medicines that destroy cancer cells (chemotherapy). Being treated for cancer can weaken your body, and you may feel very tired. Get the rest your body needs so that you can feel better. When you find out that you have cancer, you may feel many emotions and may need some help coping. Seek out family, friends, and counselors for support. You also can do things at home to make yourself feel better while you go through treatment. Call the [employer] (8-938.549.5610) or visit its website at [employer] for more information. Follow-up care is a key part of your treatment and safety. Be sure to make and go to all appointments, and call your doctor if you are having problems. It's also a good idea to know your test results and keep a list of the medicines you take. How can you care for yourself at home? · Take your medicines exactly as prescribed. Call your doctor if you think you are having a problem with your medicine. You may get medicine for nausea and vomiting if you have these side effects. · Follow your doctor's instructions to relieve pain. Pain from cancer and surgery can almost always be controlled. Use pain medicine when you first notice pain, before it becomes severe. · Eat healthy food. If you do not feel like eating, try to eat food that has protein and extra calories to keep up your strength and prevent weight loss. Drink liquid meal replacements for extra calories and protein. Try to eat your main meal early. · Get some physical activity every day, but do not get too tired. Keep doing the hobbies you enjoy as your energy allows. · Take steps to control your stress and workload. Learn relaxation techniques. ¨ Share your feelings. Stress and tension affect our emotions. By expressing your feelings to others, you may be able to understand and cope with them. ¨ Consider joining a support group. Talking about a problem with your spouse, a good friend, or other people with similar problems is a good way to reduce tension and stress. ¨ Express yourself through art. Try writing, dance, art, or crafts to relieve stress. Some dance, writing, or art groups may be available just for people who have cancer. ¨ Be kind to your body and mind. Getting enough sleep, eating a healthy diet, and taking time to do things you enjoy can contribute to an overall feeling of balance in your life and help reduce stress. ¨ Get help if you need it. Discuss your concerns with your doctor or counselor. · If you are vomiting or have diarrhea:  ¨ Drink plenty of fluids (enough so that your urine is light yellow or clear like water) to prevent dehydration. Choose water and other caffeine-free clear liquids. If you have kidney, heart, or liver disease and have to limit fluids, talk with your doctor before you increase the amount of fluids you drink. ¨ When you are able to eat, try clear soups, mild foods, and liquids until all symptoms are gone for 12 to 48 hours. Other good choices include dry toast, crackers, cooked cereal, and gelatin dessert, such as Jell-O.  · If you have not already done so, prepare a list of advance directives. Advance directives are instructions to your doctor and family members about what kind of care you want if you become unable to speak or express yourself. When should you call for help?   Call 911 anytime you think you may need emergency care. For example, call if:    · You pass maroon or very bloody stools.     · You passed out (lost consciousness).    Call your doctor now or seek immediate medical care if:    · You have a fever.     · You are vomiting.     · You have new or worse belly pain.     · You cannot pass stools or gas.     · You have new or more blood in your stool.    Watch closely for changes in your health, and be sure to contact your doctor if:    · You are losing weight.     · You have new or worse symptoms.     · You do not get better as expected. Where can you learn more? Go to http://ledy-toño.info/. Enter T448 in the search box to learn more about \"Colon Cancer: Care Instructions. \"  Current as of: March 28, 2018  Content Version: 11.8  © 7302-8453 [employer]. Care instructions adapted under license by [employer] (which disclaims liability or warranty for this information). If you have questions about a medical condition or this instruction, always ask your healthcare professional. [address] any warranty or liability for your use of this information.

## 2018-10-15 NOTE — PROGRESS NOTES
[facility] Progress Note Date: October 15, 2018 Name: Paulo Boss MRN: [id number] : 1955 Opdivo Infusion C6 (HELD) Patient arrived to [facility] at 1025 ambulatory. Patient complaining of vomiting and nausea. She stated she can't hold anything down. She also has had decreased activity in her ostomy bag. NP Marivel Talbert notified of patients concerns. Order PO Zofran 8mg today and is giving a written script for marinol today. Ms. Sonia Gong was assessed and education was provided. Ms. Polk's vitals were reviewed and patient was observed for 5 minutes prior to treatment. Visit Vitals  BP (!) 171/102 (BP 1 Location: Right arm, BP Patient Position: Sitting)  Pulse 76  Temp 98.6 °F (37 °C)  Resp 18  Ht 5' (1.524 m)  Wt 81.5 kg (179 lb 9.6 oz)  SpO2 100%  BMI 35.08 kg/m2 Mediport accessed with 20g 1 inch orozco. Labs drawn from port for CBC, BMP, and Hep Function. Mediport flushed easily and had brisk blood return. Recent Results (from the past 12 hour(s)) CBC WITH 3 PART DIFF Collection Time: 10/15/18 11:15 AM  
Result Value Ref Range WBC 3.9 (L) 4.5 - 13.0 K/uL  
 RBC 3.85 (L) 4.10 - 5.10 M/uL  
 HGB 10.9 (L) 12.0 - 16 g/dL HCT 35.0 (L) 36 - 48 % MCV 90.9 78 - 102 FL  
 MCH 28.3 25.0 - 35.0 PG  
 MCHC 31.1 31 - 37 g/dL  
 RDW 16.5 (H) 11.5 - 14.5 % PLATELET 465 959 - 804 K/uL NEUTROPHILS 72 (H) 40 - 70 % MIXED CELLS 8 0.1 - 17 % LYMPHOCYTES 20 14 - 44 % ABS. NEUTROPHILS 2.8 1.8 - 9.5 K/UL  
 ABS. MIXED CELLS 0.3 0.0 - 2.3 K/uL  
 ABS. LYMPHOCYTES 0.8 (L) 1.1 - 5.9 K/UL  
 DF AUTOMATED HEPATIC FUNCTION PANEL Collection Time: 10/15/18 11:15 AM  
Result Value Ref Range Protein, total 7.2 6.4 - 8.2 g/dL Albumin 3.3 (L) 3.4 - 5.0 g/dL Globulin 3.9 2.0 - 4.0 g/dL A-G Ratio 0.8 0.8 - 1.7 Bilirubin, total 0.4 0.2 - 1.0 MG/DL  Bilirubin, direct 0.2 0.0 - 0.2 MG/DL  
 Alk. phosphatase 100 45 - 117 U/L  
 AST (SGOT) 17 15 - 37 U/L  
 ALT (SGPT) 15 13 - 56 U/L  
POC CHEM8 Collection Time: 10/15/18 11:21 AM  
Result Value Ref Range CO2, POC 19 19 - 24 MMOL/L Glucose,  (H) 74 - 106 MG/DL  
 BUN, POC 27 (H) 7 - 18 MG/DL Creatinine, POC 3.4 (H) 0.6 - 1.3 MG/DL  
 GFRAA, POC 16 (L) >60 ml/min/1.73m2 GFRNA, POC 14 (L) >60 ml/min/1.73m2 Sodium,  136 - 145 MMOL/L Potassium, POC 3.7 3.5 - 5.5 MMOL/L Calcium, ionized (POC) 1.17 1.12 - 1.32 mmol/L Chloride,  100 - 108 MMOL/L Anion gap, POC 19 10 - 20 Hematocrit, POC 35 (L) 36 - 49 % Hemoglobin, POC 11.9 (L) 12 - 16 G/DL Procrit 60,000 units not indicated today. Hydration 2 liters bolus per verbal order given. Opdivo 240 mg held today and resume in 2 weeks. VS remained stable and pt denied complaints of itching, lip/tongue/facial swelling, SOB, CP or other complaints. Ms. Sharon Pino tolerated the infusion, and had no complaints. Patient states she is feeling much better after zofran and hydration. Mediport flushed with NS 20 ml and heparinized per protocol. No bleeding or hematoma noted at site. Band aid applied. Patient Vitals for the past 12 hrs: 
 Temp Pulse Resp BP SpO2  
10/15/18 1330 98.6 °F (37 °C) 76 18 (!) 171/102 -  
10/15/18 1120 97.8 °F (36.6 °C) 78 18 (!) 155/105 100 % Notified Maddie Nance NP regarding patients /102, patient stated she has not been able to hold her BP meds down for 2-3 days but feels she can take her medication upon arriving home today because she is feeling better. Reviewed discharge instructions with patient regarding side efffects , symptom management and self care. She verbalized understanding. Patient armband removed and shredded Tommas Baptise Ms. Polk was discharged from [place] [address] in stable condition at 1340.  She is to follow up with Dr. Dumont Lung today at [address] [facility] and with [facility] in two weeks on 10/29/18 for Opdivo cycle 6 of 6 and Procrit. Carlo Chiang, RN October 15, 2018  
[id number]

## 2018-10-29 NOTE — PROGRESS NOTES
[facility] Progress Note Date: 2018 Name: [facility] MRN: [id number] : 1955 Opdivo Infusion C6 Patient arrived to [facility] at 56 in a wheelchair, accompanied by her friend. . No concerns or complaints voiced, other than intermittent nausea. Ms. Natali Hurtado was assessed and education was provided. Opdivo checklist completed. Ms. Polk's vitals were reviewed and patient was observed for 5 minutes prior to treatment. Visit Vitals BP (!) 152/104 (BP 1 Location: Right arm, BP Patient Position: Sitting) Pulse 99 Temp 98.4 °F (36.9 °C) Resp 18 Ht 5' (1.524 m) Wt 80.5 kg (177 lb 8 oz) SpO2 99% BMI 34.67 kg/m² Mediport accessed with 20g 1 inch orozco. Labs drawn from port for CBC, BMP, and Hep Function Mediport flushed easily and had brisk blood return. Recent Results (from the past 12 hour(s)) CBC WITH 3 PART DIFF Collection Time: 10/29/18 11:08 AM  
Result Value Ref Range WBC 3.6 (L) 4.5 - 13.0 K/uL  
 RBC 3.84 (L) 4.10 - 5.10 M/uL  
 HGB 10.5 (L) 12.0 - 16 g/dL HCT 34.5 (L) 36 - 48 % MCV 89.8 78 - 102 FL  
 MCH 27.3 25.0 - 35.0 PG  
 MCHC 30.4 (L) 31 - 37 g/dL  
 RDW 14.9 (H) 11.5 - 14.5 % PLATELET 431 608 - 662 K/uL NEUTROPHILS 69 40 - 70 % MIXED CELLS 10 0.1 - 17 % LYMPHOCYTES 21 14 - 44 % ABS. NEUTROPHILS 2.4 1.8 - 9.5 K/UL  
 ABS. MIXED CELLS 0.4 0.0 - 2.3 K/uL  
 ABS. LYMPHOCYTES 0.8 (L) 1.1 - 5.9 K/UL  
 DF AUTOMATED    
POC CHEM8 Collection Time: 10/29/18 11:16 AM  
Result Value Ref Range CO2, POC 22 19 - 24 MMOL/L Glucose,  (H) 74 - 106 MG/DL  
 BUN, POC 38 (H) 7 - 18 MG/DL Creatinine, POC 3.4 (H) 0.6 - 1.3 MG/DL  
 GFRAA, POC 16 (L) >60 ml/min/1.73m2 GFRNA, POC 14 (L) >60 ml/min/1.73m2 Sodium,  136 - 145 MMOL/L Potassium, POC 3.6 3.5 - 5.5 MMOL/L Calcium, ionized (POC) 1.27 1.12 - 1.32 mmol/L Chloride,  100 - 108 MMOL/L  Anion gap, POC 16 10 - 20    
 Hematocrit, POC 33 (L) 36 - 49 % Hemoglobin, POC 11.2 (L) 12 - 16 G/DL Procrit held per parameters Opdivo 480 mg was infused over 30 minutes as ordered. VS remained stable and pt denied complaints of itching, lip/tongue/facial swelling, SOB, CP or other complaints. Ms. Arnoldo Goodwin tolerated the infusion, and had no complaints. Mediport flushed with NS 20 ml and heparinized per protocol. No bleeding or hematoma noted at site. Band aid applied. Patient Vitals for the past 12 hrs: 
 Temp Pulse Resp BP SpO2  
10/29/18 1300 98.4 °F (36.9 °C) 99 18 (!) 152/104 99 % 10/29/18 1057 98.4 °F (36.9 °C) 74 18 132/88 99 % Reviewed discharge instructions with patient regarding side efffects , symptom management and self care. She verbalized understanding. Patient armband removed and shredded Tonye Cogan Ms. Polk was discharged from [place] [address] in stable condition at 1305. She is to return on 11/12/18 at 1100 for her next Procrit appointment. Brittney Alford RN October 29, 2018

## 2018-11-01 NOTE — TELEPHONE ENCOUNTER
Please call Gonzales Rdz at [place] to verify the script for this patient's Madeleine Collins he is at  [phone]

## 2018-12-12 NOTE — PROGRESS NOTES
Hematology/Oncology  Progress Note    Name: Pam Don  Date: 2018  : 1955    PCP: Jonas Varma NP     Ms. Lisa Tucker is a 70 y.o. -American woman with metastatic colorectal carcinoma/carcinomatosis  Current therapy: Opdivo 240 milligrams every 2 weeks      Subjective:     Ms. Lisa Tucker is a 75-year-old -American woman with abdominal carcinomatosis from metastatic colorectal carcinoma. She was started on FOLFIRI regimen and she is here today for follow up. She states that she has no pain, her appetite has significantly increased and her energy level is up. She denies diarrhea, nausea, or vomiting. Her only complaint is occasional abdominal cramping during chemo. She denies constipation. Otherwise there are no additional complaints to report. She has recently been found to have hydronephrosis and on the advice of the oncologic surgeon was referred to a urologist for an assessment. The patient has been evaluated and treated by the urologist.  Today she reports that she is continuing to do well. Unfortunately, I explained to the patient that her tumor marker is increasing. She had a recent MRI on 2018 she was slowly progressive disease in her liver with stable peritoneal implants. Since her last clinic visit she was hospitalized with intractable nausea and emesis. She is here today to discuss additional treatment options for her nausea and emesis. She is expressing a desire to continue with the immunotherapy if possible for a while longer. Past medical history, family history, and social history: these were reviewed and remains unchanged.     Past Medical History:   Diagnosis Date    Asthma     Cancer ([facility] [address] 10/2016    colon    Diabetes ([facility] [address]     only on chemo days    Gout     Heart disease     Hypertension     Kidney disease     Maintenance chemotherapy     Neuropathic pain of foot      Past Surgical History:   Procedure Laterality Date    ABDOMEN SURGERY PROC UNLISTED  05/2015    partial colectomy with colostomy    HX BACK SURGERY      HX COLONOSCOPY      HX HYSTERECTOMY      HX UROLOGICAL  03/2018    Uereteroscopy. stent    HX UROLOGICAL  11/28/2018    Cystoscopy, left RPG, Left double-J stent exchange    HX VASCULAR ACCESS      mediport- right chest wall     Social History     Socioeconomic History    Marital status:      Spouse name: Not on file    Number of children: Not on file    Years of education: Not on file    Highest education level: Not on file   Social Needs    Financial resource strain: Not on file    Food insecurity - worry: Not on file    Food insecurity - inability: Not on file   Opiatalk needs - medical: Not on file   Opiatalk needs - non-medical: Not on file   Occupational History    Not on file   Tobacco Use    Smoking status: Never Smoker    Smokeless tobacco: Never Used   Substance and Sexual Activity    Alcohol use: No    Drug use: No    Sexual activity: Not on file   Other Topics Concern    Not on file   Social History Narrative    ** Merged History Encounter **          Family History   Family history unknown: Yes     Current Outpatient Medications   Medication Sig Dispense Refill    LORazepam (ATIVAN) 1 mg tablet Take 1 Tab by mouth every eight (8) hours as needed for Anxiety. Max Daily Amount: 3 mg. 90 Tab 3    metoclopramide HCl (REGLAN) 10 mg tablet Take 1 Tab by mouth every eight (8) hours for 10 days. 90 Tab 6    ciprofloxacin HCl (CIPRO) 500 mg tablet 500 mg.      ondansetron (ZOFRAN ODT) 8 mg disintegrating tablet Take 1 Tab by mouth every eight (8) hours as needed for Nausea. 90 Tab 2    dronabinol (MARINOL) 2.5 mg capsule Take 1 Cap by mouth two (2) times a day. Max Daily Amount: 5 mg. 60 Cap 1    oxyCODONE IR (OXY-IR) 15 mg immediate release tablet Take 1 Tab by mouth every four (4) hours as needed for Pain. Max Daily Amount: 90 mg.  Indications: Pain, Cancer associated pain 180 Tab 0    0.9% sodium chloride solp 100 mL with nivolumab 240 mg/24 mL soln by IntraVENous route once.  nitrofurantoin, macrocrystal-monohydrate, (MACROBID) 100 mg capsule Take 1 Cap by mouth two (2) times a day. 20 Cap 0    HYDROmorphone (DILAUDID) 2 mg tablet Take 1 Tab by mouth every four (4) hours as needed for Pain. Max Daily Amount: 12 mg. 12 Tab 0    magic mouthwash solution Magic mouth wash   Maalox  Lidocaine 2% viscous   Diphenhydramine oral solution     Pharmacy to mix equal portions of ingredients to a total volume as indicated in the dispense amount. Indications: please swish and spit 4 times a day and as needed, 600 mL 1    lidocaine-prilocaine (EMLA) topical cream Apply  to affected area as needed for Pain. 30 g 1    acyclovir (ZOVIRAX) 800 mg tablet Take 800 mg by mouth five (5) times daily.  nitrofurantoin, macrocrystal-monohydrate, (MACROBID) 100 mg capsule Take 1 Cap by mouth two (2) times a day. 14 Cap 0    tamsulosin (FLOMAX) 0.4 mg capsule Take 1 Cap by mouth daily (after dinner). 90 Cap 3    warfarin (COUMADIN) 3 mg tablet Take 3 mg by mouth daily. Takes 1 and 1/2 tab Sunday, Tuesday, and Thursday. Takes 1 tablet Monday, Wednesday, and Friday      amLODIPine (NORVASC) 5 mg tablet Take 5 mg by mouth daily.  cloNIDine HCl (CATAPRES) 0.1 mg tablet Take  by mouth two (2) times a day.  insulin aspart (NOVOLOG) 100 unit/mL inpn by SubCUTAneous route.  omeprazole (PRILOSEC) 20 mg capsule Take 20 mg by mouth daily.  traMADol (ULTRAM) 50 mg tablet Take 50 mg by mouth every six (6) hours as needed for Pain.  gabapentin (NEURONTIN) 100 mg capsule Take 100 mg by mouth two (2) times a day.  predniSONE (DELTASONE) 20 mg tablet Take 2 Tabs by mouth daily. Take 2 tabs on days 2 and 3 of each chemo cycle 4 Tab 2    lidocaine-prilocaine (EMLA) topical cream Place cream over mediport 1 hour prior to chemotherapy and then place saran wrap over area.  Every chemo treatment. 30 g 1    ondansetron hcl (ZOFRAN, AS HYDROCHLORIDE,) 8 mg tablet Take one tablet by mouth every 8 hours for nausea beginning the night of chemo for 3 days. 9 Tab 3    diphenhydrAMINE (BENADRYL) 25 mg capsule Take 1 with compazine every 6 hours for nausea for 3 days then, as needed. 60 Cap 3    prochlorperazine (COMPAZINE) 10 mg tablet Take 1 tablet every 6 hours with Benadryl 25mg for nausea for 3 days then, as needed. 60 Tab 3    albuterol (PROVENTIL HFA, VENTOLIN HFA, PROAIR HFA) 90 mcg/actuation inhaler 2 Puffs.  ergocalciferol (ERGOCALCIFEROL) 50,000 unit capsule 50,000 Units every seven (7) days.  furosemide (LASIX) 20 mg tablet Take 1/2-1 tablet daily if needed for swelling.  allopurinol (ZYLOPRIM) 300 mg tablet 300 mg daily.  desonide (TRIDESILON) 0.05 % cream Use 1 Application to affected area Twice Daily.  bisoprolol-hydrochlorothiazide (ZIAC) 10-6.25 mg per tablet Take 1 Tab by Mouth Once a Day.  glipiZIDE (GLUCOTROL) 10 mg tablet 10 mg.      glimepiride (AMARYL) 4 mg tablet Take one in the am.  New dose. Facility-Administered Medications Ordered in Other Visits   Medication Dose Route Frequency Provider Last Rate Last Dose    sodium chloride (NS) flush 10-40 mL  10-40 mL IntraVENous PRN Vida Mcdonald MD        heparin (porcine) pf 500 Units  500 Units InterCATHeter PRN Vida Mcdonald MD           Review of Systems  Constitutional: The patient denies acute distress or discomfort. HEENT: The patient denies recent head trauma, eye pain, blurred vision,  hearing deficit, oropharyngeal mucosal pain or lesions, and the patient denies throat pain or discomfort. Lymphatics: The patient denies palpable peripheral lymphadenopathy. Hematologic: The patient denies having bruising, bleeding, or progressive fatigue. Respiratory: Patient denies having shortness of breath, cough, sputum production, fever, or dyspnea on exertion. Cardiovascular:  The patient denies having leg pain, leg swelling, heart palpitations, chest permit, chest pain, or lightheadedness. The patient denies having dyspnea on exertion. Gastrointestinal: The patient reports occasional nausea from chemotherapy which is well controlled with antinausea medication. She denies having any emesis or diarrhea. Genitourinary: Patient denies having urinary urgency, frequency, or dysuria. The patient denies having blood in the urine. Psychological: The patient denies having symptoms of nervousness, anxiety, depression, or thoughts of harming self. Skin: Patient denies having skin rashes, skin, ulcerations, or unexplained itching or pruritus. Musculoskeletal: The patient denies having pain in the joints or bones. Objective:     Visit Vitals  /84   Pulse 91   Temp 98.8 °F (37.1 °C) (Oral)   Resp 16   Ht 5' (1.524 m)   Wt 77.6 kg (171 lb)   SpO2 100%   BMI 33.40 kg/m²     ECOG PS=0; Pain score=0/10    Physical Exam:   Gen. Appearance: The patient is in no acute distress. Skin: There is no bruise or rash. HEENT: The exam is unremarkable. Neck: Supple without lymphadenopathy or thyromegaly. Lungs: Clear to auscultation and percussion; there are no wheezes or rhonchi. Heart: Regular rate and rhythm; there are no murmurs, gallops, or rubs. Abdomen: Bowel sounds are present and normal.  There is no guarding, tenderness, or hepatosplenomegaly. The patient has a colostomy bag in place. Extremities: There is no clubbing, cyanosis, or edema. Neurologic: There are no focal neurologic deficits. Lymphatics: There is no palpable peripheral lymphadenopathy. Musculoskeletal: The patient has full range of motion at all joints. There is no evidence of joint deformity or effusions. There is no focal joint tenderness. Psychological/psychiatric: There is no clinical evidence of anxiety, depression, or melancholy.     Lab data:      Results for orders placed or performed during the hospital encounter of 12/12/18   CBC WITH 3 PART DIFF     Status: Abnormal   Result Value Ref Range Status    WBC 6.1 4.5 - 13.0 K/uL Final    RBC 3.45 (L) 4.10 - 5.10 M/uL Final    HGB 9.7 (L) 12.0 - 16 g/dL Final    HCT 31.1 (L) 36 - 48 % Final    MCV 90.1 78 - 102 FL Final    MCH 28.1 25.0 - 35.0 PG Final    MCHC 31.2 31 - 37 g/dL Final    RDW 15.9 (H) 11.5 - 14.5 % Final    PLATELET 700 (H) 954 - 440 K/uL Final    NEUTROPHILS 77 (H) 40 - 70 % Final    MIXED CELLS 7 0.1 - 17 % Final    LYMPHOCYTES 17 14 - 44 % Final    ABS. NEUTROPHILS 4.7 1.8 - 9.5 K/UL Final    ABS. MIXED CELLS 0.4 0.0 - 2.3 K/uL Final    ABS. LYMPHOCYTES 1.0 (L) 1.1 - 5.9 K/UL Final     Comment: Test performed at [address]. Results Reviewed by Medical Director. DF AUTOMATED   Final           Assessment:     1. Colon cancer metastasized to multiple sites ([facility] [address]    2. Anemia in stage 3 chronic kidney disease ([facility] [address]    3. Iron deficiency anemia due to chronic blood loss    4. Cancer associated pain    5. Hydronephrosis with ureteropelvic junction (UPJ) obstruction    6. Peritoneal carcinomatosis ([facility] [address]    7. Antineoplastic chemotherapy induced anemia    8. Intractable cyclical vomiting with nausea      Plan:   Colorectal carcinoma with abdominal carcinomatosis: The patient recently had an MRI of the abdomen on 7/6/2018 there was slight progression in the size of liver metastases. The peritoneal implants appeared stable. I have explained to the patient had a CEA level from from her  visit  on 9/17/2018, was 2771.5 ng/mL. A prior study done in August 2018 was elevated at 1570 ng/mL. The CEA level from 10/16/2018 was 4275.7 ng/mL. I will continue the current immunotherapy for a few more cycles. In the interval, we will refer the patient to the [facility] to see if there is an open protocol that she may reach the eligibility criteria to participate in.   I will recheck her CEA level at this time    Anemia associated with chronic kidney disease and chemotherapy-induced anemia (persistent problem): I have explained to the patient that her CBC from today shows that her hemoglobin is 9.7 g/dL with hematocrit of 31.1 %. Procrit at a dose of 60,000 units will be provided whenever her hemoglobin is below 10 g/dL hematocrit is below 30% every 2 weeks. The iron profile and ferritin levels will be ordered at this time. Hydronephrosis (persistent problem): The patient is currently being seen by the urologist and is tentatively scheduled to have ureteral stents inserted in the upcoming week. Chemotherapy-induced leukopenia/neutropenia (improved problem): The CBC  today showed a WBC count of 6.1 with an absolute neutrophil count of 4.7. This will be monitored on a regular basis. Neulasta following each chemo cycle will continue. Cancer associated pain: A new prescription for OxyContin immediate release 15 mg every 4 hours was provided. A total of 180 tablets were requested. I will have the patient return to clinic for complete reassessment again in 6 weeks. Orders Placed This Encounter    COMPLETE CBC & AUTO DIFF WBC    METABOLIC PANEL, COMPREHENSIVE     Standing Status:   Future     Standing Expiration Date:   12/13/2019    IRON PROFILE     Standing Status:   Future     Standing Expiration Date:   12/13/2019    CEA     Standing Status:   Future     Standing Expiration Date:   12/13/2019    FERRITIN     Standing Status:   Future     Standing Expiration Date:   12/13/2019    InHouse CBC ([employer])     Standing Status:   Future     Number of Occurrences:   1     Standing Expiration Date:   12/19/2018    LORazepam (ATIVAN) 1 mg tablet     Sig: Take 1 Tab by mouth every eight (8) hours as needed for Anxiety. Max Daily Amount: 3 mg. Dispense:  90 Tab     Refill:  3    metoclopramide HCl (REGLAN) 10 mg tablet     Sig: Take 1 Tab by mouth every eight (8) hours for 10 days.      Dispense:  90 Tab Refill:  6       Dejan Wallace MD  12/12/2018

## 2018-12-12 NOTE — PROGRESS NOTES
Pt labs drawn 12/12/2018 after office visit. HBG 9.7 HCT 31.1    Will hold procrit for today per MD orders.

## 2018-12-12 NOTE — PATIENT INSTRUCTIONS
Coping With Your Emotions When You Have Cancer: Care Instructions  Your Care Instructions    Finding out you have cancer can cause a flood of emotions. You may feel angry, sad, or powerless. Emotions can be overwhelming to you and your loved ones. This time in your life may feel dark and hopeless. However, many people survive and even thrive with cancer. Some types of cancer can be treated and cured. There are many treatments to control pain and improve your quality of life. Even if you cannot be cured, you do not have to suffer. It is important to know that it is normal to have all of these emotions, or none of them. Everyone reacts differently. And your feelings may change often, without warning. It is common to go through any or all of these feelings:  · Anger, fear, or worry. · Not believing that you have cancer. · Feeling out of control and not able to care for yourself. · Sadness, guilt, or loneliness. · No hope for the future. Follow-up care is a key part of your treatment and safety. Be sure to make and go to all appointments, and call your doctor if you are having problems. It's also a good idea to know your test results and keep a list of the medicines you take. How can you care for yourself at home? · Cancer can be very unpredictable. Learning to live with uncertainty is part of living with cancer. · You may have some false ideas about how you \"should\" feel when you have cancer. Although some people with cancer suffer from depression, not all do. · If you are depressed, talk to your doctor and get treatment. It will help you to feel better and focus on making good health decisions. Symptoms of depression include:  ? You feel helpless or hopeless. ? You lose interest in being with family or friends. ? You lose interest in hobbies or activities you once enjoyed. ? You do not feel hungry. ? You cry a lot, or for long periods of time. ?  You have trouble sleeping, or you sleep too much or too little. ? You think about killing yourself, or you make plans or take action to kill yourself. · Share your feelings with someone you can trust. It is okay to feel angry and frustrated. You will feel better if you can share these feelings with someone. Do not pretend to be cheerful if you are not. · Know which family members or friends you can turn to for support. Find a good listener. You do not always want advice. · Find a support group for people who have cancer. A support group can be a safe and comfortable place to talk about your illness. · Let yourself grieve. But when symptoms get in the way of your ability to carry on with daily activities, talk to your doctor. · Talk to your doctor if you are thinking about using any over-the-counter or herbal supplements. Some of them may not be safe if used with certain other medicines. · Keep the numbers for these national suicide hotlines: 0-590-578-TALK (6-291.841.3483) and 0-774-WFQHNVM (5-205.766.9229). If you or someone you know talks about suicide or feeling hopeless, get help right away. When should you call for help? Call 911 anytime you think you may need emergency care. For example, call if:    · You feel like hurting yourself or someone else.     · Someone you know has depression and is about to attempt or is attempting suicide.    Watch closely for changes in your health, and be sure to contact your doctor if:    · You feel very sad and think you may be depressed. Where can you learn more? Go to http://ledy-toño.info/. Enter O987 in the search box to learn more about \"Coping With Your Emotions When You Have Cancer: Care Instructions. \"  Current as of: March 28, 2018  Content Version: 11.8  © 8335-6617 [employer]. Care instructions adapted under license by [employer] (which disclaims liability or warranty for this information).  If you have questions about a medical condition or this instruction, always ask your healthcare professional. [place] [address] any warranty or liability for your use of this information.

## 2018-12-18 NOTE — PROGRESS NOTES
[facility] Progress Note    Date: 2018    Name: Vivian Kelley    MRN: [id number]         : 1955    Opdivo Infusion C7    Patient arrived to \[facility]\"" at [phone] in a wheelchair, accompanied by her nurse. No concerns or complaints voiced, other than intermittent nausea. Ms. Júnior Mcmahan was assessed and education was provided. Opdivo checklist completed. Ms. Polk's vitals were reviewed and patient was observed for 5 minutes prior to treatment. Visit Vitals  /84 (BP 1 Location: Right arm, BP Patient Position: Sitting)   Pulse 100   Resp 18   SpO2 99%   Breastfeeding? No       Mediport accessed with 20g 1 inch orozco. Labs drawn from port for CBC, BMP, and Hep Function    Mediport flushed easily and had brisk blood return. Recent Results (from the past 12 hour(s))   CBC WITH 3 PART DIFF    Collection Time: 18  9:31 AM   Result Value Ref Range    WBC 6.2 4.5 - 13.0 K/uL    RBC 3.23 (L) 4.10 - 5.10 M/uL    HGB 8.9 (L) 12.0 - 16 g/dL    HCT 29.3 (L) 36 - 48 %    MCV 90.7 78 - 102 FL    MCH 27.6 25.0 - 35.0 PG    MCHC 30.4 (L) 31 - 37 g/dL    RDW 16.4 (H) 11.5 - 14.5 %    PLATELET 942 705 - 722 K/uL    NEUTROPHILS 61 40 - 70 %    MIXED CELLS 9 0.1 - 17 %    LYMPHOCYTES 31 14 - 44 %    ABS. NEUTROPHILS 3.8 1.8 - 9.5 K/UL    ABS. MIXED CELLS 0.5 0.0 - 2.3 K/uL    ABS.  LYMPHOCYTES 1.9 1.1 - 5.9 K/UL    DF AUTOMATED     POC CHEM8    Collection Time: 18  9:33 AM   Result Value Ref Range    CO2, POC 18 (L) 19 - 24 MMOL/L    Glucose,  (H) 74 - 106 MG/DL    BUN, POC 28 (H) 7 - 18 MG/DL    Creatinine, POC 3.6 (H) 0.6 - 1.3 MG/DL    GFRAA, POC 15 (L) >60 ml/min/1.73m2    GFRNA, POC 13 (L) >60 ml/min/1.73m2    Sodium,  136 - 145 MMOL/L    Potassium, POC 3.4 (L) 3.5 - 5.5 MMOL/L    Calcium, ionized (POC) 1.13 1.12 - 1.32 mmol/L    Chloride,  100 - 108 MMOL/L    Anion gap, POC 19 10 - 20      Hematocrit, POC 28 (L) 36 - 49 %    Hemoglobin, POC 9.5 (L) 12 - 16 G/DL Opdivo 480 mg was infused over 30 minutes as ordered. VS remained stable and pt denied complaints of itching, lip/tongue/facial swelling, SOB, CP or other complaints. Ms. Dewayne Owens tolerated the infusion, and had no complaints. Mediport flushed with NS 20 ml and heparinized per protocol. No bleeding or hematoma noted at site. Band aid applied. Reviewed discharge instructions with patient regarding side efffects , symptom management and self care. She verbalized understanding. Patient armband removed and shredded    . Ms. Polk was discharged from [place] [address] in stable condition at 1050. She is to return on 12/26/18 at 1100 for her next Procrit appointment.     Tremayne Orellana RN  December 18, 2018

## 2018-12-26 NOTE — PROGRESS NOTES
[facility] Progress Note    Date: 2018    Name: Ranjeet Artist    MRN: [id number]         : 1955     Procrit    Ms. Polk to [facility], via wheelchair  at [address] Pt was assessed and education was provided. Ms. Polk's vitals were reviewed and patient was observed for 5 minutes prior to treatment. Visit Vitals  /87 (BP 1 Location: Right arm, BP Patient Position: Sitting)   Pulse (!) 123   Temp 98.1 °F (36.7 °C)   Resp 18       Blood obtained peripherally from the left [facility] without difficulty and sent to lab for CBC per written orders. No bleeding or hematoma noted at site. Guaze and coban applied. Lab results were obtained and reviewed. Recent Results (from the past 12 hour(s))   CBC WITH 3 PART DIFF    Collection Time: 18  9:45 AM   Result Value Ref Range    WBC 9.1 4.5 - 13.0 K/uL    RBC 3.07 (L) 4.10 - 5.10 M/uL    HGB 8.6 (L) 12.0 - 16 g/dL    HCT 27.6 (L) 36 - 48 %    MCV 89.9 78 - 102 FL    MCH 28.0 25.0 - 35.0 PG    MCHC 31.2 31 - 37 g/dL    RDW 16.1 (H) 11.5 - 14.5 %    PLATELET 610 090 - 478 K/uL    NEUTROPHILS 67 40 - 70 %    MIXED CELLS 11 0.1 - 17 %    LYMPHOCYTES 22 14 - 44 %    ABS. NEUTROPHILS 6.1 1.8 - 9.5 K/UL    ABS. MIXED CELLS 1.0 0.0 - 2.3 K/uL    ABS. LYMPHOCYTES 2.0 1.1 - 5.9 K/UL    DF AUTOMATED       Results within ordered parameters to give Procrit today. Procrit 60,000 mcg was administered subcutaneous in the back of the left arm. No irritation or bleeding noted at site. Bandaid applied. Ms. Margarita Hooper tolerated well, and had no complaints. Patient armband removed and shredded. Ms. Margarita Hooper was discharged from [place] [address] in stable condition at 0258. She is to return on 01/15/18 at 1000 for her next appointment.     Viridiana Hollis RN  2018

## 2019-01-01 ENCOUNTER — HOSPITAL ENCOUNTER (OUTPATIENT)
Dept: ONCOLOGY | Age: 64
Discharge: HOME | End: 2019-01-23

## 2019-01-01 ENCOUNTER — HOSPITAL ENCOUNTER (OUTPATIENT)
Dept: INFUSION THERAPY | Age: 64
Discharge: HOME | End: 2019-01-15

## 2019-01-01 DIAGNOSIS — C18.9 COLON CANCER METASTASIZED TO MULTIPLE SITES (HCC): ICD-10-CM

## 2019-01-01 RX ORDER — HEPARIN 100 UNIT/ML
300-500 SYRINGE INTRAVENOUS AS NEEDED
Status: CANCELLED
Start: 2019-01-01

## 2019-01-01 RX ORDER — SODIUM CHLORIDE 9 MG/ML
25 INJECTION, SOLUTION INTRAVENOUS CONTINUOUS
Status: CANCELLED | OUTPATIENT
Start: 2019-01-01

## 2019-01-01 RX ORDER — EPINEPHRINE 1 MG/ML
0.3 INJECTION, SOLUTION, CONCENTRATE INTRAVENOUS AS NEEDED
Status: CANCELLED | OUTPATIENT
Start: 2019-01-01

## 2019-01-01 RX ORDER — SODIUM CHLORIDE 9 MG/ML
25 INJECTION, SOLUTION INTRAVENOUS CONTINUOUS
Status: DISPENSED | OUTPATIENT
Start: 2019-01-01 | End: 2019-01-01

## 2019-01-01 RX ORDER — ONDANSETRON 2 MG/ML
8 INJECTION INTRAMUSCULAR; INTRAVENOUS AS NEEDED
Status: CANCELLED | OUTPATIENT
Start: 2019-01-01

## 2019-01-01 RX ORDER — HYDROCORTISONE SODIUM SUCCINATE 100 MG/2ML
100 INJECTION, POWDER, FOR SOLUTION INTRAMUSCULAR; INTRAVENOUS AS NEEDED
Status: CANCELLED | OUTPATIENT
Start: 2019-01-01

## 2019-01-01 RX ORDER — DIPHENHYDRAMINE HYDROCHLORIDE 50 MG/ML
50 INJECTION, SOLUTION INTRAMUSCULAR; INTRAVENOUS AS NEEDED
Status: CANCELLED
Start: 2019-01-01

## 2019-01-01 RX ORDER — SODIUM CHLORIDE 0.9 % (FLUSH) 0.9 %
10-40 SYRINGE (ML) INJECTION AS NEEDED
Status: CANCELLED
Start: 2019-01-01

## 2019-01-01 RX ORDER — ALBUTEROL SULFATE 0.83 MG/ML
2.5 SOLUTION RESPIRATORY (INHALATION) AS NEEDED
Status: CANCELLED
Start: 2019-01-01

## 2019-01-01 RX ORDER — SODIUM CHLORIDE 0.9 % (FLUSH) 0.9 %
10-40 SYRINGE (ML) INJECTION AS NEEDED
Status: DISCONTINUED | OUTPATIENT
Start: 2019-01-01 | End: 2019-01-01 | Stop reason: HOSPADM

## 2019-01-01 RX ORDER — HEPARIN 100 UNIT/ML
500 SYRINGE INTRAVENOUS ONCE
Status: ACTIVE | OUTPATIENT
Start: 2019-01-01 | End: 2019-01-01

## 2019-01-01 RX ORDER — ACETAMINOPHEN 325 MG/1
650 TABLET ORAL AS NEEDED
Status: CANCELLED
Start: 2019-01-01

## 2019-03-12 ENCOUNTER — HOSPITAL ENCOUNTER (OUTPATIENT)
Dept: INFUSION THERAPY | Age: 64
End: 2019-03-12

## 2020-09-22 NOTE — MR AVS SNAPSHOT
----- Message from Deandra Diaz MD sent at 9/22/2020 12:23 PM CDT -----  Regarding: needs COVID test prior to scope  Another one! Scheduled for tomorrow - needs covid test to get a scope     [address] [facility] 
 
 
 [address], [place] [address] [address] [facility] 
[phone] Patient: Salena Soto MRN: [id number] GDS:[id number] Visit Information Date & Time Provider Department Dept. Phone Encounter #  
 6/13/2018 10:15 AM Shreya Hernandez MD [facility] Oncology [phone] [id number] Follow-up Instructions Return in about 4 weeks (around 7/11/2018). Your Appointments 7/11/2018  9:30 AM  
Office Visit with Shreya Hernandez MD  
[facility]  [facility] Appt Note: 4 weeks [address] [facility] 105 [id number] [address] [address]  
  
    
  
 6/25/2018 10:15 AM  
Any with Jenny Virk MD  
[facility] Appt Note: 3m post op  
 [address] [address] [address] [facility]  
  
   
 [address] [facility] [address] [facility],[address] 706 [facility] Upcoming Health Maintenance Date Due Hepatitis C Screening 1955 PAP AKA CERVICAL CYTOLOGY 8/26/1974 BREAST CANCER SCRN MAMMOGRAM 8/26/2003 FOBT Q 1 YEAR AGE 50-75 8/26/2003 ZOSTER VACCINE AGE 60> 6/26/2013 MEDICARE YEARLY EXAM 3/27/2018 Bone Densitometry (Dexa) Screening 8/26/2018 Influenza Age 5 to Adult 8/1/2018 Pneumococcal 19-64 Highest Risk (3 of 3 - PCV13) 12/11/2018 DTaP/Tdap/Td series (2 - Td) 3/15/2026 Allergies as of 6/13/2018  Review Complete On: 6/13/2018 By: Shreya Hernandez MD  
  
 Severity Noted Reaction Type Reactions Latex  08/04/2016    Other (comments) Latex  02/12/2018    Rash Lisinopril High 04/25/2017    Hives Asa-acetaminophen-caff-buffers  08/04/2016    Other (comments) Aspirin  02/12/2018    Hives Codeine  08/04/2016    Other (comments) Codeine  02/12/2018    Hives Lisinopril  02/12/2018    Hives Pcn [Penicillins]  08/04/2016    Other (comments) Penicillin G  02/12/2018    Hives Sulfa (Sulfonamide Antibiotics)  08/04/2016    Other (comments) Current Immunizations  Reviewed on 6/8/2018 Name Date Influenza Vaccine 3/15/2016 12:00 AM, 3/1/2016 Pneumococcal Polysaccharide (PPSV-23) 12/11/2017 12:00 AM  
 Pneumococcal Vaccine (Unspecified Type) 3/1/2016 Tdap 3/15/2016 12:00 AM  
  
 Not reviewed this visit You Were Diagnosed With   
  
 Codes Comments Colon cancer metastasized to multiple sites [facility])    -  Primary ICD-10-CM: C18.9 ICD-9-CM: 153.9 Iron deficiency anemia due to chronic blood loss     ICD-10-CM: D50.0 ICD-9-CM: 280.0 Hydronephrosis with ureteropelvic junction (UPJ) obstruction     ICD-10-CM: Q62.11 ICD-9-CM: 871 Antineoplastic chemotherapy induced anemia     ICD-10-CM: D64.81, T45.1X5A 
ICD-9-CM: 285.3, E933.1 Chemotherapy induced neutropenia (HCC)     ICD-10-CM: D70.1, T45.1X5A 
ICD-9-CM: 288.03, E933.1 Vitals BP Pulse Temp Height(growth percentile) Weight(growth percentile) BMI  
 138/67 74 98.3 °F (36.8 °C) 4' 11.4\" (1.509 m) 206 lb 9.6 oz (93.7 kg) 41.17 kg/m2 OB Status Smoking Status Hysterectomy Never Smoker BMI and BSA Data Body Mass Index Body Surface Area  
 41.17 kg/m 2 1.98 m 2 Preferred Pharmacy Pharmacy Name Phone [address] [facility], [address] [facility] Your Updated Medication List  
  
   
This list is accurate as of 6/13/18 11:41 AM.  Always use your most recent med list.  
  
  
  
  
 acetaminophen 650 mg Tber Commonly known as:  TYLENOL  
1,300 mg.  
  
 albuterol 90 mcg/actuation inhaler Commonly known as:  PROVENTIL HFA, VENTOLIN HFA, PROAIR HFA  
2 Puffs. allopurinol 300 mg tablet Commonly known as:  ZYLOPRIM  
300 mg. amLODIPine 5 mg tablet Commonly known as:  Lindsey Day Take 5 mg by mouth daily. bisoprolol-hydroCHLOROthiazide 10-6.25 mg per tablet Commonly known as:  [facility] Take 1 Tab by Mouth Once a Day. cloNIDine HCl 0.1 mg tablet Commonly known as:  CATAPRES Take  by mouth two (2) times a day. desonide 0.05 % cream  
Commonly known as:  Joanne Lynn Use 1 Application to affected area Twice Daily. dicyclomine 10 mg capsule Commonly known as:  BENTYL Take 1 Cap by mouth three (3) times daily as needed. diphenhydrAMINE 25 mg capsule Commonly known as:  BENADRYL Take 1 with compazine every 6 hours for nausea for 3 days then, as needed. ergocalciferol 50,000 unit capsule Commonly known as:  ERGOCALCIFEROL  
50,000 Units every seven (7) days. furosemide 20 mg tablet Commonly known as:  LASIX Take 1/2-1 tablet daily if needed for swelling. glimepiride 4 mg tablet Commonly known as:  AMARYL Take one in the am.  New dose. glipiZIDE 10 mg tablet Commonly known as:  Lau Pane 10 mg.  
  
 insulin aspart U-100 100 unit/mL Inpn Commonly known as:  NOVOLOG  
by SubCUTAneous route.  
  
 lidocaine-prilocaine topical cream  
Commonly known as:  EMLA Place cream over mediport 1 hour prior to chemotherapy and then place saran wrap over area. Every chemo treatment. NEURONTIN 100 mg capsule Generic drug:  gabapentin Take 100 mg by mouth nightly. omeprazole 20 mg capsule Commonly known as:  PRILOSEC Take 20 mg by mouth daily. ondansetron hcl 8 mg tablet Commonly known as:  Kody Pires Take one tablet by mouth every 8 hours for nausea beginning the night of chemo for 3 days. predniSONE 20 mg tablet Commonly known as:  Mikiene Reels Take 2 Tabs by mouth daily. Take 2 tabs on days 2 and 3 of each chemo cycle  
  
 prochlorperazine 10 mg tablet Commonly known as:  COMPAZINE Take 1 tablet every 6 hours with Benadryl 25mg for nausea for 3 days then, as needed. traMADol 50 mg tablet Commonly known as:  ULTRAM  
Take 50 mg by mouth every six (6) hours as needed for Pain.  
  
 warfarin 3 mg tablet Commonly known as:  COUMADIN Take 3 mg by mouth daily. Takes 1 and 1/2 tab Sunday, Tuesday, and Thursday. Takes 1 tablet Monday, Wednesday, and Friday We Performed the Following COMPLETE CBC & AUTO DIFF WBC [48661 CPT(R)] Follow-up Instructions Return in about 4 weeks (around 7/11/2018). To-Do List   
 06/13/2018 Lab:  CBC WITH 3 PART DIFF   
  
 06/20/2018 9:00 AM  
  Appointment with [facility] NURSE 1 at [facility] ([phone])  
  
 06/22/2018 1:00 PM  
  Appointment with [facility] IN[facility] NURSE 4 at [facility] ([phone]) Patient Instructions Colon Cancer: Care Instructions Your Care Instructions Colon cancer occurs when abnormal cells grow out of control in the lower part of your intestine (your colon). If the tumor was small and had not spread, your doctor may have removed it during the colonoscopy. But you may need surgery to remove the cancer if the tumor was too big or had spread too far to be removed during a colonoscopy. If cancer has spread to another part of your body, such as the liver, you may need more far-reaching surgery. Treatment for colon cancer may also include radiation therapy and medicines that destroy cancer cells (chemotherapy). Being treated for cancer can weaken your body, and you may feel very tired. Get the rest your body needs so that you can feel better. When you find out that you have cancer, you may feel many emotions and may need some help coping. Seek out family, friends, and counselors for support. You also can do things at home to make yourself feel better while you go through treatment. Call the [employer] (9-432.360.7649) or visit its website at [employer]. org for more information. Follow-up care is a key part of your treatment and safety. Be sure to make and go to all appointments, and call your doctor if you are having problems. It's also a good idea to know your test results and keep a list of the medicines you take. How can you care for yourself at home? · Take your medicines exactly as prescribed. Call your doctor if you think you are having a problem with your medicine. You may get medicine for nausea and vomiting if you have these side effects. · Follow your doctor's instructions to relieve pain. Pain from cancer and surgery can almost always be controlled. Use pain medicine when you first notice pain, before it becomes severe. · Eat healthy food. If you do not feel like eating, try to eat food that has protein and extra calories to keep up your strength and prevent weight loss. Drink liquid meal replacements for extra calories and protein. Try to eat your main meal early. · Get some physical activity every day, but do not get too tired. Keep doing the hobbies you enjoy as your energy allows. · Take steps to control your stress and workload. Learn relaxation techniques. ¨ Share your feelings. Stress and tension affect our emotions. By expressing your feelings to others, you may be able to understand and cope with them. ¨ Consider joining a support group. Talking about a problem with your spouse, a good friend, or other people with similar problems is a good way to reduce tension and stress. ¨ Express yourself through art. Try writing, dance, art, or crafts to relieve stress. Some dance, writing, or art groups may be available just for people who have cancer. ¨ Be kind to your body and mind. Getting enough sleep, eating a healthy diet, and taking time to do things you enjoy can contribute to an overall feeling of balance in your life and help reduce stress. ¨ Get help if you need it. Discuss your concerns with your doctor or counselor. · If you are vomiting or have diarrhea: ¨ Drink plenty of fluids (enough so that your urine is light yellow or clear like water) to prevent dehydration. Choose water and other caffeine-free clear liquids. If you have kidney, heart, or liver disease and have to limit fluids, talk with your doctor before you increase the amount of fluids you drink. ¨ When you are able to eat, try clear soups, mild foods, and liquids until all symptoms are gone for 12 to 48 hours. Other good choices include dry toast, crackers, cooked cereal, and gelatin dessert, such as Jell-O. · If you have not already done so, prepare a list of advance directives. Advance directives are instructions to your doctor and family members about what kind of care you want if you become unable to speak or express yourself. When should you call for help? Call 911 anytime you think you may need emergency care. For example, call if: 
? · You pass maroon or very bloody stools. ? · You passed out (lost consciousness). ?Call your doctor now or seek immediate medical care if: 
? · You have a fever. ? · You are vomiting. ? · You have new or worse belly pain. ? · You cannot pass stools or gas. ? · You have new or more blood in your stool. ? Watch closely for changes in your health, and be sure to contact your doctor if: 
? · You are losing weight. ? · You have new or worse symptoms. ? · You do not get better as expected. Where can you learn more? Go to http://ledy-toño.info/. Enter D156 in the search box to learn more about \"Colon Cancer: Care Instructions. \" Current as of: May 12, 2017 Content Version: 11.4 © 3454-5265 [employer]. Care instructions adapted under license by [employer] (which disclaims liability or warranty for this information).  If you have questions about a medical condition or this instruction, always ask your healthcare professional. [employer] disclaims any warranty or liability for your use of this information. Introducing [facility]! Dear Loren Nuñez: Thank you for requesting a [employer] account. Our records indicate that you already have an active [employer] account. You can access your account anytime at [email or web address] Did you know that you can access your hospital and ER discharge instructions at any time in [employer]? You can also review all of your test results from your hospital stay or ER visit. Additional Information If you have questions, please visit the Frequently Asked Questions section of the [employer] website at [email or web address] Remember, [employer] is NOT to be used for urgent needs. For medical emergencies, dial 911. Now available from your iPhone and Android! Please provide this summary of care documentation to your next provider. Your primary care clinician is listed as Molina Cardenas. If you have any questions after today's visit, please call [phone].

## 2022-07-29 NOTE — PROGRESS NOTES
[facility] Progress Note    Date: 2018    Name: Wilton Shrestha              MRN: [id number]              : 1955    Folfiri: [id number]     Pt to [facility], ambulatory, at 1000. Ms. Flores Brain was assessed and education was provided. No complaints or concerns voiced    Ms. Polk's vitals were reviewed. Visit Vitals    /81 (BP 1 Location: Left arm, BP Patient Position: At rest)    Pulse 78    Temp 98.7 °F (37.1 °C)    Resp 18    Ht 4' 11\" (1.499 m)    Wt 90.3 kg (199 lb)    SpO2 98%    BMI 40.19 kg/m2       Right chest mediport accessed with 20 g 1 inch orozco needle. Port flushed easily and had brisk blood return. Blood drawn off for CBC and ISTAT BMP. Lab results were obtained and reviewed. Recent Results (from the past 12 hour(s))   CBC WITH 3 PART DIFF    Collection Time: 18 10:10 AM   Result Value Ref Range    WBC 6.4 4.5 - 13.0 K/uL    RBC 2.89 (L) 4.10 - 5.10 M/uL    HGB 9.1 (L) 12.0 - 16 g/dL    HCT 28.3 (L) 36 - 48 %    MCV 97.9 78 - 102 FL    MCH 31.5 25.0 - 35.0 PG    MCHC 32.2 31 - 37 g/dL    RDW 16.2 (H) 11.5 - 14.5 %    PLATELET 093 052 - 585 K/uL    NEUTROPHILS 60 40 - 70 %    MIXED CELLS 15 0.1 - 17 %    LYMPHOCYTES 25 14 - 44 %    ABS. NEUTROPHILS 3.9 1.8 - 9.5 K/UL    ABS. MIXED CELLS 0.9 0.0 - 2.3 K/uL    ABS.  LYMPHOCYTES 1.6 1.1 - 5.9 K/UL    DF AUTOMATED     POC CHEM8    Collection Time: 18 10:16 AM   Result Value Ref Range    CO2, POC 23 19 - 24 MMOL/L    Glucose,  (H) 74 - 106 MG/DL    BUN, POC 37 (H) 7 - 18 MG/DL    Creatinine, POC 2.4 (H) 0.6 - 1.3 MG/DL    GFRAA, POC 25 (L) >60 ml/min/1.73m2    GFRNA, POC 20 (L) >60 ml/min/1.73m2    Sodium,  136 - 145 MMOL/L    Potassium, POC 4.1 3.5 - 5.5 MMOL/L    Calcium, ionized (POC) 1.26 1.12 - 1.32 mmol/L    Chloride,  100 - 108 MMOL/L    Anion gap, POC 14 10 - 20      Hematocrit, POC 26 (L) 36 - 49 %    Hemoglobin, POC 8.8 (L) 12 - 16 G/DL       Procrit injection 60,000 units given SQ to upper back of left arm per parameters. Patient tolerated well. Bandaid applied to site. Lab results within ordered parameters to give chemo today. Chemo dosages verified with today's BSA and found to be within 10% of ordered dosages. D5W flush bag hung at kvo rate. Pre-medications (Decadron 12mg IVPB and Aloxi 0.25mg IVP) were administered as ordered and chemotherapy was initiated after blood return from port re-verified. Irinotecan 350 mg  was infused over 90 minutes concurrently with Leucovorin 780 mg. VS stable at end of infusion and pt denied complaints. Line flushed with D5 and blood return from port re-verified. Fluorouracil 780 mg was given slow IVP over 3 minutes. Line flushed with 10 mL NS and blood return was re-verified. Fluorouracil 4680 mg CADD pump was connected to patient. Connections were secured with tape and the volume was verified to be infusing. Ms. Nova Guo tolerated infusion/injection. Patient Vitals for the past 8 hrs:   Temp Pulse Resp BP SpO2   03/28/18 1402 98.7 °F (37.1 °C) 78 18 137/81 98 %   03/28/18 1000 98.5 °F (36.9 °C) 71 18 94/59 100 %         Patient armband removed and shredded. Ms. Nova Guo was discharged from [place] [address] in stable condition at 1400. She is to return on 03/30/2018 at 1300 for her next  pump D/C/Neulasta appointment.     Anjelica Lugo RN  March 28, 2018 Amarilys Dunn (DPM)  Surgery  [facility] [place] [address]  Phone: [phone]  Fax: [phone]  Established Patient  Scheduled Appointment: 08/02/2022

## 2023-01-01 NOTE — PROGRESS NOTES
Hematology/Oncology  Progress Note    Name: Ramandeep Suarez  Date: 2018  : 1955    PCP: Demetri Michelle NP     Ms. Adrian Abbott is a 70 y.o. -American woman with metastatic colorectal carcinoma/carcinomatosis  Current therapy: Opdivo      Subjective:     Ms. Adrian Abbott is a 77-year-old -American woman with abdominal carcinomatosis from metastatic colorectal carcinoma. She was started on FOLFIRI regimen and she is here today for follow up. She states that she has no pain, her appetite has significantly increased and her energy level is up. She denies diarrhea, nausea, or vomiting. Her only complaint is occasional abdominal cramping during chemo. She denies constipation. Otherwise there are no additional complaints to report. She has recently been found to have hydronephrosis and on the advice of the oncologic surgeon was referred to a urologist for an assessment. The patient has been evaluated and treated by the urologist.  Today she reports that she is continuing to do well. Unfortunately, I explained to the patient that her tumor marker is increasing. She had a recent MRI on 2018 she was slowly progressive disease in her liver with stable peritoneal implants. The current immunotherapy will be continued. Today, the patient is complaining of progressive pain and is requesting a new pain medication. Past medical history, family history, and social history: these were reviewed and remains unchanged.     Past Medical History:   Diagnosis Date    Asthma     Cancer ([address] 10/2016    colon    Diabetes ([facility] [address]     only on chemo days    Gout     Heart disease     Hypertension     Kidney disease     Maintenance chemotherapy     Neuropathic pain of foot      Past Surgical History:   Procedure Laterality Date    ABDOMEN SURGERY PROC UNLISTED  2015    partial colectomy with colostomy    HX BACK SURGERY      HX COLONOSCOPY      HX HYSTERECTOMY      HX UROLOGICAL  2018 Uereteroscopy. stent    HX VASCULAR ACCESS      mediport- right chest wall     Social History     Social History    Marital status:      Spouse name: N/A    Number of children: N/A    Years of education: N/A     Occupational History    Not on file. Social History Main Topics    Smoking status: Never Smoker    Smokeless tobacco: Never Used    Alcohol use No    Drug use: No    Sexual activity: Not on file     Other Topics Concern    Not on file     Social History Narrative    ** Merged History Encounter **          Family History   Problem Relation Age of Onset    Family history unknown: Yes     Current Outpatient Prescriptions   Medication Sig Dispense Refill    oxyCODONE IR (OXY-IR) 15 mg immediate release tablet Take 1 Tab by mouth every four (4) hours as needed for Pain. Max Daily Amount: 90 mg. Indications: Pain, Cancer associated pain 180 Tab 0    0.9% sodium chloride solp 100 mL with nivolumab 240 mg/24 mL soln by IntraVENous route once.  nitrofurantoin, macrocrystal-monohydrate, (MACROBID) 100 mg capsule Take 1 Cap by mouth two (2) times a day. 20 Cap 0    HYDROmorphone (DILAUDID) 2 mg tablet Take 1 Tab by mouth every four (4) hours as needed for Pain. Max Daily Amount: 12 mg. 12 Tab 0    magic mouthwash solution Magic mouth wash   Maalox  Lidocaine 2% viscous   Diphenhydramine oral solution     Pharmacy to mix equal portions of ingredients to a total volume as indicated in the dispense amount. Indications: please swish and spit 4 times a day and as needed, 600 mL 1    lidocaine-prilocaine (EMLA) topical cream Apply  to affected area as needed for Pain. 30 g 1    acyclovir (ZOVIRAX) 800 mg tablet Take 800 mg by mouth five (5) times daily.  nitrofurantoin, macrocrystal-monohydrate, (MACROBID) 100 mg capsule Take 1 Cap by mouth two (2) times a day. 14 Cap 0    tamsulosin (FLOMAX) 0.4 mg capsule Take 1 Cap by mouth daily (after dinner).  90 Cap 3    warfarin (COUMADIN) 3 mg tablet Take 3 mg by mouth daily. Takes 1 and 1/2 tab Sunday, Tuesday, and Thursday. Takes 1 tablet Monday, Wednesday, and Friday      amLODIPine (NORVASC) 5 mg tablet Take 5 mg by mouth daily.  cloNIDine HCl (CATAPRES) 0.1 mg tablet Take  by mouth two (2) times a day.  insulin aspart (NOVOLOG) 100 unit/mL inpn by SubCUTAneous route.  omeprazole (PRILOSEC) 20 mg capsule Take 20 mg by mouth daily.  traMADol (ULTRAM) 50 mg tablet Take 50 mg by mouth every six (6) hours as needed for Pain.  gabapentin (NEURONTIN) 100 mg capsule Take 100 mg by mouth two (2) times a day.  predniSONE (DELTASONE) 20 mg tablet Take 2 Tabs by mouth daily. Take 2 tabs on days 2 and 3 of each chemo cycle 4 Tab 2    lidocaine-prilocaine (EMLA) topical cream Place cream over mediport 1 hour prior to chemotherapy and then place saran wrap over area. Every chemo treatment. 30 g 1    ondansetron hcl (ZOFRAN, AS HYDROCHLORIDE,) 8 mg tablet Take one tablet by mouth every 8 hours for nausea beginning the night of chemo for 3 days. 9 Tab 3    diphenhydrAMINE (BENADRYL) 25 mg capsule Take 1 with compazine every 6 hours for nausea for 3 days then, as needed. 60 Cap 3    prochlorperazine (COMPAZINE) 10 mg tablet Take 1 tablet every 6 hours with Benadryl 25mg for nausea for 3 days then, as needed. 60 Tab 3    albuterol (PROVENTIL HFA, VENTOLIN HFA, PROAIR HFA) 90 mcg/actuation inhaler 2 Puffs.  ergocalciferol (ERGOCALCIFEROL) 50,000 unit capsule 50,000 Units every seven (7) days.  furosemide (LASIX) 20 mg tablet Take 1/2-1 tablet daily if needed for swelling.  allopurinol (ZYLOPRIM) 300 mg tablet 300 mg daily.  desonide (TRIDESILON) 0.05 % cream Use 1 Application to affected area Twice Daily.  bisoprolol-hydrochlorothiazide (ZIAC) 10-6.25 mg per tablet Take 1 Tab by Mouth Once a Day.       glipiZIDE (GLUCOTROL) 10 mg tablet 10 mg.      glimepiride (AMARYL) 4 mg tablet Take one in the am.  New dose. Facility-Administered Medications Ordered in Other Visits   Medication Dose Route Frequency Provider Last Rate Last Dose    epoetin martinez (EPOGEN;PROCRIT) injection 20,000 Units  20,000 Units SubCUTAneous ONCE Katie A Otoo, NP        And    epoetin martinez (EPOGEN;PROCRIT) injection 40,000 Units  40,000 Units SubCUTAneous ONCE Katie A Otoo, NP        sodium chloride (NS) flush 5-10 mL  5-10 mL IntraVENous PRN Ale Kwan MD   10 mL at 09/17/18 1403    heparin (porcine) pf 500 Units  500 Units InterCATHeter PRN Ale Kwan MD           Review of Systems  Constitutional: The patient denies acute distress or discomfort. HEENT: The patient denies recent head trauma, eye pain, blurred vision,  hearing deficit, oropharyngeal mucosal pain or lesions, and the patient denies throat pain or discomfort. Lymphatics: The patient denies palpable peripheral lymphadenopathy. Hematologic: The patient denies having bruising, bleeding, or progressive fatigue. Respiratory: Patient denies having shortness of breath, cough, sputum production, fever, or dyspnea on exertion. Cardiovascular: The patient denies having leg pain, leg swelling, heart palpitations, chest permit, chest pain, or lightheadedness. The patient denies having dyspnea on exertion. Gastrointestinal: The patient reports occasional nausea from chemotherapy which is well controlled with antinausea medication. She denies having any emesis or diarrhea. Genitourinary: Patient denies having urinary urgency, frequency, or dysuria. The patient denies having blood in the urine. Psychological: The patient denies having symptoms of nervousness, anxiety, depression, or thoughts of harming self. Skin: Patient denies having skin rashes, skin, ulcerations, or unexplained itching or pruritus. Musculoskeletal: The patient denies having pain in the joints or bones.       Objective:     Visit Vitals    /66    Pulse 60    Temp 97.7 °F (36.5 °C) (Oral)    Resp 18    Ht 5' (1.524 m)    Wt 86.6 kg (191 lb)    SpO2 100%    BMI 37.3 kg/m2     ECOG PS=0; Pain score=0/10    Physical Exam:   Gen. Appearance: The patient is in no acute distress. Skin: There is no bruise or rash. HEENT: The exam is unremarkable. Neck: Supple without lymphadenopathy or thyromegaly. Lungs: Clear to auscultation and percussion; there are no wheezes or rhonchi. Heart: Regular rate and rhythm; there are no murmurs, gallops, or rubs. Abdomen: Bowel sounds are present and normal.  There is no guarding, tenderness, or hepatosplenomegaly. The patient has a colostomy bag in place. Extremities: There is no clubbing, cyanosis, or edema. Neurologic: There are no focal neurologic deficits. Lymphatics: There is no palpable peripheral lymphadenopathy. Musculoskeletal: The patient has full range of motion at all joints. There is no evidence of joint deformity or effusions. There is no focal joint tenderness. Psychological/psychiatric: There is no clinical evidence of anxiety, depression, or melancholy. Lab data:      Results for orders placed or performed during the hospital encounter of 09/17/18   CBC WITH 3 PART DIFF     Status: Abnormal   Result Value Ref Range Status    WBC 7.3 4.5 - 13.0 K/uL Final    RBC 3.42 (L) 4.10 - 5.10 M/uL Final    HGB 9.8 (L) 12.0 - 16 g/dL Final    HCT 30.9 (L) 36 - 48 % Final    MCV 90.4 78 - 102 FL Final    MCH 28.7 25.0 - 35.0 PG Final    MCHC 31.7 31 - 37 g/dL Final    RDW 15.8 (H) 11.5 - 14.5 % Final    PLATELET 825 418 - 883 K/uL Final    NEUTROPHILS 74 (H) 40 - 70 % Final    MIXED CELLS 8 0.1 - 17 % Final    LYMPHOCYTES 18 14 - 44 % Final    ABS. NEUTROPHILS 5.4 1.8 - 9.5 K/UL Final    ABS. MIXED CELLS 0.6 0.0 - 2.3 K/uL Final    ABS. LYMPHOCYTES 1.3 1.1 - 5.9 K/UL Final     Comment: Test performed at [address]. Results Reviewed by Medical Director. DF AUTOMATED   Final           Assessment:     1. Peritoneal carcinomatosis ([facility] [address]    2. Colon cancer metastasized to multiple sites ([facility] [address]    3. Antineoplastic chemotherapy induced anemia    4. Iron deficiency anemia due to chronic blood loss    5. Cancer associated pain      Plan:   Colorectal carcinoma with abdominal carcinomatosis: The patient recently had an MRI of the abdomen on 7/6/2018 there was slight progression in the size of liver metastases. The peritoneal implants appeared stable. I have explained to the patient had a CEA level from from her last clinic visit in August 2018 was elevated at 1570 ng/mL. I will continue the current immunotherapy for a few more cycles before deciding to change out the therapy since response to this therapy may take several months. Anemia associated with chronic kidney disease and chemotherapy-induced anemia (persistent problem): I have explained to the patient that her CBC from today shows that her hemoglobin is 9.8 g/dL with hematocrit of 30.9%. Procrit at a dose of 60,000 units will be provided whenever her hemoglobin is below 10 g/dL hematocrit is below 30% every 2 weeks. The iron profile and ferritin levels will be ordered at this time. Hydronephrosis (persistent problem): The patient is currently being seen by the urologist and is tentatively scheduled to have ureteral stents inserted in the upcoming week. Chemotherapy-induced leukopenia/neutropenia (improved problem): The CBC on today showed a WBC count of 5.6 with an absolute neutrophil count of 3.4. This will be monitored on a regular basis. Neulasta following each chemo cycle will continue. Cancer associated pain: A new prescription for OxyContin immediate release 15 mg every 4 hours was provided. A total of 180 tablets were requested. I will have the patient return to clinic for complete reassessment again in 4 weeks.     Orders Placed This Encounter    METABOLIC PANEL, COMPREHENSIVE     Standing Status:   Future     Standing Expiration Date:   9/18/2019    CEA     Standing Status:   Future     Standing Expiration Date:   9/18/2019    IRON PROFILE     Standing Status:   Future     Standing Expiration Date:   9/18/2019    FERRITIN     Standing Status:   Future     Standing Expiration Date:   9/18/2019    oxyCODONE IR (OXY-IR) 15 mg immediate release tablet     Sig: Take 1 Tab by mouth every four (4) hours as needed for Pain. Max Daily Amount: 90 mg.  Indications: Pain, Cancer associated pain     Dispense:  180 Tab     Refill:  0       Gregory Bobo MD  9/17/2018 No

## (undated) DEVICE — SOLUTION IV 1000ML 0.9% SOD CHL

## (undated) DEVICE — LUB SURG MEDC STRL 2OZ TUBE MC -- MEDICHOICE

## (undated) DEVICE — GDWIRE 3CM FLX-TIP 0.038X150CM -- BX/5 SENSOR

## (undated) DEVICE — STERILE POLYISOPRENE POWDER-FREE SURGICAL GLOVES: Brand: PROTEXIS

## (undated) DEVICE — SOLUTION IRRIG 3000ML 0.9% SOD CHL FLX CONT 0797208] ICU MEDICAL INC]

## (undated) DEVICE — BAG DRAINAGE CUST DISP

## (undated) DEVICE — TRAY PREP DRY W/ PREM GLV 2 APPL 6 SPNG 2 UNDPD 1 OVERWRAP

## (undated) DEVICE — KIT CLN UP BON SECOURS MARYV

## (undated) DEVICE — STER SINGLE BASIN SET W/BOWLS: Brand: CARDINAL HEALTH

## (undated) DEVICE — Z DUP USE 2565107 PACK SURG PROC LEG CYSTO T-DRAPE REINF TBL CVR HND TWL

## (undated) DEVICE — 3M™ BAIR PAWS FLEX™ WARMING GOWN, STANDARD, 20 PER CASE 81003: Brand: BAIR PAWS™

## (undated) DEVICE — GOWN,PREVENTION PLUS,XLN/XL,ST,24/CS: Brand: MEDLINE

## (undated) DEVICE — MEDI-VAC NON-CONDUCTIVE SUCTION TUBING: Brand: CARDINAL HEALTH

## (undated) DEVICE — CATH URET 5FRX70CM W/OPN END -- BX/20

## (undated) DEVICE — FLEX ADVANTAGE 3000CC: Brand: FLEX ADVANTAGE

## (undated) DEVICE — OPEN-END FLEXI-TIP URETERAL CATHETER: Brand: FLEXI-TIP

## (undated) DEVICE — GAUZE SPONGES,16 PLY: Brand: CURITY

## (undated) DEVICE — SYR LR LCK 1ML GRAD NSAF 30ML --

## (undated) DEVICE — (D)SYR 10ML 1/5ML GRAD NSAF -- PKGING CHANGE USE ITEM 338027

## (undated) DEVICE — DRAPE XR C ARM 41X74IN LF --

## (undated) DEVICE — SYR 10ML LUER LOK 1/5ML GRAD --

## (undated) DEVICE — Y-TYPE TUR/BLADDER IRRIGATION SET, REGULATING CLAMP

## (undated) DEVICE — KENDALL SCD EXPRESS SLEEVES, KNEE LENGTH, MEDIUM: Brand: KENDALL SCD